# Patient Record
Sex: FEMALE | Race: WHITE | Employment: OTHER | URBAN - METROPOLITAN AREA
[De-identification: names, ages, dates, MRNs, and addresses within clinical notes are randomized per-mention and may not be internally consistent; named-entity substitution may affect disease eponyms.]

---

## 2017-02-10 RX ORDER — ATORVASTATIN CALCIUM 40 MG/1
TABLET, FILM COATED ORAL
Qty: 30 TABLET | Refills: 1 | Status: SHIPPED | OUTPATIENT
Start: 2017-02-10 | End: 2017-08-14 | Stop reason: SDUPTHER

## 2017-02-13 RX ORDER — LEVOTHYROXINE SODIUM 0.12 MG/1
TABLET ORAL
Qty: 30 TABLET | Refills: 3 | Status: SHIPPED | OUTPATIENT
Start: 2017-02-13 | End: 2017-05-18 | Stop reason: SDUPTHER

## 2017-03-20 RX ORDER — CALCIPOTRIENE 0.05 MG/ML
SOLUTION TOPICAL
Qty: 60 ML | Refills: 0 | Status: SHIPPED | OUTPATIENT
Start: 2017-03-20 | End: 2017-08-14 | Stop reason: SDUPTHER

## 2017-05-19 RX ORDER — LEVOTHYROXINE SODIUM 0.12 MG/1
TABLET ORAL
Qty: 30 TABLET | Refills: 0 | Status: SHIPPED | OUTPATIENT
Start: 2017-05-19 | End: 2017-06-16 | Stop reason: SDUPTHER

## 2017-06-16 RX ORDER — LEVOTHYROXINE SODIUM 0.12 MG/1
TABLET ORAL
Qty: 30 TABLET | Refills: 0 | Status: SHIPPED | OUTPATIENT
Start: 2017-06-16 | End: 2017-07-11 | Stop reason: SDUPTHER

## 2017-07-11 RX ORDER — LEVOTHYROXINE SODIUM 0.12 MG/1
TABLET ORAL
Qty: 30 TABLET | Refills: 0 | Status: SHIPPED | OUTPATIENT
Start: 2017-07-11 | End: 2017-08-14 | Stop reason: SDUPTHER

## 2017-08-14 ENCOUNTER — OFFICE VISIT (OUTPATIENT)
Dept: INTERNAL MEDICINE | Age: 62
End: 2017-08-14

## 2017-08-14 VITALS
DIASTOLIC BLOOD PRESSURE: 82 MMHG | WEIGHT: 238 LBS | BODY MASS INDEX: 39.65 KG/M2 | SYSTOLIC BLOOD PRESSURE: 128 MMHG | HEIGHT: 65 IN

## 2017-08-14 DIAGNOSIS — E78.5 HYPERLIPIDEMIA, UNSPECIFIED HYPERLIPIDEMIA TYPE: ICD-10-CM

## 2017-08-14 DIAGNOSIS — M16.11 PRIMARY OSTEOARTHRITIS OF RIGHT HIP: ICD-10-CM

## 2017-08-14 DIAGNOSIS — E03.9 ACQUIRED HYPOTHYROIDISM: ICD-10-CM

## 2017-08-14 DIAGNOSIS — Z12.11 ENCOUNTER FOR SCREENING COLONOSCOPY: ICD-10-CM

## 2017-08-14 DIAGNOSIS — Z00.00 ANNUAL PHYSICAL EXAM: Primary | ICD-10-CM

## 2017-08-14 DIAGNOSIS — H40.1132 PRIMARY OPEN ANGLE GLAUCOMA OF BOTH EYES, MODERATE STAGE: ICD-10-CM

## 2017-08-14 PROCEDURE — 99396 PREV VISIT EST AGE 40-64: CPT | Performed by: INTERNAL MEDICINE

## 2017-08-14 RX ORDER — CALCIPOTRIENE 0.05 MG/ML
SOLUTION TOPICAL
Qty: 60 ML | Refills: 1 | Status: SHIPPED | OUTPATIENT
Start: 2017-08-14 | End: 2018-07-23 | Stop reason: SDUPTHER

## 2017-08-14 RX ORDER — LEVOTHYROXINE SODIUM 0.12 MG/1
TABLET ORAL
Qty: 30 TABLET | Refills: 3 | Status: SHIPPED | OUTPATIENT
Start: 2017-08-14 | End: 2017-11-23 | Stop reason: SDUPTHER

## 2017-08-14 RX ORDER — ATORVASTATIN CALCIUM 40 MG/1
TABLET, FILM COATED ORAL
Qty: 30 TABLET | Refills: 3 | Status: SHIPPED | OUTPATIENT
Start: 2017-08-14 | End: 2017-11-23 | Stop reason: SDUPTHER

## 2017-08-14 RX ORDER — NAPROXEN 500 MG/1
500 TABLET ORAL 2 TIMES DAILY WITH MEALS
Qty: 60 TABLET | Refills: 2 | Status: SHIPPED | OUTPATIENT
Start: 2017-08-14 | End: 2017-10-29 | Stop reason: SDUPTHER

## 2017-08-14 ASSESSMENT — PATIENT HEALTH QUESTIONNAIRE - PHQ9
SUM OF ALL RESPONSES TO PHQ QUESTIONS 1-9: 0
1. LITTLE INTEREST OR PLEASURE IN DOING THINGS: 0
SUM OF ALL RESPONSES TO PHQ9 QUESTIONS 1 & 2: 0
2. FEELING DOWN, DEPRESSED OR HOPELESS: 0

## 2017-08-16 DIAGNOSIS — M16.11 PRIMARY OSTEOARTHRITIS OF RIGHT HIP: ICD-10-CM

## 2017-08-16 DIAGNOSIS — E78.5 HYPERLIPIDEMIA, UNSPECIFIED HYPERLIPIDEMIA TYPE: ICD-10-CM

## 2017-08-16 DIAGNOSIS — Z00.00 ANNUAL PHYSICAL EXAM: ICD-10-CM

## 2017-08-16 LAB
A/G RATIO: 1.5 (ref 1.1–2.2)
ALBUMIN SERPL-MCNC: 4.1 G/DL (ref 3.4–5)
ALP BLD-CCNC: 67 U/L (ref 40–129)
ALT SERPL-CCNC: 13 U/L (ref 10–40)
ANION GAP SERPL CALCULATED.3IONS-SCNC: 11 MMOL/L (ref 3–16)
AST SERPL-CCNC: 15 U/L (ref 15–37)
BASOPHILS ABSOLUTE: 0.1 K/UL (ref 0–0.2)
BASOPHILS RELATIVE PERCENT: 1 %
BILIRUB SERPL-MCNC: 0.3 MG/DL (ref 0–1)
BUN BLDV-MCNC: 13 MG/DL (ref 7–20)
CALCIUM SERPL-MCNC: 9.1 MG/DL (ref 8.3–10.6)
CHLORIDE BLD-SCNC: 101 MMOL/L (ref 99–110)
CHOLESTEROL, TOTAL: 187 MG/DL (ref 0–199)
CO2: 28 MMOL/L (ref 21–32)
CREAT SERPL-MCNC: 0.5 MG/DL (ref 0.6–1.2)
EOSINOPHILS ABSOLUTE: 0.6 K/UL (ref 0–0.6)
EOSINOPHILS RELATIVE PERCENT: 11.5 %
GFR AFRICAN AMERICAN: >60
GFR NON-AFRICAN AMERICAN: >60
GLOBULIN: 2.8 G/DL
GLUCOSE BLD-MCNC: 94 MG/DL (ref 70–99)
HCT VFR BLD CALC: 38.3 % (ref 36–48)
HDLC SERPL-MCNC: 69 MG/DL (ref 40–60)
HEMOGLOBIN: 12.6 G/DL (ref 12–16)
LDL CHOLESTEROL CALCULATED: 96 MG/DL
LYMPHOCYTES ABSOLUTE: 1.2 K/UL (ref 1–5.1)
LYMPHOCYTES RELATIVE PERCENT: 22.6 %
MCH RBC QN AUTO: 31.7 PG (ref 26–34)
MCHC RBC AUTO-ENTMCNC: 32.9 G/DL (ref 31–36)
MCV RBC AUTO: 96.1 FL (ref 80–100)
MONOCYTES ABSOLUTE: 0.5 K/UL (ref 0–1.3)
MONOCYTES RELATIVE PERCENT: 8.9 %
NEUTROPHILS ABSOLUTE: 2.9 K/UL (ref 1.7–7.7)
NEUTROPHILS RELATIVE PERCENT: 56 %
PDW BLD-RTO: 15 % (ref 12.4–15.4)
PLATELET # BLD: 291 K/UL (ref 135–450)
PMV BLD AUTO: 9 FL (ref 5–10.5)
POTASSIUM SERPL-SCNC: 4.5 MMOL/L (ref 3.5–5.1)
RBC # BLD: 3.99 M/UL (ref 4–5.2)
SODIUM BLD-SCNC: 140 MMOL/L (ref 136–145)
T4 FREE: 1.5 NG/DL (ref 0.9–1.8)
TOTAL PROTEIN: 6.9 G/DL (ref 6.4–8.2)
TRIGL SERPL-MCNC: 111 MG/DL (ref 0–150)
TSH REFLEX: 5.02 UIU/ML (ref 0.27–4.2)
VITAMIN D 25-HYDROXY: 31.6 NG/ML
VLDLC SERPL CALC-MCNC: 22 MG/DL
WBC # BLD: 5.1 K/UL (ref 4–11)

## 2017-10-30 RX ORDER — NAPROXEN 500 MG/1
TABLET ORAL
Qty: 60 TABLET | Refills: 0 | Status: SHIPPED | OUTPATIENT
Start: 2017-10-30 | End: 2017-11-23 | Stop reason: SDUPTHER

## 2017-11-27 RX ORDER — ATORVASTATIN CALCIUM 40 MG/1
TABLET, FILM COATED ORAL
Qty: 30 TABLET | Refills: 0 | Status: SHIPPED | OUTPATIENT
Start: 2017-11-27 | End: 2017-12-22 | Stop reason: SDUPTHER

## 2017-11-27 RX ORDER — LEVOTHYROXINE SODIUM 0.12 MG/1
TABLET ORAL
Qty: 30 TABLET | Refills: 0 | Status: SHIPPED | OUTPATIENT
Start: 2017-11-27 | End: 2017-12-22 | Stop reason: SDUPTHER

## 2017-11-27 RX ORDER — NAPROXEN 500 MG/1
TABLET ORAL
Qty: 60 TABLET | Refills: 0 | Status: SHIPPED | OUTPATIENT
Start: 2017-11-27 | End: 2017-12-22 | Stop reason: SDUPTHER

## 2017-12-22 RX ORDER — ATORVASTATIN CALCIUM 40 MG/1
TABLET, FILM COATED ORAL
Qty: 30 TABLET | Refills: 0 | Status: SHIPPED | OUTPATIENT
Start: 2017-12-22 | End: 2018-11-05 | Stop reason: SDUPTHER

## 2017-12-22 RX ORDER — LEVOTHYROXINE SODIUM 0.12 MG/1
TABLET ORAL
Qty: 30 TABLET | Refills: 0 | Status: SHIPPED | OUTPATIENT
Start: 2017-12-22 | End: 2018-01-14 | Stop reason: SDUPTHER

## 2017-12-22 RX ORDER — NAPROXEN 500 MG/1
TABLET ORAL
Qty: 60 TABLET | Refills: 0 | Status: SHIPPED | OUTPATIENT
Start: 2017-12-22 | End: 2018-01-03 | Stop reason: ALTCHOICE

## 2018-01-03 ENCOUNTER — OFFICE VISIT (OUTPATIENT)
Dept: ORTHOPEDIC SURGERY | Age: 63
End: 2018-01-03

## 2018-01-03 VITALS
HEIGHT: 65 IN | WEIGHT: 235 LBS | DIASTOLIC BLOOD PRESSURE: 83 MMHG | HEART RATE: 66 BPM | BODY MASS INDEX: 39.15 KG/M2 | SYSTOLIC BLOOD PRESSURE: 142 MMHG

## 2018-01-03 DIAGNOSIS — M79.642 BILATERAL HAND PAIN: Primary | ICD-10-CM

## 2018-01-03 DIAGNOSIS — G56.03 BILATERAL CARPAL TUNNEL SYNDROME: ICD-10-CM

## 2018-01-03 DIAGNOSIS — M79.641 BILATERAL HAND PAIN: Primary | ICD-10-CM

## 2018-01-03 PROCEDURE — 99203 OFFICE O/P NEW LOW 30 MIN: CPT | Performed by: ORTHOPAEDIC SURGERY

## 2018-01-03 PROCEDURE — 73130 X-RAY EXAM OF HAND: CPT | Performed by: ORTHOPAEDIC SURGERY

## 2018-01-03 PROCEDURE — 20526 THER INJECTION CARP TUNNEL: CPT | Performed by: ORTHOPAEDIC SURGERY

## 2018-01-03 PROCEDURE — L3908 WHO COCK-UP NONMOLDE PRE OTS: HCPCS | Performed by: ORTHOPAEDIC SURGERY

## 2018-01-03 RX ORDER — IBUPROFEN 800 MG/1
800 TABLET ORAL EVERY 8 HOURS PRN
Qty: 60 TABLET | Refills: 0 | Status: SHIPPED | OUTPATIENT
Start: 2018-01-03 | End: 2019-03-20

## 2018-01-03 NOTE — PROGRESS NOTES
Injection administration details:  Date & Time: 1/3/18 10:52 AM  Site & Comments: BILATERAL CARPAL TUNNEL administered by Dr Stiven Hudson 400mg/10mL  CC# : 1  [facility], [place]:[id number]   Lot number: [id number]  EXP: 12/2018    Xylocaine 1% 50mL  CC# : 1  NDC: 91294-348-59  Lot number: [id number]  EXP: 06/2021

## 2018-01-15 RX ORDER — LEVOTHYROXINE SODIUM 0.12 MG/1
TABLET ORAL
Qty: 30 TABLET | Refills: 0 | Status: SHIPPED | OUTPATIENT
Start: 2018-01-15 | End: 2018-02-16 | Stop reason: SDUPTHER

## 2018-01-23 ENCOUNTER — OFFICE VISIT (OUTPATIENT)
Dept: ORTHOPEDIC SURGERY | Age: 63
End: 2018-01-23

## 2018-01-23 DIAGNOSIS — G56.03 BILATERAL CARPAL TUNNEL SYNDROME: Primary | ICD-10-CM

## 2018-01-23 PROCEDURE — 95886 MUSC TEST DONE W/N TEST COMP: CPT | Performed by: PHYSICAL MEDICINE & REHABILITATION

## 2018-01-23 PROCEDURE — 95910 NRV CNDJ TEST 7-8 STUDIES: CPT | Performed by: PHYSICAL MEDICINE & REHABILITATION

## 2018-01-23 NOTE — PROGRESS NOTES
[address] MEDICINE      Patient: Evie Gordon Age: 57 Years 3 Months  Sex: Female Date: 1/23/2018  YOB: 1955 Ref. Phys.: Dr Mari Roberts  Notes:  LUE pain in elbow to wrist; n/t bilateral hands; no DM, no xs Etoh abuse, h/o hypothyroid; improvements with wrist inj. and splints      Sensory NCS      Nerve / Sites Peak PeakAmp Dist Jayro    ms µV cm m/s   L MEDIAN - D2 ULNAR D5   1. Median Wrist 4.20 20.1 14 42.4   2. Ulnar Wrist 3.40 24.3 14 51.9   R MEDIAN - D2 ULNAR D5   1. Median Wrist 4.25 20.9 14 41.2   2. Ulnar Wrist 3.40 30.3 14 54.9   L MEDIAN - RADIAL THUMB   1. Median Wrist       2. Radial Wrist 2.05 46.0 10 87.0   R MEDIAN - RADIAL THUMB   1. Median Wrist       2. Radial Wrist 2.00 24.5 8 50.0       Motor NCS      Nerve / Sites Lat Amp Amp Dist Jayro    ms mV % cm m/s   L MEDIAN - APB   1. Wrist 4.30 10.0 100 8    2. Elbow 8.45 9.7 97 22 53.0   R MEDIAN - APB   1. Wrist 4.25 8.1 100 8    2. Elbow 8.15 7.5 93.4 22 56.4   L ULNAR - ADM   1. Wrist 2.55 7.3 100 8    2. B. Elbow 5.80 7.1 98.3 19 58.5   3. A. Elbow 7.55 7.2 99.5 10 57.1   R ULNAR - ADM   1. Wrist 2.40 8.8 100 8    2. B. Elbow 5.55 8.5 96.1 19 60.3   3. A. Elbow 7.20 8.4 94.8 10 60.6       EMG Summary Table     Spontaneous MUAP Recruit. Ins. Act Fibs. PSW Fasics. H.F. Amp. Dur. Poly's. Pattern   L. ABD POLL BREVIS N None None None None ++ + + N   R. ABD POLL BREVIS N None None None None ++ + N N   R. FIRST D INTEROSS N None None None None + N N N   R. EXT DIG COMM N None None None None + + N N   R. PRON TERES N None None None None N N N N   R. BICEPS N None None None None N N N N   R. TRICEPS N None None None None N N N N   R. CERV PSP (L) N None None None None N N N N   L. CERV PSP (L) N None None None None N N N N   L. EXT CARPI R BREV N None None None None N N N N   L. EXT DIG COMM N None None None None N N N N   L.  PRON TERES N None None None None N N N N   L. FIRST D INTEROSS N None

## 2018-01-25 ENCOUNTER — OFFICE VISIT (OUTPATIENT)
Dept: ORTHOPEDIC SURGERY | Age: 63
End: 2018-01-25

## 2018-01-25 VITALS — BODY MASS INDEX: 39.16 KG/M2 | HEIGHT: 65 IN | WEIGHT: 235.01 LBS

## 2018-01-25 DIAGNOSIS — G56.03 BILATERAL CARPAL TUNNEL SYNDROME: Primary | ICD-10-CM

## 2018-01-25 PROCEDURE — 99213 OFFICE O/P EST LOW 20 MIN: CPT | Performed by: ORTHOPAEDIC SURGERY

## 2018-01-25 NOTE — LETTER
KEEP YOUR HAND ELEVATED - Surgery always results in swelling. Most of the pain and stiffness right after surgery is due to internal pressure from swelling. To relieve the pressure, raise your hand higher than your heart as often as possible to drain fluid out of your hand for at least three (3) days after surgery. Swelling may also make the cast or bandage tight, which will cause more pain and swelling. If you feel that your cast or bandage is too tight, please contact me or Kole Cheek- we would rather change it than for you to have a problem. KEEP YOUR BANDAGE DRY -  Wounds heal with the fewest problems if they are kept clean and dry. When bathing, protect your bandage in a plastic bag. If you bandage gets wet on the inside, it should be changed not simply allowed to dry. I would rather change your cast or bandage then risk a problem with your wound. DO NOT REMOVE OR CHANGE YOUR BANDAGES - unless you have been given specific instructions from Dr. Manuel Benavides to change them before being seen for you post operative appointment. BRUISING OR BLEEDING - This is  Common after surgery. Bandages often become stained with blood on the day of surgery. Bruising often worsens several days after surgery. Bruising or bleeding is usually not a source of concern unless accompanied by steady drainage, worsening pain, or progressive swelling. MEDICATIONS - You will most likely be given at least one prescription following surgery. All medications should be taken only as directed on the prescription. Nausea is common when taking pain medications. Take the pain medicine only as needed and not on an empty stomach. Itching or a rash are signs of possible mild allergic reaction.   Contact our office should this persist.    HAND THERAPY:  (Only as checked off here)       No Therapy will be needed at this time     xxx Every two (2) hours, do this (4) times daily:  With your bandage on,

## 2018-01-26 ENCOUNTER — TELEPHONE (OUTPATIENT)
Dept: ORTHOPEDIC SURGERY | Age: 63
End: 2018-01-26

## 2018-01-29 NOTE — PROGRESS NOTES
Assessment: 15-year-old female presenting with multiple bilateral upper extremity complaints including numbness tingling bilateral hands  1. Bilateral carpal tunnel syndrome, possible mild chronic C8 radiculopathy right upper extremity  2. Bilateral basal thumb joint arthritis  3. Left elbow lateral epicondylitis      Treatment Plan: I discussed with the patient today results of electrodiagnostic study. She does have evidence of mild to moderate bilateral carpal tunnel syndrome consistent with her clinical examination. She has had transient benefit from bilateral corticosteroid injections in the carpal tunnel was begun to have recurrent symptoms. Her symptoms have been over 6 months despite conservative treatment. We did discuss treatment options for her carpal tunnel including both operative and nonoperative intervention. Risks and benefits of each option were discussed and patient would like to proceed with surgical intervention for her left carpal tunnel syndrome  The risks and benefits were discussed thoroughly involving mini-open carpal tunnel release. These included, but were not limited to infection, tendon or nerve injury, scar or thenar sensitivity, need for transfusion, adverse effects of anesthesia including stroke heart attack, blood clot and death, incomplete relief of numbness, pain, and/or weakness. We will continue to treat conservatively her right carpal tunnel syndrome with use of wrist brace at night. Consent obtained for left carpal tunnel release, plan for arrangement for appropriate preoperative medical workup. Regarding her left elbow, continue with conservative treatment consisting of home exercise program for lateral epicondylitis and consider formal referral for occupational therapy for stretching and strengthening. Continue with activity modification including lifting modifications for her left upper extremity.  We will continue to monitor symptoms and if worsening symptoms we'll Left elbow:  Range of motion 0-130°, 70° of pronation and supination with no mechanical symptoms  Focal tenderness lateral epicondyle with mild swelling lateral elbow. No additional skin changes throughout left elbow  Pain with wrist extension and long finger extension     Neurological:  Direct compression, Tinel's, and Phalen's testing reproduces the patient's symptoms into the median nerve distribution bilaterally  Negative cubital tunnel syndrome bilateral upper extremity  Negative Spurling sign bilateral upper extremity  2+ brachioradialis and triceps tendon reflexes, negative Glenn sign bilateral upper extremity    Capillary refill brisk all fingers, symmetric  Gross sensation intact to light touch median/ulnar/radial nerves  Sensation intact to radial/ulnar aspect of fingertip        Radiology:    X-rays obtained and reviewed in office:  No new images obtained today    Additional Diagnostic Test Findings: EMG/nerve conduction study bilateral upper extremity 1/23/2018: There is electrodiagnostic evidence of:     1. Mild to moderate bilateral median mononeuropathies around the wrists(carpal tunnel syndrome)  2. Findings suggestive of mild chronic right C8 radiculopathy  3. No evidence of any other left or right upper extremity mononeuropathy, plexopathy, or radiculopathy   As interpreted by Dr. Ana De Paz , personally reviewed by me please see media tab for full details      Office Procedures:  No orders of the defined types were placed in this encounter. Allan Mcknight MD  Orthopaedic Surgeon, [address]    Contact Information:  [place]: [phone] [id number] Clinical )    This dictation was performed with a verbal recognition program [facility]) and it was checked for errors. It is possible that there are still dictated errors within this office note. If so, please bring any errors to my attention for an addendum.   All efforts were made to ensure that this office note is accurate.

## 2018-01-29 NOTE — PROGRESS NOTES
Chief Complaint   Patient presents with    Hand Pain     B Hands         HISTORY OF PRESENT ILLNESS:  Melchor Galvan is a 57 y.o.  right-hand-dominant patient, , here for a numbness & tingling in the  Bilateral hand and wrist that began approximately 6 months ago. She reports numbness and tingling in nearly all of her fingers that has been intermittent but her aggressive over the last several months. She denies any catching or locking of her fingers. No history of rheumatoid arthritis, gout or other inflammatory arthropathy. She does report a history of bilateral thumb base pain as well over the last several months. She has tried oral anti-inflammatories occasionally for this pain that is exacerbated by pinching and gripping activities. Finally, she report a history of some left lateral sided elbow pain with radiating symptoms down her left forearm and into her hand and wrist.  No reported injury to her left hand or left elbow. Outstretched lifting and gripping exacerbate the symptoms    The discomfort does affect sleep at night. she has not tried wrist splints. EMG testing: no.        Medical History:  Patient's medications, allergies, past medical, surgical, social and family histories were reviewed and updated as appropriate. ROS:  Pertinent items are noted in HPI  Review of systems reviewed from Patient History Form dated on 1/3/18 and available in the patient's chart under the Media tab. PHYSICAL EXAMINATION:    Gen/Psych: Examination reveals a pleasant individual in no acute distress. The patient is oriented to time, place and person. The patient's mood and affect are appropriate. Lymph: The lymphatic examination bilaterally reveals all areas to be without enlargement or induration. Skin intact without lymphadenopathy, discoloration, or abnormal temperature. Vascular: There is intact, symmetric circulation in both upper extremities.  Capillary refill brisk in

## 2018-01-30 ENCOUNTER — OFFICE VISIT (OUTPATIENT)
Dept: INTERNAL MEDICINE | Age: 63
End: 2018-01-30

## 2018-01-30 VITALS
HEIGHT: 65 IN | WEIGHT: 239 LBS | DIASTOLIC BLOOD PRESSURE: 70 MMHG | BODY MASS INDEX: 39.82 KG/M2 | SYSTOLIC BLOOD PRESSURE: 130 MMHG

## 2018-01-30 DIAGNOSIS — Z01.818 PREOP EXAM FOR INTERNAL MEDICINE: Primary | ICD-10-CM

## 2018-01-30 DIAGNOSIS — E03.9 ACQUIRED HYPOTHYROIDISM: ICD-10-CM

## 2018-01-30 DIAGNOSIS — G56.03 BILATERAL CARPAL TUNNEL SYNDROME: ICD-10-CM

## 2018-01-30 DIAGNOSIS — E78.5 HYPERLIPIDEMIA, UNSPECIFIED HYPERLIPIDEMIA TYPE: ICD-10-CM

## 2018-01-30 PROCEDURE — 99214 OFFICE O/P EST MOD 30 MIN: CPT | Performed by: INTERNAL MEDICINE

## 2018-02-02 ENCOUNTER — HOSPITAL ENCOUNTER (OUTPATIENT)
Dept: PREADMISSION TESTING | Age: 63
Discharge: HOME | End: 2018-02-02
Attending: ORTHOPAEDIC SURGERY | Admitting: ORTHOPAEDIC SURGERY

## 2018-02-02 VITALS
OXYGEN SATURATION: 98 % | RESPIRATION RATE: 16 BRPM | BODY MASS INDEX: 39.15 KG/M2 | DIASTOLIC BLOOD PRESSURE: 62 MMHG | HEART RATE: 58 BPM | SYSTOLIC BLOOD PRESSURE: 138 MMHG | WEIGHT: 235 LBS | TEMPERATURE: 97.6 F | HEIGHT: 65 IN

## 2018-02-02 DIAGNOSIS — Z98.890 S/P CARPAL TUNNEL RELEASE: Primary | ICD-10-CM

## 2018-02-02 RX ORDER — MEPERIDINE HYDROCHLORIDE 25 MG/ML
12.5 INJECTION INTRAMUSCULAR; INTRAVENOUS; SUBCUTANEOUS EVERY 5 MIN PRN
Status: DISCONTINUED | OUTPATIENT
Start: 2018-02-02 | End: 2018-02-03 | Stop reason: HOSPADM

## 2018-02-02 RX ORDER — HYDROCODONE BITARTRATE AND ACETAMINOPHEN 5; 325 MG/1; MG/1
1 TABLET ORAL EVERY 6 HOURS PRN
Qty: 10 TABLET | Refills: 0 | Status: SHIPPED | OUTPATIENT
Start: 2018-02-02 | End: 2018-02-05

## 2018-02-02 RX ORDER — SODIUM CHLORIDE, SODIUM LACTATE, POTASSIUM CHLORIDE, CALCIUM CHLORIDE 600; 310; 30; 20 MG/100ML; MG/100ML; MG/100ML; MG/100ML
INJECTION, SOLUTION INTRAVENOUS CONTINUOUS
Status: DISCONTINUED | OUTPATIENT
Start: 2018-02-02 | End: 2018-02-03 | Stop reason: HOSPADM

## 2018-02-02 RX ORDER — ONDANSETRON 2 MG/ML
4 INJECTION INTRAMUSCULAR; INTRAVENOUS
Status: ACTIVE | OUTPATIENT
Start: 2018-02-02 | End: 2018-02-02

## 2018-02-02 RX ORDER — FENTANYL CITRATE 50 UG/ML
25 INJECTION, SOLUTION INTRAMUSCULAR; INTRAVENOUS EVERY 5 MIN PRN
Status: DISCONTINUED | OUTPATIENT
Start: 2018-02-02 | End: 2018-02-03 | Stop reason: HOSPADM

## 2018-02-02 RX ORDER — LABETALOL HYDROCHLORIDE 5 MG/ML
5 INJECTION, SOLUTION INTRAVENOUS EVERY 10 MIN PRN
Status: DISCONTINUED | OUTPATIENT
Start: 2018-02-02 | End: 2018-02-03 | Stop reason: HOSPADM

## 2018-02-02 RX ADMIN — SODIUM CHLORIDE, SODIUM LACTATE, POTASSIUM CHLORIDE, CALCIUM CHLORIDE: 600; 310; 30; 20 INJECTION, SOLUTION INTRAVENOUS at 08:11

## 2018-02-02 ASSESSMENT — PAIN SCALES - GENERAL
PAINLEVEL_OUTOF10: 0

## 2018-02-02 NOTE — PROGRESS NOTES
Device  [] Crutches   []Drain    [] Wanda Dove   []Other  [] Inpatient / significant other understands the plan for transfer to the inpatient unit

## 2018-02-02 NOTE — ANESTHESIA PRE-OP
Department of Anesthesiology  Preprocedure Note       Name:  Narda Schilder   Age:  57 y.o.  :  1955                                          MRN:  [id number]         Date:  2018      Surgeon: Grant Martinez    Procedure: CTS release    Medications prior to admission:   Prior to Admission medications    Medication Sig Start Date End Date Taking? Authorizing Provider   levothyroxine (SYNTHROID) 125 MCG tablet TAKE 1 TABLET BY MOUTH EVERY DAY 1/15/18  Yes Lois Nye MD   ibuprofen (ADVIL;MOTRIN) 800 MG tablet Take 1 tablet by mouth every 8 hours as needed for Pain 1/3/18  Yes Karl Cushing, MD   atorvastatin (LIPITOR) 40 MG tablet TAKE ONE TABLET BY MOUTH EVERY DAY 17  Yes Lois Nye MD   Vitamin D (CHOLECALCIFEROL) 1000 UNITS CAPS capsule Take 1,000 Units by mouth daily. Yes Historical Provider, MD   Risedronate Sodium (ACTONEL) 150 MG TABS Take  by mouth every 30 days. Yes Historical Provider, MD   latanoprost (XALATAN) 0.005 % ophthalmic solution 1 drop nightly. Yes Historical Provider, MD   timolol (TIMOPTIC-XR) 0.5 % ophthalmic gel-forming 1 drop daily. Yes Historical Provider, MD   calcium carbonate (OSCAL) 500 MG TABS tablet Take 500 mg by mouth daily. Yes Historical Provider, MD   fish oil-omega-3 fatty acids 1000 MG capsule Take 2 g by mouth daily. Yes Historical Provider, MD   calcipotriene (DOVONEX) 0.005 % solution APPLY EXTERNALLY TO THE AFFECTED AREA TWICE DAILY 17   Lois Nye MD   Nutritional Supplements (NUTRITIONAL SUPPLEMENT PO) Take  by mouth.     Historical Provider, MD       Current medications:    Current Outpatient Prescriptions   Medication Sig Dispense Refill    levothyroxine (SYNTHROID) 125 MCG tablet TAKE 1 TABLET BY MOUTH EVERY DAY 30 tablet 0    ibuprofen (ADVIL;MOTRIN) 800 MG tablet Take 1 tablet by mouth every 8 hours as needed for Pain 60 tablet 0    atorvastatin (LIPITOR) 40 MG tablet TAKE ONE TABLET BY MOUTH EVERY DAY 30 tablet 0    Vitamin D Vitals:    02/02/18 0637 02/02/18 0730   BP: (!) 158/91 (!) 148/79   Pulse: 73    Resp: 18    Temp: 97.7 °F (36.5 °C)    TempSrc: Oral    SpO2: 95%    Weight: 235 lb (106.6 kg)    Height: 5' 5\" (1.651 m)                                               BP Readings from Last 3 Encounters:   02/02/18 (!) 148/79   01/30/18 130/70   01/03/18 (!) 142/83       NPO Status: Time of last liquid consumption: 2000                        Time of last solid consumption: 2000                        Date of last liquid consumption: 02/01/18                        Date of last solid food consumption: 02/01/18    BMI:   Wt Readings from Last 3 Encounters:   02/02/18 235 lb (106.6 kg)   01/30/18 239 lb (108.4 kg)   01/25/18 235 lb 0.2 oz (106.6 kg)     Body mass index is 39.11 kg/m². Anesthesia Evaluation   history of anesthetic complications (PONV (with GA)): Airway: Mallampati: II  TM distance: >3 FB   Neck ROM: full  Mouth opening: > = 3 FB Dental:      Comment: Good dentition    Pulmonary:                             ROS comment: Deneis Asthma, COPD, Smoking   Cardiovascular:                   ROS comment: Deneis CP, SOA with moderate exercise     Neuro/Psych:   Negative Neuro/Psych ROS               ROS comment: As per surgery GI/Hepatic/Renal:        (-) GERD, liver disease and no renal disease       Endo/Other:    (+) hypothyroidism (controlled)::., .    (-) no Type II DM               Abdominal:           Vascular: negative vascular ROS. Anesthesia Plan      MAC     ASA 2       Induction: intravenous. Anesthetic plan and risks discussed with patient.                       Lizz Espinoza MD   2/2/2018

## 2018-02-02 NOTE — BRIEF OP NOTE
Brief Postoperative Note    Gilda Agustin  YOB: 1955  [id number]    Pre-operative Diagnosis: 1. Left carpal tunnel syndrome    Post-operative Diagnosis: Same    Procedure: 1.  Left mini open carpal tunnel release    Anesthesia: MAC and Local    Surgeons/Assistants: Adeline Gillette MD/Noemy MATHEW     Estimated Blood Loss: less than 5ml    Complications: None immediate apparent    Specimens: Was Not Obtained    Findings: see fully dictated operative report    Electronically signed by Ok Red MD on 2/2/2018 at 9:34 AM

## 2018-02-02 NOTE — OP NOTE
hand, dissection carried down through  skin and subcutaneous tissue as well as palmar fascia.  At this point  retractors were used to identify transverse carpal ligament, which was  identified and incised using a 15 blade sharply at  the distal portion of the  carpal tunnel . A Richardson elevator was inserted to protect the contents of the  carpal tunnel at all times.  The distal release was completed using a  combination of tenotomy scissors, gently releasing the fibers distally as well  as a 15 blade until the yellow fat distally was identified and complete  release of the nerve was directly visualized.  We then focused attention  proximally. Under direct visualization, the transverse carpal ligament was  released using a 15 blade using a Richardson elevator for protection of the median  nerve and carpal tunnel contents.  Tenotomy scissors were used to incise the  more superficial palmar fascia proximally as well with again direct  visualization noted.  Fingertip could be inserted to ensure adequate proximal  and distal decompression.  The contents of the carpal tunnel were examined and  found to be consistent with changes of median nerve compression.   The  tourniquet was then released, and hemostasis was achieved with bipolar  electrocautery.  The wound was irrigated with sterile saline.  The skin was  then closed using interrupted 5-0 nylon suture.  The sterile dressing using  Adaptic, bacitracin with sterile overwrap was then applied.  The patient was  awoken and taken to PACU in stable condition.  No apparent complications were  noted.           Findings:  No aberrant motor branch     Intervention:  1% Xylocaine, 0.25% Marcaine, without epinephrine as local injection        Other Notes:   Follow-up in 10-14 days for wound inspection and likely suture removal.  Patient will be taught a home exercise range of motion program for the wrist and fingers along with scar and thenar massage.  If there are any concerns or if the patient desires, hand therapy will be ordered.         Florencio Miller MD    Hand & Upper Extremity Surgery  [address] [facility] ([facility])

## 2018-02-02 NOTE — H&P
I have reviewed the history and physical and examined the patient and find no relevant changes. I have reviewed with the patient and/or family the risks, benefits, and alternatives to the procedure.     Mary Garcia MD  2/2/2018
PT CALLED AND CANCELED APPT FOR TODAY DUE TO FAMILY EMERGENCY. ASKED IF SHE COULD GET A REFILL ON HER JARDIANCE AND NEURONTIN.  [facility].     THANK  YOU.   
CAPS capsule Take 1,000 Units by mouth daily. Yes Historical Provider, MD   Risedronate Sodium (ACTONEL) 150 MG TABS Take  by mouth every 30 days. Yes Historical Provider, MD   latanoprost (XALATAN) 0.005 % ophthalmic solution 1 drop nightly. Yes Historical Provider, MD   timolol (TIMOPTIC-XR) 0.5 % ophthalmic gel-forming 1 drop daily. Yes Historical Provider, MD   calcium carbonate (OSCAL) 500 MG TABS tablet Take 500 mg by mouth daily. Yes Historical Provider, MD   fish oil-omega-3 fatty acids 1000 MG capsule Take 2 g by mouth daily. Yes Historical Provider, MD   calcipotriene (DOVONEX) 0.005 % solution APPLY EXTERNALLY TO THE AFFECTED AREA TWICE DAILY 8/14/17   Sharon Solomon MD   Nutritional Supplements (NUTRITIONAL SUPPLEMENT PO) Take  by mouth. Historical Provider, MD         Allergies:  Codeine phosphate    PHYSICAL EXAM:      BP (!) 148/79   Pulse 73   Temp 97.7 °F (36.5 °C) (Oral)   Resp 18   Ht 5' 5\" (1.651 m)   Wt 235 lb (106.6 kg)   SpO2 95%   BMI 39.11 kg/m²      Heart:  regular rate and rhythm,no murmur     Lungs:  No increased work of breathing, good air exchange, clear to auscultation bilaterally, no crackles or wheezing    Abdomen:  soft, non-distended, non-tender, no rebound tenderness or guarding, normal active bowel sounds and no masses palpated    ASSESSMENT AND PLAN:    1. Patient seen and focused exam done today- no new changes since last physical exam on 01/30/18    2. Access to ancillary services are available per request of the provider.     Marisela Castillo CNP     2/2/2018

## 2018-02-14 ENCOUNTER — TELEPHONE (OUTPATIENT)
Dept: ORTHOPEDIC SURGERY | Age: 63
End: 2018-02-14

## 2018-02-14 ENCOUNTER — OFFICE VISIT (OUTPATIENT)
Dept: ORTHOPEDIC SURGERY | Age: 63
End: 2018-02-14

## 2018-02-14 VITALS — BODY MASS INDEX: 39.16 KG/M2 | HEIGHT: 65 IN | WEIGHT: 235.01 LBS

## 2018-02-14 DIAGNOSIS — G56.02 LEFT CARPAL TUNNEL SYNDROME: Primary | ICD-10-CM

## 2018-02-14 DIAGNOSIS — G56.01 RIGHT CARPAL TUNNEL SYNDROME: ICD-10-CM

## 2018-02-14 PROBLEM — G56.03 BILATERAL CARPAL TUNNEL SYNDROME: Status: ACTIVE | Noted: 2018-02-14

## 2018-02-14 PROCEDURE — 99212 OFFICE O/P EST SF 10 MIN: CPT | Performed by: ORTHOPAEDIC SURGERY

## 2018-02-14 PROCEDURE — L3908 WHO COCK-UP NONMOLDE PRE OTS: HCPCS | Performed by: ORTHOPAEDIC SURGERY

## 2018-02-14 PROCEDURE — 99024 POSTOP FOLLOW-UP VISIT: CPT | Performed by: ORTHOPAEDIC SURGERY

## 2018-02-14 NOTE — PROGRESS NOTES
Nasreen Desai: [phone]  Clinical )    This dictation was performed with a verbal recognition program [facility]) and it was checked for errors. It is possible that there are still dictated errors within this office note. If so, please bring any errors to my attention for an addendum. All efforts were made to ensure that this office note is accurate.

## 2018-02-16 RX ORDER — LEVOTHYROXINE SODIUM 0.12 MG/1
TABLET ORAL
Qty: 30 TABLET | Refills: 0 | Status: SHIPPED | OUTPATIENT
Start: 2018-02-16 | End: 2018-03-13 | Stop reason: SDUPTHER

## 2018-02-21 NOTE — PROGRESS NOTES
The following educational items and goals will be achieved upon completion of the patient's Pre-admission testing experience:             Identify the learner who is being assessed for education:  patient                    Ability to Learn:  Exhibits ability to grasp concepts and respond to questions: High  Ready to Learn: Yes  calm   Preferred Method of Learning:  written  Barriers to Learning: Verbalizes interest  Special Considerations due to cultural, Mu-ism, spiritual beliefs:  No  Language:  English  :  Ana Paula Stephens  [x] Appropriate evaluation / integration of data as delineated by ASPAN Standards of Perianesthesia Nursing Practice    Pain scale and pain management   [x]Patient verbalizes understanding of pain scale and pain management  [x]Pre-operative determination of patients anticipated Post-Operative pain goal:   less than 4 of 10 on 10 point scale post op goal  [] Other     Medication(s) - Compliance with preop medication instructions  [x] Patient verbalizes understanding of preop medications (see [facility] Presurgical Instructions)    Instructions, Pre op                                                                                            [x] Patient verbalizes understanding of presurgical instructions as reviewed with phone interview nurse or in-person nurse review    Fall Risk Potential, Preoperatively                                                                                   []No preoperative risk identified  []Preop risk identified:                    []Sensory deficit        []Motor deficit        []Balance problem        []Home medication        []Uses assistive device                    []History of a Fall within the last 30 days    Goal(s) for fall prevention:  []Prevent fall or injury by requesting assistance with activities of daily living  []Patient / Significant other verbalizes understanding the need to call

## 2018-02-21 NOTE — PROGRESS NOTES
[address] [facility]                          Date of Procedure 2/23/18 Time of Procedure 7:30 a.m    PRIOR TO PROCEDURE DATE:  1. Please follow any guidelines/instructions prior to your procedure as advised by your surgeon. 2. Arrange for someone to drive you home and be with you for the first 24 hours after discharge for your safety after your procedure for which you received sedation. Ensure it is someone we can share information with regarding your discharge. 3. You must contact your surgeon for instructions IF:   You are taking any blood thinners, aspirin, anti-inflammatory or vitamin E.   There is a change in your physical condition such as a cold, fever, rash, cuts, sores or any other infection, especially near your surgical site. 4. Do not drink alcohol the day before or day of your procedure. 5. A Pre-op History and Physical for surgery MUST be completed by your Physician or Urgent Care within 30 days of your procedure date. Please bring a copy with you on the day of your procedure and along with any other testing performed. THE DAY OF YOUR PROCEDURE:  1. Follow instructions for ARRIVAL TIME as DIRECTED BY YOUR SURGEON. If your surgeon does not give you a specific arrival time, please arrive at 5:30 a.m.    2. Enter the MAIN entrance from [facility] and follow the signs to the free [employer] or [employer] parking (offered free of charge 6am-5pm). 3. Enter the Main Entrance of the hospital (do not enter from the lower level of the parking garage). Upon entrance, check in with the  at the main desk on your left. If no one is available at the desk, proceed into the [facility] Waiting Room and go through the door directly into the [facility]. There is a Check-in desk ACROSS from Room 5 (marked with a sign hanging from the ceiling). The phone number for the surgery center is [phone].     4. Please call [phone] option #2

## 2018-02-23 ENCOUNTER — HOSPITAL ENCOUNTER (OUTPATIENT)
Dept: PREADMISSION TESTING | Age: 63
Discharge: HOME | End: 2018-02-23
Attending: ORTHOPAEDIC SURGERY | Admitting: ORTHOPAEDIC SURGERY

## 2018-02-23 VITALS
SYSTOLIC BLOOD PRESSURE: 128 MMHG | HEIGHT: 65 IN | OXYGEN SATURATION: 96 % | DIASTOLIC BLOOD PRESSURE: 69 MMHG | BODY MASS INDEX: 39.15 KG/M2 | TEMPERATURE: 97.7 F | RESPIRATION RATE: 16 BRPM | WEIGHT: 235 LBS | HEART RATE: 66 BPM

## 2018-02-23 RX ORDER — ONDANSETRON 2 MG/ML
4 INJECTION INTRAMUSCULAR; INTRAVENOUS
Status: ACTIVE | OUTPATIENT
Start: 2018-02-23 | End: 2018-02-23

## 2018-02-23 RX ORDER — MEPERIDINE HYDROCHLORIDE 25 MG/ML
12.5 INJECTION INTRAMUSCULAR; INTRAVENOUS; SUBCUTANEOUS EVERY 5 MIN PRN
Status: DISCONTINUED | OUTPATIENT
Start: 2018-02-23 | End: 2018-02-24 | Stop reason: HOSPADM

## 2018-02-23 RX ORDER — SODIUM CHLORIDE 0.9 % (FLUSH) 0.9 %
10 SYRINGE (ML) INJECTION EVERY 12 HOURS SCHEDULED
Status: DISCONTINUED | OUTPATIENT
Start: 2018-02-23 | End: 2018-02-24 | Stop reason: HOSPADM

## 2018-02-23 RX ORDER — SODIUM CHLORIDE 0.9 % (FLUSH) 0.9 %
10 SYRINGE (ML) INJECTION PRN
Status: DISCONTINUED | OUTPATIENT
Start: 2018-02-23 | End: 2018-02-24 | Stop reason: HOSPADM

## 2018-02-23 RX ORDER — FENTANYL CITRATE 50 UG/ML
50 INJECTION, SOLUTION INTRAMUSCULAR; INTRAVENOUS EVERY 5 MIN PRN
Status: DISCONTINUED | OUTPATIENT
Start: 2018-02-23 | End: 2018-02-24 | Stop reason: HOSPADM

## 2018-02-23 RX ORDER — FENTANYL CITRATE 50 UG/ML
25 INJECTION, SOLUTION INTRAMUSCULAR; INTRAVENOUS EVERY 5 MIN PRN
Status: DISCONTINUED | OUTPATIENT
Start: 2018-02-23 | End: 2018-02-24 | Stop reason: HOSPADM

## 2018-02-23 RX ORDER — LABETALOL HYDROCHLORIDE 5 MG/ML
5 INJECTION, SOLUTION INTRAVENOUS EVERY 10 MIN PRN
Status: DISCONTINUED | OUTPATIENT
Start: 2018-02-23 | End: 2018-02-24 | Stop reason: HOSPADM

## 2018-02-23 RX ORDER — OXYCODONE HYDROCHLORIDE AND ACETAMINOPHEN 5; 325 MG/1; MG/1
1 TABLET ORAL
Status: ACTIVE | OUTPATIENT
Start: 2018-02-23 | End: 2018-02-23

## 2018-02-23 RX ORDER — HYDRALAZINE HYDROCHLORIDE 20 MG/ML
5 INJECTION INTRAMUSCULAR; INTRAVENOUS EVERY 10 MIN PRN
Status: DISCONTINUED | OUTPATIENT
Start: 2018-02-23 | End: 2018-02-24 | Stop reason: HOSPADM

## 2018-02-23 RX ORDER — PROMETHAZINE HYDROCHLORIDE 25 MG/ML
6.25 INJECTION, SOLUTION INTRAMUSCULAR; INTRAVENOUS
Status: ACTIVE | OUTPATIENT
Start: 2018-02-23 | End: 2018-02-23

## 2018-02-23 RX ORDER — SODIUM CHLORIDE, SODIUM LACTATE, POTASSIUM CHLORIDE, CALCIUM CHLORIDE 600; 310; 30; 20 MG/100ML; MG/100ML; MG/100ML; MG/100ML
INJECTION, SOLUTION INTRAVENOUS CONTINUOUS
Status: DISCONTINUED | OUTPATIENT
Start: 2018-02-23 | End: 2018-02-24 | Stop reason: HOSPADM

## 2018-02-23 RX ADMIN — SODIUM CHLORIDE, SODIUM LACTATE, POTASSIUM CHLORIDE, CALCIUM CHLORIDE: 600; 310; 30; 20 INJECTION, SOLUTION INTRAVENOUS at 06:31

## 2018-02-23 ASSESSMENT — PAIN SCALES - GENERAL
PAINLEVEL_OUTOF10: 0

## 2018-02-23 ASSESSMENT — PAIN - FUNCTIONAL ASSESSMENT: PAIN_FUNCTIONAL_ASSESSMENT: 0-10

## 2018-02-23 ASSESSMENT — PAIN DESCRIPTION - DESCRIPTORS: DESCRIPTORS: ACHING;TINGLING

## 2018-02-23 NOTE — H&P
I have reviewed the history and physical and examined the patient and find no relevant changes. I have reviewed with the patient and/or family the risks, benefits, and alternatives to the procedure.     Carmine Suarez MD  2/23/2018

## 2018-02-23 NOTE — H&P
Shayylissy Agustin    [id number]    [facility] Same Day Surgery Update H & P  Department of General Surgery   Surgical Service   CNP Pre-operative History and Physical  Last H & P within the last 30 days. DIAGNOSIS:   RIGHT CARPAL TUNNEL SYNDROME    PROCEDURE:  Right Open Carpal Tunnel Release; Any Other Indicated Procedures      HISTORY OF PRESENT ILLNESS: Pt. Is a 57 y.o. Morbidly Obese Female who c/o of pain and N/T in right hand and fingers. Sx. Not relieved with conservative treatment. Pt. Is now here for surgical intervention. Please see initial H & P     Past Medical History:        Diagnosis Date    Arthritis     Bone spur     CTS (carpal tunnel syndrome)     Cyst     left foot and spur    Glaucoma     Hyperlipidemia     Hypothyroidism     Osteopenia     Overweight(278.02)     PONV (postoperative nausea and vomiting)     S/P foot surgery 07/07/2011    excision cyst and resection spur left foot    Scalp psoriasis      Past Surgical History:        Procedure Laterality Date    CARPAL TUNNEL RELEASE Left 02/02/2018    COLONOSCOPY      2011    FOOT SURGERY  07/07/2011    w/local anesthesia    JOINT REPLACEMENT  06/2015    Rt. hip      Past Social History:  Social History     Social History    Marital status:      Spouse name: N/A    Number of children: N/A    Years of education: N/A     Social History Main Topics    Smoking status: Former Smoker     Quit date: 7/6/1991    Smokeless tobacco: Never Used    Alcohol use Yes      Comment: occasionally    Drug use: No    Sexual activity: Not Asked     Other Topics Concern    None     Social History Narrative    None         Medications Prior to Admission:      Prior to Admission medications    Medication Sig Start Date End Date Taking?  Authorizing Provider   Multiple Vitamins-Minerals (MULTIVITAMIN PO) Take by mouth daily   Yes Historical Provider, MD   levothyroxine (SYNTHROID) 125 MCG tablet TAKE 1 TABLET BY MOUTH EVERY DAY

## 2018-03-07 ENCOUNTER — OFFICE VISIT (OUTPATIENT)
Dept: ORTHOPEDIC SURGERY | Age: 63
End: 2018-03-07

## 2018-03-07 DIAGNOSIS — G56.01 RIGHT CARPAL TUNNEL SYNDROME: Primary | ICD-10-CM

## 2018-03-07 PROCEDURE — 99024 POSTOP FOLLOW-UP VISIT: CPT | Performed by: ORTHOPAEDIC SURGERY

## 2018-03-07 PROCEDURE — L3908 WHO COCK-UP NONMOLDE PRE OTS: HCPCS | Performed by: ORTHOPAEDIC SURGERY

## 2018-03-13 RX ORDER — LEVOTHYROXINE SODIUM 0.12 MG/1
TABLET ORAL
Qty: 30 TABLET | Refills: 0 | Status: SHIPPED | OUTPATIENT
Start: 2018-03-13 | End: 2018-04-06 | Stop reason: SDUPTHER

## 2018-04-06 RX ORDER — LEVOTHYROXINE SODIUM 0.12 MG/1
TABLET ORAL
Qty: 30 TABLET | Refills: 0 | Status: SHIPPED | OUTPATIENT
Start: 2018-04-06 | End: 2018-05-02 | Stop reason: SDUPTHER

## 2018-04-23 ENCOUNTER — OFFICE VISIT (OUTPATIENT)
Dept: ORTHOPEDIC SURGERY | Age: 63
End: 2018-04-23

## 2018-04-23 VITALS — BODY MASS INDEX: 39.16 KG/M2 | WEIGHT: 235.01 LBS | HEIGHT: 65 IN

## 2018-04-23 DIAGNOSIS — M25.642 STIFFNESS OF HAND JOINT, LEFT: ICD-10-CM

## 2018-04-23 DIAGNOSIS — G56.03 BILATERAL CARPAL TUNNEL SYNDROME: ICD-10-CM

## 2018-04-23 DIAGNOSIS — M25.641 STIFFNESS OF HAND JOINT, RIGHT: Primary | ICD-10-CM

## 2018-04-23 PROCEDURE — 99024 POSTOP FOLLOW-UP VISIT: CPT | Performed by: ORTHOPAEDIC SURGERY

## 2018-05-02 RX ORDER — LEVOTHYROXINE SODIUM 0.12 MG/1
TABLET ORAL
Qty: 30 TABLET | Refills: 0 | Status: SHIPPED | OUTPATIENT
Start: 2018-05-02 | End: 2018-05-27 | Stop reason: SDUPTHER

## 2018-05-29 RX ORDER — LEVOTHYROXINE SODIUM 0.12 MG/1
TABLET ORAL
Qty: 30 TABLET | Refills: 0 | Status: SHIPPED | OUTPATIENT
Start: 2018-05-29 | End: 2018-06-23 | Stop reason: SDUPTHER

## 2018-06-25 RX ORDER — LEVOTHYROXINE SODIUM 0.12 MG/1
TABLET ORAL
Qty: 30 TABLET | Refills: 2 | Status: SHIPPED | OUTPATIENT
Start: 2018-06-25 | End: 2018-07-23 | Stop reason: SDUPTHER

## 2018-07-02 ENCOUNTER — TELEPHONE (OUTPATIENT)
Dept: ORTHOPEDIC SURGERY | Age: 63
End: 2018-07-02

## 2018-07-02 DIAGNOSIS — G56.03 BILATERAL CARPAL TUNNEL SYNDROME: Primary | ICD-10-CM

## 2018-07-16 ENCOUNTER — HOSPITAL ENCOUNTER (OUTPATIENT)
Dept: OCCUPATIONAL THERAPY | Age: 63
Setting detail: THERAPIES SERIES
Discharge: HOME | End: 2018-07-16
Payer: COMMERCIAL

## 2018-07-16 PROCEDURE — G8985 CARRY GOAL STATUS: HCPCS | Performed by: OCCUPATIONAL THERAPIST

## 2018-07-16 PROCEDURE — 97530 THERAPEUTIC ACTIVITIES: CPT | Performed by: OCCUPATIONAL THERAPIST

## 2018-07-16 PROCEDURE — 97165 OT EVAL LOW COMPLEX 30 MIN: CPT | Performed by: OCCUPATIONAL THERAPIST

## 2018-07-16 PROCEDURE — 97022 WHIRLPOOL THERAPY: CPT | Performed by: OCCUPATIONAL THERAPIST

## 2018-07-16 PROCEDURE — 97110 THERAPEUTIC EXERCISES: CPT | Performed by: OCCUPATIONAL THERAPIST

## 2018-07-16 PROCEDURE — G8984 CARRY CURRENT STATUS: HCPCS | Performed by: OCCUPATIONAL THERAPIST

## 2018-07-16 NOTE — FLOWSHEET NOTE
[facility]  [address],  [address], [address] [place] [address]  Phone: [phone] Fax: [phone]      Hand Therapy Daily Treatment Note  Date:  2018    Patient: Palak Peters   : 1955   MRN: [id number]  Referring Physician: Referring Practitioner: Shital Peralta MD       Medical Diagnosis Information:  Diagnosis: G56.03 (ICD-10-CM) - Bilateral carpal tunnel syndrome    Treatment Diagnosis: B hand pain M79.641, M79.642                                         Insurance information: OT Insurance Information: [place]  Date of Injury:NA  Date of Surgery: 2/3/18 L OCTR, 18 R OCTR         Visit # Insurance Allowable        Date of Patient follow up with Physician: prn    G-Codes:  OT G-codes  Functional Assessment Tool Used: Quick DASH  Score: 57%  Functional Limitation: Carrying, moving and handling objects  Carrying, Moving and Handling Objects Current Status (): At least 40 percent but less than 60 percent impaired, limited or restricted  Carrying, Moving and Handling Objects Goal Status ():  At least 20 percent but less than 40 percent impaired, limited or restricted    Progress Note: []  Yes  [x]  No  Next due by: Visit #10      Latex Allergy:  [x]NO      []YES            Pacemaker:  [x] No       [] Yes      Preferred Language for Healthcare:   [x]English       []other:    Pain level:  See eval    SUBJECTIVE:  See eval    RESTRICTIONS/PRECAUTIONS: -    OBJECTIVE:       Date:  2018       Objective Measures:         See eval                       Modalities:        Fluidotherapy 20' (10' each R/L)                       Therapeutic Exercise, Activities, NMR:        A/PROM 10x B DTG, A/PROM exercises - see sheet       Activities Thumb to base of SF to  med foam blocks R x 15, mini L x 15    Tea strainer (light) to  foam blocks x 15 R, x 15 L    Pen rolling B thumb x20                                 Therapeutic

## 2018-07-16 NOTE — PLAN OF CARE
reviewed - see intake form.      SUBJECTIVE:  Background/Relevant Medical & Therapy History: progressive numbness/tingling, pain into B arms/wrists/hands; B surgeries weeks apart in Feb 2018; persistent stiffness in B hands before surgery and has continued since; reports pain constantly, sometimes worse than others (particularly shaking hands)      Pain Scale: 5/10   [x]Constant      []Intermittent    []other:  Pain Location:  B wrists/hands  Easing factors: rest  Provocative factors: movement      Occupational Profile:  Home Enviroment: lives with  [] spouse,  [] family,  [x] alone,  [] significant other,   [] other:    Occupation/School:     Recreational Activities/Meaningful Interests: walking, gardening    Prior Level of Function: [x] Independent with ADLs/IADLs     [] Assistance needed (describe):    Patient-Identified Primary Performance Deficits (to be addressed in POC):   [] bathing    [x] household tasks    [] dressing    [] self feeding   [] grooming    [] work/education   [] functional mobility   [] sleeping/rest   [] toileting/hygiene   [x] recreational activities   [] driving    [] community/social participation   [] other:     Comorbidities Affecting Functional Performance:     []Anxiety (F41.9)/Depression (F32.9)   []Diabetes Type 1(E10.65) or 2 (E11.65)   []Rheumatoid Arthritis (M05.9)  []Fibromyalgia (M79.7)  []Neuropathy(G60.9)  [x]Osteoarthritis(M19.91)  []None   [x]Other: glaucoma    Hand Dominance:   [x]  Right    [] Left      OBJECTIVE:    Involved Involved   AROM: Right Left   IF MP  PIP  DIP   0/44 25/103  0/45 0/50  10/94  10/54   LF MP  PIP  DIP   10/55  22/110  10/55 10/62  15/92  0/55   RF MP  PIP   DIP   0/65  50/107  0/40 0/45  35/100  0/18   SF MP  PIP  DIP   20/80  15/90  5/45 10/75  10/80  15/20   Digits: tips to DPFC   2cm IF  1.5cm LF  2cm RF  1.5cm SF   2.5cm IF  3cm LF  3.5cm RF  3cm SF   Thumb MP  IP >0/30  0/50 >0/40  0/50   Thumb tip to DPFC 3cm 1.5cm   Wrist minutes face to face)             Electronically signed by:   [address] Stephens OTR/L, PT, MPT, [address] [place] [address], [id number], [id number]

## 2018-07-19 ENCOUNTER — HOSPITAL ENCOUNTER (OUTPATIENT)
Dept: OCCUPATIONAL THERAPY | Age: 63
Setting detail: THERAPIES SERIES
Discharge: HOME | End: 2018-07-19
Payer: COMMERCIAL

## 2018-07-19 PROCEDURE — 97140 MANUAL THERAPY 1/> REGIONS: CPT | Performed by: OCCUPATIONAL THERAPIST

## 2018-07-19 PROCEDURE — 97110 THERAPEUTIC EXERCISES: CPT | Performed by: OCCUPATIONAL THERAPIST

## 2018-07-19 PROCEDURE — 97530 THERAPEUTIC ACTIVITIES: CPT | Performed by: OCCUPATIONAL THERAPIST

## 2018-07-19 PROCEDURE — 97022 WHIRLPOOL THERAPY: CPT | Performed by: OCCUPATIONAL THERAPIST

## 2018-07-19 NOTE — FLOWSHEET NOTE
[facility]   [address],  [address], [address] [place] [address]  Phone: [phone] Fax: [phone]      Hand Therapy Daily Treatment Note  Date:  2018    Patient: Morgan Ganser   : 1955   MRN: [id number]  Referring Physician: Referring Practitioner: Reji Mejia MD       Medical Diagnosis Information:  Diagnosis: G56.03 (ICD-10-CM) - Bilateral carpal tunnel syndrome    Treatment Diagnosis: B hand pain M79.641, M79.642                                         Insurance information: OT Insurance Information: [place]  Date of Injury:NA  Date of Surgery: 2/3/18 L OCTR, 18 R OCTR         Visit # Insurance Allowable   2      Date of Patient follow up with Physician: prn    G-Codes:  OT G-codes  Functional Assessment Tool Used: Quick DASH  Score: 57%  Functional Limitation: Carrying, moving and handling objects  Carrying, Moving and Handling Objects Current Status (): At least 40 percent but less than 60 percent impaired, limited or restricted  Carrying, Moving and Handling Objects Goal Status ():  At least 20 percent but less than 40 percent impaired, limited or restricted    Progress Note: []  Yes  [x]  No  Next due by: Visit #10      Latex Allergy:  [x]NO      []YES            Pacemaker:  [x] No       [] Yes      Preferred Language for Healthcare:   [x]English       []other:    Pain level:  3-5/10    SUBJECTIVE:  Compliant with HEP; seems to be moving some better    RESTRICTIONS/PRECAUTIONS: -    OBJECTIVE:       Date:  2018      Objective Measures:        Digits: tips to DPFC   R: 2cm IF  1.5cm LF  2cm RF  1.5cm SF    L: 2.5cm IF  3cm LF  3.5cm RF  3cm SF R: 1cm IF  0.5cm LF/RF  0cm SF    L: 2cm IF-RF  1.5cm SF                      Modalities:        Fluidotherapy 20' (10' each R/L) 20' (10' each as prior)                      Therapeutic Exercise, Activities, NMR:        A/PROM 10x B DTG, A/PROM exercises - see sheet

## 2018-07-23 RX ORDER — CALCIPOTRIENE 0.05 MG/ML
SOLUTION TOPICAL
Qty: 60 ML | Refills: 1 | Status: SHIPPED | OUTPATIENT
Start: 2018-07-23 | End: 2019-09-05

## 2018-07-23 RX ORDER — LEVOTHYROXINE SODIUM 0.12 MG/1
TABLET ORAL
Qty: 90 TABLET | Refills: 1 | Status: SHIPPED | OUTPATIENT
Start: 2018-07-23 | End: 2018-11-05 | Stop reason: SDUPTHER

## 2018-07-31 ENCOUNTER — HOSPITAL ENCOUNTER (OUTPATIENT)
Dept: OCCUPATIONAL THERAPY | Age: 63
Setting detail: THERAPIES SERIES
Discharge: HOME | End: 2018-07-31
Payer: COMMERCIAL

## 2018-07-31 PROCEDURE — 97110 THERAPEUTIC EXERCISES: CPT | Performed by: OCCUPATIONAL THERAPIST

## 2018-07-31 PROCEDURE — 97022 WHIRLPOOL THERAPY: CPT | Performed by: OCCUPATIONAL THERAPIST

## 2018-07-31 PROCEDURE — 97140 MANUAL THERAPY 1/> REGIONS: CPT | Performed by: OCCUPATIONAL THERAPIST

## 2018-07-31 PROCEDURE — 97530 THERAPEUTIC ACTIVITIES: CPT | Performed by: OCCUPATIONAL THERAPIST

## 2018-07-31 NOTE — FLOWSHEET NOTE
[facility]  [address],  [address], [address] [place] [address]  Phone: [phone] Fax: [phone]      Hand Therapy Daily Treatment Note  Date:  2018    Patient: Dmitriy Pastrana   : 1955   MRN: [id number]  Referring Physician: Referring Practitioner: Betsey Estrada MD       Medical Diagnosis Information:  Diagnosis: G56.03 (ICD-10-CM) - Bilateral carpal tunnel syndrome    Treatment Diagnosis: B hand pain M79.641, M79.642                                         Insurance information: OT Insurance Information: [place]  Date of Injury:NA  Date of Surgery: 2/3/18 L OCTR, 18 R OCTR         Visit # Insurance Allowable   3      Date of Patient follow up with Physician: prn    G-Codes:  OT G-codes  Functional Assessment Tool Used: Quick DASH  Score: 57%  Functional Limitation: Carrying, moving and handling objects  Carrying, Moving and Handling Objects Current Status (): At least 40 percent but less than 60 percent impaired, limited or restricted  Carrying, Moving and Handling Objects Goal Status ():  At least 20 percent but less than 40 percent impaired, limited or restricted    Progress Note: []  Yes  [x]  No  Next due by: Visit #10      Latex Allergy:  [x]NO      []YES            Pacemaker:  [x] No       [] Yes      Preferred Language for Healthcare:   [x]English       []other:    Pain level:  3-5/10    SUBJECTIVE:  Compliant with HEP; continues to feel like fingers are moving better; able to use hands more easily for bathing tasks    RESTRICTIONS/PRECAUTIONS: -    OBJECTIVE:       Date:  2018     Objective Measures:        Digits: tips to DPFC   R: 2cm IF  1.5cm LF  2cm RF  1.5cm SF    L: 2.5cm IF  3cm LF  3.5cm RF  3cm SF R: 1cm IF  0.5cm LF/RF  0cm SF    L: 2cm IF-RF  1.5cm SF R: 1cm IF  0.5cm LF/RF  0cm SF    L: 2cm LF/RF  1.5cm IF/SF      II   B 16#               Modalities:        Fluidotherapy 20' (10' strengthening, flexibility, endurance, ROM of scapular, scapulothoracic and UE control with self care, reaching, carrying, lifting, house/yardwork, driving/computer work  [x] (32720) Reviewed/Progressed HEP activities related to improving balance, coordination, kinesthetic sense, posture, motor skill, proprioception of scapular, scapulothoracic and UE control with self care, reaching, carrying, lifting, house/yardwork, driving/computer work    [] Comments:     Manual Treatments:  PROM / STM / Oscillations-Mobs:  G-I, II, III, IV (PA's, Inf., Post.)  [x] (80971) Provided manual therapy to mobilize soft tissue/joints of cervical/CT, scapular GHJ and UE for the purpose of modulating pain, promoting relaxation,  increasing ROM, reducing/eliminating soft tissue swelling/inflammation/restriction, improving soft tissue extensibility and allowing for proper ROM for normal function with self care, reaching, carrying, lifting, house/yardwork, driving/computer work  [x] Comments: 8' STM, retrograde massage, AA/PROM, joint mobilizations PIP ext B hands    ADL Training:  [x]  (29300) Provided self-care/home management training related to activities of daily living and compensatory training, and/or use of adaptive equipment   [x] Comments: reviewed diagnosis specific anatomy, joint protection, and ADL modifications       Splinting:  [] Fabrication of:   [] (38750) Checkout for orthotic/prosthetic use, established patient   [] (45261) Orthotic management and training (fitting and assessment)  [] Comments:    Charges:  Timed Code Treatment Minutes: 38   Total Treatment Minutes: 60     [] EVAL (LOW) 94217   [] OT Re-eval (49950)  [] EVAL (MOD) 90819   [] EVAL (HIGH) 20060       [x] Vanessa (25332) x  1   [] CBQOP(50458)  [] NMR (80833) x      [] Estim (attended) (55128)   [x] Manual ([id number]) x  1    [] US (32302)  [x] TA (17285) x  1   [] Paraffin (17949)  [] ADL  ([phone]) x     [] Splint/L code:    [] Estim (unattended) (22 [id number])  [x] Fluidotherapy (26983)  [] Other:    GOALS:  Short Term Goals: To be achieved in: 2 weeks  1. Independent in HEP and progression per patient tolerance, in order to prevent re-injury. 2. Patient will have a decrease in pain to facilitate improvement in movement, function, and ADLs as indicated by Functional Deficits. Long Term Goals to be achieved in 4-6  weeks, including patient directed goals to address identified performance deficits:  1) Pt to be independent in graded HEP progression with a good level of effort and compliance. 2) Pt to report a score of 30 % or less on the Quick DASH disability questionnaire for increased performance with carrying, moving, and handling objects. 3) Pt will demonstrate increased ROM to B digits </= 1-1.5cm to [facility] for improved independence with grasping garden tools. 4) Pt will demonstrate increased strength to B  >/= 15# for improved independence with opening jar lids. Met 7/31/2018  5) Pt will have a decrease in pain to 1-2/10 to facilitate improvement in performance of household cooking/cleaning tasks. 6)    7)      Progression Towards Functional goals:  [x] Patient is progressing as expected towards functional goals listed. [] Progression is slowed due to complexities listed. [] Progression has been slowed due to co-morbidities. [] Plan just implemented, too soon to assess goals progression  [] All goals are met  [x] Other: goal 4 met    ASSESSMENT:    Treatment/Activity Tolerance:  [x] Patient tolerated treatment well [] Patient limited by fatique  [] Patient limited by pain  [] Patient limited by other medical complications  [x] Other: increased strength/function     Prognosis: [x] Good [] Fair  [] Poor    Patient Requires Follow-up: [x] Yes  [] No    PLAN: [x] Continue per plan of care [] Alter current plan (see comments)  [] Plan of care initiated [] Hold pending MD visit [] Discharge    Electronically signed by:  Jessica Leonard OTR/L, PT, MPT, CHT, [id number], [id number]

## 2018-08-06 ENCOUNTER — APPOINTMENT (OUTPATIENT)
Dept: OCCUPATIONAL THERAPY | Age: 63
End: 2018-08-06
Payer: COMMERCIAL

## 2018-08-07 ENCOUNTER — APPOINTMENT (OUTPATIENT)
Dept: OCCUPATIONAL THERAPY | Age: 63
End: 2018-08-07
Payer: COMMERCIAL

## 2018-08-07 ENCOUNTER — HOSPITAL ENCOUNTER (OUTPATIENT)
Dept: OCCUPATIONAL THERAPY | Age: 63
Setting detail: THERAPIES SERIES
Discharge: HOME | End: 2018-08-07
Payer: COMMERCIAL

## 2018-08-07 PROCEDURE — 97530 THERAPEUTIC ACTIVITIES: CPT | Performed by: OCCUPATIONAL THERAPIST

## 2018-08-07 PROCEDURE — 97022 WHIRLPOOL THERAPY: CPT | Performed by: OCCUPATIONAL THERAPIST

## 2018-08-07 PROCEDURE — 97140 MANUAL THERAPY 1/> REGIONS: CPT | Performed by: OCCUPATIONAL THERAPIST

## 2018-08-07 PROCEDURE — 97110 THERAPEUTIC EXERCISES: CPT | Performed by: OCCUPATIONAL THERAPIST

## 2018-08-07 NOTE — FLOWSHEET NOTE
[facility]  [address], [address]  Phone: [phone] Fax: [phone]      Hand Therapy Daily Treatment Note  Date:  2018    Patient: Corie Search   : 1955   MRN: [id number]  Referring Physician: Referring Practitioner: Scott Parra MD       Medical Diagnosis Information:  Diagnosis: G56.03 (ICD-10-CM) - Bilateral carpal tunnel syndrome    Treatment Diagnosis: B hand pain M79.641, M79.642                                         Insurance information: OT Insurance Information: [place]  Date of Injury:NA  Date of Surgery: 2/3/18 L OCTR, 18 R OCTR         Visit # Insurance Allowable   4 20     Date of Patient follow up with Physician: prn    G-Codes:  OT G-codes  Functional Assessment Tool Used: Quick DASH  Score: 57%  Functional Limitation: Carrying, moving and handling objects  Carrying, Moving and Handling Objects Current Status (): At least 40 percent but less than 60 percent impaired, limited or restricted  Carrying, Moving and Handling Objects Goal Status ():  At least 20 percent but less than 40 percent impaired, limited or restricted    Progress Note: []  Yes  [x]  No  Next due by: Visit #10      Latex Allergy:  [x]NO      []YES            Pacemaker:  [x] No       [] Yes      Preferred Language for Healthcare:   [x]English       []other:    Pain level:  3-5/10    SUBJECTIVE:  Compliant with HEP; continues to feel like hands are moving better overall    RESTRICTIONS/PRECAUTIONS: -    OBJECTIVE:       Date:  2018    Objective Measures:        Digits: tips to DPFC   R: 2cm IF  1.5cm LF  2cm RF  1.5cm SF    L: 2.5cm IF  3cm LF  3.5cm RF  3cm SF R: 1cm IF  0.5cm LF/RF  0cm SF    L: 2cm IF-RF  1.5cm SF R: 1cm IF  0.5cm LF/RF  0cm SF    L: 2cm LF/RF  1.5cm IF/SF R RF PIP ext 30  L RF PIP ext 20    R: 1cm IF  0.5cm LF/RF  0cm SF    L: 2cm LF/RF  1.5cm IF/SF        R      L Wrist ext/flex    60/45    54/40     II  LP   3 pt    B 16#   18     16  10     10  6      6              Modalities:        Fluidotherapy 20' (10' each R/L) 20' (10' each as prior) 15' R    Paraffin 15' L 10'R  10'L                    Therapeutic Exercise, Activities, NMR:        A/PROM 10x B DTG, A/PROM exercises - see sheet B DTG, A/PROM hands (th-SF) as prior 15'    Tenodesis x 10 each for retraining of wrist/hand mechanics   10' - A/PROM B hands    Flexion taping B hands x 3'    Power Web flexion stretch B hands x 10 each A/PROM as prior    Power Web extension stretching B hands x 10 each    Activities Thumb to base of SF to  med foam blocks R x 15, mini L x 15    Tea strainer (light) to  foam blocks x 15 R, x 15 L    Pen rolling B thumb x20 Sponges - yellow L, pink R - 20x squeeze/roll - cueing for wrist/hand mechanics    Warm water transfer - R pink sponge, L yellow sponge x 25 squeezes    Hand helper R x 20 (2 red bands)     Paraffin squeezes L hand x 15    B tea strainers to /release foam blocks (30 x each hand - light TS L, mod resistance R) Yellow sponge gripping R (while L was in fludio)     Red hand grippers to /release foam blocks x 30x2 B                                  Therapeutic Exercise and NMR:  [x] (08041) Provided verbal/tactile cueing for activities related to strengthening, flexibility, endurance, ROM  for improvements in scapular, scapulothoracic and UE control with self care, reaching, carrying, lifting, house/yardwork, driving/computer work. [x] (99361) Provided verbal/tactile cueing for activities related to improving balance, coordination, kinesthetic sense, posture, motor skill, proprioception  to assist with  scapular, scapulothoracic and UE control with self care, reaching, carrying, lifting, house/yardwork, driving/computer work.   [] Comments:    Therapeutic Activities:    [x] (74410 or 02169) Provided verbal/tactile cueing for activities related to improving balance, coordination, kinesthetic sense, posture, motor skill, proprioception and motor activation to allow for proper function of scapular, scapulothoracic and UE control with self care, carrying, lifting, driving/computer work  [] Comments:    Home Exercise Program:    [x] (98539) Reviewed/Progressed HEP activities related to strengthening, flexibility, endurance, ROM of scapular, scapulothoracic and UE control with self care, reaching, carrying, lifting, house/yardwork, driving/computer work  [x] (74907) Reviewed/Progressed HEP activities related to improving balance, coordination, kinesthetic sense, posture, motor skill, proprioception of scapular, scapulothoracic and UE control with self care, reaching, carrying, lifting, house/yardwork, driving/computer work    [] Comments:     Manual Treatments:  PROM / STM / Oscillations-Mobs:  G-I, II, III, IV (PA's, Inf., Post.)  [x] (05320) Provided manual therapy to mobilize soft tissue/joints of cervical/CT, scapular GHJ and UE for the purpose of modulating pain, promoting relaxation,  increasing ROM, reducing/eliminating soft tissue swelling/inflammation/restriction, improving soft tissue extensibility and allowing for proper ROM for normal function with self care, reaching, carrying, lifting, house/yardwork, driving/computer work  [x] Comments: 8' STM, retrograde massage, AA/PROM, joint mobilizations PIP ext B hands    ADL Training:  [x]  (10196) Provided self-care/home management training related to activities of daily living and compensatory training, and/or use of adaptive equipment   [x] Comments: reviewed diagnosis specific anatomy, joint protection, and ADL modifications       Splinting:  [x] Fabrication of: night R RF extension splint    [] (45408) Checkout for orthotic/prosthetic use, established patient   [] (47660) Orthotic management and training (fitting and assessment)  [] Comments:    Charges:  Timed Code Treatment Minutes: 38   Total

## 2018-08-13 ENCOUNTER — HOSPITAL ENCOUNTER (OUTPATIENT)
Dept: OCCUPATIONAL THERAPY | Age: 63
Setting detail: THERAPIES SERIES
Discharge: HOME | End: 2018-08-13
Payer: COMMERCIAL

## 2018-08-13 PROCEDURE — 97110 THERAPEUTIC EXERCISES: CPT | Performed by: OCCUPATIONAL THERAPIST

## 2018-08-13 PROCEDURE — 97530 THERAPEUTIC ACTIVITIES: CPT | Performed by: OCCUPATIONAL THERAPIST

## 2018-08-13 NOTE — FLOWSHEET NOTE
LF/RF  0cm SF    L: 2cm LF/RF  1.5cm IF/SF        R      L  25     Wrist ext/flex    60/45    54/40      II  LP   3 pt    B 16#   18     16  10     10  6      6     R - 15  L - 15            Modalities:         Fluidotherapy 20' (10' each R/L) 20' (10' each as prior) 15' R    Paraffin 15' L 10'R  10'L 10' R  10' L         Hot pack with passive digital extension 10' Hot pack with alternating passive digital extension and flexion            Therapeutic Exercise, Activities, NMR:         A/PROM 10x B DTG, A/PROM exercises - see sheet B DTG, A/PROM hands (th-SF) as prior 15'    Tenodesis x 10 each for retraining of wrist/hand mechanics   10' - A/PROM B hands    Flexion taping B hands x 3'    Power Web flexion stretch B hands x 10 each A/PROM as prior    Power Web extension stretching B hands x 10 each APROM to bilateral hands flex and ext. Putty rolling and squeezing with bilateral hands. APROM to bilateral hands. Putty rolling and squeezing bilaterally   Activities Thumb to base of SF to  med foam blocks R x 15, mini L x 15    Tea strainer (light) to  foam blocks x 15 R, x 15 L    Pen rolling B thumb x20 Sponges - yellow L, pink R - 20x squeeze/roll - cueing for wrist/hand mechanics    Warm water transfer - R pink sponge, L yellow sponge x 25 squeezes    Hand helper R x 20 (2 red bands)     Paraffin squeezes L hand x 15    B tea strainers to /release foam blocks (30 x each hand - light TS L, mod resistance R) Yellow sponge gripping R (while L was in fludio)     Red hand grippers to /release foam blocks x 30x2 B        Tea strainer picking up 1\" cube sponges x 40 both hands.                             Issued digisleeve for right ring finger to see if it helps with swelling                                Therapeutic Exercise and NMR:  [x] (94320) Provided verbal/tactile cueing for activities related to strengthening, flexibility, endurance, ROM  for improvements in scapular, Training:  [x]  (51037) Provided self-care/home management training related to activities of daily living and compensatory training, and/or use of adaptive equipment   [x] Comments: reviewed diagnosis specific anatomy, joint protection, and ADL modifications       Splinting:  [x] Fabrication of: night R RF extension splint    [] (06045) Checkout for orthotic/prosthetic use, established patient   [] (51290) Orthotic management and training (fitting and assessment)  [] Comments:    Charges:  Timed Code Treatment Minutes: 38   Total Treatment Minutes: 48     [] EVAL (LOW) 32202   [] OT Re-eval (65252)  [] EVAL (MOD) 46431   [] EVAL (HIGH) 82056       [x] Vnaessa (38197) x  2   [] OIECU(92825)  [] NMR (75895) x      [] Estim (attended) (29191)   [] Manual ([id number]) x       [] US (42532)  [x] TA (38437) x  1   [] Paraffin (77908)  [] ADL  (88 [phone]) x     [] Splint/L code:    [] Estim (unattended) [phone])  [x] Fluidotherapy (82028)  [] Other:    GOALS:  Short Term Goals: To be achieved in: 2 weeks  1. Independent in HEP and progression per patient tolerance, in order to prevent re-injury. 2. Patient will have a decrease in pain to facilitate improvement in movement, function, and ADLs as indicated by Functional Deficits. Long Term Goals to be achieved in 4-6  weeks, including patient directed goals to address identified performance deficits:  1) Pt to be independent in graded HEP progression with a good level of effort and compliance. 2) Pt to report a score of 30 % or less on the Quick DASH disability questionnaire for increased performance with carrying, moving, and handling objects. 3) Pt will demonstrate increased ROM to B digits </= 1-1.5cm to [facility] for improved independence with grasping garden tools. 4) Pt will demonstrate increased strength to B  >/= 15# for improved independence with opening jar lids.  Met 7/31/2018  5) Pt will have a decrease in pain to 1-2/10 to facilitate improvement in performance of household cooking/cleaning tasks. 6)    7)      Progression Towards Functional goals:  [x] Patient is progressing as expected towards functional goals listed. [] Progression is slowed due to complexities listed. [] Progression has been slowed due to co-morbidities. [] Plan just implemented, too soon to assess goals progression  [] All goals are met  [] Other:    ASSESSMENT:  Slight gains in extension to PIP joints ring fingers.   Treatment/Activity Tolerance:  [x] Patient tolerated treatment well [] Patient limited by fatique  [] Patient limited by pain  [] Patient limited by other medical complications  [x] Other: functional improvements continue to be reported     Prognosis: [x] Good [] Fair  [] Poor    Patient Requires Follow-up: [x] Yes  [] No    PLAN: [x] Continue per plan of care [] Alter current plan (see comments)  [] Plan of care initiated [] Hold pending MD visit [] Discharge    Electronically signed by: Lourdes Never , Roberta Goltz, [address] [place] [id number]

## 2018-08-17 ENCOUNTER — HOSPITAL ENCOUNTER (OUTPATIENT)
Dept: OCCUPATIONAL THERAPY | Age: 63
Setting detail: THERAPIES SERIES
Discharge: HOME | End: 2018-08-17
Payer: COMMERCIAL

## 2018-08-17 PROCEDURE — 97110 THERAPEUTIC EXERCISES: CPT | Performed by: OCCUPATIONAL THERAPIST

## 2018-08-17 PROCEDURE — 97018 PARAFFIN BATH THERAPY: CPT | Performed by: OCCUPATIONAL THERAPIST

## 2018-08-17 PROCEDURE — 97140 MANUAL THERAPY 1/> REGIONS: CPT | Performed by: OCCUPATIONAL THERAPIST

## 2018-08-17 NOTE — FLOWSHEET NOTE
[facility] [address], [address] [facility]  Phone: [phone] Fax: [phone]      Hand Therapy Daily Treatment Note  Date:  2018    Patient: Gabrielle Frances   : 1955   MRN: [id number]  Referring Physician: Referring Practitioner: Lety Pittman MD       Medical Diagnosis Information:  Diagnosis: G56.03 (ICD-10-CM) - Bilateral carpal tunnel syndrome    Treatment Diagnosis: B hand pain M79.641, M79.642                                         Insurance information: OT Insurance Information: [place]  Date of Injury:NA  Date of Surgery: 2/3/18 L OCTR, 18 R OCTR         Visit # Insurance Allowable        Date of Patient follow up with Physician: prn    G-Codes:  OT G-codes  Functional Assessment Tool Used: Quick DASH  Score: 57%  Functional Limitation: Carrying, moving and handling objects  Carrying, Moving and Handling Objects Current Status (): At least 40 percent but less than 60 percent impaired, limited or restricted  Carrying, Moving and Handling Objects Goal Status (): At least 20 percent but less than 40 percent impaired, limited or restricted    Progress Note: []  Yes  [x]  No  Next due by: Visit #10      Latex Allergy:  [x]NO      []YES            Pacemaker:  [x] No       [] Yes      Preferred Language for Healthcare:   [x]English       []other:    Pain level:  3-5/10    SUBJECTIVE:  Have been traveling a lot so have not worked hands much.  Digisleeve seems to help keeping finger from drawing in as much    RESTRICTIONS/PRECAUTIONS: -    OBJECTIVE:       Date:  2018 7/19/18 7/31/18 8/7/18 8/10/18 8/13/18 8/17/18   Objective Measures:          Digits: tips to DPFC   R: 2cm IF  1.5cm LF  2cm RF  1.5cm SF    L: 2.5cm IF  3cm LF  3.5cm RF  3cm SF R: 1cm IF  0.5cm LF/RF  0cm SF    L: 2cm IF-RF  1.5cm SF R: 1cm IF  0.5cm LF/RF  0cm SF    L: 2cm LF/RF  1.5cm IF/SF R RF PIP ext 30  L RF PIP ext 20    R: 1cm

## 2018-08-21 ENCOUNTER — HOSPITAL ENCOUNTER (OUTPATIENT)
Dept: OCCUPATIONAL THERAPY | Age: 63
Setting detail: THERAPIES SERIES
Discharge: HOME | End: 2018-08-21
Payer: COMMERCIAL

## 2018-08-21 PROCEDURE — 97140 MANUAL THERAPY 1/> REGIONS: CPT | Performed by: OCCUPATIONAL THERAPIST

## 2018-08-21 PROCEDURE — 97110 THERAPEUTIC EXERCISES: CPT | Performed by: OCCUPATIONAL THERAPIST

## 2018-08-21 NOTE — FLOWSHEET NOTE
fludio)     Red hand grippers to /release foam blocks x 30x2 B        Tea strainer picking up 1\" cube sponges x 40 both hands. Issued digisleeve for right ring finger to see if it helps with swelling                                     Patient requested digisleeve for ring finger on left hand also. Also issued LMB dynamic extension splint patient could put on an doff for 10-15 minutes at a time during the day to ring finger PIP joints                                       Continue digisleeves and LMB as desired. Recpommended wearing static PIP extension splint to right ring finger more frequently throughout the day to see if greater periods of war help to decrease the rebound into PIP flexion when removed. Therapeutic Exercise and NMR:  [x] (86231) Provided verbal/tactile cueing for activities related to strengthening, flexibility, endurance, ROM  for improvements in scapular, scapulothoracic and UE control with self care, reaching, carrying, lifting, house/yardwork, driving/computer work.    [] (83689) Provided verbal/tactile cueing for activities related to improving balance, coordination, kinesthetic sense, posture, motor skill, proprioception  to assist with  scapular, scapulothoracic and UE control with self care, reaching, carrying, lifting, house/yardwork, driving/computer work.   [] Comments:    Therapeutic Activities:    [] (13599 or 06359) Provided verbal/tactile cueing for activities related to improving balance, coordination, kinesthetic sense, posture, motor skill, proprioception and motor activation to allow for proper function of scapular, scapulothoracic and UE control with self care, carrying, lifting, driving/computer work  [] Comments:    Home Exercise Program:    [x] (82924) Reviewed/Progressed HEP activities related to strengthening, flexibility, endurance, ROM of scapular, scapulothoracic and UE control with self care, reaching, carrying, lifting, house/yardwork, driving/computer work  [x] (31487) Reviewed/Progressed HEP activities related to improving balance, coordination, kinesthetic sense, posture, motor skill, proprioception of scapular, scapulothoracic and UE control with self care, reaching, carrying, lifting, house/yardwork, driving/computer work    [] Comments:     Manual Treatments:  PROM / STM / Oscillations-Mobs:  G-I, II, III, IV (PA's, Inf., Post.)  [x] (15112) Provided manual therapy to mobilize soft tissue/joints of cervical/CT, scapular GHJ and UE for the purpose of modulating pain, promoting relaxation,  increasing ROM, reducing/eliminating soft tissue swelling/inflammation/restriction, improving soft tissue extensibility and allowing for proper ROM for normal function with self care, reaching, carrying, lifting, house/yardwork, driving/computer work  [x] Comments: 8' STM, retrograde massage, AA/PROM, joint mobilizations PIP ext B hands    ADL Training:  []  (79922) Provided self-care/home management training related to activities of daily living and compensatory training, and/or use of adaptive equipment   [] Comments: reviewed diagnosis specific anatomy, joint protection, and ADL modifications       Splinting:  [] Fabrication of: night R RF extension splint    [] (83280) Checkout for orthotic/prosthetic use, established patient   [] (06198) Orthotic management and training (fitting and assessment)  [] Comments:    Charges:  Timed Code Treatment Minutes: 30   Total Treatment Minutes: 45     [] EVAL (LOW) 15069   [] OT Re-eval (26803)  [] EVAL (MOD) 16104   [] EVAL (HIGH) 44742       [x] Vanessa [phone]) x  1   [] IHRAB(48970)  [] NMR (19883) x      [] Estim (attended) (77960)   [x] Manual ([id number]) x  1    [] US (83968)  [] TA (34922) x      [x] Paraffin (25597)  [] ADL  (28 289 24 60) x     [] Splint/L code:    [] Estim (unattended) ([id number])  [] Fluidotherapy (05289)  [] Other:    GOALS:  Short Term Goals:  To be achieved in: 2 weeks  1. Independent in HEP and progression per patient tolerance, in order to prevent re-injury. 2. Patient will have a decrease in pain to facilitate improvement in movement, function, and ADLs as indicated by Functional Deficits. Long Term Goals to be achieved in 4-6  weeks, including patient directed goals to address identified performance deficits:  1) Pt to be independent in graded HEP progression with a good level of effort and compliance. 2) Pt to report a score of 30 % or less on the Quick DASH disability questionnaire for increased performance with carrying, moving, and handling objects. 3) Pt will demonstrate increased ROM to B digits </= 1-1.5cm to [facility] for improved independence with grasping garden tools. 4) Pt will demonstrate increased strength to B  >/= 15# for improved independence with opening jar lids. Met 7/31/2018  5) Pt will have a decrease in pain to 1-2/10 to facilitate improvement in performance of household cooking/cleaning tasks. 6)    7)      Progression Towards Functional goals:  [x] Patient is progressing as expected towards functional goals listed. [] Progression is slowed due to complexities listed. [] Progression has been slowed due to co-morbidities.   [] Plan just implemented, too soon to assess goals progression  [] All goals are met  [] Other:    ASSESSMENT:  Status quo  Treatment/Activity Tolerance:  [x] Patient tolerated treatment well [] Patient limited by fatique  [] Patient limited by pain  [] Patient limited by other medical complications  [] Other:     Prognosis: [x] Good [] Fair  [] Poor    Patient Requires Follow-up: [x] Yes  [] No    PLAN: [x] Continue per plan of care [] Alter current plan (see comments)  [] Plan of care initiated [] Hold pending MD visit [] Discharge    Electronically signed by: Sunita Becerra , [id number], [address] [facility] [address]

## 2018-08-28 ENCOUNTER — HOSPITAL ENCOUNTER (OUTPATIENT)
Dept: OCCUPATIONAL THERAPY | Age: 63
Setting detail: THERAPIES SERIES
Discharge: HOME | End: 2018-08-28
Payer: COMMERCIAL

## 2018-08-28 PROCEDURE — 97140 MANUAL THERAPY 1/> REGIONS: CPT | Performed by: OCCUPATIONAL THERAPIST

## 2018-08-28 PROCEDURE — 97022 WHIRLPOOL THERAPY: CPT | Performed by: OCCUPATIONAL THERAPIST

## 2018-08-28 PROCEDURE — 97110 THERAPEUTIC EXERCISES: CPT | Performed by: OCCUPATIONAL THERAPIST

## 2018-08-28 PROCEDURE — G8984 CARRY CURRENT STATUS: HCPCS | Performed by: OCCUPATIONAL THERAPIST

## 2018-08-28 PROCEDURE — G8985 CARRY GOAL STATUS: HCPCS | Performed by: OCCUPATIONAL THERAPIST

## 2018-08-28 NOTE — FLOWSHEET NOTE
Thumb MP  IP    40  50 45  55   Thumb tip to DPFC    2.5cm 1.25cm   Wrist ext/flex            rd/ud    62/47 56/45   FA sup/pron    60/90 60/90   Strength        II  LP  3 pt pinch  Tip Pinch 16  11  8  6 15  12  8.5  8   MMT: wrist ext                   flex 5/5  4+/5 4+/5  4/5             Date: 8/17/18 8/20/18 8/27/18 8/28/18   Objective Measures:   prior to tx       See 8/27 note See above for 10th visit reassessment findings   Modalities:       Fluidotherapy    12' R  8' L    Paraffin and hot pack with alternating passive digital ext/flex Hot pack with alternating passive digital extension and fleixon Hot pack with alternating passive digital extension and fleixon           Therapeutic Exercise, Activities, NMR:       A/PROM APROM to bilateral hands and wrists        Squeezing soft paraffin 3' bilateral hands. APROM to bilateral hands     Yellow putty rolling and squeezing APROM to bilateral hands    Yellow putty rolling and squeezing A/PROM B hands, wrists, FA     Reinforced frequency and techniques for  intrinsic stretching, PIP isolated stretching, extrinsic stretching, DTG AROM, intrinsic +, wrist A/PROM    Activities   Patient requested digisleeve for ring finger on left hand also. Also issued LMB dynamic extension splint patient could put on an doff for 10-15 minutes at a time during the day to ring finger PIP joints   Continue digisleeves and LMB as desired. Recpommended wearing static PIP extension splint to right ring finger more frequently throughout the day to see if greater periods of war help to decrease the rebound into PIP flexion when removed. Recommend continued use of gutter extension to right ring more frequently throughout the day.    Gripping of stress ball R during fluido for L                          Therapeutic Exercise and NMR:  [x] (23670) Provided verbal/tactile cueing for activities related to strengthening, flexibility, endurance, ROM  for improvements in scapular, has been slowed due to co-morbidities. [] Plan just implemented, too soon to assess goals progression  [] All goals are met  [x] Other: goal 2 met    ASSESSMENT:  progression of ROM noted, although slow and incremental, enabling notable gains in functional mobility of B hands as evidenced by pt's Quick DASH score (improved from 57% to 30%) since initial evalTreatment/Activity Tolerance:  [x] Patient tolerated treatment well [] Patient limited by fatique  [] Patient limited by pain  [] Patient limited by other medical complications  [] Other:     Prognosis: [x] Good [] Fair  [] Poor    Patient Requires Follow-up: [x] Yes  [] No    PLAN: [] Continue per plan of care   [x] Alter current plan - 1-2 days per week for 4-6 additional Weeks, with decrease in frequency to 1x every 1-2 weeks as pt becomes more competent with HEP for progression to D/C to HEP independence. [] Plan of care initiated [] Hold pending MD visit [] Discharge    Electronically signed by:  Gurdeep Stephens OTR/L, PT, MPT, [address] [place] [address], [phone], [phone]

## 2018-08-28 NOTE — PLAN OF CARE
listed. [] Progression is slowed due to complexities listed. [] Progression has been slowed due to co-morbidities. [x] Other: progression of ROM noted, although slow and incremental, enabling notable gains in functional mobility of B hands as evidenced by pt's Quick DASH score (improved from 57% to 30%)     Impairments (physical, cognitive and/or psychosocial):  [x] Decreased mobility   [x] Weakness    [] Hypersensitivity   [x] Pain/tenderness   [x] Edema/swelling   [] Decreased coordination (fine/gross motor)   [] Impaired body mechanics  [] Sensory loss  [] Loss of balance   [] Other:      Patient-Identified Primary Performance Deficits (to be addressed in POC):   [] bathing                                   [x] household tasks    [] dressing                                 [] self feeding   [] grooming                                [] work/education   [] functional mobility                  [] sleeping/rest   [] toileting/hygiene                     [x] recreational activities   [] driving                                    [] community/social participation   [] other:     Rehab Potential:   [] Excellent [x] Good [] Fair  [] Poor      Toleration of reevaluation:    [] Excellent [x] Good [] Fair  [] Poor    New or Updated Goals (if applicable):  [] No change to goals established upon initial eval/last progress note (See below)  New Goals:    Long Term Goals to be achieved in 4-6  weeks, including patient directed goals to address identified performance deficits:  1) Pt to be independent in graded HEP progression with a good level of effort and compliance. 2) Pt to report a score of 30 % or less on the Quick DASH disability questionnaire for increased performance with carrying, moving, and handling objects. Met 8/28/2018  3) Modified 8/28/18: Pt will demonstrate increased ROM to B digits </= 2 cm  to [facility] for improved independence with grasping garden tools.   4) Pt will demonstrate increased strength to B  >/= 15# for improved independence with opening jar lids. Met 7/31/2018  5) Pt will have a decrease in pain to 1-2/10 to facilitate improvement in performance of household cooking/cleaning tasks. 6)    7)        PLAN OF CARE:  Frequency/Duration:  1-2 days per week for 4-6 additional Weeks, with decrease in frequency to 1x every 1-2 weeks as pt becomes more competent with HEP for progression to D/C to HEP independence. Interventions:   [x] Therapeutic Exercise [x] Therapeutic Activity    [x] Activities of Daily Living [x] Neuromuscular Re-education      [x] Patient Education  [x] Manual Therapy      [x] Modalities as needed, and not otherwise contraindicated, including: ultrasound,paraffin,moist heat/cold pack, electrical stimulation, contrast bath, iontophoresis, fluidotherapy  [x] Splinting          Electronically signed by:   Gurdeep Stephens OTR/L, PT, MPT, [address] [place] [address], [phone], [id number]

## 2018-08-30 ENCOUNTER — HOSPITAL ENCOUNTER (OUTPATIENT)
Dept: OCCUPATIONAL THERAPY | Age: 63
Setting detail: THERAPIES SERIES
Discharge: HOME | End: 2018-08-30
Payer: COMMERCIAL

## 2018-08-30 PROCEDURE — 97140 MANUAL THERAPY 1/> REGIONS: CPT | Performed by: OCCUPATIONAL THERAPIST

## 2018-08-30 PROCEDURE — 97110 THERAPEUTIC EXERCISES: CPT | Performed by: OCCUPATIONAL THERAPIST

## 2018-08-30 PROCEDURE — 97530 THERAPEUTIC ACTIVITIES: CPT | Performed by: OCCUPATIONAL THERAPIST

## 2018-08-30 NOTE — FLOWSHEET NOTE
tip to [facility]    2.5cm 1.25cm   Wrist ext/flex            rd/ud    62/47 56/45   FA sup/pron    60/90 60/90   Strength        II  LP  3 pt pinch  Tip Pinch 16  11  8  6 15  12  8.5  8   MMT: wrist ext                   flex 5/5  4+/5 4+/5  4/5             Date: 8/17/18 8/20/18 8/27/18 8/28/18 8/30/18   Objective Measures:   prior to tx        See 8/27 note See above for 10th visit reassessment findings    Modalities:        Fluidotherapy    12' R  8' L     Paraffin and hot pack with alternating passive digital ext/flex Hot pack with alternating passive digital extension and fleixon Hot pack with alternating passive digital extension and fleixon  Hot pack with alternating passive digital flex and ext x 15 total both hands           Therapeutic Exercise, Activities, NMR:        A/PROM APROM to bilateral hands and wrists        Squeezing soft paraffin 3' bilateral hands. APROM to bilateral hands     Yellow putty rolling and squeezing APROM to bilateral hands    Yellow putty rolling and squeezing A/PROM B hands, wrists, FA     Reinforced frequency and techniques for  intrinsic stretching, PIP isolated stretching, extrinsic stretching, DTG AROM, intrinsic +, wrist A/PROM  A/PROM B hands, wrists, FA     Motion redirection for PIP extension to right ring finger utilizing pen to flex right ring MCP more than other fingers, performed 10 x2 instructed to add to exercise routine at home     Activities   Patient requested digisleeve for ring finger on left hand also. Also issued LMB dynamic extension splint patient could put on an doff for 10-15 minutes at a time during the day to ring finger PIP joints   Continue digisleeves and LMB as desired. Recpommended wearing static PIP extension splint to right ring finger more frequently throughout the day to see if greater periods of war help to decrease the rebound into PIP flexion when removed.    Recommend continued use of gutter extension to right ring more frequently reducing/eliminating soft tissue swelling/inflammation/restriction, improving soft tissue extensibility and allowing for proper ROM for normal function with self care, reaching, carrying, lifting, house/yardwork, driving/computer work  [x] Comments: 10' STM, retrograde massage, AA/PROM, joint mobilizations PIP ext B hands    ADL Training:  []  (37674) Provided self-care/home management training related to activities of daily living and compensatory training, and/or use of adaptive equipment   [] Comments: reviewed diagnosis specific anatomy, joint protection, and ADL modifications       Splinting:  [] Fabrication of: night L RF extension splint    [] (71967) Checkout for orthotic/prosthetic use, established patient   [] (29449) Orthotic management and training (fitting and assessment)  [x] Comments:adjusted base of splint to left ring finger    Charges:  Timed Code Treatment Minutes: 45   Total Treatment Minutes: 55     [] EVAL (LOW) 09275   [] OT Re-eval (94480)  [] EVAL (MOD) 53750   [] EVAL (HIGH) 82421       [x] Vanessa (53148) x  1   [] GUQXN(00997)  [] NMR (21838) x      [] Estim (attended) (09255)   [x] Manual ([id number]) x  1    [] US (10097)  [x] TA (51750) x  1   [] Paraffin (20007)  [] ADL  (39290) x     [] Splint/L code:    [] Estim (unattended) (52966)  [x] Fluidotherapy (53098)   [] Other:    GOALS:  Long Term Goals to be achieved in 4-6 additional weeks through 10/9/18, including patient directed goals to address identified performance deficits:  1) Pt to be independent in graded HEP progression with a good level of effort and compliance. 2) Pt to report a score of 30 % or less on the Quick DASH disability questionnaire for increased performance with carrying, moving, and handling objects. Met 8/28/2018  3) Modified 8/28/18: Pt will demonstrate increased ROM to B digits </= 2 cm  to DPFC for improved independence with grasping garden tools.   4) Pt will demonstrate increased strength to B  >/= 15# for

## 2018-09-06 ENCOUNTER — HOSPITAL ENCOUNTER (OUTPATIENT)
Dept: OCCUPATIONAL THERAPY | Age: 63
Setting detail: THERAPIES SERIES
Discharge: HOME | End: 2018-09-06
Payer: COMMERCIAL

## 2018-09-06 PROCEDURE — 97022 WHIRLPOOL THERAPY: CPT | Performed by: OCCUPATIONAL THERAPIST

## 2018-09-06 PROCEDURE — 97110 THERAPEUTIC EXERCISES: CPT | Performed by: OCCUPATIONAL THERAPIST

## 2018-09-06 PROCEDURE — 97140 MANUAL THERAPY 1/> REGIONS: CPT | Performed by: OCCUPATIONAL THERAPIST

## 2018-09-06 PROCEDURE — 97530 THERAPEUTIC ACTIVITIES: CPT | Performed by: OCCUPATIONAL THERAPIST

## 2018-09-06 NOTE — FLOWSHEET NOTE
smaller dowel (thick Sharpie) B hands for pressing into putty x 20                                Therapeutic Exercise and NMR:  [x] (95722) Provided verbal/tactile cueing for activities related to strengthening, flexibility, endurance, ROM  for improvements in scapular, scapulothoracic and UE control with self care, reaching, carrying, lifting, house/yardwork, driving/computer work.    [] (27294) Provided verbal/tactile cueing for activities related to improving balance, coordination, kinesthetic sense, posture, motor skill, proprioception  to assist with  scapular, scapulothoracic and UE control with self care, reaching, carrying, lifting, house/yardwork, driving/computer work.   [] Comments:    Therapeutic Activities:    [x] (42433 or 36314) Provided verbal/tactile cueing for activities related to improving balance, coordination, kinesthetic sense, posture, motor skill, proprioception and motor activation to allow for proper function of scapular, scapulothoracic and UE control with self care, carrying, lifting, driving/computer work  [] Comments:    Home Exercise Program:    [x] (59171) Reviewed/Progressed HEP activities related to strengthening, flexibility, endurance, ROM of scapular, scapulothoracic and UE control with self care, reaching, carrying, lifting, house/yardwork, driving/computer work  [x] (93012) Reviewed/Progressed HEP activities related to improving balance, coordination, kinesthetic sense, posture, motor skill, proprioception of scapular, scapulothoracic and UE control with self care, reaching, carrying, lifting, house/yardwork, driving/computer work    [] Comments:     Manual Treatments:  PROM / STM / Oscillations-Mobs:  G-I, II, III, IV (PA's, Inf., Post.)  [x] (84929) Provided manual therapy to mobilize soft tissue/joints of cervical/CT, scapular GHJ and UE for the purpose of modulating pain, promoting relaxation,  increasing ROM, reducing/eliminating soft tissue

## 2018-09-07 ENCOUNTER — TELEPHONE (OUTPATIENT)
Dept: ORTHOPEDIC SURGERY | Age: 63
End: 2018-09-07

## 2018-09-10 ENCOUNTER — OFFICE VISIT (OUTPATIENT)
Dept: ORTHOPEDIC SURGERY | Age: 63
End: 2018-09-10

## 2018-09-10 DIAGNOSIS — M79.641 BILATERAL HAND PAIN: ICD-10-CM

## 2018-09-10 DIAGNOSIS — M79.642 BILATERAL HAND PAIN: ICD-10-CM

## 2018-09-10 DIAGNOSIS — G56.03 BILATERAL CARPAL TUNNEL SYNDROME: Primary | ICD-10-CM

## 2018-09-10 LAB
A/G RATIO: 1.3 (ref 1.1–2.2)
ALBUMIN SERPL-MCNC: 4 G/DL (ref 3.4–5)
ALP BLD-CCNC: 68 U/L (ref 40–129)
ALT SERPL-CCNC: 13 U/L (ref 10–40)
ANION GAP SERPL CALCULATED.3IONS-SCNC: 11 MMOL/L (ref 3–16)
AST SERPL-CCNC: 17 U/L (ref 15–37)
BASOPHILS ABSOLUTE: 0 K/UL (ref 0–0.2)
BASOPHILS RELATIVE PERCENT: 1 %
BILIRUB SERPL-MCNC: 0.3 MG/DL (ref 0–1)
BUN BLDV-MCNC: 17 MG/DL (ref 7–20)
C-REACTIVE PROTEIN: 6.2 MG/L (ref 0–5.1)
CALCIUM SERPL-MCNC: 9.1 MG/DL (ref 8.3–10.6)
CHLORIDE BLD-SCNC: 100 MMOL/L (ref 99–110)
CO2: 24 MMOL/L (ref 21–32)
CREAT SERPL-MCNC: 0.6 MG/DL (ref 0.6–1.2)
EOSINOPHILS ABSOLUTE: 0.3 K/UL (ref 0–0.6)
EOSINOPHILS RELATIVE PERCENT: 6.5 %
GFR AFRICAN AMERICAN: >60
GFR NON-AFRICAN AMERICAN: >60
GLOBULIN: 3 G/DL
GLUCOSE BLD-MCNC: 100 MG/DL (ref 70–99)
HCT VFR BLD CALC: 38.7 % (ref 36–48)
HEMOGLOBIN: 12.5 G/DL (ref 12–16)
LYMPHOCYTES ABSOLUTE: 1 K/UL (ref 1–5.1)
LYMPHOCYTES RELATIVE PERCENT: 22.8 %
MCH RBC QN AUTO: 31 PG (ref 26–34)
MCHC RBC AUTO-ENTMCNC: 32.4 G/DL (ref 31–36)
MCV RBC AUTO: 95.8 FL (ref 80–100)
MONOCYTES ABSOLUTE: 0.4 K/UL (ref 0–1.3)
MONOCYTES RELATIVE PERCENT: 9.6 %
NEUTROPHILS ABSOLUTE: 2.7 K/UL (ref 1.7–7.7)
NEUTROPHILS RELATIVE PERCENT: 60.1 %
PDW BLD-RTO: 14.8 % (ref 12.4–15.4)
PLATELET # BLD: 315 K/UL (ref 135–450)
PMV BLD AUTO: 8.3 FL (ref 5–10.5)
POTASSIUM SERPL-SCNC: 4.7 MMOL/L (ref 3.5–5.1)
RBC # BLD: 4.04 M/UL (ref 4–5.2)
RHEUMATOID FACTOR: <10 IU/ML
SEDIMENTATION RATE, ERYTHROCYTE: 21 MM/HR (ref 0–30)
SODIUM BLD-SCNC: 135 MMOL/L (ref 136–145)
TOTAL PROTEIN: 7 G/DL (ref 6.4–8.2)
URIC ACID, SERUM: 5.5 MG/DL (ref 2.6–6)
WBC # BLD: 4.5 K/UL (ref 4–11)

## 2018-09-10 PROCEDURE — 99213 OFFICE O/P EST LOW 20 MIN: CPT | Performed by: ORTHOPAEDIC SURGERY

## 2018-09-10 NOTE — PROGRESS NOTES
Assessment: 33-year-old female status post bilateral carpal tunnel release presenting with bilateral finger and hand stiffness    Treatment Plan: She is now over 6 months status post surgery. She has had excellent benefit from numbness and tingling in symptoms related to carpal tunnel release. However, she does have stiffness that has been limiting her activities in both hands. I did review images today from January 2018 demonstrating multiple diffuse bilateral IP joint degenerative changes and thumb CMC joint degenerative changes  We did discuss symptomatic treatment including oral anti-inflammatory and the possibility of selective injections into hand and finger joints. I would like to obtain inflammatory panel to assess and rule out inflammatory arthritis at this time. The patient has plateaued essentially over the last several weeks while working with therapy and therefore we will continue to work on identifying possible causes of her stiffness and strategies to improve her overall function. Plan for follow-up after laboratory studies have been completed to discuss results and further treatment option    No Follow-up on file. History of Present Illness  Oscar Agustin is a 57 y.o. female here today for a follow-up for Her bilateral hands. She underwent bilateral carpal tunnel release in February 2018. She also has a known history of bilateral thumb arthritis and diffuse arthritis in her fingers. Since her surgery, she had been doing well with no symptoms of numbness or tingling. However, she has had persistent stiffness and despite working with hand therapy on range of motion and modalities she continues to have stiffness in her hand and all fingers that has inhibited some of her function. She denies any new injury, she does have some persistent swelling.  She denies any other joint pain and does not have a known history of rheumatoid arthritis or gout    Review of Systems  Pertinent items are noted

## 2018-09-11 LAB
ANA INTERPRETATION: NORMAL
ANTI-NUCLEAR ANTIBODY (ANA): NEGATIVE

## 2018-09-12 LAB — CCP IGG ANTIBODIES: 3 UNITS (ref 0–19)

## 2018-09-13 ENCOUNTER — APPOINTMENT (OUTPATIENT)
Dept: OCCUPATIONAL THERAPY | Age: 63
End: 2018-09-13
Payer: COMMERCIAL

## 2018-10-01 ENCOUNTER — OFFICE VISIT (OUTPATIENT)
Dept: RHEUMATOLOGY | Age: 63
End: 2018-10-01
Payer: COMMERCIAL

## 2018-10-01 ENCOUNTER — OFFICE VISIT (OUTPATIENT)
Dept: ORTHOPEDIC SURGERY | Age: 63
End: 2018-10-01
Payer: COMMERCIAL

## 2018-10-01 VITALS
HEART RATE: 82 BPM | HEIGHT: 65 IN | SYSTOLIC BLOOD PRESSURE: 122 MMHG | DIASTOLIC BLOOD PRESSURE: 84 MMHG | BODY MASS INDEX: 40.82 KG/M2 | TEMPERATURE: 98 F | WEIGHT: 245 LBS

## 2018-10-01 DIAGNOSIS — M79.641 BILATERAL HAND PAIN: Primary | ICD-10-CM

## 2018-10-01 DIAGNOSIS — M19.042 PRIMARY OSTEOARTHRITIS OF BOTH HANDS: ICD-10-CM

## 2018-10-01 DIAGNOSIS — M18.11 ARTHRITIS OF CARPOMETACARPAL (CMC) JOINT OF RIGHT THUMB: ICD-10-CM

## 2018-10-01 DIAGNOSIS — L40.50 PSORIATIC ARTHRITIS (HCC): Primary | ICD-10-CM

## 2018-10-01 DIAGNOSIS — M79.642 BILATERAL HAND PAIN: Primary | ICD-10-CM

## 2018-10-01 DIAGNOSIS — L40.9 PSORIASIS: ICD-10-CM

## 2018-10-01 DIAGNOSIS — M19.041 PRIMARY OSTEOARTHRITIS OF BOTH HANDS: ICD-10-CM

## 2018-10-01 DIAGNOSIS — M18.12 ARTHRITIS OF CARPOMETACARPAL (CMC) JOINT OF LEFT THUMB: ICD-10-CM

## 2018-10-01 PROCEDURE — 99213 OFFICE O/P EST LOW 20 MIN: CPT | Performed by: ORTHOPAEDIC SURGERY

## 2018-10-01 PROCEDURE — 99245 OFF/OP CONSLTJ NEW/EST HI 55: CPT | Performed by: INTERNAL MEDICINE

## 2018-10-01 PROCEDURE — 20600 DRAIN/INJ JOINT/BURSA W/O US: CPT | Performed by: ORTHOPAEDIC SURGERY

## 2018-10-01 RX ORDER — PREDNISONE 10 MG/1
TABLET ORAL
Qty: 35 TABLET | Refills: 0 | Status: SHIPPED | OUTPATIENT
Start: 2018-10-01 | End: 2018-10-29 | Stop reason: SDUPTHER

## 2018-10-01 NOTE — PROGRESS NOTES
ophthalmic gel-forming Place 1 drop into both eyes daily       calcium carbonate (OSCAL) 500 MG TABS tablet Take 500 mg by mouth daily.  fish oil-omega-3 fatty acids 1000 MG capsule Take 2 g by mouth daily. No current facility-administered medications for this visit. Allergies   Allergen Reactions    Codeine Phosphate        PHYSICAL EXAM:    Vitals:    /84 (Site: Right Upper Arm, Position: Sitting, Cuff Size: Large Adult)   Pulse 82   Temp 98 °F (36.7 °C) (Oral)   Ht 5' 5\" (1.651 m)   Wt 245 lb (111.1 kg)   BMI 40.77 kg/m²   General appearance/ Psychiatric: well nourished, and well groomed, normal judgement, alert, appears stated age and cooperative. MKS:   28 joint JOINT COUNT:                               Right                                                  Left   Swell Tender Swell Tender   PIP1           PIP2  x  x  x  x   PIP3  x  x       PIP4  x x       PIP5          MCP1           MCP2 xx  xx  x  x   MCP3  xx  xx  x  x   MCP4  x  x       MCP5 x  x       Wrist    x  x  x   Elbow           Shoulder    x   x   Knee             Bony swelling across scattered DIP joints, and PIP especially right second and third. Fist-50% right, 80% left. Bilateral wrist-painful range of motion. Both shoulders have full range of motion, associated with discomfort. All other joints have full range of motion. Ankle and feet joints-no tenderness, swelling or synovitis, full range of motion. Focal spine tenderness. Skin: 2 small plaque psoriasis - hairline behind right ear, one in the vertex. No other rashes, no induration or skin thickening or nodules. No evidence ischemia or deformities noted in digits or nails. HEENT: Normal lids, lacrimal glands and pupils. No oral or nasal ulcers. Salivary glands reveal no evidence of abnormality. External inspection of the ears and nose within normal limits. Neck: No masses or asymmetry. No thyroid enlargement.   Chest: Normal effort, clear to

## 2018-10-12 ENCOUNTER — TELEPHONE (OUTPATIENT)
Dept: RHEUMATOLOGY | Age: 63
End: 2018-10-12

## 2018-10-12 ENCOUNTER — OFFICE VISIT (OUTPATIENT)
Dept: RHEUMATOLOGY | Age: 63
End: 2018-10-12
Payer: COMMERCIAL

## 2018-10-12 VITALS
TEMPERATURE: 97.9 F | HEART RATE: 74 BPM | SYSTOLIC BLOOD PRESSURE: 124 MMHG | HEIGHT: 65 IN | DIASTOLIC BLOOD PRESSURE: 82 MMHG | BODY MASS INDEX: 40.98 KG/M2 | WEIGHT: 246 LBS

## 2018-10-12 DIAGNOSIS — L40.9 PSORIASIS: ICD-10-CM

## 2018-10-12 DIAGNOSIS — L40.50 PSORIATIC ARTHRITIS (HCC): Primary | ICD-10-CM

## 2018-10-12 DIAGNOSIS — Z79.899 HIGH RISK MEDICATION USE: ICD-10-CM

## 2018-10-12 PROCEDURE — 99214 OFFICE O/P EST MOD 30 MIN: CPT | Performed by: INTERNAL MEDICINE

## 2018-10-12 RX ORDER — FOLIC ACID 1 MG/1
1 TABLET ORAL DAILY
Qty: 90 TABLET | Refills: 1 | Status: SHIPPED | OUTPATIENT
Start: 2018-10-12 | End: 2019-03-20

## 2018-10-12 NOTE — PROGRESS NOTES
benefits and side effects fully explained including but not limited the immune suppression, low blood count, liver toxicity, rare lung problems, probable risk of cancers . It requires periodic blood monitoring for the side effects and alcohol abstinence is recommended. Patient was made aware that this is a high risk medications, and explained the importance of close follow up and testing in order to identify any adverse events that can potentially cause organ damage. Patient verbalizes the understanding. I also gave hand out on drug information and should discuss with me with any concerns. Methotrexate and folic acid was sent to pharmacy. Continue prednisone taper. Labs in 4 weeks, office visit in 2 months. Patient indicates understanding and agrees with the management plan. I reviewed patient's history, referral documents and electronic medical records. #######################################################################    Subjective-  Follow-up for psoriasis and psoriatic arthritis. Skin psoriasis is limited-2 patches in her scalp, resolved now. Psoriatic arthritis is much better on prednisone taper. Labs from last visit are stable. She continues to have stiffness, pain, swelling across her finger joints, has noticed approximately 50-60% improvement in her symptoms. She is tolerating prednisone well. Denies any intercurrent infections, palpitations, tremor, insomnia. All other review of systems are negative.     Past Medical History:   Diagnosis Date    Arthritis     Bone spur     CTS (carpal tunnel syndrome)     Cyst     left foot and spur    Glaucoma     Hyperlipidemia     Hypothyroidism     Osteopenia     Overweight(278.02)     PONV (postoperative nausea and vomiting)     S/P foot surgery 07/07/2011    excision cyst and resection spur left foot    Scalp psoriasis      Past Surgical History:   Procedure Laterality Date    CARPAL TUNNEL RELEASE Left 02/02/2018    CARPAL

## 2018-10-16 DIAGNOSIS — L40.50 PSORIATIC ARTHRITIS (HCC): Primary | ICD-10-CM

## 2018-10-18 DIAGNOSIS — L40.50 PSORIATIC ARTHRITIS (HCC): Primary | ICD-10-CM

## 2018-10-29 RX ORDER — PREDNISONE 10 MG/1
TABLET ORAL
Qty: 35 TABLET | Refills: 0 | Status: SHIPPED | OUTPATIENT
Start: 2018-10-29 | End: 2018-11-29 | Stop reason: ALTCHOICE

## 2018-11-05 ENCOUNTER — OFFICE VISIT (OUTPATIENT)
Dept: INTERNAL MEDICINE CLINIC | Age: 63
End: 2018-11-05
Payer: COMMERCIAL

## 2018-11-05 VITALS
SYSTOLIC BLOOD PRESSURE: 122 MMHG | DIASTOLIC BLOOD PRESSURE: 60 MMHG | WEIGHT: 241.6 LBS | HEIGHT: 65 IN | BODY MASS INDEX: 40.25 KG/M2

## 2018-11-05 DIAGNOSIS — Z00.00 ANNUAL PHYSICAL EXAM: Primary | ICD-10-CM

## 2018-11-05 DIAGNOSIS — E03.9 ACQUIRED HYPOTHYROIDISM: ICD-10-CM

## 2018-11-05 DIAGNOSIS — Z00.00 ANNUAL PHYSICAL EXAM: ICD-10-CM

## 2018-11-05 DIAGNOSIS — M16.11 PRIMARY OSTEOARTHRITIS OF RIGHT HIP: ICD-10-CM

## 2018-11-05 DIAGNOSIS — H40.1132 PRIMARY OPEN ANGLE GLAUCOMA (POAG) OF BOTH EYES, MODERATE STAGE: ICD-10-CM

## 2018-11-05 DIAGNOSIS — E78.5 HYPERLIPIDEMIA, UNSPECIFIED HYPERLIPIDEMIA TYPE: ICD-10-CM

## 2018-11-05 LAB
CHOLESTEROL, TOTAL: 201 MG/DL (ref 0–199)
HDLC SERPL-MCNC: 65 MG/DL (ref 40–60)
LDL CHOLESTEROL CALCULATED: 111 MG/DL
TRIGL SERPL-MCNC: 126 MG/DL (ref 0–150)
VLDLC SERPL CALC-MCNC: 25 MG/DL

## 2018-11-05 PROCEDURE — 99397 PER PM REEVAL EST PAT 65+ YR: CPT | Performed by: INTERNAL MEDICINE

## 2018-11-05 RX ORDER — ATORVASTATIN CALCIUM 40 MG/1
TABLET, FILM COATED ORAL
Qty: 90 TABLET | Refills: 3 | Status: SHIPPED | OUTPATIENT
Start: 2018-11-05 | End: 2019-04-04 | Stop reason: SDUPTHER

## 2018-11-05 RX ORDER — LEVOTHYROXINE SODIUM 0.12 MG/1
TABLET ORAL
Qty: 90 TABLET | Refills: 3 | Status: SHIPPED | OUTPATIENT
Start: 2018-11-05 | End: 2019-05-06 | Stop reason: SDUPTHER

## 2018-11-05 ASSESSMENT — PATIENT HEALTH QUESTIONNAIRE - PHQ9
SUM OF ALL RESPONSES TO PHQ9 QUESTIONS 1 & 2: 0
1. LITTLE INTEREST OR PLEASURE IN DOING THINGS: 0
SUM OF ALL RESPONSES TO PHQ QUESTIONS 1-9: 0
2. FEELING DOWN, DEPRESSED OR HOPELESS: 0
SUM OF ALL RESPONSES TO PHQ QUESTIONS 1-9: 0

## 2018-11-05 NOTE — PROGRESS NOTES
Normal bowel sounds, non-distended and non-tender to palpation. No liver or spleen enlargement. EXT: No edema, no calf tenderness. Pulses are present bilaterally. DATA:    Lab Results   Component Value Date    ALT 13 09/10/2018    AST 17 09/10/2018    ALKPHOS 68 09/10/2018    BILITOT 0.3 09/10/2018     Lab Results   Component Value Date    CREATININE 0.6 09/10/2018    BUN 17 09/10/2018     (L) 09/10/2018    K 4.7 09/10/2018     09/10/2018    CO2 24 09/10/2018     Lab Results   Component Value Date    TSH 2.07 07/09/2012     Lab Results   Component Value Date    WBC 4.5 09/10/2018    HGB 12.5 09/10/2018    HCT 38.7 09/10/2018    MCV 95.8 09/10/2018     09/10/2018     Assessment Plan :     1. Annual wellness exam      2. Hypothyroidism          No current complaints of fatigue or weight gain. Remains on thyroid replacement. Medications reviewed and refilled. 3.       Hyperlipidemia         Remains on statin       No c/o muscle aches       No chest pain       Last lipid panel was in good range        Discussed healthy diet and lifestyle    4. Glaucoma            Monitored closely by eye doctor     5. Obesity              BMI  40.20           Encourage weight reduction,      6.  Psoriatic arthritis           On Embrel weekly                Helene Edward MD  I attest that this note was completed entirely by me

## 2018-11-12 ENCOUNTER — OFFICE VISIT (OUTPATIENT)
Dept: INTERNAL MEDICINE CLINIC | Age: 63
End: 2018-11-12
Payer: COMMERCIAL

## 2018-11-12 VITALS
SYSTOLIC BLOOD PRESSURE: 138 MMHG | BODY MASS INDEX: 40.65 KG/M2 | DIASTOLIC BLOOD PRESSURE: 80 MMHG | HEIGHT: 65 IN | WEIGHT: 244 LBS

## 2018-11-12 DIAGNOSIS — Z01.818 PRE-OP EXAMINATION: Primary | ICD-10-CM

## 2018-11-12 DIAGNOSIS — E78.5 HYPERLIPIDEMIA, UNSPECIFIED HYPERLIPIDEMIA TYPE: ICD-10-CM

## 2018-11-12 DIAGNOSIS — E03.9 ACQUIRED HYPOTHYROIDISM: ICD-10-CM

## 2018-11-12 DIAGNOSIS — M16.12 PRIMARY OSTEOARTHRITIS OF LEFT HIP: ICD-10-CM

## 2018-11-12 PROCEDURE — 93000 ELECTROCARDIOGRAM COMPLETE: CPT | Performed by: INTERNAL MEDICINE

## 2018-11-12 PROCEDURE — 99214 OFFICE O/P EST MOD 30 MIN: CPT | Performed by: INTERNAL MEDICINE

## 2018-12-19 ENCOUNTER — OFFICE VISIT (OUTPATIENT)
Dept: RHEUMATOLOGY | Age: 63
End: 2018-12-19
Payer: COMMERCIAL

## 2018-12-19 VITALS
BODY MASS INDEX: 37.49 KG/M2 | WEIGHT: 225 LBS | HEART RATE: 72 BPM | HEIGHT: 65 IN | DIASTOLIC BLOOD PRESSURE: 78 MMHG | TEMPERATURE: 98.1 F | SYSTOLIC BLOOD PRESSURE: 124 MMHG

## 2018-12-19 DIAGNOSIS — L40.9 PSORIASIS: ICD-10-CM

## 2018-12-19 DIAGNOSIS — L40.50 PSORIATIC ARTHRITIS (HCC): Primary | ICD-10-CM

## 2018-12-19 DIAGNOSIS — Z79.899 HIGH RISK MEDICATION USE: ICD-10-CM

## 2018-12-19 PROCEDURE — 99214 OFFICE O/P EST MOD 30 MIN: CPT | Performed by: INTERNAL MEDICINE

## 2018-12-19 NOTE — PROGRESS NOTES
ALT 13 09/10/2018 1027    BILITOT 0.3 09/10/2018 1027          Lab Results   Component Value Date    LABURIC 5.5 09/10/2018     Lab Results   Component Value Date    SEDRATE 22 10/01/2018     Lab Results   Component Value Date    CRP 6.2 (H) 10/01/2018     Lab Results   Component Value Date    AQUILES Negative 09/10/2018    SEDRATE 22 10/01/2018     No results found for: CKTOTAL  Lab Results   Component Value Date    TSH 2.07 07/09/2012     Lab Results   Component Value Date    VITD25 31.6 08/16/2017         Radiology Review:   10/1/2018-  Both hand x-ray images were retrieved, images were shown to patient, has significant osteoarthritic changes in the DIP, PIP's as well as joint space narrowing of MCP joints mainly his third fourth. R 2 nd PIP has central erosion with new bone formation,which has progressed when comparing studies from Jan 2018. A/P- See above.

## 2019-02-13 DIAGNOSIS — L40.50 PSORIATIC ARTHRITIS (HCC): ICD-10-CM

## 2019-02-13 RX ORDER — ETANERCEPT 50 MG/ML
SOLUTION SUBCUTANEOUS
Qty: 3.92 ML | Refills: 3 | Status: SHIPPED | OUTPATIENT
Start: 2019-02-13 | End: 2019-05-24 | Stop reason: SDUPTHER

## 2019-03-19 ENCOUNTER — OFFICE VISIT (OUTPATIENT)
Dept: RHEUMATOLOGY | Age: 64
End: 2019-03-19
Payer: COMMERCIAL

## 2019-03-19 VITALS
DIASTOLIC BLOOD PRESSURE: 70 MMHG | SYSTOLIC BLOOD PRESSURE: 118 MMHG | TEMPERATURE: 98.1 F | HEART RATE: 68 BPM | BODY MASS INDEX: 37.49 KG/M2 | WEIGHT: 225 LBS | HEIGHT: 65 IN

## 2019-03-19 DIAGNOSIS — M19.042 PRIMARY OSTEOARTHRITIS OF BOTH HANDS: ICD-10-CM

## 2019-03-19 DIAGNOSIS — L40.9 PSORIASIS: ICD-10-CM

## 2019-03-19 DIAGNOSIS — L40.50 PSORIATIC ARTHRITIS (HCC): Primary | ICD-10-CM

## 2019-03-19 DIAGNOSIS — Z79.899 HIGH RISK MEDICATION USE: ICD-10-CM

## 2019-03-19 DIAGNOSIS — M19.041 PRIMARY OSTEOARTHRITIS OF BOTH HANDS: ICD-10-CM

## 2019-03-19 PROCEDURE — 99214 OFFICE O/P EST MOD 30 MIN: CPT | Performed by: INTERNAL MEDICINE

## 2019-03-19 RX ORDER — GABAPENTIN 300 MG/1
3 CAPSULE ORAL NIGHTLY
Refills: 0 | COMMUNITY
Start: 2019-02-19 | End: 2020-12-17 | Stop reason: SDUPTHER

## 2019-03-19 RX ORDER — MELOXICAM 15 MG/1
1 TABLET ORAL DAILY
COMMUNITY
Start: 2019-01-23 | End: 2019-09-05

## 2019-03-20 ENCOUNTER — OFFICE VISIT (OUTPATIENT)
Dept: INTERNAL MEDICINE CLINIC | Age: 64
End: 2019-03-20
Payer: COMMERCIAL

## 2019-03-20 VITALS
WEIGHT: 233 LBS | HEART RATE: 71 BPM | SYSTOLIC BLOOD PRESSURE: 130 MMHG | OXYGEN SATURATION: 98 % | HEIGHT: 65 IN | RESPIRATION RATE: 14 BRPM | DIASTOLIC BLOOD PRESSURE: 82 MMHG | BODY MASS INDEX: 38.82 KG/M2

## 2019-03-20 DIAGNOSIS — Z01.818 PRE-OP EXAM: Primary | ICD-10-CM

## 2019-03-20 DIAGNOSIS — L40.50 PSORIATIC ARTHRITIS (HCC): ICD-10-CM

## 2019-03-20 DIAGNOSIS — S72.22XS CLOSED DISPLACED SUBTROCHANTERIC FRACTURE OF LEFT FEMUR, SEQUELA: ICD-10-CM

## 2019-03-20 PROCEDURE — 99242 OFF/OP CONSLTJ NEW/EST SF 20: CPT | Performed by: INTERNAL MEDICINE

## 2019-03-20 ASSESSMENT — PATIENT HEALTH QUESTIONNAIRE - PHQ9
SUM OF ALL RESPONSES TO PHQ QUESTIONS 1-9: 0
SUM OF ALL RESPONSES TO PHQ QUESTIONS 1-9: 0
2. FEELING DOWN, DEPRESSED OR HOPELESS: 0
SUM OF ALL RESPONSES TO PHQ9 QUESTIONS 1 & 2: 0
1. LITTLE INTEREST OR PLEASURE IN DOING THINGS: 0

## 2019-04-04 ENCOUNTER — TELEPHONE (OUTPATIENT)
Dept: INTERNAL MEDICINE CLINIC | Age: 64
End: 2019-04-04

## 2019-04-04 RX ORDER — ATORVASTATIN CALCIUM 40 MG/1
TABLET, FILM COATED ORAL
Qty: 90 TABLET | Refills: 0 | Status: SHIPPED | OUTPATIENT
Start: 2019-04-04 | End: 2019-06-15 | Stop reason: SDUPTHER

## 2019-04-04 NOTE — TELEPHONE ENCOUNTER
[facility] called wanting a refill on :  atorvastatin (LIPITOR) 40 MG tablet [[id number]    Patient is a former patient of Dr. Brigid Carrillo and does have an appointment schedule in June. Requesting enough until her appointment.

## 2019-05-06 RX ORDER — LEVOTHYROXINE SODIUM 0.12 MG/1
TABLET ORAL
Qty: 90 TABLET | Refills: 0 | Status: SHIPPED | OUTPATIENT
Start: 2019-05-06 | End: 2019-09-05 | Stop reason: SDUPTHER

## 2019-05-06 NOTE — TELEPHONE ENCOUNTER
Refill Request:     levothyroxine (SYNTHROID) 125 MCG tablet [[id number]    [facility] - [id number]  [phone] - f [phone]

## 2019-05-24 DIAGNOSIS — L40.50 PSORIATIC ARTHRITIS (HCC): ICD-10-CM

## 2019-05-24 RX ORDER — ETANERCEPT 50 MG/ML
SOLUTION SUBCUTANEOUS
Qty: 3.92 ML | Refills: 3 | Status: SHIPPED | OUTPATIENT
Start: 2019-05-24 | End: 2019-12-19 | Stop reason: CLARIF

## 2019-06-19 ENCOUNTER — OFFICE VISIT (OUTPATIENT)
Dept: RHEUMATOLOGY | Age: 64
End: 2019-06-19
Payer: COMMERCIAL

## 2019-06-19 VITALS
DIASTOLIC BLOOD PRESSURE: 68 MMHG | BODY MASS INDEX: 39.32 KG/M2 | HEIGHT: 65 IN | SYSTOLIC BLOOD PRESSURE: 116 MMHG | WEIGHT: 236 LBS

## 2019-06-19 DIAGNOSIS — Z79.899 HIGH RISK MEDICATION USE: ICD-10-CM

## 2019-06-19 DIAGNOSIS — L40.9 PSORIASIS: ICD-10-CM

## 2019-06-19 DIAGNOSIS — M19.041 PRIMARY OSTEOARTHRITIS OF BOTH HANDS: ICD-10-CM

## 2019-06-19 DIAGNOSIS — L40.50 PSORIATIC ARTHRITIS (HCC): Primary | ICD-10-CM

## 2019-06-19 DIAGNOSIS — M19.042 PRIMARY OSTEOARTHRITIS OF BOTH HANDS: ICD-10-CM

## 2019-06-19 PROCEDURE — 99214 OFFICE O/P EST MOD 30 MIN: CPT | Performed by: INTERNAL MEDICINE

## 2019-06-19 RX ORDER — SULFASALAZINE 500 MG/1
TABLET ORAL
Qty: 120 TABLET | Refills: 1 | Status: SHIPPED | OUTPATIENT
Start: 2019-06-19 | End: 2019-08-13 | Stop reason: SDUPTHER

## 2019-06-19 NOTE — PROGRESS NOTES
[facility], MD                                                           [address]                                                              (O) [phone] (F)      Dear Dr. Obdulia Harman MD:  Please find Rheumatology assessment. Thank you for giving me the opportunity to be involved in Shania Agustin's care and I look forward following Shania Bryson along with you. If you have any questions or concerns please feel free to reach me. Note is transcribed using voice recognition software. Inadvertent computerized transcription errors may be present. Patient identification: Rai Agustin,: ,43 y.o. Sex: female     Assessment / Plan: Shania Bryson was seen today for follow-up. Diagnoses and all orders for this visit:    Psoriatic arthritis ([facility] [address]    Psoriasis    Primary osteoarthritis of both hands    High risk medication use  -     AST(SGOT) & ALT(SGPT); Standing  -     CBC Auto Differential; Standing  -     C-Reactive Protein; Standing  -     Creatinine, Serum; Standing  -     Sedimentation Rate; Standing    Other orders  -     sulfaSALAzine (AZULFIDINE) 500 MG tablet; Take 2 tab po BID        Psoriatic arthritis-symptomatic-active disease-did have significant improvement on Enbrel about 60 to 70%. Plan osteoarthritis-stable. Skin psoriasis-is in remission. Plan-  2 options were discussed with patient-either adding methotrexate or sulfasalazine to Enbrel or changing biologic therapy. She does not feel comfortable taking methotrexate, would like to try sulfasalazine before sending biologic therapy.   Prescription was sent to the pharmacy, side effects, directions were discussed with patient, should start with 1 tablet a day, quickly escalate to a desired dose. Labs in 6 weeks, follow-up in 3 months. Patient indicates understanding and agrees with the management plan. I reviewed patient's history, referral documents and electronic medical records. #######################################################################    Subjective-  Follow-up for psoriasis, psoriatic arthritis and generalized osteoarthritis. Interval changes-states that she is doing much better than before starting Enbrel, does continue to have pain, swelling, stiffness across DIPs, PIPs, MCPs-a.m. stiffness-about an hour, swelling comes and goes. She is able to carry out daily chores and recreational activities. She does have problem and making full fist especially in the morning. Skin psoriasis is in remission. Tolerating medications well. No intercurrent infections, injection site reactions. All other review of systems are negative. Past Medical History:   Diagnosis Date    Arthritis     Bone spur     CTS (carpal tunnel syndrome)     Cyst     left foot and spur    Glaucoma     Hyperlipidemia     Hypothyroidism     Osteopenia     Overweight(278.02)     PONV (postoperative nausea and vomiting)     Psoriatic arthritis (HCC)     S/P foot surgery 07/07/2011    excision cyst and resection spur left foot    Scalp psoriasis      Past Surgical History:   Procedure Laterality Date    CARPAL TUNNEL RELEASE Left 02/02/2018    CARPAL TUNNEL RELEASE Right 02/23/2018    right carpal tunnel release    COLONOSCOPY      2011    FOOT SURGERY  07/07/2011    w/local anesthesia    JOINT REPLACEMENT  06/2015    Rt.  hip     JOINT REPLACEMENT Left 11/20/2018    Dr Leonel Ascencio  2003       No family history of autoimmune diseases    Current Outpatient Medications   Medication Sig Dispense Refill    ENBREL MINI 50 MG/ML SOCT INJECT 50 MG UNDER THE SKIN ONCE A WEEK 3.92 mL 3    levothyroxine (SYNTHROID) 125 MCG tablet TAKE 1 TABLET BY MOUTH EVERY DAY 90 tablet 0  atorvastatin (LIPITOR) 40 MG tablet TAKE ONE TABLET BY MOUTH EVERY DAY 90 tablet 0    gabapentin (NEURONTIN) 300 MG capsule Take 3 capsules by mouth nightly. 0    meloxicam (MOBIC) 15 MG tablet Take 1 tablet by mouth daily      calcipotriene (DOVONEX) 0.005 % solution APPLY EXTERNALLY TO THE AFFECTED AREA TWICE DAILY 60 mL 1    Multiple Vitamins-Minerals (MULTIVITAMIN PO) Take by mouth daily      Vitamin D (CHOLECALCIFEROL) 1000 UNITS CAPS capsule Take 1,000 Units by mouth daily.  latanoprost (XALATAN) 0.005 % ophthalmic solution Place 1 drop into both eyes nightly       timolol (TIMOPTIC-XR) 0.5 % ophthalmic gel-forming Place 1 drop into both eyes daily       sulfaSALAzine (AZULFIDINE) 500 MG tablet Take 2 tab po  tablet 1     No current facility-administered medications for this visit. Allergies   Allergen Reactions    Codeine Phosphate      Nausea and vomiting       PHYSICAL EXAM:    Vitals:    /68   Ht 5' 5\" (1.651 m)   Wt 236 lb (107 kg)   BMI 39.27 kg/m²   General appearance/ Psychiatric: well nourished, and well groomed, normal judgement, alert, appears stated age and cooperative. MKS:   28 joint JOINT COUNT:                               Right                                                  Left   Swell Tender Swell Tender   PIP1           PIP2  x  x x  x   PIP3 x  x       PIP4          PIP5          MCP1           MCP2     x     MCP3  x x  x     MCP4  x  x       MCP5          Wrist           Elbow           Shoulder          Knee             Right hand second and third DIP joint is tender. She also has advanced osteoarthritic changes across DIPs, PIP, MCP-clinically and radiologically. Right hand-80% effaced, left hand loose fist.  Otherwise, do not appreciate any tender, swollen or inflamed joints in upper or lower extremities. Skin: Does not have a psoriatic rash is now. . No evidence ischemia or deformities noted in digits or nails.         DATA:   Lab Results

## 2019-07-25 DIAGNOSIS — Z79.899 HIGH RISK MEDICATION USE: ICD-10-CM

## 2019-07-25 LAB
ALT SERPL-CCNC: 10 U/L (ref 10–40)
AST SERPL-CCNC: 14 U/L (ref 15–37)
BASOPHILS ABSOLUTE: 0 K/UL (ref 0–0.2)
BASOPHILS RELATIVE PERCENT: 1.3 %
C-REACTIVE PROTEIN: 2.7 MG/L (ref 0–5.1)
CREAT SERPL-MCNC: 0.9 MG/DL (ref 0.6–1.2)
EOSINOPHILS ABSOLUTE: 0.2 K/UL (ref 0–0.6)
EOSINOPHILS RELATIVE PERCENT: 4.8 %
GFR AFRICAN AMERICAN: >60
GFR NON-AFRICAN AMERICAN: >60
HCT VFR BLD CALC: 40.2 % (ref 36–48)
HEMOGLOBIN: 13 G/DL (ref 12–16)
LYMPHOCYTES ABSOLUTE: 1.3 K/UL (ref 1–5.1)
LYMPHOCYTES RELATIVE PERCENT: 35.2 %
MCH RBC QN AUTO: 30.4 PG (ref 26–34)
MCHC RBC AUTO-ENTMCNC: 32.3 G/DL (ref 31–36)
MCV RBC AUTO: 94.2 FL (ref 80–100)
MONOCYTES ABSOLUTE: 0.6 K/UL (ref 0–1.3)
MONOCYTES RELATIVE PERCENT: 14.8 %
NEUTROPHILS ABSOLUTE: 1.6 K/UL (ref 1.7–7.7)
NEUTROPHILS RELATIVE PERCENT: 43.9 %
PDW BLD-RTO: 17 % (ref 12.4–15.4)
PLATELET # BLD: 291 K/UL (ref 135–450)
PMV BLD AUTO: 8.3 FL (ref 5–10.5)
RBC # BLD: 4.26 M/UL (ref 4–5.2)
WBC # BLD: 3.8 K/UL (ref 4–11)

## 2019-07-26 LAB — SEDIMENTATION RATE, ERYTHROCYTE: 13 MM/HR (ref 0–30)

## 2019-08-13 RX ORDER — SULFASALAZINE 500 MG/1
TABLET ORAL
Qty: 120 TABLET | Refills: 0 | Status: SHIPPED | OUTPATIENT
Start: 2019-08-13 | End: 2020-09-01 | Stop reason: ALTCHOICE

## 2019-09-05 ENCOUNTER — OFFICE VISIT (OUTPATIENT)
Dept: INTERNAL MEDICINE CLINIC | Age: 64
End: 2019-09-05
Payer: COMMERCIAL

## 2019-09-05 VITALS
SYSTOLIC BLOOD PRESSURE: 142 MMHG | DIASTOLIC BLOOD PRESSURE: 60 MMHG | WEIGHT: 238 LBS | HEIGHT: 65 IN | BODY MASS INDEX: 39.65 KG/M2

## 2019-09-05 DIAGNOSIS — Z12.39 BREAST CANCER SCREENING: ICD-10-CM

## 2019-09-05 DIAGNOSIS — Z00.00 ROUTINE GENERAL MEDICAL EXAMINATION AT A HEALTH CARE FACILITY: Primary | ICD-10-CM

## 2019-09-05 DIAGNOSIS — E78.5 HYPERLIPIDEMIA, UNSPECIFIED HYPERLIPIDEMIA TYPE: ICD-10-CM

## 2019-09-05 DIAGNOSIS — L40.50 PSORIATIC ARTHRITIS (HCC): ICD-10-CM

## 2019-09-05 DIAGNOSIS — L40.9 PSORIASIS: ICD-10-CM

## 2019-09-05 DIAGNOSIS — E03.9 ACQUIRED HYPOTHYROIDISM: ICD-10-CM

## 2019-09-05 PROCEDURE — 99397 PER PM REEVAL EST PAT 65+ YR: CPT | Performed by: INTERNAL MEDICINE

## 2019-09-05 RX ORDER — ATORVASTATIN CALCIUM 20 MG/1
20 TABLET, FILM COATED ORAL DAILY
Qty: 90 TABLET | Refills: 3 | Status: SHIPPED | OUTPATIENT
Start: 2019-09-05 | End: 2020-11-25 | Stop reason: SDUPTHER

## 2019-09-05 RX ORDER — LEVOTHYROXINE SODIUM 0.12 MG/1
TABLET ORAL
Qty: 90 TABLET | Refills: 3 | Status: SHIPPED | OUTPATIENT
Start: 2019-09-05 | End: 2019-10-02

## 2019-09-05 RX ORDER — NAPROXEN 500 MG/1
500 TABLET ORAL 2 TIMES DAILY WITH MEALS
COMMUNITY
End: 2020-12-17

## 2019-09-05 ASSESSMENT — PATIENT HEALTH QUESTIONNAIRE - PHQ9
SUM OF ALL RESPONSES TO PHQ QUESTIONS 1-9: 0
2. FEELING DOWN, DEPRESSED OR HOPELESS: 0
SUM OF ALL RESPONSES TO PHQ QUESTIONS 1-9: 0
SUM OF ALL RESPONSES TO PHQ9 QUESTIONS 1 & 2: 0
1. LITTLE INTEREST OR PLEASURE IN DOING THINGS: 0

## 2019-09-05 NOTE — LETTER
[facility] [address]  [address], [address] [place] [address]  Phone: [phone]  Fax: [phone]    Irvin Kennedy MD        September 5, 2019     Patient: Eunice Ott   YOB: 1955   Date of Visit: 9/5/2019       To Whom It May Concern: It is my medical opinion that Michelle Adam requires a disability parking placard for the following reasons:  She has limited walking ability due to an orthopedic condition. Duration of need: 5 years    If you have any questions or concerns, please don't hesitate to call.     Sincerely,          Irvin Kennedy MD

## 2019-09-05 NOTE — PROGRESS NOTES
Rt. hip     JOINT REPLACEMENT Left 2018    Dr Leighton Bejarano  2003       Social History     Socioeconomic History    Marital status:      Spouse name: Not on file    Number of children: Not on file    Years of education: Not on file    Highest education level: Not on file   Occupational History    Occupation: sales     Comment: chemical protective suits   Social Needs    Financial resource strain: Not on file    Food insecurity:     Worry: Not on file     Inability: Not on file    Transportation needs:     Medical: Not on file     Non-medical: Not on file   Tobacco Use    Smoking status: Former Smoker     Packs/day: 1.00     Years: 4.00     Pack years: 4.00     Last attempt to quit: 1991     Years since quittin.1    Smokeless tobacco: Never Used   Substance and Sexual Activity    Alcohol use: Yes     Comment: occasionally    Drug use: No    Sexual activity: Not on file   Lifestyle    Physical activity:     Days per week: Not on file     Minutes per session: Not on file    Stress: Not on file   Relationships    Social connections:     Talks on phone: Not on file     Gets together: Not on file     Attends Evangelical service: Not on file     Active member of club or organization: Not on file     Attends meetings of clubs or organizations: Not on file     Relationship status: Not on file    Intimate partner violence:     Fear of current or ex partner: Not on file     Emotionally abused: Not on file     Physically abused: Not on file     Forced sexual activity: Not on file   Other Topics Concern    Not on file   Social History Narrative    Not on file        Family History   Problem Relation Age of Onset    Coronary Art Dis Mother        ADVANCEDIRECTIVE: N, Not Received    Vitals:    19 1048 19 1056   BP: (!) 142/60 (!) 142/60   Weight: 238 lb (108 kg)    Height: 5' 5\" (1.651 m)      Estimated body mass index is 39.61 kg/m² as calculated from the

## 2019-09-06 PROBLEM — L40.50 PSORIATIC ARTHRITIS (HCC): Status: ACTIVE | Noted: 2019-09-06

## 2019-09-06 ASSESSMENT — ENCOUNTER SYMPTOMS
SHORTNESS OF BREATH: 0
BACK PAIN: 0
CONSTIPATION: 0
DIARRHEA: 0
ABDOMINAL PAIN: 0

## 2019-09-24 ENCOUNTER — OFFICE VISIT (OUTPATIENT)
Dept: RHEUMATOLOGY | Age: 64
End: 2019-09-24
Payer: COMMERCIAL

## 2019-09-24 VITALS
HEIGHT: 65 IN | BODY MASS INDEX: 39.82 KG/M2 | WEIGHT: 239 LBS | SYSTOLIC BLOOD PRESSURE: 132 MMHG | DIASTOLIC BLOOD PRESSURE: 84 MMHG

## 2019-09-24 DIAGNOSIS — M19.042 PRIMARY OSTEOARTHRITIS OF BOTH HANDS: ICD-10-CM

## 2019-09-24 DIAGNOSIS — M19.041 PRIMARY OSTEOARTHRITIS OF BOTH HANDS: ICD-10-CM

## 2019-09-24 DIAGNOSIS — L40.9 PSORIASIS: ICD-10-CM

## 2019-09-24 DIAGNOSIS — Z79.899 HIGH RISK MEDICATION USE: ICD-10-CM

## 2019-09-24 DIAGNOSIS — L40.50 PSORIATIC ARTHRITIS (HCC): Primary | ICD-10-CM

## 2019-09-24 PROCEDURE — 99214 OFFICE O/P EST MOD 30 MIN: CPT | Performed by: INTERNAL MEDICINE

## 2019-09-24 RX ORDER — SULFASALAZINE 500 MG/1
TABLET ORAL
Qty: 120 TABLET | Refills: 2 | Status: SHIPPED | OUTPATIENT
Start: 2019-09-24 | End: 2019-10-22 | Stop reason: SDUPTHER

## 2019-09-24 RX ORDER — PREDNISONE 10 MG/1
TABLET ORAL
Qty: 10 TABLET | Refills: 0 | Status: SHIPPED | OUTPATIENT
Start: 2019-09-24 | End: 2019-12-19 | Stop reason: CLARIF

## 2019-09-24 NOTE — PROGRESS NOTES
 JOINT REPLACEMENT  06/2015    Rt. hip     JOINT REPLACEMENT Left 11/20/2018    Dr Bella Hinsno  2003       No family history of autoimmune diseases    Current Outpatient Medications   Medication Sig Dispense Refill    predniSONE (DELTASONE) 10 MG tablet Take 1 tab po daily. 10 tablet 0    sulfaSALAzine (AZULFIDINE) 500 MG tablet Take 2 tab po  tablet 2    levothyroxine (SYNTHROID) 125 MCG tablet TAKE 1 TABLET BY MOUTH EVERY DAY 90 tablet 3    atorvastatin (LIPITOR) 20 MG tablet Take 1 tablet by mouth daily 90 tablet 3    naproxen (NAPROSYN) 500 MG tablet Take 500 mg by mouth 2 times daily (with meals)      sulfaSALAzine (AZULFIDINE) 500 MG tablet TAKE 2 TABLETS BY MOUTH TWICE DAILY 120 tablet 0    ENBREL MINI 50 MG/ML SOCT INJECT 50 MG UNDER THE SKIN ONCE A WEEK 3.92 mL 3    gabapentin (NEURONTIN) 300 MG capsule Take 3 capsules by mouth nightly. 0    Vitamin D (CHOLECALCIFEROL) 1000 UNITS CAPS capsule Take 1,000 Units by mouth daily.  latanoprost (XALATAN) 0.005 % ophthalmic solution Place 1 drop into both eyes nightly       timolol (TIMOPTIC-XR) 0.5 % ophthalmic gel-forming Place 1 drop into both eyes daily        No current facility-administered medications for this visit. Allergies   Allergen Reactions    Codeine Phosphate      Nausea and vomiting       PHYSICAL EXAM:    Vitals:    /84   Ht 5' 5\" (1.651 m)   Wt 239 lb (108.4 kg)   BMI 39.77 kg/m²   General appearance/ Psychiatric: well nourished, and well groomed, normal judgement, alert, appears stated age and cooperative.    MKS:   28 joint JOINT COUNT:                               Right                                                  Left   Swell Tender Swell Tender   PIP1           PIP2    x x  x   PIP3   x     x   PIP4          PIP5          MCP1           MCP2          MCP3  x x    x   MCP4  x  x    x   MCP5          Wrist           Elbow           Shoulder          Knee             Osteoarthritic

## 2019-10-01 DIAGNOSIS — E78.5 HYPERLIPIDEMIA, UNSPECIFIED HYPERLIPIDEMIA TYPE: ICD-10-CM

## 2019-10-01 DIAGNOSIS — E03.9 ACQUIRED HYPOTHYROIDISM: ICD-10-CM

## 2019-10-01 LAB
A/G RATIO: 2 (ref 1.1–2.2)
ALBUMIN SERPL-MCNC: 4.7 G/DL (ref 3.4–5)
ALP BLD-CCNC: 67 U/L (ref 40–129)
ALT SERPL-CCNC: 20 U/L (ref 10–40)
ANION GAP SERPL CALCULATED.3IONS-SCNC: 17 MMOL/L (ref 3–16)
AST SERPL-CCNC: 18 U/L (ref 15–37)
BASOPHILS ABSOLUTE: 0 K/UL (ref 0–0.2)
BASOPHILS RELATIVE PERCENT: 1.3 %
BILIRUB SERPL-MCNC: 0.5 MG/DL (ref 0–1)
BUN BLDV-MCNC: 12 MG/DL (ref 7–20)
CALCIUM SERPL-MCNC: 9.1 MG/DL (ref 8.3–10.6)
CHLORIDE BLD-SCNC: 98 MMOL/L (ref 99–110)
CHOLESTEROL, TOTAL: 200 MG/DL (ref 0–199)
CO2: 24 MMOL/L (ref 21–32)
CREAT SERPL-MCNC: 0.6 MG/DL (ref 0.6–1.2)
EOSINOPHILS ABSOLUTE: 0.1 K/UL (ref 0–0.6)
EOSINOPHILS RELATIVE PERCENT: 3.2 %
GFR AFRICAN AMERICAN: >60
GFR NON-AFRICAN AMERICAN: >60
GLOBULIN: 2.4 G/DL
GLUCOSE BLD-MCNC: 96 MG/DL (ref 70–99)
HCT VFR BLD CALC: 38.9 % (ref 36–48)
HDLC SERPL-MCNC: 88 MG/DL (ref 40–60)
HEMOGLOBIN: 12.7 G/DL (ref 12–16)
LDL CHOLESTEROL CALCULATED: 91 MG/DL
LYMPHOCYTES ABSOLUTE: 1.2 K/UL (ref 1–5.1)
LYMPHOCYTES RELATIVE PERCENT: 40 %
MCH RBC QN AUTO: 31.2 PG (ref 26–34)
MCHC RBC AUTO-ENTMCNC: 32.6 G/DL (ref 31–36)
MCV RBC AUTO: 95.8 FL (ref 80–100)
MONOCYTES ABSOLUTE: 0.3 K/UL (ref 0–1.3)
MONOCYTES RELATIVE PERCENT: 10.9 %
NEUTROPHILS ABSOLUTE: 1.4 K/UL (ref 1.7–7.7)
NEUTROPHILS RELATIVE PERCENT: 44.6 %
PDW BLD-RTO: 16.8 % (ref 12.4–15.4)
PLATELET # BLD: 252 K/UL (ref 135–450)
PMV BLD AUTO: 8 FL (ref 5–10.5)
POTASSIUM SERPL-SCNC: 4.2 MMOL/L (ref 3.5–5.1)
RBC # BLD: 4.06 M/UL (ref 4–5.2)
SODIUM BLD-SCNC: 139 MMOL/L (ref 136–145)
T4 FREE: 1.3 NG/DL (ref 0.9–1.8)
TOTAL PROTEIN: 7.1 G/DL (ref 6.4–8.2)
TRIGL SERPL-MCNC: 103 MG/DL (ref 0–150)
TSH REFLEX: 20.87 UIU/ML (ref 0.27–4.2)
VLDLC SERPL CALC-MCNC: 21 MG/DL
WBC # BLD: 3.1 K/UL (ref 4–11)

## 2019-10-02 RX ORDER — LEVOTHYROXINE SODIUM 0.15 MG/1
150 TABLET ORAL DAILY
Qty: 90 TABLET | Refills: 1 | Status: SHIPPED | OUTPATIENT
Start: 2019-10-02 | End: 2020-02-04

## 2019-10-16 ENCOUNTER — TELEPHONE (OUTPATIENT)
Dept: RHEUMATOLOGY | Age: 64
End: 2019-10-16

## 2019-10-22 RX ORDER — SULFASALAZINE 500 MG/1
TABLET ORAL
Qty: 360 TABLET | Refills: 1 | Status: SHIPPED | OUTPATIENT
Start: 2019-10-22 | End: 2020-01-02 | Stop reason: SDUPTHER

## 2019-12-19 ENCOUNTER — OFFICE VISIT (OUTPATIENT)
Dept: RHEUMATOLOGY | Age: 64
End: 2019-12-19
Payer: COMMERCIAL

## 2019-12-19 VITALS
SYSTOLIC BLOOD PRESSURE: 122 MMHG | HEIGHT: 65 IN | BODY MASS INDEX: 41.48 KG/M2 | DIASTOLIC BLOOD PRESSURE: 84 MMHG | WEIGHT: 249 LBS

## 2019-12-19 DIAGNOSIS — M19.042 PRIMARY OSTEOARTHRITIS OF BOTH HANDS: ICD-10-CM

## 2019-12-19 DIAGNOSIS — M19.041 PRIMARY OSTEOARTHRITIS OF BOTH HANDS: ICD-10-CM

## 2019-12-19 DIAGNOSIS — L40.50 PSORIATIC ARTHRITIS (HCC): Primary | ICD-10-CM

## 2019-12-19 DIAGNOSIS — Z79.899 HIGH RISK MEDICATION USE: ICD-10-CM

## 2019-12-19 DIAGNOSIS — L40.9 PSORIASIS: ICD-10-CM

## 2019-12-19 LAB
ALBUMIN SERPL-MCNC: 4 G/DL (ref 3.4–5)
ALP BLD-CCNC: 71 U/L (ref 40–129)
ALT SERPL-CCNC: 21 U/L (ref 10–40)
AST SERPL-CCNC: 21 U/L (ref 15–37)
BASOPHILS ABSOLUTE: 0 K/UL (ref 0–0.2)
BASOPHILS RELATIVE PERCENT: 1 %
BILIRUB SERPL-MCNC: 0.3 MG/DL (ref 0–1)
BILIRUBIN DIRECT: <0.2 MG/DL (ref 0–0.3)
BILIRUBIN, INDIRECT: ABNORMAL MG/DL (ref 0–1)
CREAT SERPL-MCNC: <0.5 MG/DL (ref 0.6–1.2)
EOSINOPHILS ABSOLUTE: 1.1 K/UL (ref 0–0.6)
EOSINOPHILS RELATIVE PERCENT: 25.6 %
GFR AFRICAN AMERICAN: >60
GFR NON-AFRICAN AMERICAN: >60
HCT VFR BLD CALC: 36.6 % (ref 36–48)
HEMOGLOBIN: 12.3 G/DL (ref 12–16)
LYMPHOCYTES ABSOLUTE: 1.2 K/UL (ref 1–5.1)
LYMPHOCYTES RELATIVE PERCENT: 27 %
MCH RBC QN AUTO: 33.5 PG (ref 26–34)
MCHC RBC AUTO-ENTMCNC: 33.6 G/DL (ref 31–36)
MCV RBC AUTO: 99.5 FL (ref 80–100)
MONOCYTES ABSOLUTE: 0.5 K/UL (ref 0–1.3)
MONOCYTES RELATIVE PERCENT: 11.6 %
NEUTROPHILS ABSOLUTE: 1.5 K/UL (ref 1.7–7.7)
NEUTROPHILS RELATIVE PERCENT: 34.8 %
PDW BLD-RTO: 15.3 % (ref 12.4–15.4)
PLATELET # BLD: 232 K/UL (ref 135–450)
PMV BLD AUTO: 8.2 FL (ref 5–10.5)
RBC # BLD: 3.68 M/UL (ref 4–5.2)
TOTAL PROTEIN: 6.3 G/DL (ref 6.4–8.2)
WBC # BLD: 4.3 K/UL (ref 4–11)

## 2019-12-19 PROCEDURE — 99214 OFFICE O/P EST MOD 30 MIN: CPT | Performed by: INTERNAL MEDICINE

## 2020-01-02 ENCOUNTER — PATIENT MESSAGE (OUTPATIENT)
Dept: RHEUMATOLOGY | Age: 65
End: 2020-01-02

## 2020-01-02 RX ORDER — SULFASALAZINE 500 MG/1
TABLET ORAL
Qty: 360 TABLET | Refills: 1 | Status: SHIPPED | OUTPATIENT
Start: 2020-01-02 | End: 2020-09-01 | Stop reason: ALTCHOICE

## 2020-02-04 RX ORDER — LEVOTHYROXINE SODIUM 150 MCG
TABLET ORAL
Qty: 90 TABLET | Refills: 1 | Status: SHIPPED | OUTPATIENT
Start: 2020-02-04 | End: 2020-09-03 | Stop reason: DRUGHIGH

## 2020-03-19 ENCOUNTER — OFFICE VISIT (OUTPATIENT)
Dept: RHEUMATOLOGY | Age: 65
End: 2020-03-19
Payer: COMMERCIAL

## 2020-03-19 VITALS
SYSTOLIC BLOOD PRESSURE: 124 MMHG | WEIGHT: 243 LBS | BODY MASS INDEX: 40.48 KG/M2 | HEIGHT: 65 IN | DIASTOLIC BLOOD PRESSURE: 76 MMHG

## 2020-03-19 LAB
A/G RATIO: 1.7 (ref 1.1–2.2)
ALBUMIN SERPL-MCNC: 4 G/DL (ref 3.4–5)
ALP BLD-CCNC: 70 U/L (ref 40–129)
ALT SERPL-CCNC: 21 U/L (ref 10–40)
ANION GAP SERPL CALCULATED.3IONS-SCNC: 13 MMOL/L (ref 3–16)
AST SERPL-CCNC: 20 U/L (ref 15–37)
BASOPHILS ABSOLUTE: 0 K/UL (ref 0–0.2)
BASOPHILS RELATIVE PERCENT: 1 %
BILIRUB SERPL-MCNC: 0.3 MG/DL (ref 0–1)
BUN BLDV-MCNC: 13 MG/DL (ref 7–20)
C-REACTIVE PROTEIN: 1.8 MG/L (ref 0–5.1)
CALCIUM SERPL-MCNC: 9.1 MG/DL (ref 8.3–10.6)
CHLORIDE BLD-SCNC: 102 MMOL/L (ref 99–110)
CO2: 24 MMOL/L (ref 21–32)
CREAT SERPL-MCNC: <0.5 MG/DL (ref 0.6–1.2)
EOSINOPHILS ABSOLUTE: 0.6 K/UL (ref 0–0.6)
EOSINOPHILS RELATIVE PERCENT: 14.2 %
GFR AFRICAN AMERICAN: >60
GFR NON-AFRICAN AMERICAN: >60
GLOBULIN: 2.4 G/DL
GLUCOSE BLD-MCNC: 86 MG/DL (ref 70–99)
HCT VFR BLD CALC: 38.8 % (ref 36–48)
HEMOGLOBIN: 12.6 G/DL (ref 12–16)
LYMPHOCYTES ABSOLUTE: 1.2 K/UL (ref 1–5.1)
LYMPHOCYTES RELATIVE PERCENT: 29.9 %
MCH RBC QN AUTO: 32.8 PG (ref 26–34)
MCHC RBC AUTO-ENTMCNC: 32.5 G/DL (ref 31–36)
MCV RBC AUTO: 101.1 FL (ref 80–100)
MONOCYTES ABSOLUTE: 0.5 K/UL (ref 0–1.3)
MONOCYTES RELATIVE PERCENT: 13.3 %
NEUTROPHILS ABSOLUTE: 1.6 K/UL (ref 1.7–7.7)
NEUTROPHILS RELATIVE PERCENT: 41.6 %
PDW BLD-RTO: 14.5 % (ref 12.4–15.4)
PLATELET # BLD: 267 K/UL (ref 135–450)
PMV BLD AUTO: 8.2 FL (ref 5–10.5)
POTASSIUM SERPL-SCNC: 4.3 MMOL/L (ref 3.5–5.1)
RBC # BLD: 3.83 M/UL (ref 4–5.2)
SODIUM BLD-SCNC: 139 MMOL/L (ref 136–145)
TOTAL PROTEIN: 6.4 G/DL (ref 6.4–8.2)
WBC # BLD: 3.9 K/UL (ref 4–11)

## 2020-03-19 PROCEDURE — 99214 OFFICE O/P EST MOD 30 MIN: CPT | Performed by: INTERNAL MEDICINE

## 2020-03-19 RX ORDER — SECUKINUMAB 150 MG/ML
INJECTION SUBCUTANEOUS
Qty: 1 ML | Refills: 0 | Status: SHIPPED | OUTPATIENT
Start: 2020-03-19 | End: 2020-03-26 | Stop reason: SDUPTHER

## 2020-03-19 NOTE — PATIENT INSTRUCTIONS
carefully follow any Instructions for Use provided with your medicine. You may need to use 2 injections to get your total dose. Ask your doctor or pharmacist if you don't understand all instructions. Your care provider will show you where on your body to inject secukinumab. Do not inject into skin with active psoriasis, or skin that is red, bruised, or tender. Do not  inject within 2 inches of your navel (belly button). Secukinumab should be clear or light-yellow. Do not use if the medicine looks cloudy, has changed colors, or has particles in it. Call your pharmacist for new medicine. Store this medicine in the original container in a refrigerator. Protect from light and do not shake or freeze. Take the medicine out of the refrigerator and allow it to reach room temperature for 15 to 30 minutes before injecting your dose. Give the injection within 1 hour after removing the medicine from a refrigerator. Use a needle and syringe only once and then place them in a puncture-proof \"sharps\" container. Follow state or local laws about how to dispose of this container. Keep it out of the reach of children and pets. Each prefilled syringe or injection pen is for one use only. Throw it away after one use, even if there is still medicine left inside. What happens if I miss a dose? Call your doctor for instructions if you miss a dose of secukinumab. What happens if I overdose? Seek emergency medical attention or call the Poison Help line at 1-138.473.1037. What should I avoid while using secukinumab? Do not receive a \"live\" vaccine while using secukinumab. Live vaccines include measles, mumps, rubella (MMR), polio, rotavirus, typhoid, yellow fever, varicella (chickenpox), zoster (shingles), and nasal flu (influenza) vaccine. What are the possible side effects of secukinumab?   Get emergency medical help if you have signs of an allergic reaction: hives; chest tightness, difficult breathing; feeling like you might pass out; swelling of your face, lips, tongue, or throat. Call your doctor at once if you have:  · redness, warmth, or painful sores on your skin;  · cough, shortness of breath, cough with red or pink mucus;  · increased urination, burning when you urinate;  · sores or white patches in your mouth or throat (yeast infection or \"thrush\");  · diarrhea, stomach pain; or  · fever, chills, sweating, muscle pain, weight loss. Common side effects may include:  · diarrhea; or  · cold symptoms such as stuffy nose, sneezing, sore throat. This is not a complete list of side effects and others may occur. Call your doctor for medical advice about side effects. You may report side effects to FDA at 9-254-FDA-0651. What other drugs will affect secukinumab? Other drugs may affect secukinumab, including prescription and over-the-counter medicines, vitamins, and herbal products. Tell your doctor about all your current medicines and any medicine you start or stop using. Where can I get more information? Your doctor or pharmacist can provide more information about secukinumab. Remember, keep this and all other medicines out of the reach of children, never share your medicines with others, and use this medication only for the indication prescribed. Every effort has been made to ensure that the information provided by [address] is accurate, up-to-date, and complete, but no guarantee is made to that effect. Drug information contained herein may be time sensitive. [facility] information has been compiled for use by healthcare practitioners and consumers in the [place] and therefore [facility] does not warrant that uses outside of the [place] are appropriate, unless specifically indicated otherwise. [facility]'s drug information does not endorse drugs, diagnose patients or recommend therapy.  [facility]'s drug information is an informational resource designed to assist licensed healthcare practitioners in caring for their patients and/or to serve consumers viewing this service as a supplement to, and not a substitute for, the expertise, skill, knowledge and judgment of healthcare practitioners. The absence of a warning for a given drug or drug combination in no way should be construed to indicate that the drug or drug combination is safe, effective or appropriate for any given patient. [facility] does not assume any responsibility for any aspect of healthcare administered with the aid of information [facility] provides. The information contained herein is not intended to cover all possible uses, directions, precautions, warnings, drug interactions, allergic reactions, or adverse effects. If you have questions about the drugs you are taking, check with your doctor, nurse or pharmacist.  Copyright 5577-4091 [address]: 2.03. Revision date: 7/15/2019. Care instructions adapted under license by [facility] ([facility]). If you have questions about a medical condition or this instruction, always ask your healthcare professional. [place] [address] any warranty or liability for your use of this information.

## 2020-03-19 NOTE — PROGRESS NOTES
Outpatient Medications   Medication Sig Dispense Refill    secukinumab (COSENTYX) 150 MG/ML SOSY Inject 150 mg sc 0,1,2,3,4, thereafter inj sc every 4 weekly. 1 mL 0    SYNTHROID 150 MCG tablet TAKE 1 TABLET DAILY 90 tablet 1    sulfaSALAzine (AZULFIDINE) 500 MG tablet Take 2 tab po  tablet 1    Adalimumab (HUMIRA) 40 MG/0.4ML PSKT Inject 40 mg sc Q 14 days. 2 each 5    atorvastatin (LIPITOR) 20 MG tablet Take 1 tablet by mouth daily 90 tablet 3    naproxen (NAPROSYN) 500 MG tablet Take 500 mg by mouth 2 times daily (with meals)      sulfaSALAzine (AZULFIDINE) 500 MG tablet TAKE 2 TABLETS BY MOUTH TWICE DAILY 120 tablet 0    gabapentin (NEURONTIN) 300 MG capsule Take 3 capsules by mouth nightly. 0    Vitamin D (CHOLECALCIFEROL) 1000 UNITS CAPS capsule Take 1,000 Units by mouth daily.  latanoprost (XALATAN) 0.005 % ophthalmic solution Place 1 drop into both eyes nightly       timolol (TIMOPTIC-XR) 0.5 % ophthalmic gel-forming Place 1 drop into both eyes daily        No current facility-administered medications for this visit. Allergies   Allergen Reactions    Codeine Phosphate      Nausea and vomiting       PHYSICAL EXAM:    Vitals:    /76   Ht 5' 5\" (1.651 m)   Wt 243 lb (110.2 kg)   BMI 40.44 kg/m²   General appearance/ Psychiatric: well nourished, and well groomed, normal judgement, alert, appears stated age and cooperative. MKS:   28 joint JOINT COUNT:                               Right                                                  Left   Swell Tender Swell Tender   PIP1           PIP2          PIP3           PIP4          PIP5          MCP1           MCP2          MCP3         MCP4           MCP5          Wrist           Elbow           Shoulder          Knee             Advanced osteoarthritic changes are unchanged across her DIPs and PIPs however I do not appreciate any focally tender, swollen or inflamed joints in upper or lower extremities.   Loose fist from osteoarthritic changes. No dactylitis or enthesitis. Skin: Multiple subcentimeter superficial abrading skin eruptions with fine scales in forehead, periorbital area, upper and lower extremities and abdomen. I do not appreciate any plaque psoriasis. No nail or digital changes. DATA:   Lab Results   Component Value Date    WBC 4.3 12/19/2019    HGB 12.3 12/19/2019    HCT 36.6 12/19/2019    MCV 99.5 12/19/2019     12/19/2019         Chemistry        Component Value Date/Time     10/01/2019 1141    K 4.2 10/01/2019 1141    CL 98 (L) 10/01/2019 1141    CO2 24 10/01/2019 1141    BUN 12 10/01/2019 1141    CREATININE <0.5 (L) 12/19/2019 1117        Component Value Date/Time    CALCIUM 9.1 10/01/2019 1141    ALKPHOS 71 12/19/2019 1117    AST 21 12/19/2019 1117    ALT 21 12/19/2019 1117    BILITOT 0.3 12/19/2019 1117          Lab Results   Component Value Date    LABURIC 5.5 09/10/2018     Lab Results   Component Value Date    SEDRATE 13 07/25/2019     Lab Results   Component Value Date    CRP 2.7 07/25/2019     Lab Results   Component Value Date    AQUILES Negative 09/10/2018    SEDRATE 13 07/25/2019     No results found for: CKTOTAL  Lab Results   Component Value Date    TSH 2.07 07/09/2012     Lab Results   Component Value Date    VITD25 31.6 08/16/2017         Radiology Review:   10/1/2018-  Both hand x-ray images were retrieved, images were shown to patient, has significant osteoarthritic changes in the DIP, PIP's as well as joint space narrowing of MCP joints mainly his third fourth. R 2 nd PIP has central erosion with new bone formation,which has progressed when comparing studies from Jan 2018. A/P- See above.

## 2020-03-26 ENCOUNTER — PATIENT MESSAGE (OUTPATIENT)
Dept: RHEUMATOLOGY | Age: 65
End: 2020-03-26

## 2020-03-26 RX ORDER — SECUKINUMAB 150 MG/ML
INJECTION SUBCUTANEOUS
Qty: 3 ML | Refills: 2 | Status: SHIPPED | OUTPATIENT
Start: 2020-03-26 | End: 2020-04-27

## 2020-03-26 NOTE — TELEPHONE ENCOUNTER
From: Annita Agustin  To: Binu Aaron MD  Sent: 3/26/2020 11:43 AM EDT  Subject: Prescription Question    My specialty pharmacy does not have the order for Cosentyx. I called and spoke to them today. Could you resend to [place] please?

## 2020-04-03 RX ORDER — DESONIDE 0.5 MG/G
CREAM TOPICAL
Qty: 60 G | Refills: 2 | Status: SHIPPED | OUTPATIENT
Start: 2020-04-03 | End: 2021-09-03

## 2020-04-27 RX ORDER — SECUKINUMAB 150 MG/ML
INJECTION SUBCUTANEOUS
Qty: 5 SYRINGE | Refills: 1 | Status: SHIPPED | OUTPATIENT
Start: 2020-04-27 | End: 2020-05-04

## 2020-05-04 RX ORDER — SECUKINUMAB 150 MG/ML
INJECTION SUBCUTANEOUS
Qty: 6 ML | Refills: 0 | Status: SHIPPED | OUTPATIENT
Start: 2020-05-04 | End: 2020-07-14

## 2020-06-19 ENCOUNTER — VIRTUAL VISIT (OUTPATIENT)
Dept: RHEUMATOLOGY | Age: 65
End: 2020-06-19
Payer: COMMERCIAL

## 2020-06-19 PROCEDURE — 99214 OFFICE O/P EST MOD 30 MIN: CPT | Performed by: INTERNAL MEDICINE

## 2020-06-19 NOTE — PROGRESS NOTES
Mckenna Rosario is a 75 y.o. female being evaluated by a Virtual Visit (video visit) encounter to address concerns as mentioned above. A caregiver was present when appropriate. Due to this being a TeleHealth encounter (During XDWMQ-21 public health emergency), evaluation of the following organ systems was limited: Vitals/Constitutional/EENT/Resp/CV/GI//MS/Neuro/Skin/Heme-Lymph-Imm. Pursuant to the emergency declaration under the [address] [place] [address] and the [employer] Act, this Virtual Visit was conducted with patient's (and/or legal guardian's) consent, to reduce the patient's risk of exposure to COVID-19 and provide necessary medical care. The patient (and/or legal guardian) has also been advised to contact this office for worsening conditions or problems, and seek emergency medical treatment and/or call 911 if deemed necessary. Patient identification was verified at the start of the visit: Yes    Total time spent for this encounter: Not billed by time    Services were provided through a video synchronous discussion virtually to substitute for in-person clinic visit. Patient and provider were located at their individual homes. --Ventura Peterson MD on 6/19/2020 at 12:27 PM    An electronic signature was used to authenticate this note. Mckenna Rosario is a 75 y.o. female being evaluated by a Virtual Visit (video visit) encounter to address concerns as mentioned above. A caregiver was present when appropriate. Due to this being a TeleHealth encounter (During QWKAV-71 public health emergency), evaluation of the following organ systems was limited: Vitals/Constitutional/EENT/Resp/CV/GI//MS/Neuro/Skin/Heme-Lymph-Imm.   Pursuant to the emergency declaration under the [address] [place] [address] and the [facility] Future    Psoriatic arthritis (HCC)    Psoriasis        Psoriasis and psoriatic arthritis- Improved. Started Cosentyx 7 months ago, is on 150 mg q. monthly doses. Seems to be pleased with therapy so far. Facial rash- Saw Derm, Dr. Gregorio Schilling, and diagnosed with eczema, got steroid cream as needed moisturizer. Hand OA- stable. Previous Rx- Enbrel- lost efficacy Humira-nonspecific facial eruption, and lost efficacy over time    Plan-  Labs to be checked in about 6 to 8 weeks, continue current dose of Cosentyx. Call me with any flares or if symptoms are persistent, doses can be increased. Reassessment in 3 months. Patient indicates understanding and agrees with the management plan. I reviewed patient's history, referral documents and electronic medical records. #######################################################################    Subjective-  Follow-up for psoriasis, psoriatic arthritis and generalized osteoarthritis. Interval changes-  She started Cosentyx-finished the loading dose, is due for maintenance dose next week. Skin psoriasis is in remission, and is pleased with the response to the joint as well. Believes that it is helping about 60 to 70%, still has some discomfort especially in the right hand MCPs and PIPs. Tolerating medication well. Denies any GI side effects. she no longer on sulfasalazine. Denies any rashes, GI side effects, intercurrent infections. All other review of systems are negative.       Past Medical History:   Diagnosis Date    Arthritis     Bone spur     CTS (carpal tunnel syndrome)     Cyst     left foot and spur    Glaucoma     Hyperlipidemia     Hypothyroidism     Osteopenia     Overweight(278.02)     PONV (postoperative nausea and vomiting)     Psoriatic arthritis (HCC)     S/P foot surgery 07/07/2011    excision cyst and resection spur left foot    Scalp psoriasis      Past Surgical History:   Procedure Laterality Date    CARPAL TUNNEL RELEASE Left Left   Swell Tender Swell Tender   PIP1           PIP2          PIP3           PIP4          PIP5          MCP1           MCP2          MCP3         MCP4           MCP5          Wrist           Elbow           Shoulder          Knee             R hand loose fist, Advanced osteoarthritic changes are unchanged across her DIPs and PIPs unchanged. No over inflammatory findings noted in exam.  Skin: no rashes. No nail or digital changes. DATA:   Lab Results   Component Value Date    WBC 3.9 (L) 03/19/2020    HGB 12.6 03/19/2020    HCT 38.8 03/19/2020    .1 (H) 03/19/2020     03/19/2020         Chemistry        Component Value Date/Time     03/19/2020 1105    K 4.3 03/19/2020 1105     03/19/2020 1105    CO2 24 03/19/2020 1105    BUN 13 03/19/2020 1105    CREATININE <0.5 (L) 03/19/2020 1105        Component Value Date/Time    CALCIUM 9.1 03/19/2020 1105    ALKPHOS 70 03/19/2020 1105    AST 20 03/19/2020 1105    ALT 21 03/19/2020 1105    BILITOT 0.3 03/19/2020 1105          Lab Results   Component Value Date    LABURIC 5.5 09/10/2018     Lab Results   Component Value Date    SEDRATE 13 07/25/2019     Lab Results   Component Value Date    CRP 1.8 03/19/2020     Lab Results   Component Value Date    AQUILES Negative 09/10/2018    SEDRATE 13 07/25/2019     No results found for: CKTOTAL  Lab Results   Component Value Date    TSH 2.07 07/09/2012     Lab Results   Component Value Date    VITD25 31.6 08/16/2017         Radiology Review:   10/1/2018-  Both hand x-ray images were retrieved, images were shown to patient, has significant osteoarthritic changes in the DIP, PIP's as well as joint space narrowing of MCP joints mainly his third fourth. R 2 nd PIP has central erosion with new bone formation,which has progressed when comparing studies from Jan 2018. A/P- See above.

## 2020-07-14 RX ORDER — SECUKINUMAB 150 MG/ML
INJECTION SUBCUTANEOUS
Qty: 1 SYRINGE | Refills: 5 | Status: SHIPPED | OUTPATIENT
Start: 2020-07-14 | End: 2020-12-17

## 2020-08-26 NOTE — PROGRESS NOTES
[facility] / [facility] ([facility]) SHIRA Lamas [address] [facility], [address]    Acknowledgment of Informed Consent for Surgical or Medical Procedure and Sedation  I agree to allow doctor(s) VENUS LING and his/her associates or assistants, including residents and/or other qualified medical practitioner to perform the following medical treatment or procedure and to administer or direct the administration of sedation as necessary:  Procedure(s): [address] [facility], AKIN OSTEOTOMY RIGHT HALLUX, [address] SEQUENTIAL REDUCTION AND DORSAL CAPSULOTOMY, METATARSAL OSTEOTOMY SECOND RIGHT, OPEN REPAIR OF SUBLUXED SECOND METATARSOPHALANGEAL JOINT RIGHT;  My doctor has explained the following regarding the proposed procedure:   the explanation of the procedure   the benefits of the procedure   the potential problems that might occur during recuperation   the risks and side effects of the procedure which could include but are not limited to severe blood loss, infection, stroke or death   the benefits, risks and side effect of alternative procedures including the consequences of declining this procedure or any alternative procedures   the likelihood of achieving satisfactory results. I acknowledge no guarantee or assurance has been made to me regarding the results. I understand that during the course of this treatment/procedure, unforeseen conditions can occur which require an additional or different procedure. I agree to allow my physician or assistants to perform such extension of the original procedure as they may find necessary. I understand that sedation will often result in temporary impairment of memory and fine motor skills and that sedation can occasionally progress to a state of deep sedation or general anesthesia.     I understand the risks of anesthesia for surgery include, but are not limited to, sore throat, hoarseness, injury to face, mouth, or teeth; nausea; headache; injury to blood vessels or nerves; death, brain damage, or paralysis. I understand that if I have a Limitation of Treatment order in effect during my hospitalization, the order may or may not be in effect during this procedure. I give my doctor permission to give me blood or blood products. I understand that there are risks with receiving blood such as hepatitis, AIDS, fever, or allergic reaction. I acknowledge that the risks, benefits, and alternatives of this treatment have been explained to me and that no express or implied warranty has been given by the hospital, any blood bank, or any person or entity as to the blood or blood components transfused. At the discretion of my doctor, I agree to allow observers, equipment/product representatives and allow photographing, and/or televising of the procedure, provided my name or identity is maintained confidentially. I agree the hospital may dispose of or use for scientific or educational purposes any tissue, fluid, or body parts which may be removed.     ________________________________Date________Time______ am/pm  ([employer] [place] [employer])  Patient or Signature of Closest Relative or Legal Guardian    ________________________________Date________Time______am/pm      Page 1 of  1  Witness

## 2020-08-28 ENCOUNTER — NURSE ONLY (OUTPATIENT)
Dept: PRIMARY CARE CLINIC | Age: 65
End: 2020-08-28
Payer: COMMERCIAL

## 2020-08-28 LAB
A/G RATIO: 1.5 (ref 1.1–2.2)
ALBUMIN SERPL-MCNC: 4.1 G/DL (ref 3.4–5)
ALP BLD-CCNC: 69 U/L (ref 40–129)
ALT SERPL-CCNC: 15 U/L (ref 10–40)
ANION GAP SERPL CALCULATED.3IONS-SCNC: 12 MMOL/L (ref 3–16)
AST SERPL-CCNC: 16 U/L (ref 15–37)
BASOPHILS ABSOLUTE: 0 K/UL (ref 0–0.2)
BASOPHILS RELATIVE PERCENT: 0.7 %
BILIRUB SERPL-MCNC: 0.3 MG/DL (ref 0–1)
BUN BLDV-MCNC: 18 MG/DL (ref 7–20)
C-REACTIVE PROTEIN: 11.2 MG/L (ref 0–5.1)
CALCIUM SERPL-MCNC: 9.4 MG/DL (ref 8.3–10.6)
CHLORIDE BLD-SCNC: 104 MMOL/L (ref 99–110)
CHOLESTEROL, TOTAL: 177 MG/DL (ref 0–199)
CO2: 24 MMOL/L (ref 21–32)
CREAT SERPL-MCNC: 0.6 MG/DL (ref 0.6–1.2)
EOSINOPHILS ABSOLUTE: 0.3 K/UL (ref 0–0.6)
EOSINOPHILS RELATIVE PERCENT: 8 %
GFR AFRICAN AMERICAN: >60
GFR NON-AFRICAN AMERICAN: >60
GLOBULIN: 2.7 G/DL
GLUCOSE BLD-MCNC: 102 MG/DL (ref 70–99)
HCT VFR BLD CALC: 37.6 % (ref 36–48)
HDLC SERPL-MCNC: 63 MG/DL (ref 40–60)
HEMOGLOBIN: 12.5 G/DL (ref 12–16)
LDL CHOLESTEROL CALCULATED: 95 MG/DL
LYMPHOCYTES ABSOLUTE: 1.4 K/UL (ref 1–5.1)
LYMPHOCYTES RELATIVE PERCENT: 31.9 %
MCH RBC QN AUTO: 32.3 PG (ref 26–34)
MCHC RBC AUTO-ENTMCNC: 33.3 G/DL (ref 31–36)
MCV RBC AUTO: 96.9 FL (ref 80–100)
MONOCYTES ABSOLUTE: 0.5 K/UL (ref 0–1.3)
MONOCYTES RELATIVE PERCENT: 10.5 %
NEUTROPHILS ABSOLUTE: 2.1 K/UL (ref 1.7–7.7)
NEUTROPHILS RELATIVE PERCENT: 48.9 %
PDW BLD-RTO: 14.6 % (ref 12.4–15.4)
PLATELET # BLD: 239 K/UL (ref 135–450)
PMV BLD AUTO: 8.3 FL (ref 5–10.5)
POTASSIUM SERPL-SCNC: 4.4 MMOL/L (ref 3.5–5.1)
RBC # BLD: 3.88 M/UL (ref 4–5.2)
SEDIMENTATION RATE, ERYTHROCYTE: 30 MM/HR (ref 0–30)
SODIUM BLD-SCNC: 140 MMOL/L (ref 136–145)
TOTAL PROTEIN: 6.8 G/DL (ref 6.4–8.2)
TRIGL SERPL-MCNC: 97 MG/DL (ref 0–150)
VLDLC SERPL CALC-MCNC: 19 MG/DL
WBC # BLD: 4.3 K/UL (ref 4–11)

## 2020-08-28 PROCEDURE — 99211 OFF/OP EST MAY X REQ PHY/QHP: CPT | Performed by: NURSE PRACTITIONER

## 2020-08-28 PROCEDURE — 36415 COLL VENOUS BLD VENIPUNCTURE: CPT | Performed by: INTERNAL MEDICINE

## 2020-08-29 LAB — SARS-COV-2, NAA: NOT DETECTED

## 2020-08-31 ENCOUNTER — ANESTHESIA EVENT (OUTPATIENT)
Dept: OPERATING ROOM | Age: 65
End: 2020-08-31
Payer: COMMERCIAL

## 2020-09-01 NOTE — PROGRESS NOTES
No surgery scheduling sheet/ orders  in epic or received via fax. Called surgeon- [facility] for Dayo Screen- need orders ASAP- please fax to PAT.

## 2020-09-01 NOTE — PROGRESS NOTES
[facility] PRE-SURGICAL TESTING INSTRUCTIONS                              PRIOR TO PROCEDURE DATE:  1. Please follow any guidelines/instructions prior to your procedure as advised by your surgeon. 2. Arrange for someone to drive you home and be with you for the first 24 hours after discharge for your safety after your procedure for which you received sedation. Ensure it is someone we can share information with regarding your discharge. 3. You must contact your surgeon for instructions IF:   You are taking any blood thinners, aspirin, anti-inflammatory or vitamin E.   There is a change in your physical condition such as a cold, fever, rash, cuts, sores or any other infection, especially near your surgical site. 4. Do not drink alcohol the day before or day of your procedure. 5. A Pre-op History and Physical for surgery MUST be completed by your Physician or Urgent Care within 30 days of your procedure date. Please bring a copy with you on the day of your procedure and along with any other testing performed. THE DAY OF YOUR PROCEDURE:  1. Follow instructions for ARRIVAL TIME as DIRECTED BY YOUR SURGEON. I    2. Enter the MAIN entrance from [employer] and follow the signs to the free [employer] or [employer] parking (offered free of charge 6am-5pm). 3. Enter the Main Entrance of the hospital (do not enter from the lower level of the parking garage). Upon entrance, check in with the  at the main desk on your left. If no one is available at the desk, proceed into the [facility] Waiting Room and go through the door directly into the [facility]. There is a Check-in desk ACROSS from Room 5 (marked with a sign hanging from the ceiling). The phone number for the surgery center is [phone]. 4. Please call [phone] option #2 option #2 if you have not been preregistered yet. On the day of your procedure bring your insurance card and photo ID.  You will be registered at your bedside once brought back to your room. 5. DO NOT EAT ANYTHING eight hours prior to surgery. May have 8 ounces of water 4 hours prior to surgery. 6. MEDICATIONS    Take the following medications with a SMALL sip of water:    Use your usual dose of inhalers the morning of surgery. BRING your rescue inhaler with you to hospital.    Anesthesia does NOT want you to take insulin the morning of surgery. They will control your blood sugar while you are at the hospital. Please contact your ordering physician for instructions regarding your insulin the night before your procedure. If you have an insulin pump, please keep it set on basal rate. 7. Do not swallow water when brushing teeth. No gum, candy, mints or ice chips. Refrain from smoking or at least decrease the amount. 8. Dress in loose, comfortable clothing appropriate for redressing after your procedure. Do not wear jewelry (including body piercings), make-up (especially NO eye make-up), fingernail polish (NO toenail polish if foot/leg surgery), lotion, powders or metal hairclips. 9. Dentures, glasses, or contacts will need to be removed before your procedure. Bring cases for your glasses, contacts, dentures, or hearing aids to protect them while you are in surgery. 10. If you use a CPAP, please bring it with you on the day of your procedure. 11. We recommend that valuable personal  belongings such as cash, cell phones, e-tablets or jewelry, be left at home during your stay. The hospital will not be responsible for valuables that are not secured in the hospital safe. However, if your insurance requires a co-pay, you may want to bring a method of payment, i.e. Check or credit card, if you wish to pay your co-pay the day of surgery. 12. If you are to stay overnight, you may bring a bag with personal items.  Please have any large items you may need brought in by your family after your arrival to your hospital potential side effects. 2. For comfort and safety, arrange to have someone at home with you for the first 24 hours after discharge. 3. You and your family will be given written instructions about your diet, activity, dressing care, medications, and return visits. 4. Once at home, should issues with nausea, pain, or bleeding occur, or should you notice any signs of infection, you should call your surgeon. 5. Always clean your hands before and after caring for your wound. Do not let your family touch your surgery site without cleaning their hands. 6. Narcotic pain medications can cause significant constipation. You may want to add a stool softener to your postoperative medication schedule or speak to your surgeon on how best to manage this SIDE EFFECT. SPECIAL INSTRUCTIONS     Thank you for allowing us to care for you. We strive to exceed your expectations in the delivery of care and service provided to you and your family. If you need to contact us for any reason, please call us at [phone]    Instructions reviewed with patient during preadmission testing phone interview. Girma Zambrano. 9/1/2020 .2:33 PM      ADDITIONAL EDUCATIONAL INFORMATION REVIEWED PER PHONE WITH YOU AND/OR YOUR FAMILY:  ANTIBACTERIAL SOAP

## 2020-09-02 ENCOUNTER — OFFICE VISIT (OUTPATIENT)
Dept: INTERNAL MEDICINE CLINIC | Age: 65
End: 2020-09-02
Payer: COMMERCIAL

## 2020-09-02 VITALS
WEIGHT: 248 LBS | HEIGHT: 65 IN | TEMPERATURE: 98.3 F | OXYGEN SATURATION: 97 % | BODY MASS INDEX: 41.32 KG/M2 | HEART RATE: 76 BPM | DIASTOLIC BLOOD PRESSURE: 70 MMHG | SYSTOLIC BLOOD PRESSURE: 138 MMHG

## 2020-09-02 DIAGNOSIS — E03.9 ACQUIRED HYPOTHYROIDISM: ICD-10-CM

## 2020-09-02 LAB
BILIRUBIN, POC: NORMAL
BLOOD URINE, POC: NORMAL
CLARITY, POC: CLEAR
COLOR, POC: YELLOW
GLUCOSE URINE, POC: NORMAL
KETONES, POC: NORMAL
LEUKOCYTE EST, POC: NORMAL
NITRITE, POC: NORMAL
PH, POC: 6
PROTEIN, POC: NORMAL
SPECIFIC GRAVITY, POC: 1005
T4 FREE: 1.3 NG/DL (ref 0.9–1.8)
TSH REFLEX: 8.56 UIU/ML (ref 0.27–4.2)
UROBILINOGEN, POC: 0.2

## 2020-09-02 PROCEDURE — 99214 OFFICE O/P EST MOD 30 MIN: CPT | Performed by: INTERNAL MEDICINE

## 2020-09-02 PROCEDURE — 93000 ELECTROCARDIOGRAM COMPLETE: CPT | Performed by: INTERNAL MEDICINE

## 2020-09-02 PROCEDURE — 81002 URINALYSIS NONAUTO W/O SCOPE: CPT | Performed by: INTERNAL MEDICINE

## 2020-09-02 ASSESSMENT — PATIENT HEALTH QUESTIONNAIRE - PHQ9
1. LITTLE INTEREST OR PLEASURE IN DOING THINGS: 0
SUM OF ALL RESPONSES TO PHQ9 QUESTIONS 1 & 2: 0
2. FEELING DOWN, DEPRESSED OR HOPELESS: 0
SUM OF ALL RESPONSES TO PHQ QUESTIONS 1-9: 0
SUM OF ALL RESPONSES TO PHQ QUESTIONS 1-9: 0

## 2020-09-02 NOTE — PROGRESS NOTES
of subluxed 2nd MPJ right on September 3 at [facility].      She has previously had general anesthesia with postop nausea and vomiting. Otherwise well-tolerated. Activities limited by her arthritis, however she is able to walk long distances without any chest pain or shortness of breath. No cardiac or lung disease. She has stopped NSAIDs for the last week. Planned anesthesia: General   Known anesthesia problems: post-op nausea and vomiting   Bleeding risk: No recent or remote history of abnormal bleeding  Personal or FH of DVT/PE: No      Patient Active Problem List   Diagnosis    Psoriasis    Glaucoma    Osteopenia    Hyperlipidemia    Hypothyroidism    Morbid obesity due to excess calories ([address]    Osteoarthritis of right hip    Left carpal tunnel syndrome    Bilateral carpal tunnel syndrome    Right carpal tunnel syndrome    Arthritis of carpometacarpal (CMC) joint of right thumb    Arthritis of carpometacarpal (CMC) joint of left thumb    Psoriatic arthritis ([address]       Past Medical History:   Diagnosis Date    Arthritis     Bone spur     CTS (carpal tunnel syndrome)     Cyst     left foot and spur    Glaucoma     Hyperlipidemia     Hypothyroidism     Osteopenia     Overweight(278.02)     PONV (postoperative nausea and vomiting)     phenergan works well     Psoriatic arthritis ([address]     S/P foot surgery 07/07/2011    excision cyst and resection spur left foot    Scalp psoriasis      Past Surgical History:   Procedure Laterality Date    CARPAL TUNNEL RELEASE Left 02/02/2018    CARPAL TUNNEL RELEASE Right 02/23/2018    right carpal tunnel release    CATARACT REMOVAL Left 2019    COLONOSCOPY      2011    FOOT SURGERY  07/07/2011    w/local anesthesia    JOINT REPLACEMENT  06/2015    Rt.  hip     JOINT REPLACEMENT Left 11/20/2018    hip -Dr Cristian Rodney Left 2020     Family History   Problem Relation Age of Onset    Coronary Art Dis Mother      Social History     Socioeconomic History    Marital status:      Spouse name: Not on file    Number of children: Not on file    Years of education: Not on file    Highest education level: Not on file   Occupational History    Occupation: sales     Comment: chemical protective suits   Social Needs    Financial resource strain: Not on file    Food insecurity     Worry: Not on file     Inability: Not on file   [employer] needs     Medical: Not on file     Non-medical: Not on file   Tobacco Use    Smoking status: Former Smoker     Packs/day: 1.00     Years: 4.00     Pack years: 4.00     Last attempt to quit: 1981     Years since quittin.1    Smokeless tobacco: Never Used   Substance and Sexual Activity    Alcohol use: Yes     Comment: occasionally    Drug use: No    Sexual activity: Not on file   Lifestyle    Physical activity     Days per week: Not on file     Minutes per session: Not on file    Stress: Not on file   Relationships    Social connections     Talks on phone: Not on file     Gets together: Not on file     Attends Hindu service: Not on file     Active member of club or organization: Not on file     Attends meetings of clubs or organizations: Not on file     Relationship status: Not on file    Intimate partner violence     Fear of current or ex partner: Not on file     Emotionally abused: Not on file     Physically abused: Not on file     Forced sexual activity: Not on file   Other Topics Concern    Not on file   Social History Narrative    Not on file       Review of Systems  A comprehensive review of systems was negative except for what was noted in the HPI. Physical Exam   Constitutional: She is oriented to person, place, and time. She appears well-developed and well-nourished. No distress. HENT:   Head: Normocephalic and atraumatic.     Eyes: Conjunctivae and EOM are normal. Pupils are equal, round, and reactive to light.   Neck: Trachea normal and normal range of motion. Neck supple. No JVD present. Carotid bruit is not present. No mass and no thyromegaly present. Cardiovascular: Normal rate, regular rhythm, normal heart sounds and intact distal pulses. Exam reveals no gallop and no friction rub. No murmur heard. Pulmonary/Chest: Effort normal and breath sounds normal. No respiratory distress. She has no wheezes. She has no rales. Abdominal: Soft. Normal aorta and bowel sounds are normal. She exhibits no distension and no mass. There is no hepatosplenomegaly. No tenderness. Musculoskeletal: She exhibits no edema and no tenderness. Neurological: She is alert and oriented to person, place, and time. She has normal strength. No cranial nerve deficit or sensory deficit. Coordination and gait normal.   Skin: Skin is warm and dry. No rash noted. No erythema. Psychiatric: She has a normal mood and affect. Her behavior is normal.     EKG Interpretation:  normal EKG, normal sinus rhythm, unchanged from previous tracings.     Lab Review   Nurse Only on 08/28/2020   Component Date Value    SARS-CoV-2, MALLORY 08/28/2020 NOT DETECTED    Orders Only on 08/28/2020   Component Date Value    Cholesterol, Total 08/28/2020 177     Triglycerides 08/28/2020 97     HDL 08/28/2020 63*    LDL Calculated 08/28/2020 95     VLDL Cholesterol Calcula* 08/28/2020 19     CRP 08/28/2020 11.2*    Sed Rate 08/28/2020 30     Sodium 08/28/2020 140     Potassium 08/28/2020 4.4     Chloride 08/28/2020 104     CO2 08/28/2020 24     Anion Gap 08/28/2020 12     Glucose 08/28/2020 102*    BUN 08/28/2020 18     CREATININE 08/28/2020 0.6     GFR Non- 08/28/2020 >60     GFR  08/28/2020 >60     Calcium 08/28/2020 9.4     Total Protein 08/28/2020 6.8     Alb 08/28/2020 4.1     Albumin/Globulin Ratio 08/28/2020 1.5     Total Bilirubin 08/28/2020 0.3     Alkaline Phosphatase 08/28/2020 69     ALT 08/28/2020 15     AST 08/28/2020 16     Globulin 08/28/2020 2.7     WBC 08/28/2020 4.3     RBC 08/28/2020 3.88*    Hemoglobin 08/28/2020 12.5     Hematocrit 08/28/2020 37.6     MCV 08/28/2020 96.9     MCH 08/28/2020 32.3     MCHC 08/28/2020 33.3     RDW 08/28/2020 14.6     Platelets 42/18/2460 239     MPV 08/28/2020 8.3     Neutrophils % 08/28/2020 48.9     Lymphocytes % 08/28/2020 31.9     Monocytes % 08/28/2020 10.5     Eosinophils % 08/28/2020 8.0     Basophils % 08/28/2020 0.7     Neutrophils Absolute 08/28/2020 2.1     Lymphocytes Absolute 08/28/2020 1.4     Monocytes Absolute 08/28/2020 0.5     Eosinophils Absolute 08/28/2020 0.3     Basophils Absolute 08/28/2020 0.0            Assessment:       75 y.o. patient with planned surgery as above. Known risk factors for perioperative complications: None  Current medications which may produce withdrawal symptoms if withheld perioperatively: none      Plan:   1. Pre-op evaluation  - Preoperative workup as follows: EKG< UA  - Change in medication regimen before surgery: hold all medications the morning of surgery  - Prophylaxis for cardiac events with perioperative beta-blockers: Not indicated  - No contraindications to planned surgery    2. Hammer toe of right foot  - will be surgically managed  - POCT Urinalysis no Micro    3. Bunion of right foot  - will be surgically managed  - POCT Urinalysis no Micro    4. Acquired hypothyroidism  - due for TSH  - continue levothyroxine  - TSH with Reflex;  Future  - POCT Urinalysis no Micro

## 2020-09-03 ENCOUNTER — APPOINTMENT (OUTPATIENT)
Dept: GENERAL RADIOLOGY | Age: 65
End: 2020-09-03
Attending: PODIATRIST
Payer: COMMERCIAL

## 2020-09-03 ENCOUNTER — ANESTHESIA (OUTPATIENT)
Dept: OPERATING ROOM | Age: 65
End: 2020-09-03
Payer: COMMERCIAL

## 2020-09-03 ENCOUNTER — HOSPITAL ENCOUNTER (OUTPATIENT)
Age: 65
Setting detail: OUTPATIENT SURGERY
Discharge: HOME | End: 2020-09-03
Attending: PODIATRIST | Admitting: PODIATRIST
Payer: COMMERCIAL

## 2020-09-03 VITALS — DIASTOLIC BLOOD PRESSURE: 87 MMHG | SYSTOLIC BLOOD PRESSURE: 150 MMHG | OXYGEN SATURATION: 97 % | TEMPERATURE: 96.3 F

## 2020-09-03 VITALS
RESPIRATION RATE: 17 BRPM | BODY MASS INDEX: 39.99 KG/M2 | TEMPERATURE: 96.5 F | WEIGHT: 240 LBS | HEIGHT: 65 IN | HEART RATE: 64 BPM | OXYGEN SATURATION: 96 % | SYSTOLIC BLOOD PRESSURE: 138 MMHG | DIASTOLIC BLOOD PRESSURE: 71 MMHG

## 2020-09-03 LAB
GLUCOSE BLD-MCNC: 95 MG/DL (ref 70–99)
PERFORMED ON: NORMAL

## 2020-09-03 PROCEDURE — 2580000003 HC RX 258: Performed by: PODIATRIST

## 2020-09-03 PROCEDURE — 2580000003 HC RX 258: Performed by: ANESTHESIOLOGY

## 2020-09-03 PROCEDURE — 7100000010 HC PHASE II RECOVERY - FIRST 15 MIN: Performed by: PODIATRIST

## 2020-09-03 PROCEDURE — 6360000002 HC RX W HCPCS: Performed by: NURSE ANESTHETIST, CERTIFIED REGISTERED

## 2020-09-03 PROCEDURE — 3600000004 HC SURGERY LEVEL 4 BASE: Performed by: PODIATRIST

## 2020-09-03 PROCEDURE — 6360000002 HC RX W HCPCS: Performed by: PODIATRIST

## 2020-09-03 PROCEDURE — 6360000002 HC RX W HCPCS

## 2020-09-03 PROCEDURE — 3700000001 HC ADD 15 MINUTES (ANESTHESIA): Performed by: PODIATRIST

## 2020-09-03 PROCEDURE — 7100000011 HC PHASE II RECOVERY - ADDTL 15 MIN: Performed by: PODIATRIST

## 2020-09-03 PROCEDURE — 7100000000 HC PACU RECOVERY - FIRST 15 MIN: Performed by: PODIATRIST

## 2020-09-03 PROCEDURE — 6370000000 HC RX 637 (ALT 250 FOR IP): Performed by: NURSE ANESTHETIST, CERTIFIED REGISTERED

## 2020-09-03 PROCEDURE — C1713 ANCHOR/SCREW BN/BN,TIS/BN: HCPCS | Performed by: PODIATRIST

## 2020-09-03 PROCEDURE — 73630 X-RAY EXAM OF FOOT: CPT

## 2020-09-03 PROCEDURE — 2500000003 HC RX 250 WO HCPCS: Performed by: NURSE ANESTHETIST, CERTIFIED REGISTERED

## 2020-09-03 PROCEDURE — 2709999900 HC NON-CHARGEABLE SUPPLY: Performed by: PODIATRIST

## 2020-09-03 PROCEDURE — 3600000014 HC SURGERY LEVEL 4 ADDTL 15MIN: Performed by: PODIATRIST

## 2020-09-03 PROCEDURE — 3700000000 HC ANESTHESIA ATTENDED CARE: Performed by: PODIATRIST

## 2020-09-03 PROCEDURE — 7100000001 HC PACU RECOVERY - ADDTL 15 MIN: Performed by: PODIATRIST

## 2020-09-03 PROCEDURE — 2500000003 HC RX 250 WO HCPCS: Performed by: PODIATRIST

## 2020-09-03 DEVICE — IMPLANTABLE DEVICE
Type: IMPLANTABLE DEVICE | Site: FOOT | Status: FUNCTIONAL
Brand: FUSEFORCE

## 2020-09-03 RX ORDER — SODIUM CHLORIDE 0.9 % (FLUSH) 0.9 %
10 SYRINGE (ML) INJECTION PRN
Status: DISCONTINUED | OUTPATIENT
Start: 2020-09-03 | End: 2020-09-03 | Stop reason: HOSPADM

## 2020-09-03 RX ORDER — HYDRALAZINE HYDROCHLORIDE 20 MG/ML
5 INJECTION INTRAMUSCULAR; INTRAVENOUS EVERY 5 MIN PRN
Status: DISCONTINUED | OUTPATIENT
Start: 2020-09-03 | End: 2020-09-03 | Stop reason: HOSPADM

## 2020-09-03 RX ORDER — MAGNESIUM HYDROXIDE 1200 MG/15ML
LIQUID ORAL CONTINUOUS PRN
Status: COMPLETED | OUTPATIENT
Start: 2020-09-03 | End: 2020-09-03

## 2020-09-03 RX ORDER — DEXAMETHASONE SODIUM PHOSPHATE 4 MG/ML
INJECTION, SOLUTION INTRA-ARTICULAR; INTRALESIONAL; INTRAMUSCULAR; INTRAVENOUS; SOFT TISSUE PRN
Status: DISCONTINUED | OUTPATIENT
Start: 2020-09-03 | End: 2020-09-03 | Stop reason: SDUPTHER

## 2020-09-03 RX ORDER — FENTANYL CITRATE 50 UG/ML
50 INJECTION, SOLUTION INTRAMUSCULAR; INTRAVENOUS EVERY 5 MIN PRN
Status: DISCONTINUED | OUTPATIENT
Start: 2020-09-03 | End: 2020-09-03 | Stop reason: HOSPADM

## 2020-09-03 RX ORDER — ENALAPRILAT 2.5 MG/2ML
1.25 INJECTION INTRAVENOUS
Status: DISCONTINUED | OUTPATIENT
Start: 2020-09-03 | End: 2020-09-03 | Stop reason: HOSPADM

## 2020-09-03 RX ORDER — SUCCINYLCHOLINE/SOD CL,ISO/PF 200MG/10ML
SYRINGE (ML) INTRAVENOUS PRN
Status: DISCONTINUED | OUTPATIENT
Start: 2020-09-03 | End: 2020-09-03 | Stop reason: SDUPTHER

## 2020-09-03 RX ORDER — LIDOCAINE HYDROCHLORIDE 20 MG/ML
INJECTION, SOLUTION INFILTRATION; PERINEURAL PRN
Status: DISCONTINUED | OUTPATIENT
Start: 2020-09-03 | End: 2020-09-03 | Stop reason: ALTCHOICE

## 2020-09-03 RX ORDER — LABETALOL 20 MG/4 ML (5 MG/ML) INTRAVENOUS SYRINGE
5 EVERY 10 MIN PRN
Status: DISCONTINUED | OUTPATIENT
Start: 2020-09-03 | End: 2020-09-03 | Stop reason: HOSPADM

## 2020-09-03 RX ORDER — LEVOTHYROXINE SODIUM 175 UG/1
175 TABLET ORAL DAILY
Qty: 90 TABLET | Refills: 1 | Status: SHIPPED | OUTPATIENT
Start: 2020-09-03 | End: 2020-11-25 | Stop reason: SDUPTHER

## 2020-09-03 RX ORDER — EPHEDRINE SULFATE 50 MG/ML
INJECTION, SOLUTION INTRAVENOUS PRN
Status: DISCONTINUED | OUTPATIENT
Start: 2020-09-03 | End: 2020-09-03 | Stop reason: SDUPTHER

## 2020-09-03 RX ORDER — BUPIVACAINE HYDROCHLORIDE 5 MG/ML
INJECTION, SOLUTION EPIDURAL; INTRACAUDAL PRN
Status: DISCONTINUED | OUTPATIENT
Start: 2020-09-03 | End: 2020-09-03 | Stop reason: ALTCHOICE

## 2020-09-03 RX ORDER — SODIUM CHLORIDE, SODIUM LACTATE, POTASSIUM CHLORIDE, CALCIUM CHLORIDE 600; 310; 30; 20 MG/100ML; MG/100ML; MG/100ML; MG/100ML
INJECTION, SOLUTION INTRAVENOUS CONTINUOUS
Status: DISCONTINUED | OUTPATIENT
Start: 2020-09-03 | End: 2020-09-03 | Stop reason: HOSPADM

## 2020-09-03 RX ORDER — PROMETHAZINE HYDROCHLORIDE 25 MG/1
25 TABLET ORAL EVERY 6 HOURS PRN
Qty: 28 TABLET | Refills: 0 | Status: SHIPPED | OUTPATIENT
Start: 2020-09-03 | End: 2020-09-10

## 2020-09-03 RX ORDER — LIDOCAINE HYDROCHLORIDE 20 MG/ML
INJECTION, SOLUTION INTRAVENOUS PRN
Status: DISCONTINUED | OUTPATIENT
Start: 2020-09-03 | End: 2020-09-03 | Stop reason: SDUPTHER

## 2020-09-03 RX ORDER — ALBUTEROL SULFATE 90 UG/1
AEROSOL, METERED RESPIRATORY (INHALATION) PRN
Status: DISCONTINUED | OUTPATIENT
Start: 2020-09-03 | End: 2020-09-03 | Stop reason: SDUPTHER

## 2020-09-03 RX ORDER — LIDOCAINE HYDROCHLORIDE 10 MG/ML
1 INJECTION, SOLUTION EPIDURAL; INFILTRATION; INTRACAUDAL; PERINEURAL
Status: DISCONTINUED | OUTPATIENT
Start: 2020-09-03 | End: 2020-09-03 | Stop reason: HOSPADM

## 2020-09-03 RX ORDER — FENTANYL CITRATE 50 UG/ML
25 INJECTION, SOLUTION INTRAMUSCULAR; INTRAVENOUS EVERY 5 MIN PRN
Status: DISCONTINUED | OUTPATIENT
Start: 2020-09-03 | End: 2020-09-03 | Stop reason: HOSPADM

## 2020-09-03 RX ORDER — SODIUM CHLORIDE 0.9 % (FLUSH) 0.9 %
10 SYRINGE (ML) INJECTION EVERY 12 HOURS SCHEDULED
Status: DISCONTINUED | OUTPATIENT
Start: 2020-09-03 | End: 2020-09-03 | Stop reason: HOSPADM

## 2020-09-03 RX ORDER — SODIUM CHLORIDE 9 MG/ML
INJECTION, SOLUTION INTRAVENOUS CONTINUOUS
Status: DISCONTINUED | OUTPATIENT
Start: 2020-09-03 | End: 2020-09-03 | Stop reason: HOSPADM

## 2020-09-03 RX ORDER — ONDANSETRON 2 MG/ML
INJECTION INTRAMUSCULAR; INTRAVENOUS PRN
Status: DISCONTINUED | OUTPATIENT
Start: 2020-09-03 | End: 2020-09-03 | Stop reason: SDUPTHER

## 2020-09-03 RX ORDER — LEVOTHYROXINE SODIUM 0.12 MG/1
125 TABLET ORAL DAILY
Status: ON HOLD | COMMUNITY
End: 2020-09-03

## 2020-09-03 RX ORDER — PROPOFOL 10 MG/ML
INJECTION, EMULSION INTRAVENOUS PRN
Status: DISCONTINUED | OUTPATIENT
Start: 2020-09-03 | End: 2020-09-03 | Stop reason: SDUPTHER

## 2020-09-03 RX ORDER — SCOLOPAMINE TRANSDERMAL SYSTEM 1 MG/1
1 PATCH, EXTENDED RELEASE TRANSDERMAL
Status: DISCONTINUED | OUTPATIENT
Start: 2020-09-03 | End: 2020-09-03 | Stop reason: HOSPADM

## 2020-09-03 RX ORDER — ONDANSETRON 2 MG/ML
4 INJECTION INTRAMUSCULAR; INTRAVENOUS
Status: DISCONTINUED | OUTPATIENT
Start: 2020-09-03 | End: 2020-09-03 | Stop reason: HOSPADM

## 2020-09-03 RX ORDER — PROMETHAZINE HYDROCHLORIDE 25 MG/ML
INJECTION, SOLUTION INTRAMUSCULAR; INTRAVENOUS
Status: COMPLETED
Start: 2020-09-03 | End: 2020-09-03

## 2020-09-03 RX ORDER — OXYCODONE HYDROCHLORIDE AND ACETAMINOPHEN 5; 325 MG/1; MG/1
1 TABLET ORAL EVERY 6 HOURS PRN
Qty: 28 TABLET | Refills: 0 | Status: SHIPPED | OUTPATIENT
Start: 2020-09-03 | End: 2020-09-10

## 2020-09-03 RX ORDER — FENTANYL CITRATE 50 UG/ML
INJECTION, SOLUTION INTRAMUSCULAR; INTRAVENOUS PRN
Status: DISCONTINUED | OUTPATIENT
Start: 2020-09-03 | End: 2020-09-03 | Stop reason: SDUPTHER

## 2020-09-03 RX ORDER — OXYCODONE HYDROCHLORIDE AND ACETAMINOPHEN 5; 325 MG/1; MG/1
1 TABLET ORAL
Status: DISCONTINUED | OUTPATIENT
Start: 2020-09-03 | End: 2020-09-03 | Stop reason: HOSPADM

## 2020-09-03 RX ORDER — IBUPROFEN 800 MG/1
800 TABLET ORAL EVERY 8 HOURS PRN
Qty: 42 TABLET | Refills: 0 | Status: SHIPPED | OUTPATIENT
Start: 2020-09-03 | End: 2020-12-17

## 2020-09-03 RX ORDER — PROMETHAZINE HYDROCHLORIDE 25 MG/ML
12.5 INJECTION, SOLUTION INTRAMUSCULAR; INTRAVENOUS ONCE
Status: COMPLETED | OUTPATIENT
Start: 2020-09-03 | End: 2020-09-03

## 2020-09-03 RX ADMIN — PHENYLEPHRINE HYDROCHLORIDE 150 MCG: 10 INJECTION, SOLUTION INTRAMUSCULAR; INTRAVENOUS; SUBCUTANEOUS at 12:20

## 2020-09-03 RX ADMIN — FENTANYL CITRATE 100 MCG: 50 INJECTION INTRAMUSCULAR; INTRAVENOUS at 14:01

## 2020-09-03 RX ADMIN — ALBUTEROL SULFATE 2 PUFF: 90 AEROSOL, METERED RESPIRATORY (INHALATION) at 14:04

## 2020-09-03 RX ADMIN — SODIUM CHLORIDE, POTASSIUM CHLORIDE, SODIUM LACTATE AND CALCIUM CHLORIDE: 600; 310; 30; 20 INJECTION, SOLUTION INTRAVENOUS at 10:45

## 2020-09-03 RX ADMIN — DEXAMETHASONE SODIUM PHOSPHATE 8 MG: 4 INJECTION, SOLUTION INTRAMUSCULAR; INTRAVENOUS at 14:10

## 2020-09-03 RX ADMIN — PROPOFOL 200 MG: 10 INJECTION, EMULSION INTRAVENOUS at 12:10

## 2020-09-03 RX ADMIN — EPHEDRINE SULFATE 10 MG: 50 INJECTION, SOLUTION INTRAMUSCULAR; INTRAVENOUS; SUBCUTANEOUS at 12:51

## 2020-09-03 RX ADMIN — Medication 120 MG: at 12:12

## 2020-09-03 RX ADMIN — ONDANSETRON 4 MG: 2 INJECTION INTRAMUSCULAR; INTRAVENOUS at 14:10

## 2020-09-03 RX ADMIN — LIDOCAINE HYDROCHLORIDE 50 MG: 20 INJECTION, SOLUTION INTRAVENOUS at 12:10

## 2020-09-03 RX ADMIN — PROMETHAZINE HYDROCHLORIDE 12.5 MG: 25 INJECTION, SOLUTION INTRAMUSCULAR; INTRAVENOUS at 15:17

## 2020-09-03 RX ADMIN — FENTANYL CITRATE 100 MCG: 50 INJECTION INTRAMUSCULAR; INTRAVENOUS at 12:10

## 2020-09-03 RX ADMIN — ALBUTEROL SULFATE 4 PUFF: 90 AEROSOL, METERED RESPIRATORY (INHALATION) at 14:05

## 2020-09-03 RX ADMIN — PROMETHAZINE HYDROCHLORIDE 12.5 MG: 25 INJECTION INTRAMUSCULAR; INTRAVENOUS at 15:17

## 2020-09-03 RX ADMIN — PROPOFOL 100 MG: 10 INJECTION, EMULSION INTRAVENOUS at 14:06

## 2020-09-03 RX ADMIN — EPHEDRINE SULFATE 10 MG: 50 INJECTION, SOLUTION INTRAMUSCULAR; INTRAVENOUS; SUBCUTANEOUS at 12:18

## 2020-09-03 RX ADMIN — CEFAZOLIN 2 G: 10 INJECTION, POWDER, FOR SOLUTION INTRAVENOUS at 12:46

## 2020-09-03 ASSESSMENT — PULMONARY FUNCTION TESTS
PIF_VALUE: 20
PIF_VALUE: 4
PIF_VALUE: 20
PIF_VALUE: 20
PIF_VALUE: 0
PIF_VALUE: 20
PIF_VALUE: 22
PIF_VALUE: 20
PIF_VALUE: 20
PIF_VALUE: 34
PIF_VALUE: 11
PIF_VALUE: 0
PIF_VALUE: 20
PIF_VALUE: 20
PIF_VALUE: 19
PIF_VALUE: 20
PIF_VALUE: 2
PIF_VALUE: 20
PIF_VALUE: 21
PIF_VALUE: 20
PIF_VALUE: 19
PIF_VALUE: 22
PIF_VALUE: 20
PIF_VALUE: 17
PIF_VALUE: 20
PIF_VALUE: 10
PIF_VALUE: 22
PIF_VALUE: 20
PIF_VALUE: 20
PIF_VALUE: 1
PIF_VALUE: 20
PIF_VALUE: 22
PIF_VALUE: 20
PIF_VALUE: 22
PIF_VALUE: 20
PIF_VALUE: 20
PIF_VALUE: 3
PIF_VALUE: 22
PIF_VALUE: 20
PIF_VALUE: 22
PIF_VALUE: 20
PIF_VALUE: 19
PIF_VALUE: 20
PIF_VALUE: 22
PIF_VALUE: 20
PIF_VALUE: 23
PIF_VALUE: 20
PIF_VALUE: 23
PIF_VALUE: 22
PIF_VALUE: 22
PIF_VALUE: 1
PIF_VALUE: 20
PIF_VALUE: 22
PIF_VALUE: 20
PIF_VALUE: 20
PIF_VALUE: 23
PIF_VALUE: 20
PIF_VALUE: 22
PIF_VALUE: 20
PIF_VALUE: 1
PIF_VALUE: 20
PIF_VALUE: 22
PIF_VALUE: 20
PIF_VALUE: 0
PIF_VALUE: 20
PIF_VALUE: 22
PIF_VALUE: 18
PIF_VALUE: 20
PIF_VALUE: 20
PIF_VALUE: 23
PIF_VALUE: 15
PIF_VALUE: 20

## 2020-09-03 ASSESSMENT — PAIN SCALES - GENERAL
PAINLEVEL_OUTOF10: 0

## 2020-09-03 ASSESSMENT — PAIN - FUNCTIONAL ASSESSMENT: PAIN_FUNCTIONAL_ASSESSMENT: 0-10

## 2020-09-03 NOTE — PROGRESS NOTES
PACU Transfer to [facility]    Vitals:    09/03/20 1615   BP: (!) 169/83   Pulse: 67   Resp: 12   Temp: 96.3 °F (35.7 °C)   SpO2: 94%       No intake or output data in the 24 hours ending 09/03/20 1630  Voided on bedpan-not measured  Pain assessment:Pain Level: 0    Patient transferred to care of [facility] RN.    9/3/2020 4:30 PM

## 2020-09-03 NOTE — BRIEF OP NOTE
Brief Postoperative Note      Patient:  Chetna Joseph  YOB: 1955  MRN: [id number]    Date of Procedure: 9/3/2020    Pre-Op Diagnosis: Hallux rigidus of right foot [M20.21] Hammertoe of second toe of right foot [M20.41]    Post-Op Diagnosis: Same       Procedure(s):  AKIN OSTEOTOMY RIGHT HALLUX, PROXIMAL INTERPHALANGEAL JOINT ARTHRODESIS WITH SEQUENTIAL REDUCTION AND DORSAL CAPSULOTOMY    Surgeon(s):  Andrea Scott DPM    Assistant:  Resident: MARZENA Be MS4    Anesthesia: General    Injectables: pre-op 20 cc 2% Lidocaine plain, and post-op 30 0.5% Marcaine plain     Hemostasis: pneumatic ankle tourniquet at 250 mmHg for 120 minutes    Materials: 3-0 Vicryl, 4-0 Vicryl, 4-0 Monocryl, 8 mm x 8 mm Fuseforce Nitinol Staple, 0.045 k-wire      Estimated Blood Loss: less than 10 cc    Complications: None    Specimens: Was Not Obtained    Findings: Iincreased hallux intetrphalangeal angle, overlapping 2nd digit with hammertoe deformity, right foot    Britt Sheppard DPM  Podiatric Resident PGY2  Pager [phone] or PerfectServe  9/3/2020, 2:48 PM      Electronically signed by Britt Sheppard DPM on 9/3/2020 at 2:44 PM

## 2020-09-03 NOTE — PROGRESS NOTES
Ambulatory Surgery/Procedure Discharge Note    Vitals:    09/03/20 1637   BP: 138/71   Pulse: 64   Resp: 17   Temp: 96.5 °F (35.8 °C)   SpO2: 96%     Patient meets discharge criteria based on Melanie score. No intake/output data recorded. Restroom use offered before discharge. Yes    Pain assessment:  none  Pain Level: 0    Patient states she is ready to go home. Dressing is clean, dry and intact. Assisted patient with dressing and placed post op shoe on. Discharge instructions reviewed with S.O. Prescriptions in patient folder to be filled at pharmacy of choice. Patient discharged to home/self care.  Patient discharged via wheel chair by transporter to waiting family/S.O.       9/3/2020 5:41 PM

## 2020-09-03 NOTE — PROGRESS NOTES
Admitted to pacu at this time. VSS on monitors. Report given by CRNA. Was given puffs from inhaler when awakening for wheezes.  OA in place

## 2020-09-03 NOTE — H&P
Deborah Tejada    [id number]    [facility] Same Day Surgery Update H & P  Department of General Surgery   Surgical Service   Pre-operative History and Physical  Last H & P within the last 30 days. DIAGNOSIS:   Hallux rigidus of right foot [M20.21]  Hammertoe of second toe of right foot [M20.41]    PROCEDURE:  [place] REPAIR OF HAMMERTOE,ONE [[id number]] (FIRST METATARSAL OSTEOTOMY WITH SCREW FIXATION RIGHT FOOT, AKIN OSTEOTOMY RIGHT HALLUX, PROXIMAL INTERPHALANGEAL JOINT ARTHRODESIS WITH SEQUENTIAL REDUCTION AND DORSAL CAPSULOTOMY, METATARSAL OSTEOTOMY SECOND RIGHT, OPEN REPAIR OF SUBLUXED SECOND METATARSOPHALANGEAL JOINT RIGHT;)     HISTORY OF PRESENT ILLNESS:   Patient with chronic right foot pain, valgus deformity of the 1st MTP joint and hammertoe deformity of the 2nd toe. The symptoms have been recalcitrant to conservative treatment and the patient presents today for the above procedure. Covid 19:  Patient denies fever, chills, cough or known exposure to Covid-19. Patient reports they have not been quarantined at home since Covid-19 test.      Past Medical History:        Diagnosis Date    Arthritis     Bone spur     CTS (carpal tunnel syndrome)     Cyst     left foot and spur    Dental crowns present     molars    Glaucoma     Hyperlipidemia     Hypothyroidism     Osteopenia     Overweight(278.02)     PONV (postoperative nausea and vomiting)     phenergan works well     Psoriatic arthritis ([address]     S/P foot surgery 07/07/2011    excision cyst and resection spur left foot    Scalp psoriasis      Past Surgical History:        Procedure Laterality Date    CARPAL TUNNEL RELEASE Left 02/02/2018    CARPAL TUNNEL RELEASE Right 02/23/2018    right carpal tunnel release    CATARACT REMOVAL Left 2019    COLONOSCOPY      2011    FOOT SURGERY  07/07/2011    w/local anesthesia    JOINT REPLACEMENT  06/2015    Rt.  hip     JOINT REPLACEMENT Left 11/20/2018    hip -Dr Ronny Sparks  2003  REFRACTIVE SURGERY Left      Past Social History:  Social History     Socioeconomic History    Marital status:      Spouse name: None    Number of children: None    Years of education: None    Highest education level: None   Occupational History    Occupation: sales     Comment: chemical protective suits   Social Needs    Financial resource strain: None    Food insecurity     Worry: None     Inability: None    Transportation needs     Medical: None     Non-medical: None   Tobacco Use    Smoking status: Former Smoker     Packs/day: 1.00     Years: 4.00     Pack years: 4.00     Last attempt to quit: 1981     Years since quittin.1    Smokeless tobacco: Never Used   Substance and Sexual Activity    Alcohol use: Yes     Comment: occasionally    Drug use: No    Sexual activity: None   Lifestyle    Physical activity     Days per week: None     Minutes per session: None    Stress: None   Relationships    Social connections     Talks on phone: None     Gets together: None     Attends [employer] service: None     Active member of club or organization: None     Attends meetings of clubs or organizations: None     Relationship status: None    Intimate partner violence     Fear of current or ex partner: None     Emotionally abused: None     Physically abused: None     Forced sexual activity: None   Other Topics Concern    None   Social History Narrative    None         Medications Prior to Admission:      Prior to Admission medications    Medication Sig Start Date End Date Taking? Authorizing Provider   levothyroxine (SYNTHROID) 125 MCG tablet Take 125 mcg by mouth Daily   Yes Historical Provider, MD   secukinumab (COSENTYX) 150 MG/ML SOSY INJECT ONE SYRINGE SUBCUTANEOUSLY EVERY 4 WEEKS. REFRIGERATE. ALLOW 15TO 30 MINUTES AT ROOM TEMP PRIOR TO ADMINISTRATION. 20  Yes Jose Raul Marley MD   desonide (DESOWEN) 0.05 % cream Apply topically 2 times daily.   Patient taking differently: Apply topically daily. 4/3/20  Yes Anaid Dupont MD   atorvastatin (LIPITOR) 20 MG tablet Take 1 tablet by mouth daily 9/5/19  Yes Zena Frye MD   naproxen (NAPROSYN) 500 MG tablet Take 500 mg by mouth 2 times daily (with meals)   Yes Historical Provider, MD   gabapentin (NEURONTIN) 300 MG capsule Take 3 capsules by mouth nightly. 2/19/19  Yes Historical Provider, MD   Vitamin D (CHOLECALCIFEROL) 1000 UNITS CAPS capsule Take 1,000 Units by mouth daily. Yes Historical Provider, MD   latanoprost (XALATAN) 0.005 % ophthalmic solution Place 1 drop into both eyes nightly    Yes Historical Provider, MD   timolol (TIMOPTIC-XR) 0.5 % ophthalmic gel-forming Place 1 drop into both eyes daily    Yes Historical Provider, MD         Allergies:  Codeine phosphate    PHYSICAL EXAM:      BP (!) 147/98   Pulse 68   Temp 98 °F (36.7 °C) (Oral)   Resp 18   Ht 5' 5\" (1.651 m)   Wt 240 lb (108.9 kg)   SpO2 96%   BMI 39.94 kg/m²      Airway:  Airway patent with no audible stridor    Heart:  Regular rate and rhythm, No murmur noted    Lungs:  No increased work of breathing, good air exchange, clear to auscultation bilaterally, no crackles or wheezing    Abdomen:  Soft, non-distended, non-tender, normal active bowel sounds, no masses palpated    ASSESSMENT AND PLAN    Patient is a 75 y.o. female with above specified procedure planned. 1.  Patient seen and focused exam done today- no new changes since last physical exam on 9/1/20    2. Access to ancillary services are available per request of the provider.     Carley Mims, PATRICIA - CNP     9/3/2020

## 2020-09-03 NOTE — ANESTHESIA PRE PROCEDURE
Department of Anesthesiology  Preprocedure Note       Name:  Mateus Ríos   Age:  75 y.o.  :  1955                                          MRN:  [id number]         Date:  9/3/2020      Surgeon: Naomi Mcghee):  Héctor Brennan DPM    Procedure: Procedure(s):  FIRST METATARSAL OSTEOTOMY WITH SCREW FIXATION RIGHT FOOT, AKIN OSTEOTOMY RIGHT HALLUX, PROXIMAL INTERPHALANGEAL JOINT ARTHRODESIS WITH SEQUENTIAL REDUCTION AND DORSAL CAPSULOTOMY, METATARSAL OSTEOTOMY SECOND RIGHT, OPEN REPAIR OF SUBLUXED SECOND METATARSOPHALANGEAL JOINT RIGHT; Medications prior to admission:   Prior to Admission medications    Medication Sig Start Date End Date Taking? Authorizing Provider   levothyroxine (SYNTHROID) 125 MCG tablet Take 125 mcg by mouth Daily   Yes Historical Provider, MD   secukinumab (COSENTYX) 150 MG/ML SOSY INJECT ONE SYRINGE SUBCUTANEOUSLY EVERY 4 WEEKS. REFRIGERATE. ALLOW 15TO 30 MINUTES AT ROOM TEMP PRIOR TO ADMINISTRATION. 20  Yes Olivia Bailey MD   desonide (DESOWEN) 0.05 % cream Apply topically 2 times daily. Patient taking differently: Apply topically daily. 4/3/20  Yes Olivia Bailey MD   atorvastatin (LIPITOR) 20 MG tablet Take 1 tablet by mouth daily 19  Yes Kortney Morelos MD   naproxen (NAPROSYN) 500 MG tablet Take 500 mg by mouth 2 times daily (with meals)   Yes Historical Provider, MD   gabapentin (NEURONTIN) 300 MG capsule Take 3 capsules by mouth nightly. 19  Yes Historical Provider, MD   Vitamin D (CHOLECALCIFEROL) 1000 UNITS CAPS capsule Take 1,000 Units by mouth daily.    Yes Historical Provider, MD   latanoprost (XALATAN) 0.005 % ophthalmic solution Place 1 drop into both eyes nightly    Yes Historical Provider, MD   timolol (TIMOPTIC-XR) 0.5 % ophthalmic gel-forming Place 1 drop into both eyes daily    Yes Historical Provider, MD       Current medications:    Current Facility-Administered Medications   Medication Dose Route Frequency Provider Last Rate Last Dose    sodium chloride flush 0.9 % injection 10 mL  10 mL Intravenous 2 times per day Haylee Paradise, DO        sodium chloride flush 0.9 % injection 10 mL  10 mL Intravenous PRN Haylee Paradise, DO        0.9 % sodium chloride infusion   Intravenous Continuous Haylee Paradise, DO        lactated ringers infusion   Intravenous Continuous Haylee Paradise,  mL/hr at 09/03/20 1045      ceFAZolin (ANCEF) 2 g in dextrose 5 % 50 mL IVPB  2 g Intravenous Once Glena Danyel, DPM        lactated ringers infusion   Intravenous Continuous Krzysztof Patel MD        sodium chloride flush 0.9 % injection 10 mL  10 mL Intravenous 2 times per day Krzysztof Patel MD        sodium chloride flush 0.9 % injection 10 mL  10 mL Intravenous PRN Krzysztof Patel MD        lidocaine PF 1 % injection 1 mL  1 mL Intradermal Once PRN Krzysztof Patel MD        scopolamine (TRANSDERM-SCOP) transdermal patch 1 patch  1 patch Transdermal Q72H Krzysztof Patel MD           Allergies:     Allergies   Allergen Reactions    Codeine Phosphate      Nausea and vomiting       Problem List:    Patient Active Problem List   Diagnosis Code    Psoriasis L40.9    Glaucoma H40.9    Osteopenia M85.80    Hyperlipidemia E78.5    Hypothyroidism E03.9    Morbid obesity due to excess calories ([address] E66.01    Osteoarthritis of right hip M16.11    Left carpal tunnel syndrome G56.02    Bilateral carpal tunnel syndrome G56.03    Right carpal tunnel syndrome G56.01    Arthritis of carpometacarpal (CMC) joint of right thumb M18.11    Arthritis of carpometacarpal (CMC) joint of left thumb M18.12    Psoriatic arthritis ([address] L40.50       Past Medical History:        Diagnosis Date    Arthritis     Bone spur     CTS (carpal tunnel syndrome)     Cyst     left foot and spur    Dental crowns present     molars    Glaucoma     Hyperlipidemia     Hypothyroidism     Osteopenia     Overweight(278.02)     PONV (postoperative nausea and vomiting)     phenergan works well     Psoriatic arthritis ([address]     S/P foot surgery 2011    excision cyst and resection spur left foot    Scalp psoriasis        Past Surgical History:        Procedure Laterality Date    CARPAL TUNNEL RELEASE Left 2018    CARPAL TUNNEL RELEASE Right 2018    right carpal tunnel release    CATARACT REMOVAL Left     COLONOSCOPY          FOOT SURGERY  2011    w/local anesthesia    JOINT REPLACEMENT  2015    Rt. hip     JOINT REPLACEMENT Left 2018    hip -Dr Annemarie Sparks Left        Social History:    Social History     Tobacco Use    Smoking status: Former Smoker     Packs/day: 1.00     Years: 4.00     Pack years: 4.00     Last attempt to quit: 1981     Years since quittin.1    Smokeless tobacco: Never Used   Substance Use Topics    Alcohol use: Yes     Comment: occasionally                                Counseling given: Not Answered      Vital Signs (Current):   Vitals:    20 1428 20 1015   BP:  (!) 147/98   Pulse:  68   Resp:  18   Temp:  98 °F (36.7 °C)   TempSrc:  Oral   SpO2:  96%   Weight: 230 lb (104.3 kg) 240 lb (108.9 kg)   Height: 5' 5\" (1.651 m) 5' 5\" (1.651 m)                                              BP Readings from Last 3 Encounters:   20 (!) 147/98   20 138/70   20 124/76       NPO Status: Time of last liquid consumption:                         Time of last solid consumption:                         Date of last liquid consumption: 20                        Date of last solid food consumption: 20    BMI:   Wt Readings from Last 3 Encounters:   20 240 lb (108.9 kg)   20 248 lb (112.5 kg)   20 243 lb (110.2 kg)     Body mass index is 39.94 kg/m².     CBC:   Lab Results   Component Value Date    WBC 4.3 2020    RBC 3.88 2020    HGB 12.5 2020    HCT 37.6 2020    MCV 96.9 2020    RDW 14.6 2020     08/28/2020       CMP:   Lab Results   Component Value Date     08/28/2020    K 4.4 08/28/2020     08/28/2020    CO2 24 08/28/2020    BUN 18 08/28/2020    CREATININE 0.6 08/28/2020    GFRAA >60 08/28/2020    GFRAA >60 07/09/2012    AGRATIO 1.5 08/28/2020    LABGLOM >60 08/28/2020    GLUCOSE 102 08/28/2020    PROT 6.8 08/28/2020    PROT 7.0 07/09/2012    CALCIUM 9.4 08/28/2020    BILITOT 0.3 08/28/2020    ALKPHOS 69 08/28/2020    AST 16 08/28/2020    ALT 15 08/28/2020       POC Tests:   Recent Labs     09/03/20  1053   POCGLU 95       Coags: No results found for: PROTIME, INR, APTT    HCG (If Applicable): No results found for: PREGTESTUR, PREGSERUM, HCG, HCGQUANT     ABGs: No results found for: PHART, PO2ART, TWN3EGR, JOM5DZS, BEART, B6CMZCCE     Type & Screen (If Applicable):  No results found for: LABABO, LABRH    Drug/Infectious Status (If Applicable):  No results found for: HIV, HEPCAB    COVID-19 Screening (If Applicable):   Lab Results   Component Value Date    COVID19 NOT DETECTED 08/28/2020         Anesthesia Evaluation  Patient summary reviewed   history of anesthetic complications: PONV. Airway: Mallampati: IV  TM distance: >3 FB   Neck ROM: full  Mouth opening: > = 3 FB Dental: normal exam         Pulmonary:                              Cardiovascular:Negative CV ROS                      Neuro/Psych:                ROS comment: glaucoma GI/Hepatic/Renal:        Morbid obesity: BMI 40. Endo/Other:    (+) hypothyroidism::., .    Arthritis: .Psoriatic arthritis ([address]                Abdominal:           Vascular:                                        Anesthesia Plan      general     ASA 3     (Agrees to nerve block  If needed)  Induction: intravenous. Anesthetic plan and risks discussed with patient.                     Chavo Kan MD   9/3/2020

## 2020-09-04 NOTE — OP NOTE
[address], [address]                                OPERATIVE REPORT    PATIENT NAME: Yogi Ramirez                      :        1955  MED REC NO:   [id number]                          ROOM:  ACCOUNT NO:   [de-identified]                           ADMIT DATE: 2020  PROVIDER:     Laura Garcia DPM    DATE OF PROCEDURE:  2020    SURGEON:  Laura Garcia DPM    ASSISTANT:  Sigrid Parikh DPM, PGY-2    PREOPERATIVE DIAGNOSES:  1. Hallux abductovalgus deformity, right foot. 2.  Hammertoe deformity, second toe, right foot. 3.  Dorsal subluxation, second MPJ, right foot. POSTOPERATIVE DIAGNOSES:  1. Hallux abductovalgus deformity, right foot. 2.  Hammertoe deformity, second toe, right foot. 3.  Dorsal subluxation, second MPJ, right foot. PROCEDURES:  1. Akin proximal phalanx osteotomy, right great toe. 2. PIPJ arthrodesis with sequential reduction and dorsal capsulotomy,  right second toe, with K-wire fixation. PATHOLOGY:  No specimen. ANESTHESIA:  General anesthesia with a local block consisting of total  20 mL of 2% lidocaine plain. Postoperative injection includes 30 mL of  0.5% Marcaine plain. ESTIMATED BLOOD LOSS:  Less than 10 mL. HEMOSTASIS:  A pneumatic ankle tourniquet at 250 mmHg. MATERIALS:  Include 3-0 and 4-0 Vicryl suture, 4-0 Monocryl suture, a  0.045 K-wire x1, a Interview FuseFORCE nitinol 8 mm x 8 mm staple  x1. INTRAOPERATIVE FINDINGS:  The original plan was to correct bunion  deformity with a first metatarsal osteotomy; however, upon removal of  the dorsomedial elements, it was noted that the patient only had soft  bone and there was concern for bony healing. Decision was made to  proceed with an aggressive soft tissue release with a proximal phalanx  osteotomy. _____ a rectus first metatarsal MPJ.   Being happy with this  correction, no further procedure was maintain screw  fixation. The decision was made then to proceed with an aggressive John  osteotomy to realign the joint by soft tissue release. At this time,  decision was made to proceed with an Akin osteotomy. A guidewire was  placed in the proximal lateral aspect of the base of the proximal  phalanx. Once the guidewire was in place and it was perpendicular to  the weightbearing surface, a wedge osteotomy was created with the apex  oriented laterally. This wedge of bone was then released. The  osteotomy was clamped. The lateral hinge was kept in place and the  osteotomy was _____ to close down the hallux interphalangeal angle. Being happy with this correction, utilizing a [facility] FuseFORCE 8  mm x 8 mm staple, the osteotomy was fixated, the temporary fixation was  removed. The first MPJ was put through a range of motion with forefoot  loaded, which was excellent and the joint was aligned. C-arm  fluoroscopy showed excellent bone-to-bone apposition at the osteotomy  and realignment of the joint. Being happy with this correction, the  wound was irrigated. A medial capsulorrhaphy was performed to close  down the redundancy created from the resection of the medial eminence  and the wound was then closed in layers using both 3-0 and 4-0 Vicryl  sutures and the skin was closed with a 4-0 Monocryl suture. Upon  completion of this, attention was directed towards the next procedure. PIPJ ARTHRODESIS WITH SEQUENTIAL REDUCTION OF RIGHT SECOND TOE:  At this  time, an incision was made over the right second toe extending  proximally to the metatarsophalangeal joint. This incision was carried  with a #15 blade. Dissection was carried down to the deep fascia. The  deep fascia was incised. At this time, a transverse tenotomy was  created over the PIPJ. Once adequate soft tissue exposures were  released, the extensor winn apparatus was released.   The forefoot was  loaded by the clicking and pushup test.  There was still dorsal  subluxation at the metatarsophalangeal joint and so the decision was  made to proceed with the next procedure. DORSAL CAPSULOTOMY, RIGHT SECOND METATARSOPHALANGEAL JOINT:  At this  time, utilizing a #15-blade, the dorsal capsular structures were  released both dorsally, medially, and laterally. McGlamry elevator was  then inserted into the second MPJ and plantar structures were released. At this time, the forefoot was loaded by the clicking and pushup test.   The digit then sat in a rectus position at the metatarsophalangeal joint  and the previously noted subluxation was reduced. Being happy with this  correction, attention was directed towards the head of the proximal  phalanx. The base of the middle phalanx was resected with a power saw. At this time, a 0.045 K-wire was retrograded out the base of the middle  phalanx and retrograded down towards the proximal phalanx. C-arm  fluoroscopy showed excellent alignment of the second metatarsophalangeal  joint. The digit then sat on a rectus position. The wound was then  irrigated with copious amounts of normal sterile saline as we had done  periodically throughout the procedure. The long extensor tendon was  repaired utilizing 3-0 Vicryl suture. The tendon was tight when it was  repaired and so, it was lengthened via three kristi-transections of the  tendon. It then came together with no tension. Subcutaneous tissues  were closed using 4-0 Vicryl suture and the skin was closed using 4-0  Monocryl suture in a running subcuticular stitch. Upon completion of  wound closure, a postoperative injection consisting a total of 30 mL of  0.5% Marcaine plain was administered to all surgical incision sites. The K-wire that was retrograded to fixate the arthrodesis was cut, bent,  and a Jurgan's ball was applied.   The pneumatic ankle tourniquet was  then rapidly deflated and a prompt instantaneous hyperemic response  noted to all digits of the right foot. At this time, the wounds were  then dressed with Betadine-soaked Adaptic, sterile 4 x 4s, 4 x 8s,  sterile Kerlix, and Jessica Knack. A gentle compression dressing was applied  and a postoperative shoe will be dispensed in the recovery area. The patient tolerated the procedure and anesthesia well. The patient  left the OR with vital signs stable and vascular status intact to all  digits of the right foot. Following a period of postoperative  monitoring, the patient will be discharged to home with oral and written  instructions per myself. She will follow up in my office in the next  three to five days for her first postoperative visit.         Fernando Mcnally DPM    D: 09/04/2020 7:18:03       T: 09/04/2020 9:30:47     [email or web address]  Job#: [id number]     Doc#: [id number]    CC:  Maynor Whitaker DPM

## 2020-09-17 ENCOUNTER — OFFICE VISIT (OUTPATIENT)
Dept: RHEUMATOLOGY | Age: 65
End: 2020-09-17
Payer: COMMERCIAL

## 2020-09-17 VITALS — HEIGHT: 65 IN | BODY MASS INDEX: 39.99 KG/M2 | TEMPERATURE: 98 F | WEIGHT: 240 LBS

## 2020-09-17 PROCEDURE — 99214 OFFICE O/P EST MOD 30 MIN: CPT | Performed by: INTERNAL MEDICINE

## 2020-09-17 RX ORDER — SECUKINUMAB 150 MG/ML
INJECTION SUBCUTANEOUS
Qty: 2 SYRINGE | Refills: 3 | Status: SHIPPED | OUTPATIENT
Start: 2020-09-17 | End: 2021-12-13 | Stop reason: ALTCHOICE

## 2020-09-17 NOTE — PROGRESS NOTES
[address] [place] [address], [address] [place] [address] T) [phone] (F)      Dear Dr. Juma Padron MD:  Please find Rheumatology assessment. Thank you for giving me the opportunity to be involved in Romana Agustin's care and I look forward following Romana Cullen along with you. If you have any questions or concerns please feel free to reach me. Note is transcribed using voice recognition software. Inadvertent computerized transcription errors may be present. Patient identification: Elda Agustin,: ,66 y.o. Sex: female     Assessment / Plan: Romana Cullen was seen today for follow-up. Diagnoses and all orders for this visit:    Psoriasis    Psoriatic arthritis ([facility] [address]    High risk medication use    Other orders  -     Secukinumab, 300 MG Dose, (COSENTYX, 300 MG DOSE,) 150 MG/ML SOSY; Inject 300 mg sc Q 28 days. Please dispense pen. Psoriasis-in remission. Psoriatic arthritis-continued activity on Cosentyx 150 mg q. 4 weekly. Several finger joints are inflamed. Is wearing boot in her right foot after bunion surgery. Hand OA- stable. Previous Rx- Enbrel- lost efficacy Humira-nonspecific facial eruption, and lost efficacy over time    Plan-  Increase Cosentyx 300 mg every 4 weekly. Labs are stable. Continue NSAIDs PRN. Call me with worsening symptoms  Reassessment in 3 months. Patient indicates understanding and agrees with the management plan. I reviewed patient's history, referral documents and electronic medical records. #######################################################################    Subjective-  Follow-up for psoriasis, psoriatic arthritis and generalized osteoarthritis.     Interval changes-  She has been experiencing more pain, stiffness and swelling in her finger joints mainly MCPs and PIPs for last 3 to 4 weeks. She is also using walker for ambulation, has boot in her right foot after bunion surgery. I believe that mechanical stress from the walker might have caused the flare. Skin psoriasis is admission. Has been taking ibuprofen for symptom control. Tolerating medications well. Denies any GI side effects. she no longer on sulfasalazine. Denies any rashes, GI side effects, intercurrent infections. All other review of systems are negative. Past Medical History:   Diagnosis Date    Arthritis     Bone spur     CTS (carpal tunnel syndrome)     Cyst     left foot and spur    Dental crowns present     molars    Glaucoma     Hyperlipidemia     Hypothyroidism     Osteopenia     Overweight(278.02)     PONV (postoperative nausea and vomiting)     phenergan works well     Psoriatic arthritis ([facility] [address]     S/P foot surgery 07/07/2011    excision cyst and resection spur left foot    Scalp psoriasis      Past Surgical History:   Procedure Laterality Date    ARTHRODESIS Right 9/3/2020    AKIN OSTEOTOMY RIGHT HALLUX, PROXIMAL INTERPHALANGEAL JOINT ARTHRODESIS WITH SEQUENTIAL REDUCTION AND DORSAL CAPSULOTOMY performed by Arron Christianson DPM at [facility] Left 02/02/2018    CARPAL TUNNEL RELEASE Right 02/23/2018    right carpal tunnel release    CATARACT REMOVAL Left 2019    COLONOSCOPY      2011    FOOT SURGERY  07/07/2011    w/local anesthesia    JOINT REPLACEMENT  06/2015    Rt.  hip     JOINT REPLACEMENT Left 11/20/2018    hip -Dr Rachel Mcduffie Left 2020       No family history of autoimmune diseases    Current Outpatient Medications   Medication Sig Dispense Refill    levothyroxine (SYNTHROID) 175 MCG tablet Take 1 tablet by mouth daily 90 tablet 1    ibuprofen (ADVIL;MOTRIN) 800 MG tablet Take 1 tablet by mouth every 8 hours as needed for Pain 42 tablet 0    secukinumab (COSENTYX) 150 MG/ML SOSY INJECT ONE SYRINGE SUBCUTANEOUSLY EVERY 4 WEEKS. REFRIGERATE. ALLOW 15TO 30 MINUTES AT ROOM TEMP PRIOR TO ADMINISTRATION. 1 Syringe 5    desonide (DESOWEN) 0.05 % cream Apply topically 2 times daily. (Patient taking differently: Apply topically daily.) 60 g 2    atorvastatin (LIPITOR) 20 MG tablet Take 1 tablet by mouth daily 90 tablet 3    naproxen (NAPROSYN) 500 MG tablet Take 500 mg by mouth 2 times daily (with meals)      gabapentin (NEURONTIN) 300 MG capsule Take 3 capsules by mouth nightly. 0    Vitamin D (CHOLECALCIFEROL) 1000 UNITS CAPS capsule Take 1,000 Units by mouth daily.  latanoprost (XALATAN) 0.005 % ophthalmic solution Place 1 drop into both eyes nightly       timolol (TIMOPTIC-XR) 0.5 % ophthalmic gel-forming Place 1 drop into both eyes daily       Secukinumab, 300 MG Dose, (COSENTYX, 300 MG DOSE,) 150 MG/ML SOSY Inject 300 mg sc Q 28 days. Please dispense pen. 2 Syringe 3     No current facility-administered medications for this visit. Allergies   Allergen Reactions    Codeine Phosphate      Nausea and vomiting       PHYSICAL EXAM:    Vitals:    Temp 98 °F (36.7 °C) (Oral)   Ht 5' 5\" (1.651 m)   Wt 240 lb (108.9 kg)   BMI 39.94 kg/m²   General appearance/ Psychiatric: well nourished, and well groomed, normal judgement, alert, appears stated age and cooperative. MKS:   28 joint JOINT COUNT:                               Right                                                  Left   Swell Tender Swell Tender   PIP1           PIP2  x x      PIP3           PIP4  x x       PIP5 x  x      MCP1           MCP2          MCP3 x x       MCP4           MCP5 x x       Wrist           Elbow           Shoulder          Knee             Loose fist right hand, multiple tender and swollen joint as above. Advanced OA changes are also present across PIPs and DIPs. Boot in her R foot. Skin: no rashes.   No nail or digital changes. DATA:   Lab Results   Component Value Date    WBC 4.3 08/28/2020    HGB 12.5 08/28/2020    HCT 37.6 08/28/2020    MCV 96.9 08/28/2020     08/28/2020         Chemistry        Component Value Date/Time     08/28/2020 0818    K 4.4 08/28/2020 0818     08/28/2020 0818    CO2 24 08/28/2020 0818    BUN 18 08/28/2020 0818    CREATININE 0.6 08/28/2020 0818        Component Value Date/Time    CALCIUM 9.4 08/28/2020 0818    ALKPHOS 69 08/28/2020 0818    AST 16 08/28/2020 0818    ALT 15 08/28/2020 0818    BILITOT 0.3 08/28/2020 0818          Lab Results   Component Value Date    LABURIC 5.5 09/10/2018     Lab Results   Component Value Date    SEDRATE 30 08/28/2020     Lab Results   Component Value Date    CRP 11.2 (H) 08/28/2020     Lab Results   Component Value Date    AQUILES Negative 09/10/2018    SEDRATE 30 08/28/2020     No results found for: CKTOTAL  Lab Results   Component Value Date    TSH 2.07 07/09/2012     Lab Results   Component Value Date    VITD25 31.6 08/16/2017         Radiology Review:   10/1/2018-  Both hand x-ray images were retrieved, images were shown to patient, has significant osteoarthritic changes in the DIP, PIP's as well as joint space narrowing of MCP joints mainly his third fourth. R 2 nd PIP has central erosion with new bone formation,which has progressed when comparing studies from Jan 2018. A/P- See above.

## 2020-11-25 ENCOUNTER — PATIENT MESSAGE (OUTPATIENT)
Dept: INTERNAL MEDICINE CLINIC | Age: 65
End: 2020-11-25

## 2020-11-25 RX ORDER — ATORVASTATIN CALCIUM 20 MG/1
20 TABLET, FILM COATED ORAL DAILY
Qty: 90 TABLET | Refills: 3 | Status: SHIPPED | OUTPATIENT
Start: 2020-11-25 | End: 2021-09-03 | Stop reason: SDUPTHER

## 2020-11-25 RX ORDER — LEVOTHYROXINE SODIUM 175 UG/1
175 TABLET ORAL DAILY
Qty: 90 TABLET | Refills: 1 | Status: SHIPPED | OUTPATIENT
Start: 2020-11-25 | End: 2021-08-10

## 2020-12-11 ENCOUNTER — VIRTUAL VISIT (OUTPATIENT)
Dept: RHEUMATOLOGY | Age: 65
End: 2020-12-11
Payer: COMMERCIAL

## 2020-12-11 PROCEDURE — 99214 OFFICE O/P EST MOD 30 MIN: CPT | Performed by: INTERNAL MEDICINE

## 2020-12-11 RX ORDER — SECUKINUMAB 150 MG/ML
300 INJECTION SUBCUTANEOUS ONCE
Qty: 2 ML | Refills: 3 | Status: SHIPPED | OUTPATIENT
Start: 2020-12-11 | End: 2020-12-11

## 2020-12-11 NOTE — PROGRESS NOTES
Segundo Thomson is a 77 y.o. female being evaluated by a Virtual Visit (video visit) encounter to address concerns as mentioned above. A caregiver was present when appropriate. Due to this being a TeleHealth encounter (During OPC-94 public health emergency), evaluation of the following organ systems was limited: Vitals/Constitutional/EENT/Resp/CV/GI//MS/Neuro/Skin/Heme-Lymph-Imm. Pursuant to the emergency declaration under the [address] [place] [address] and the [employer] Act, this Virtual Visit was conducted with patient's (and/or legal guardian's) consent, to reduce the patient's risk of exposure to COVID-19 and provide necessary medical care. The patient (and/or legal guardian) has also been advised to contact this office for worsening conditions or problems, and seek emergency medical treatment and/or call 911 if deemed necessary. Patient identification was verified at the start of the visit: Yes    Total time spent for this encounter: Not billed by time    Services were provided through a video synchronous discussion virtually to substitute for in-person clinic visit. Patient and provider were located at their individual homes. --Dari Garcia MD on 12/11/2020 at 11:10 AM    An electronic signature was used to authenticate this note. [address] [place] [address] ) [phone] (L)      Dear Dr. Deb Kasper MD:  Please find Rheumatology assessment. Thank you for giving me the opportunity to be involved in Andrew Agustin's care and I look forward following [facility] along with you.  If you have any questions or concerns please feel free to reach me. Note is transcribed using voice recognition software. Inadvertent computerized transcription errors may be present. Patient identification: Jose Roberto Agustin,: ,89 y.o. Sex: female     Assessment / Plan: Qian Mcneill was seen today for follow-up and arthritis. Diagnoses and all orders for this visit:    Psoriatic arthritis ([facility] [address]    Psoriasis    High risk medication use    Primary osteoarthritis of both hands        Psoriasis-in remission. Psoriatic arthritis-noticed improvement with 300 mg of Cosentyx especially in terms of swelling and stiffness. Still has persistent stiffness which is likely from osteoarthritis she also has advanced osteoarthritis with Heberden's and Zaire's nodules. Tolerating medications well. Previous Rx- Enbrel- lost efficacy Humira-nonspecific facial eruption, and lost efficacy over time    Plan-  Check labs to monitor disease activity and medication side effects. Continue current dose of Cosentyx. Occasionally utilizes ibuprofen. Call us with any symptoms of flares, follow-up in 4 months. Patient indicates understanding and agrees with the management plan. I reviewed patient's history, referral documents and electronic medical records. #######################################################################    Subjective-  Follow-up for psoriasis, psoriatic arthritis and generalized osteoarthritis. Interval changes-  She tells me that she has noticed  improvement with higher dose of Cosentyx especially in terms of swelling and pain in discomfort-more so in her left hand. She still has residual bony swelling and discomfort which is likely from osteoarthritic changes. No flares since last seen. She is tolerating medications well. Denies any GI upset or diarrhea. The requirement of ibuprofen has also gone down significantly, occasionally takes it. Skin psoriasis is in remission.   All other review of systems are negative. Past Medical History:   Diagnosis Date    Arthritis     Bone spur     CTS (carpal tunnel syndrome)     Cyst     left foot and spur    Dental crowns present     molars    Glaucoma     Hyperlipidemia     Hypothyroidism     Osteopenia     Overweight(278.02)     PONV (postoperative nausea and vomiting)     phenergan works well     Psoriatic arthritis ([address]     S/P foot surgery 07/07/2011    excision cyst and resection spur left foot    Scalp psoriasis      Past Surgical History:   Procedure Laterality Date    ARTHRODESIS Right 9/3/2020    AKIN OSTEOTOMY RIGHT HALLUX, PROXIMAL INTERPHALANGEAL JOINT ARTHRODESIS WITH SEQUENTIAL REDUCTION AND DORSAL CAPSULOTOMY performed by Heather Estes DPM at [place] [address] Left 02/02/2018    CARPAL TUNNEL RELEASE Right 02/23/2018    right carpal tunnel release    CATARACT REMOVAL Left 2019    COLONOSCOPY      2011    FOOT SURGERY  07/07/2011    w/local anesthesia    JOINT REPLACEMENT  06/2015    Rt. hip     JOINT REPLACEMENT Left 11/20/2018    hip -Dr Gary Hicks Left 2020       No family history of autoimmune diseases    Current Outpatient Medications   Medication Sig Dispense Refill    levothyroxine (SYNTHROID) 175 MCG tablet Take 1 tablet by mouth daily 90 tablet 1    atorvastatin (LIPITOR) 20 MG tablet Take 1 tablet by mouth daily 90 tablet 3    Secukinumab, 300 MG Dose, (COSENTYX, 300 MG DOSE,) 150 MG/ML SOSY Inject 300 mg sc Q 28 days. Please dispense pen. 2 Syringe 3    ibuprofen (ADVIL;MOTRIN) 800 MG tablet Take 1 tablet by mouth every 8 hours as needed for Pain 42 tablet 0    secukinumab (COSENTYX) 150 MG/ML SOSY INJECT ONE SYRINGE SUBCUTANEOUSLY EVERY 4 WEEKS. REFRIGERATE. ALLOW 15TO 30 MINUTES AT ROOM TEMP PRIOR TO ADMINISTRATION. 1 Syringe 5    desonide (DESOWEN) 0.05 % cream Apply topically 2 times daily.  (Patient taking differently: Apply topically daily.) 60 g 2    naproxen (NAPROSYN) 500 MG tablet Take 500 mg by mouth 2 times daily (with meals)      gabapentin (NEURONTIN) 300 MG capsule Take 3 capsules by mouth nightly. 0    Vitamin D (CHOLECALCIFEROL) 1000 UNITS CAPS capsule Take 1,000 Units by mouth daily.  latanoprost (XALATAN) 0.005 % ophthalmic solution Place 1 drop into both eyes nightly       timolol (TIMOPTIC-XR) 0.5 % ophthalmic gel-forming Place 1 drop into both eyes daily        No current facility-administered medications for this visit. Allergies   Allergen Reactions    Codeine Phosphate      Nausea and vomiting       PHYSICAL EXAM:    Vitals: There were no vitals taken for this visit. General appearance/ Psychiatric: well nourished, and well groomed, normal judgement, alert, appears stated age and cooperative. She looks comfortable, has normal breathing effort. MKS:   28 joint JOINT COUNT:                               Right                                                  Left   Swell Tender Swell Tender   PIP1           PIP2         PIP3           PIP4          PIP5         MCP1           MCP2          MCP3         MCP4           MCP5         Wrist           Elbow           Shoulder          Knee             I am able to see swelling of PIPs and DIPs, really cannot differentiate if it is synovitis or just from the OA changes. In her prior visit she did have advanced OA findings with Heberden's and Zaire's nodules. No overt musculoskeletal findings to be seen otherwise. Skin: no rashes. No nail or digital changes.     DATA:   Lab Results   Component Value Date    WBC 4.3 08/28/2020    HGB 12.5 08/28/2020    HCT 37.6 08/28/2020    MCV 96.9 08/28/2020     08/28/2020         Chemistry        Component Value Date/Time     08/28/2020 0818    K 4.4 08/28/2020 0818     08/28/2020 0818    CO2 24 08/28/2020 0818    BUN 18 08/28/2020 0818    CREATININE 0.6 08/28/2020 0818        Component Value Date/Time    CALCIUM 9.4 08/28/2020 0818    ALKPHOS 69 08/28/2020 0818    AST 16 08/28/2020 0818    ALT 15 08/28/2020 0818    BILITOT 0.3 08/28/2020 0818          Lab Results   Component Value Date    LABURIC 5.5 09/10/2018     Lab Results   Component Value Date    SEDRATE 30 08/28/2020     Lab Results   Component Value Date    CRP 11.2 (H) 08/28/2020     Lab Results   Component Value Date    AQUILES Negative 09/10/2018    SEDRATE 30 08/28/2020     No results found for: CKTOTAL  Lab Results   Component Value Date    TSH 2.07 07/09/2012     Lab Results   Component Value Date    VITD25 31.6 08/16/2017         Radiology Review:   10/1/2018-  Both hand x-ray images were retrieved, images were shown to patient, has significant osteoarthritic changes in the DIP, PIP's as well as joint space narrowing of MCP joints mainly his third fourth. R 2 nd PIP has central erosion with new bone formation,which has progressed when comparing studies from Jan 2018. A/P- See above.

## 2020-12-16 DIAGNOSIS — L40.50 PSORIATIC ARTHRITIS (HCC): ICD-10-CM

## 2020-12-16 DIAGNOSIS — Z79.899 HIGH RISK MEDICATION USE: ICD-10-CM

## 2020-12-16 LAB
A/G RATIO: 1.7 (ref 1.1–2.2)
ALBUMIN SERPL-MCNC: 4.1 G/DL (ref 3.4–5)
ALP BLD-CCNC: 79 U/L (ref 40–129)
ALT SERPL-CCNC: 16 U/L (ref 10–40)
ANION GAP SERPL CALCULATED.3IONS-SCNC: 11 MMOL/L (ref 3–16)
AST SERPL-CCNC: 17 U/L (ref 15–37)
BASOPHILS ABSOLUTE: 0 K/UL (ref 0–0.2)
BASOPHILS RELATIVE PERCENT: 0.8 %
BILIRUB SERPL-MCNC: 0.4 MG/DL (ref 0–1)
BUN BLDV-MCNC: 16 MG/DL (ref 7–20)
CALCIUM SERPL-MCNC: 9.1 MG/DL (ref 8.3–10.6)
CHLORIDE BLD-SCNC: 102 MMOL/L (ref 99–110)
CHOLESTEROL, FASTING: 179 MG/DL (ref 0–199)
CO2: 26 MMOL/L (ref 21–32)
CREAT SERPL-MCNC: 0.6 MG/DL (ref 0.6–1.2)
EOSINOPHILS ABSOLUTE: 0.5 K/UL (ref 0–0.6)
EOSINOPHILS RELATIVE PERCENT: 13.3 %
GFR AFRICAN AMERICAN: >60
GFR NON-AFRICAN AMERICAN: >60
GLOBULIN: 2.4 G/DL
GLUCOSE BLD-MCNC: 108 MG/DL (ref 70–99)
HCT VFR BLD CALC: 37 % (ref 36–48)
HDLC SERPL-MCNC: 67 MG/DL (ref 40–60)
HEMOGLOBIN: 12.2 G/DL (ref 12–16)
LDL CHOLESTEROL CALCULATED: 90 MG/DL
LYMPHOCYTES ABSOLUTE: 1.3 K/UL (ref 1–5.1)
LYMPHOCYTES RELATIVE PERCENT: 32.8 %
MCH RBC QN AUTO: 31.6 PG (ref 26–34)
MCHC RBC AUTO-ENTMCNC: 32.8 G/DL (ref 31–36)
MCV RBC AUTO: 96.2 FL (ref 80–100)
MONOCYTES ABSOLUTE: 0.3 K/UL (ref 0–1.3)
MONOCYTES RELATIVE PERCENT: 8.2 %
NEUTROPHILS ABSOLUTE: 1.8 K/UL (ref 1.7–7.7)
NEUTROPHILS RELATIVE PERCENT: 44.9 %
PDW BLD-RTO: 14.6 % (ref 12.4–15.4)
PLATELET # BLD: 247 K/UL (ref 135–450)
PMV BLD AUTO: 8.5 FL (ref 5–10.5)
POTASSIUM SERPL-SCNC: 4.4 MMOL/L (ref 3.5–5.1)
RBC # BLD: 3.85 M/UL (ref 4–5.2)
SODIUM BLD-SCNC: 139 MMOL/L (ref 136–145)
TOTAL PROTEIN: 6.5 G/DL (ref 6.4–8.2)
TRIGLYCERIDE, FASTING: 112 MG/DL (ref 0–150)
VLDLC SERPL CALC-MCNC: 22 MG/DL
WBC # BLD: 4.1 K/UL (ref 4–11)

## 2020-12-17 ENCOUNTER — TELEPHONE (OUTPATIENT)
Dept: INTERNAL MEDICINE CLINIC | Age: 65
End: 2020-12-17

## 2020-12-17 ENCOUNTER — OFFICE VISIT (OUTPATIENT)
Dept: INTERNAL MEDICINE CLINIC | Age: 65
End: 2020-12-17
Payer: COMMERCIAL

## 2020-12-17 VITALS
DIASTOLIC BLOOD PRESSURE: 84 MMHG | WEIGHT: 252 LBS | BODY MASS INDEX: 41.93 KG/M2 | TEMPERATURE: 96.9 F | SYSTOLIC BLOOD PRESSURE: 140 MMHG

## 2020-12-17 PROCEDURE — 99213 OFFICE O/P EST LOW 20 MIN: CPT | Performed by: INTERNAL MEDICINE

## 2020-12-17 RX ORDER — GABAPENTIN 300 MG/1
300 CAPSULE ORAL NIGHTLY PRN
Qty: 90 CAPSULE | Refills: 0 | Status: SHIPPED | OUTPATIENT
Start: 2020-12-17 | End: 2021-03-25

## 2020-12-17 RX ORDER — TIZANIDINE 2 MG/1
2 TABLET ORAL 4 TIMES DAILY PRN
Qty: 40 TABLET | Refills: 0 | Status: SHIPPED | OUTPATIENT
Start: 2020-12-17 | End: 2021-09-03

## 2020-12-17 RX ORDER — GABAPENTIN 300 MG/1
300 CAPSULE ORAL NIGHTLY PRN
Qty: 90 CAPSULE | Refills: 0 | Status: SHIPPED | OUTPATIENT
Start: 2020-12-17 | End: 2020-12-17 | Stop reason: SDUPTHER

## 2020-12-17 NOTE — PROGRESS NOTES
2020     Deborah Agustin (:  1955) is a 77 y.o. female, here for evaluation of the following medical concerns:    Chief Complaint   Patient presents with    Pain     pt c/o stabbing pain on the right side x1 day,otc tylenol        HPI    R flank pain - started yesterday around 3pm. Went from aching to stabbing then back to aching. Had some nausea, no fevers/chills. No urinary symptoms. Took tylenol, it helped a little. Pain does not radiate to the front. No history of kidney stones. Did not do any heavy lifting or unusual activity. Review of Systems   Genitourinary: Positive for flank pain. Negative for dysuria, frequency and hematuria. Prior to Visit Medications    Medication Sig Taking? Authorizing Provider   tiZANidine (ZANAFLEX) 2 MG tablet Take 1 tablet by mouth 4 times daily as needed (muscle spasm) Yes Adeline May MD   gabapentin (NEURONTIN) 300 MG capsule Take 1 capsule by mouth nightly as needed (pain) for up to 90 days. Yes Adeline May MD   levothyroxine (SYNTHROID) 175 MCG tablet Take 1 tablet by mouth daily Yes Adeline May MD   atorvastatin (LIPITOR) 20 MG tablet Take 1 tablet by mouth daily Yes Adeline May MD   Secukinumab, 300 MG Dose, (COSENTYX, 300 MG DOSE,) 150 MG/ML SOSY Inject 300 mg sc Q 28 days. Please dispense pen. Yes Stas Thornton MD   desonide (DESOWEN) 0.05 % cream Apply topically 2 times daily. Patient taking differently: Apply topically daily. Yes Stas Thornton MD   Vitamin D (CHOLECALCIFEROL) 1000 UNITS CAPS capsule Take 1,000 Units by mouth daily.  Yes Historical Provider, MD   latanoprost (XALATAN) 0.005 % ophthalmic solution Place 1 drop into both eyes nightly  Yes Historical Provider, MD   timolol (TIMOPTIC-XR) 0.5 % ophthalmic gel-forming Place 1 drop into both eyes daily  Yes Historical Provider, MD        Past Medical History:   Diagnosis Date    Arthritis     Bone spur     CTS (carpal tunnel syndrome)     Cyst     left foot and spur  Dental crowns present     molars    Glaucoma     Hyperlipidemia     Hypothyroidism     Osteopenia     Overweight(278.02)     PONV (postoperative nausea and vomiting)     phenergan works well     Psoriatic arthritis ([address]     S/P foot surgery 2011    excision cyst and resection spur left foot    Scalp psoriasis        Past Surgical History:   Procedure Laterality Date    ARTHRODESIS Right 9/3/2020    AKIN OSTEOTOMY RIGHT HALLUX, PROXIMAL INTERPHALANGEAL JOINT ARTHRODESIS WITH SEQUENTIAL REDUCTION AND DORSAL CAPSULOTOMY performed by Phoenix Mai DPM at [facility] Left 2018    CARPAL TUNNEL RELEASE Right 2018    right carpal tunnel release    CATARACT REMOVAL Left     COLONOSCOPY          FOOT SURGERY  2011    w/local anesthesia    JOINT REPLACEMENT  2015    Rt. hip     JOINT REPLACEMENT Left 2018    hip -Dr Ricky [place] Left        Social History     Tobacco Use    Smoking status: Former Smoker     Packs/day: 1.00     Years: 4.00     Pack years: 4.00     Quit date: 1981     Years since quittin.4    Smokeless tobacco: Never Used   Substance Use Topics    Alcohol use: Yes     Comment: occasionally        Family History   Problem Relation Age of Onset    Coronary Art Dis Mother        Vitals:    20 1435 20 1437   BP: (!) 140/84 (!) 140/84   Temp: 96.9 °F (36.1 °C)    TempSrc: Oral    Weight: 252 lb (114.3 kg)      Estimated body mass index is 41.93 kg/m² as calculated from the following:    Height as of 20: 5' 5\" (1.651 m). Weight as of this encounter: 252 lb (114.3 kg). Physical Exam  Vitals signs reviewed. Constitutional:       General: She is not in acute distress. Appearance: Normal appearance. HENT:      Head: Normocephalic and atraumatic. Cardiovascular:      Rate and Rhythm: Normal rate and regular rhythm. Heart sounds: Normal heart sounds. Pulmonary:      Effort: Pulmonary effort is normal.      Breath sounds: Normal breath sounds. Abdominal:      General: Abdomen is flat. Bowel sounds are normal. There is no distension. Palpations: Abdomen is soft. Tenderness: There is no abdominal tenderness. There is no right CVA tenderness. Musculoskeletal:      Comments: Mild tenderness over right lower thoracic back, no paraspinal tenderness   Skin:     General: Skin is warm and dry. Neurological:      Mental Status: She is alert. ASSESSMENT/PLAN:  1. Acute right flank pain  - kidney stone vs muscle spasm vs other pain, unlikely to be infection  - UA in the office was normal, no blood or leukocytes. Less likely to be kidney stone, will treat with NSAIDs, tizanidine prn, can use the gabapentin again as well  - if not improving in the next few days will need further evaluation      Return if symptoms worsen or fail to improve.

## 2020-12-17 NOTE — TELEPHONE ENCOUNTER
Patient called stating she has a dull pain stabbing pain on right side on rib cage in the back. It started about 3:30 pm yesterday and kept her up all night long. Please call to advise.

## 2021-03-25 ENCOUNTER — VIRTUAL VISIT (OUTPATIENT)
Dept: RHEUMATOLOGY | Age: 66
End: 2021-03-25
Payer: COMMERCIAL

## 2021-03-25 DIAGNOSIS — M19.041 PRIMARY OSTEOARTHRITIS OF BOTH HANDS: ICD-10-CM

## 2021-03-25 DIAGNOSIS — M19.042 PRIMARY OSTEOARTHRITIS OF BOTH HANDS: ICD-10-CM

## 2021-03-25 DIAGNOSIS — L40.9 PSORIASIS: ICD-10-CM

## 2021-03-25 DIAGNOSIS — Z79.899 HIGH RISK MEDICATION USE: ICD-10-CM

## 2021-03-25 DIAGNOSIS — L40.50 PSORIATIC ARTHRITIS (HCC): Primary | ICD-10-CM

## 2021-03-25 PROCEDURE — 99214 OFFICE O/P EST MOD 30 MIN: CPT | Performed by: INTERNAL MEDICINE

## 2021-03-25 NOTE — PROGRESS NOTES
Brittany Silva is a 77 y.o. female being evaluated by a Virtual Visit (video visit) encounter to address concerns as mentioned above. A caregiver was present when appropriate. Due to this being a TeleHealth encounter (During GWRQQ-42 public health emergency), evaluation of the following organ systems was limited: Vitals/Constitutional/EENT/Resp/CV/GI//MS/Neuro/Skin/Heme-Lymph-Imm. Pursuant to the emergency declaration under the [address] [place] [address] and the [employer] Act, this Virtual Visit was conducted with patient's (and/or legal guardian's) consent, to reduce the patient's risk of exposure to COVID-19 and provide necessary medical care. The patient (and/or legal guardian) has also been advised to contact this office for worsening conditions or problems, and seek emergency medical treatment and/or call 911 if deemed necessary. Patient identification was verified at the start of the visit: Yes    Total time spent for this encounter: Not billed by time    Services were provided through a video synchronous discussion virtually to substitute for in-person clinic visit. Patient and provider were located at their individual homes. --Miguel Michaels MD on 3/25/2021 at 10:08 AM    An electronic signature was used to authenticate this note. [address] [place] [address] (S) [phone] (F)      Dear Dr. Carlos Tipton MD:  Please find Rheumatology assessment. Thank you for giving me the opportunity to be involved in hospitals Vamsi's care and I look forward following hospitals along with you.  If you have any questions or concerns please feel free to reach me. Note is transcribed using voice recognition software. Inadvertent computerized transcription errors may be present. Patient identification: Acosta Agustin,: ,68 y.o. Sex: female     Assessment / Plan: Dennise Page was seen today for follow-up and arthritis. Diagnoses and all orders for this visit:    Psoriatic arthritis ([facility] [address]    High risk medication use    Psoriasis    Primary osteoarthritis of both hands        Psoriasis-in remission. Psoriatic arthritis-stable on 300 mg of Cosentyx every 4 weekly. Safety labs were normal from 2020. Osteoarthritis hands-getting worse, at times affect ADLs and recreational activities. Has advanced osteoarthritis with Heberden's and Zarie's nodules. Recommend trying Voltaren gel over-the-counter. Previous Rx- Enbrel- lost efficacy Humira-nonspecific facial eruption, and lost efficacy over time    Plan-  As above. Safety labs to be checked prior to next appointment in 3 months. Patient indicates understanding and agrees with the management plan. I reviewed patient's history, referral documents and electronic medical records. #######################################################################    Subjective-  Follow-up for psoriasis, psoriatic arthritis and generalized osteoarthritis. Interval changes-  She continues to have stiffness and discomfort in her finger joints mainly from osteoarthritis and deformities from OA. Psoriasis and psoriatic arthritis is doing fairly well. No overt flares, swelling. Tolerating Cosentyx well. Denies any GI discomfort, diarrhea, infections. All other review of systems are negative.       Current Outpatient Medications   Medication Sig Dispense Refill    tiZANidine (ZANAFLEX) 2 MG tablet Take 1 tablet by mouth 4 times daily as needed (muscle spasm) 40 tablet 0    gabapentin (NEURONTIN) 300 MG capsule Take 1 capsule by mouth nightly as needed (pain) for up to 90 days. 90 capsule 0    levothyroxine (SYNTHROID) 175 MCG tablet Take 1 tablet by mouth daily 90 tablet 1    atorvastatin (LIPITOR) 20 MG tablet Take 1 tablet by mouth daily 90 tablet 3    Secukinumab, 300 MG Dose, (COSENTYX, 300 MG DOSE,) 150 MG/ML SOSY Inject 300 mg sc Q 28 days. Please dispense pen. 2 Syringe 3    desonide (DESOWEN) 0.05 % cream Apply topically 2 times daily. (Patient taking differently: Apply topically daily.) 60 g 2    Vitamin D (CHOLECALCIFEROL) 1000 UNITS CAPS capsule Take 1,000 Units by mouth daily.  latanoprost (XALATAN) 0.005 % ophthalmic solution Place 1 drop into both eyes nightly       timolol (TIMOPTIC-XR) 0.5 % ophthalmic gel-forming Place 1 drop into both eyes daily        No current facility-administered medications for this visit. Allergies   Allergen Reactions    Codeine Phosphate      Nausea and vomiting       PHYSICAL EXAM:    Vitals: There were no vitals taken for this visit. General appearance/ Psychiatric: well nourished, and well groomed, normal judgement, alert, appears stated age and cooperative. She looks comfortable, has normal breathing effort. MKS:   28 joint JOINT COUNT:                               Right                                                  Left   Swell Tender Swell Tender   PIP1           PIP2         PIP3           PIP4          PIP5         MCP1           MCP2          MCP3         MCP4           MCP5         Wrist           Elbow           Shoulder          Knee             Other than osteoarthritic changes in her finger joints MCPs, PIPs, no findings are appreciated in [facility]. Skin: no rashes.      DATA:   Lab Results   Component Value Date    WBC 4.1 12/16/2020    HGB 12.2 12/16/2020    HCT 37.0 12/16/2020    MCV 96.2 12/16/2020     12/16/2020         Chemistry        Component Value Date/Time     12/16/2020 0849    K 4.4 12/16/2020 0849     12/16/2020 0849    CO2 26 12/16/2020 0849 BUN 16 12/16/2020 0849    CREATININE 0.6 12/16/2020 0849        Component Value Date/Time    CALCIUM 9.1 12/16/2020 0849    ALKPHOS 79 12/16/2020 0849    AST 17 12/16/2020 0849    ALT 16 12/16/2020 0849    BILITOT 0.4 12/16/2020 0849          Lab Results   Component Value Date    LABURIC 5.5 09/10/2018     Lab Results   Component Value Date    SEDRATE 30 08/28/2020     Lab Results   Component Value Date    CRP 11.2 (H) 08/28/2020     Lab Results   Component Value Date    AQUILES Negative 09/10/2018    SEDRATE 30 08/28/2020     No results found for: CKTOTAL  Lab Results   Component Value Date    TSH 2.07 07/09/2012     Lab Results   Component Value Date    VITD25 31.6 08/16/2017         Radiology Review:   10/1/2018-  Both hand x-ray images were retrieved, images were shown to patient, has significant osteoarthritic changes in the DIP, PIP's as well as joint space narrowing of MCP joints mainly his third fourth. R 2 nd PIP has central erosion with new bone formation,which has progressed when comparing studies from Jan 2018. A/P- See above.

## 2021-07-07 ENCOUNTER — OFFICE VISIT (OUTPATIENT)
Dept: RHEUMATOLOGY | Age: 66
End: 2021-07-07
Payer: COMMERCIAL

## 2021-07-07 VITALS
HEIGHT: 65 IN | SYSTOLIC BLOOD PRESSURE: 120 MMHG | DIASTOLIC BLOOD PRESSURE: 76 MMHG | BODY MASS INDEX: 41.99 KG/M2 | WEIGHT: 252 LBS

## 2021-07-07 DIAGNOSIS — L40.9 PSORIASIS: ICD-10-CM

## 2021-07-07 DIAGNOSIS — L40.50 PSORIATIC ARTHRITIS (HCC): Primary | ICD-10-CM

## 2021-07-07 DIAGNOSIS — M19.042 PRIMARY OSTEOARTHRITIS OF BOTH HANDS: ICD-10-CM

## 2021-07-07 DIAGNOSIS — M19.041 PRIMARY OSTEOARTHRITIS OF BOTH HANDS: ICD-10-CM

## 2021-07-07 DIAGNOSIS — Z79.899 HIGH RISK MEDICATION USE: ICD-10-CM

## 2021-07-07 PROCEDURE — 99214 OFFICE O/P EST MOD 30 MIN: CPT | Performed by: INTERNAL MEDICINE

## 2021-07-08 DIAGNOSIS — Z01.818 PRE-OP EVALUATION: ICD-10-CM

## 2021-07-08 DIAGNOSIS — Z79.899 HIGH RISK MEDICATION USE: ICD-10-CM

## 2021-07-08 LAB
A/G RATIO: 1.5 (ref 1.1–2.2)
ALBUMIN SERPL-MCNC: 4.2 G/DL (ref 3.4–5)
ALP BLD-CCNC: 87 U/L (ref 40–129)
ALT SERPL-CCNC: 18 U/L (ref 10–40)
ANION GAP SERPL CALCULATED.3IONS-SCNC: 13 MMOL/L (ref 3–16)
AST SERPL-CCNC: 18 U/L (ref 15–37)
BACTERIA: ABNORMAL /HPF
BASOPHILS ABSOLUTE: 0 K/UL (ref 0–0.2)
BASOPHILS RELATIVE PERCENT: 0.7 %
BILIRUB SERPL-MCNC: 0.3 MG/DL (ref 0–1)
BILIRUBIN URINE: NEGATIVE
BLOOD, URINE: NEGATIVE
BUN BLDV-MCNC: 15 MG/DL (ref 7–20)
C-REACTIVE PROTEIN: 4.7 MG/L (ref 0–5.1)
CALCIUM SERPL-MCNC: 8.8 MG/DL (ref 8.3–10.6)
CHLORIDE BLD-SCNC: 104 MMOL/L (ref 99–110)
CHOLESTEROL, FASTING: 193 MG/DL (ref 0–199)
CLARITY: ABNORMAL
CO2: 23 MMOL/L (ref 21–32)
COLOR: YELLOW
CREAT SERPL-MCNC: 0.6 MG/DL (ref 0.6–1.2)
EOSINOPHILS ABSOLUTE: 0.6 K/UL (ref 0–0.6)
EOSINOPHILS RELATIVE PERCENT: 14.3 %
EPITHELIAL CELLS, UA: 28 /HPF (ref 0–5)
GFR AFRICAN AMERICAN: >60
GFR NON-AFRICAN AMERICAN: >60
GLOBULIN: 2.8 G/DL
GLUCOSE BLD-MCNC: 116 MG/DL (ref 70–99)
GLUCOSE URINE: NEGATIVE MG/DL
HCT VFR BLD CALC: 37.7 % (ref 36–48)
HDLC SERPL-MCNC: 65 MG/DL (ref 40–60)
HEMOGLOBIN: 12.7 G/DL (ref 12–16)
HYALINE CASTS: 2 /LPF (ref 0–8)
KETONES, URINE: NEGATIVE MG/DL
LDL CHOLESTEROL CALCULATED: 98 MG/DL
LEUKOCYTE ESTERASE, URINE: NEGATIVE
LYMPHOCYTES ABSOLUTE: 1.2 K/UL (ref 1–5.1)
LYMPHOCYTES RELATIVE PERCENT: 29.2 %
MCH RBC QN AUTO: 32.2 PG (ref 26–34)
MCHC RBC AUTO-ENTMCNC: 33.8 G/DL (ref 31–36)
MCV RBC AUTO: 95.2 FL (ref 80–100)
MICROSCOPIC EXAMINATION: YES
MONOCYTES ABSOLUTE: 0.3 K/UL (ref 0–1.3)
MONOCYTES RELATIVE PERCENT: 7.5 %
NEUTROPHILS ABSOLUTE: 2 K/UL (ref 1.7–7.7)
NEUTROPHILS RELATIVE PERCENT: 48.3 %
NITRITE, URINE: NEGATIVE
PDW BLD-RTO: 14.5 % (ref 12.4–15.4)
PH UA: 6 (ref 5–8)
PLATELET # BLD: 260 K/UL (ref 135–450)
PMV BLD AUTO: 8 FL (ref 5–10.5)
POTASSIUM SERPL-SCNC: 4.3 MMOL/L (ref 3.5–5.1)
PROTEIN UA: ABNORMAL MG/DL
RBC # BLD: 3.96 M/UL (ref 4–5.2)
RBC UA: 3 /HPF (ref 0–4)
SEDIMENTATION RATE, ERYTHROCYTE: 25 MM/HR (ref 0–30)
SODIUM BLD-SCNC: 140 MMOL/L (ref 136–145)
SPECIFIC GRAVITY UA: 1.02 (ref 1–1.03)
TOTAL PROTEIN: 7 G/DL (ref 6.4–8.2)
TRIGLYCERIDE, FASTING: 150 MG/DL (ref 0–150)
URINE TYPE: ABNORMAL
UROBILINOGEN, URINE: 0.2 E.U./DL
VLDLC SERPL CALC-MCNC: 30 MG/DL
WBC # BLD: 4.2 K/UL (ref 4–11)
WBC UA: 4 /HPF (ref 0–5)

## 2021-07-08 RX ORDER — GABAPENTIN 300 MG/1
300 CAPSULE ORAL NIGHTLY PRN
Qty: 90 CAPSULE | Refills: 1 | Status: SHIPPED | OUTPATIENT
Start: 2021-07-08 | End: 2022-02-11

## 2021-07-12 LAB
QUANTI TB GOLD PLUS: NEGATIVE
QUANTI TB1 MINUS NIL: 0 IU/ML (ref 0–0.34)
QUANTI TB2 MINUS NIL: 0 IU/ML (ref 0–0.34)
QUANTIFERON MITOGEN: 9.71 IU/ML
QUANTIFERON NIL: 0.01 IU/ML

## 2021-08-09 DIAGNOSIS — E03.9 ACQUIRED HYPOTHYROIDISM: ICD-10-CM

## 2021-08-10 RX ORDER — LEVOTHYROXINE SODIUM 175 MCG
TABLET ORAL
Qty: 90 TABLET | Refills: 1 | Status: SHIPPED | OUTPATIENT
Start: 2021-08-10 | End: 2022-02-02

## 2021-09-03 ENCOUNTER — OFFICE VISIT (OUTPATIENT)
Dept: INTERNAL MEDICINE CLINIC | Age: 66
End: 2021-09-03
Payer: COMMERCIAL

## 2021-09-03 VITALS
TEMPERATURE: 97.2 F | WEIGHT: 248.2 LBS | HEIGHT: 65 IN | OXYGEN SATURATION: 95 % | SYSTOLIC BLOOD PRESSURE: 130 MMHG | HEART RATE: 73 BPM | BODY MASS INDEX: 41.35 KG/M2 | DIASTOLIC BLOOD PRESSURE: 78 MMHG

## 2021-09-03 DIAGNOSIS — L40.50 PSORIATIC ARTHRITIS (HCC): ICD-10-CM

## 2021-09-03 DIAGNOSIS — R21 RASH OF FACE: ICD-10-CM

## 2021-09-03 DIAGNOSIS — E03.9 ACQUIRED HYPOTHYROIDISM: ICD-10-CM

## 2021-09-03 DIAGNOSIS — Z00.00 ROUTINE GENERAL MEDICAL EXAMINATION AT A HEALTH CARE FACILITY: Primary | ICD-10-CM

## 2021-09-03 DIAGNOSIS — E78.5 HYPERLIPIDEMIA, UNSPECIFIED HYPERLIPIDEMIA TYPE: ICD-10-CM

## 2021-09-03 PROCEDURE — 99397 PER PM REEVAL EST PAT 65+ YR: CPT | Performed by: INTERNAL MEDICINE

## 2021-09-03 RX ORDER — ATORVASTATIN CALCIUM 20 MG/1
20 TABLET, FILM COATED ORAL DAILY
Qty: 90 TABLET | Refills: 3 | Status: SHIPPED | OUTPATIENT
Start: 2021-09-03

## 2021-09-03 SDOH — ECONOMIC STABILITY: FOOD INSECURITY: WITHIN THE PAST 12 MONTHS, YOU WORRIED THAT YOUR FOOD WOULD RUN OUT BEFORE YOU GOT MONEY TO BUY MORE.: NEVER TRUE

## 2021-09-03 SDOH — ECONOMIC STABILITY: FOOD INSECURITY: WITHIN THE PAST 12 MONTHS, THE FOOD YOU BOUGHT JUST DIDN'T LAST AND YOU DIDN'T HAVE MONEY TO GET MORE.: NEVER TRUE

## 2021-09-03 ASSESSMENT — PATIENT HEALTH QUESTIONNAIRE - PHQ9
SUM OF ALL RESPONSES TO PHQ QUESTIONS 1-9: 0
SUM OF ALL RESPONSES TO PHQ9 QUESTIONS 1 & 2: 0
SUM OF ALL RESPONSES TO PHQ QUESTIONS 1-9: 0
SUM OF ALL RESPONSES TO PHQ QUESTIONS 1-9: 0
2. FEELING DOWN, DEPRESSED OR HOPELESS: 0
1. LITTLE INTEREST OR PLEASURE IN DOING THINGS: 0

## 2021-09-03 ASSESSMENT — SOCIAL DETERMINANTS OF HEALTH (SDOH): HOW HARD IS IT FOR YOU TO PAY FOR THE VERY BASICS LIKE FOOD, HOUSING, MEDICAL CARE, AND HEATING?: NOT HARD AT ALL

## 2021-09-03 NOTE — PROGRESS NOTES
9/3/2021    Abisai Agustin (:  1955) [place] 77 y.o. female, here for a preventive medicine evaluation. Patient Active Problem List   Diagnosis    Psoriasis    Glaucoma    Osteopenia    Hyperlipidemia    Hypothyroidism    Morbid obesity due to excess calories ([address]    Osteoarthritis of right hip    Bilateral carpal tunnel syndrome    Arthritis of carpometacarpal (CMC) joint of right thumb    Arthritis of carpometacarpal (CMC) joint of left thumb    Psoriatic arthritis ([address]     Overall she has been doing okay, having increased stress recently as her brother is on hospice, but he has had some chronic illnesses for a while. She has been taking the Synthroid, no fatigue or weight gain recently. She plans on getting back into an exercise regimen. She is taking the statin, no side effects. Her psoriatic arthritis medication was changed, she is doing well with that. Her dermatologist gave her hydrocortisone 2.5% cream for the rash on her face and it has worked well, changing medication also helped improve that. She is starting to get a little bit of the rash back again, wondering if she can refill her cream as she is almost out. Colonoscopy was done with Dr. Kyra Thornton in 2017    Review of Systems   Constitutional: Negative for fatigue and unexpected weight change. HENT: Negative for ear pain and sinus pain. Eyes: Negative for visual disturbance. Respiratory: Negative for cough and shortness of breath. Cardiovascular: Negative for chest pain and leg swelling. Gastrointestinal: Negative for blood in stool, constipation and diarrhea. Genitourinary: Negative for difficulty urinating and dysuria. Musculoskeletal: Positive for arthralgias (Improved on medication). Negative for back pain. Skin: Positive for rash (face). Neurological: Negative for weakness. Psychiatric/Behavioral: Negative for dysphoric mood. The patient is not nervous/anxious.          Prior to Visit Medications    Medication Sig Taking? Authorizing Provider   hydrocortisone 2.5 % cream Apply topically 3 times daily to affected area. Yes José Miguel Cerarto MD   atorvastatin (LIPITOR) 20 MG tablet Take 1 tablet by mouth daily Yes José Miguel Cerrato MD   SYNTHROID 175 MCG tablet TAKE 1 TABLET DAILY Yes José Miguel Cerrato MD   gabapentin (NEURONTIN) 300 MG capsule Take 1 capsule by mouth nightly as needed (pain) for up to 180 days. Yes José Miguel Cerrato MD   Secukinumab, 300 MG Dose, (COSENTYX, 300 MG DOSE,) 150 MG/ML SOSY Inject 300 mg sc Q 28 days. Please dispense pen. Yes Linda Rome MD   Vitamin D (CHOLECALCIFEROL) 1000 UNITS CAPS capsule Take 1,000 Units by mouth daily.  Yes Historical Provider, MD   latanoprost (XALATAN) 0.005 % ophthalmic solution Place 1 drop into both eyes nightly  Yes Historical Provider, MD   timolol (TIMOPTIC-XR) 0.5 % ophthalmic gel-forming Place 1 drop into both eyes daily  Yes Historical Provider, MD        Allergies   Allergen Reactions    Codeine Phosphate      Nausea and vomiting       Past Medical History:   Diagnosis Date    Arthritis     Bone spur     CTS (carpal tunnel syndrome)     Cyst     left foot and spur    Dental crowns present     molars    Glaucoma     Hyperlipidemia     Hypothyroidism     Osteopenia     Overweight(278.02)     PONV (postoperative nausea and vomiting)     phenergan works well     P[facility]iatic arthritis ([facility] [address]     S/P foot surgery 07/07/2011    excision cyst and resection spur left foot    Scalp psoriasis        Past Surgical History:   Procedure Laterality Date    ARTHRODESIS Right 9/3/2020    AKIN OSTEOTOMY RIGHT HALLUX, PROXIMAL INTERPHALANGEAL JOINT ARTHRODESIS WITH SEQUENTIAL REDUCTION AND DORSAL CAPSULOTOMY performed by Carmen Frank DPM at [address] [place] [address] Left 02/02/2018    CARPAL TUNNEL RELEASE Right 02/23/2018    right carpal tunnel release    CATARACT REMOVAL Left 2019    COLONOSCOPY      2011    FOOT SURGERY  07/07/2011    w/local anesthesia    JOINT REPLACEMENT  2015    Rt. hip     JOINT REPLACEMENT Left 2018    hip -Dr Richie Fields      REFRACTIVE SURGERY Left        Social History     Socioeconomic History    Marital status:      Spouse name: Not on file    Number of children: Not on file    Years of education: Not on file    Highest education level: Not on file   Occupational History    Occupation: sales     Comment: chemical protective suits   Tobacco Use    Smoking status: Former Smoker     Packs/day: 1.00     Years: 4.00     Pack years: 4.00     Quit date: 1981     Years since quittin.1    Smokeless tobacco: Never Used   Vaping Use    Vaping Use: Never used   Substance and Sexual Activity    Alcohol use: Yes     Comment: occasionally    Drug use: No    Sexual activity: Not on file   Other Topics Concern    Not on file   Social History Narrative    Not on file     Social Determinants of Health     Financial Resource Strain: Low Risk     Difficulty of Paying Living Expenses: Not hard at all   Food Insecurity: No Food Insecurity    Worried About [address] in the Last Year: Never true    [facility] [employer] in the Last Year: Never true   Transportation Needs:     Lack of Transportation (Medical):      Lack of Transportation (Non-Medical):    Physical Activity:     Days of Exercise per Week:     Minutes of Exercise per Session:    Stress:     Feeling of Stress :    Social Connections:     Frequency of Communication with Friends and Family:     Frequency of Social Gatherings with Friends and Family:     Attends Denominational Services:     Active Member of Clubs or Organizations:     Attends Club or Organization Meetings:     Marital Status:    Intimate Partner Violence:     Fear of Current or Ex-Partner:     Emotionally Abused:     Physically Abused:     Sexually Abused:         Family History   Problem Relation Age of Onset    Coronary Art Dis Mother ADVANCEDIRECTIVE: N, <no information>    Vitals:    09/03/21 1159 09/03/21 1204   BP: (!) 150/90 130/78   Site: Left Upper Arm Right Upper Arm   Pulse: 73    Temp: 97.2 °F (36.2 °C)    SpO2: 95%    Weight: 248 lb 3.2 oz (112.6 kg)    Height: 5' 5\" (1.651 m)      Estimated body mass index is 41.3 kg/m² as calculated from the following:    Height as of this encounter: 5' 5\" (1.651 m). Weight as of this encounter: 248 lb 3.2 oz (112.6 kg). Physical Exam  Vitals reviewed. Constitutional:       General: She is not in acute distress. Appearance: Normal appearance. She is well-developed. HENT:      Head: Normocephalic and atraumatic. Right Ear: Tympanic membrane, ear canal and external ear normal.      Left Ear: Tympanic membrane, ear canal and external ear normal.   Eyes:      General: No scleral icterus. Conjunctiva/sclera: Conjunctivae normal.   Neck:      Thyroid: No thyroid mass, thyromegaly or thyroid tenderness. Vascular: No carotid bruit. Cardiovascular:      Rate and Rhythm: Normal rate and regular rhythm. Heart sounds: Normal heart sounds. Pulmonary:      Effort: Pulmonary effort is normal. No respiratory distress. Breath sounds: Normal breath sounds. Abdominal:      General: Abdomen is flat. Bowel sounds are normal. There is no distension. Palpations: Abdomen is soft. There is no mass. Tenderness: There is no abdominal tenderness. Hernia: No hernia is present. Musculoskeletal:      Cervical back: Neck supple. Right lower leg: No edema. Left lower leg: No edema. Lymphadenopathy:      Cervical: No cervical adenopathy. Skin:     General: Skin is warm and dry. Neurological:      Mental Status: She is alert and oriented to person, place, and time. Psychiatric:         Mood and Affect: Mood normal.         Behavior: Behavior normal.         Thought Content:  Thought content normal.         Judgment: Judgment normal.         No flowsheet data found.     Lab Results   Component Value Date    CHOL 177 08/28/2020    CHOL 200 10/01/2019    CHOL 201 11/05/2018    CHOLFAST 193 07/08/2021    CHOLFAST 179 12/16/2020    TRIG 97 08/28/2020    TRIG 103 10/01/2019    TRIG 126 11/05/2018    TRIGLYCFAST 150 07/08/2021    TRIGLYCFAST 112 12/16/2020    HDL 65 07/08/2021    HDL 67 12/16/2020    HDL 63 08/28/2020    HDL 71 07/07/2011    HDL 82 06/28/2010    LDLCALC 98 07/08/2021    LDLCALC 90 12/16/2020    [address] 08/28/2020    GLUCOSE 116 07/08/2021       The 10-year ASCVD risk score (Falguni Rosen, et al., 2013) is: 6.6%    Values used to calculate the score:      Age: 77 years      Sex: Female      Is Non- : No      Diabetic: No      Tobacco smoker: No      Systolic Blood Pressure: 371 mmHg      Is BP treated: No      HDL Cholesterol: 65 mg/dL      Total Cholesterol: 193 mg/dL    Immunization History   Administered Date(s) Administered    COVID-19, Moderna, PF, 100mcg/0.5mL 01/19/2021, 02/17/2021, 08/23/2021    Influenza Vaccine, unspecified formulation 10/15/2017    Influenza Virus Vaccine 10/19/2018    Influenza, High Dose (Fluzone 65 yrs and older) 10/05/2018, 10/29/2020    Influenza, Intradermal, Preservative free 10/16/2017    Influenza, Triv, inactivated, subunit, adjuvanted, IM (Fluad 65 yrs and older) 10/19/2018    Pneumococcal Conjugate 13-valent ([id number]) 08/12/2016    Tdap (Boostrix, Adacel) 07/03/2012    Zoster Live (Zostavax) 06/23/2014       Health Maintenance   Topic Date Due    Diabetes screen  Never done    Colon cancer screen colonoscopy  02/26/2017    TSH testing  08/28/2021    Flu vaccine (1) 09/01/2021    DTaP/Tdap/Td vaccine (2 - Td or Tdap) 07/03/2022    Lipid screen  07/08/2022    Breast cancer screen  12/04/2022    DEXA (modify frequency per FRAX score)  Completed    Shingles Vaccine  Completed    Pneumococcal 65+ years Vaccine  Completed    COVID-19 Vaccine  Completed    Hepatitis C screen  Completed    Hepatitis A vaccine  Aged Out    Hepatitis B vaccine  Aged Out    Hib vaccine  Aged Out    Meningococcal (ACWY) vaccine  Aged Out       ASSESSMENT/PLAN:  1. Routine general medical examination at a health care facility  - Discussed age appropriate preventive care including healthy diet, daily exercise, immunizations and age & gender guided screening tests. 2. Hyperlipidemia, unspecified hyperlipidemia type  - stable  - atorvastatin (LIPITOR) 20 MG tablet; Take 1 tablet by mouth daily  Dispense: 90 tablet; Refill: 3    3. Acquired hypothyroidism  - stable, continue Synthroid 125 mcg daily    4. Psoriatic arthritis ([address]  -Stable on Cosentyx    5. Rash of face  -Refilled hydrocortisone 2.5% cream      Return in about 1 year (around 9/3/2022) for CPE.

## 2021-09-05 PROBLEM — G56.02 LEFT CARPAL TUNNEL SYNDROME: Status: RESOLVED | Noted: 2018-02-14 | Resolved: 2021-09-05

## 2021-09-05 PROBLEM — G56.01 RIGHT CARPAL TUNNEL SYNDROME: Status: RESOLVED | Noted: 2018-02-14 | Resolved: 2021-09-05

## 2021-09-05 ASSESSMENT — ENCOUNTER SYMPTOMS
SINUS PAIN: 0
BACK PAIN: 0
COUGH: 0
BLOOD IN STOOL: 0
CONSTIPATION: 0
SHORTNESS OF BREATH: 0
DIARRHEA: 0

## 2021-09-27 ENCOUNTER — OFFICE VISIT (OUTPATIENT)
Dept: RHEUMATOLOGY | Age: 66
End: 2021-09-27
Payer: COMMERCIAL

## 2021-09-27 VITALS
BODY MASS INDEX: 41.32 KG/M2 | HEIGHT: 65 IN | DIASTOLIC BLOOD PRESSURE: 80 MMHG | SYSTOLIC BLOOD PRESSURE: 120 MMHG | WEIGHT: 248 LBS

## 2021-09-27 DIAGNOSIS — M19.042 PRIMARY OSTEOARTHRITIS OF BOTH HANDS: ICD-10-CM

## 2021-09-27 DIAGNOSIS — L40.9 PSORIASIS: ICD-10-CM

## 2021-09-27 DIAGNOSIS — Z79.899 HIGH RISK MEDICATION USE: ICD-10-CM

## 2021-09-27 DIAGNOSIS — L40.50 PSORIATIC ARTHRITIS (HCC): Primary | ICD-10-CM

## 2021-09-27 DIAGNOSIS — M19.041 PRIMARY OSTEOARTHRITIS OF BOTH HANDS: ICD-10-CM

## 2021-09-27 PROCEDURE — 99214 OFFICE O/P EST MOD 30 MIN: CPT | Performed by: INTERNAL MEDICINE

## 2021-09-27 RX ORDER — PREDNISONE 10 MG/1
TABLET ORAL
Qty: 35 TABLET | Refills: 0 | Status: SHIPPED | OUTPATIENT
Start: 2021-09-27 | End: 2021-10-18

## 2021-09-27 RX ORDER — NAPROXEN 500 MG/1
500 TABLET ORAL 2 TIMES DAILY WITH MEALS
Qty: 180 TABLET | Refills: 0 | Status: SHIPPED | OUTPATIENT
Start: 2021-09-27 | End: 2021-12-20

## 2021-09-27 NOTE — PROGRESS NOTES
[address] (k) [phone] (F)      Dear Dr. Deb Kasper MD:  Please find Rheumatology assessment. Thank you for giving me the opportunity to be involved in [facility] care and I look forward following [facility] along with you. If you have any questions or concerns please feel free to reach me. Note is transcribed using voice recognition software. Inadvertent computerized transcription errors may be present. Patient identification: Tamara Agustin,: ,39 y.o. Sex: female     Assessment / Plan: [facility] was seen today for follow-up. Diagnoses and all orders for this visit:    Psoriatic arthritis ([address]    Psoriasis    High risk medication use    Primary osteoarthritis of both hands    Other orders  -     predniSONE (DELTASONE) 10 MG tablet; 2 tab po daily x 7 days. 1.5 tab po daily x 7 days. 1 tab po daily x 7 days. 1/2 tab po daily 7 days and stop.  -     naproxen (NAPROSYN) 500 MG tablet; Take 1 tablet by mouth 2 times daily (with meals)        Psoriasis-in remission. Had minimal psoriasis seen in her hairline in the past.    Psoriatic arthritis-recent flare that began 10 days ago affecting mainly upper extremity joints (shoulders, elbows, wrist and fingers) bilaterally, is getting somewhat better after she took Cosentyx last week. Will be changing to Medicare first of next month. Osteoarthritis hands-stable-mainly in her finger joints, uses topical diclofenac as needed. Previous Rx- Enbrel- lost efficacy Humira-nonspecific facial eruption, and lost efficacy over time    Plan-  With Medicare, Cosentyx is high tier medication, and is expensive. Therefore we discussed about switching subcu therapy to IV. Given that she failed 2 anti-TNF's-Enbrel and Humira, and has very limited psoriasis-Orencia would perhaps be the best option as it is approved for psoriatic arthritis. I will place therapy plan later this week once Medicare goes into effect. Washout time from Cosentyx to Orencia would be 4 weeks. Side effects, directions, monitoring explained. Meantime, continue prednisone taper for current flare. Diclofenac gel as needed for hand osteoarthritis. Call us with any questions or concerns. RTC 3 months. Patient indicates understanding and agrees with the management plan. I reviewed patient's history, referral documents and electronic medical records. #######################################################################    Subjective-  Follow-up for psoriasis, psoriatic arthritis and generalized osteoarthritis. Interval changes-  She has been experiencing a flare for last 10 days-bilateral shoulders, elbows, wrist and finger joints are affected-shoulders are the most, AM stiffness last for couple of hours, never really gets better. Is having difficulty with daily and personal chores. Symptoms have gotten somewhat better after she injected Cosentyx last week. States that she has not had this bad flare for some time. She does not recall any precipitating factors. No infections, recent vaccinations, excessive physical or emotional stress. She has couple of snf party planned this week, worries about this flare. She would also be changing to Medicare from first of next month. Current Outpatient Medications   Medication Sig Dispense Refill    predniSONE (DELTASONE) 10 MG tablet 2 tab po daily x 7 days. 1.5 tab po daily x 7 days. 1 tab po daily x 7 days. 1/2 tab po daily 7 days and stop.  35 tablet 0    naproxen (NAPROSYN) 500 MG tablet Take 1 tablet by mouth 2 times daily (with meals) 180 tablet 0    hydrocortisone 2.5 % cream Apply topically 3 times daily to affected area. 20 g 1    atorvastatin (LIPITOR) 20 MG tablet Take 1 tablet by mouth daily 90 tablet 3    SYNTHROID 175 MCG tablet TAKE 1 TABLET DAILY 90 tablet 1    gabapentin (NEURONTIN) 300 MG capsule Take 1 capsule by mouth nightly as needed (pain) for up to 180 days. 90 capsule 1    Secukinumab, 300 MG Dose, (COSENTYX, 300 MG DOSE,) 150 MG/ML SOSY Inject 300 mg sc Q 28 days. Please dispense pen. 2 Syringe 3    Vitamin D (CHOLECALCIFEROL) 1000 UNITS CAPS capsule Take 1,000 Units by mouth daily.  latanoprost (XALATAN) 0.005 % ophthalmic solution Place 1 drop into both eyes nightly       timolol (TIMOPTIC-XR) 0.5 % ophthalmic gel-forming Place 1 drop into both eyes daily        No current facility-administered medications for this visit. Allergies   Allergen Reactions    Codeine Phosphate      Nausea and vomiting       PHYSICAL EXAM:    Vitals:    /80   Ht 5' 5\" (1.651 m)   Wt 248 lb (112.5 kg)   BMI 41.27 kg/m²   General appearance/ Psychiatric: well nourished, and well groomed, normal judgement, alert, appears stated age and cooperative. She looks comfortable, has normal breathing effort. MKS:   28 joint JOINT COUNT:                               Right                                                  Left   Swell Tender Swell Tender   PIP1           PIP2         PIP3           PIP4          PIP5         MCP1           MCP2          MCP3         MCP4           MCP5         Wrist           Elbow           Shoulder          Knee             Subjective arthralgias across all PIPs, MCPs, wrists, elbows, shoulders. Shoulder range of motion are painful and limited in extreme degrees. Reactive synovitis across her MCPs bilaterally. Advanced OA changes with large Heberden's and Zaire's nodules are unchanged. She is not able to make full fist because of mechanical deformities as well as inflammation. Skin: no rashes.   Deviously had limited psoriasis in her scalp hairline. Appears comfortable with normal breathing effort. DATA:   Lab Results   Component Value Date    WBC 4.2 07/08/2021    HGB 12.7 07/08/2021    HCT 37.7 07/08/2021    MCV 95.2 07/08/2021     07/08/2021         Chemistry        Component Value Date/Time     07/08/2021 1040    K 4.3 07/08/2021 1040     07/08/2021 1040    CO2 23 07/08/2021 1040    BUN 15 07/08/2021 1040    CREATININE 0.6 07/08/2021 1040        Component Value Date/Time    CALCIUM 8.8 07/08/2021 1040    ALKPHOS 87 07/08/2021 1040    AST 18 07/08/2021 1040    ALT 18 07/08/2021 1040    BILITOT 0.3 07/08/2021 1040          Lab Results   Component Value Date    LABURIC 5.5 09/10/2018     Lab Results   Component Value Date    SEDRATE 25 07/08/2021     Lab Results   Component Value Date    CRP 4.7 07/08/2021     Lab Results   Component Value Date    AQUILES Negative 09/10/2018    SEDRATE 25 07/08/2021     No results found for: CKTOTAL  Lab Results   Component Value Date    TSH 2.07 07/09/2012     Lab Results   Component Value Date    VITD25 31.6 08/16/2017         Radiology Review:   10/1/2018-  Both hand x-ray images were retrieved, images were shown to patient, has significant osteoarthritic changes in the DIP, PIP's as well as joint space narrowing of MCP joints mainly his third fourth. R 2 nd PIP has central erosion with new bone formation,which has progressed when comparing studies from Jan 2018. A/P- See above. Total time 35 minutes that includes the following-  Preparing to see the patient such as reviewing patients records, pre-charting, preparing the visit on the same day, performing a medically appropriate history and physical examination, counseling and educating patient about diagnosis, management plan, ordering appropriate testings, prescriptions, communicating findings to other care providers, and documenting clinical information in electronic medical record.

## 2021-10-07 RX ORDER — SODIUM CHLORIDE 9 MG/ML
INJECTION, SOLUTION INTRAVENOUS CONTINUOUS
Status: CANCELLED | OUTPATIENT
Start: 2021-10-14

## 2021-10-07 RX ORDER — EPINEPHRINE 1 MG/ML
0.3 INJECTION, SOLUTION, CONCENTRATE INTRAVENOUS PRN
Status: CANCELLED | OUTPATIENT
Start: 2021-10-14

## 2021-10-07 RX ORDER — HEPARIN SODIUM (PORCINE) LOCK FLUSH IV SOLN 100 UNIT/ML 100 UNIT/ML
500 SOLUTION INTRAVENOUS PRN
Status: CANCELLED | OUTPATIENT
Start: 2021-10-14

## 2021-10-07 RX ORDER — DIPHENHYDRAMINE HYDROCHLORIDE 50 MG/ML
50 INJECTION INTRAMUSCULAR; INTRAVENOUS ONCE
Status: CANCELLED | OUTPATIENT
Start: 2021-10-14 | End: 2021-10-14

## 2021-10-07 RX ORDER — SODIUM CHLORIDE 0.9 % (FLUSH) 0.9 %
5-40 SYRINGE (ML) INJECTION PRN
Status: CANCELLED | OUTPATIENT
Start: 2021-10-14

## 2021-10-07 RX ORDER — METHYLPREDNISOLONE SODIUM SUCCINATE 125 MG/2ML
125 INJECTION, POWDER, LYOPHILIZED, FOR SOLUTION INTRAMUSCULAR; INTRAVENOUS ONCE
Status: CANCELLED | OUTPATIENT
Start: 2021-10-14 | End: 2021-10-14

## 2021-10-07 RX ORDER — SODIUM CHLORIDE 9 MG/ML
25 INJECTION, SOLUTION INTRAVENOUS PRN
Status: CANCELLED | OUTPATIENT
Start: 2021-10-14

## 2021-10-18 RX ORDER — PREDNISONE 10 MG/1
TABLET ORAL
Qty: 35 TABLET | Refills: 0 | Status: SHIPPED | OUTPATIENT
Start: 2021-10-18

## 2021-10-21 RX ORDER — PREDNISONE 10 MG/1
TABLET ORAL
Qty: 30 TABLET | Refills: 1 | Status: SHIPPED | OUTPATIENT
Start: 2021-10-21

## 2021-10-25 ENCOUNTER — HOSPITAL ENCOUNTER (OUTPATIENT)
Dept: ONCOLOGY | Age: 66
Setting detail: INFUSION SERIES
Discharge: HOME | End: 2021-10-25
Payer: MEDICARE

## 2021-10-25 DIAGNOSIS — L40.50 PSORIATIC ARTHRITIS (HCC): Primary | ICD-10-CM

## 2021-10-25 PROCEDURE — 6360000002 HC RX W HCPCS: Performed by: INTERNAL MEDICINE

## 2021-10-25 PROCEDURE — 96365 THER/PROPH/DIAG IV INF INIT: CPT

## 2021-10-25 PROCEDURE — 2580000003 HC RX 258: Performed by: INTERNAL MEDICINE

## 2021-10-25 RX ORDER — SODIUM CHLORIDE 0.9 % (FLUSH) 0.9 %
5-40 SYRINGE (ML) INJECTION PRN
Status: CANCELLED | OUTPATIENT
Start: 2021-11-08

## 2021-10-25 RX ORDER — SODIUM CHLORIDE 9 MG/ML
25 INJECTION, SOLUTION INTRAVENOUS PRN
Status: CANCELLED | OUTPATIENT
Start: 2021-11-08

## 2021-10-25 RX ORDER — SODIUM CHLORIDE 9 MG/ML
INJECTION, SOLUTION INTRAVENOUS CONTINUOUS
Status: CANCELLED | OUTPATIENT
Start: 2021-11-08

## 2021-10-25 RX ORDER — SODIUM CHLORIDE 9 MG/ML
INJECTION, SOLUTION INTRAVENOUS CONTINUOUS
Status: DISCONTINUED | OUTPATIENT
Start: 2021-10-25 | End: 2021-10-26 | Stop reason: HOSPADM

## 2021-10-25 RX ORDER — METHYLPREDNISOLONE SODIUM SUCCINATE 125 MG/2ML
125 INJECTION, POWDER, LYOPHILIZED, FOR SOLUTION INTRAMUSCULAR; INTRAVENOUS ONCE
Status: CANCELLED | OUTPATIENT
Start: 2021-11-08 | End: 2021-11-08

## 2021-10-25 RX ORDER — DIPHENHYDRAMINE HYDROCHLORIDE 50 MG/ML
50 INJECTION INTRAMUSCULAR; INTRAVENOUS ONCE
Status: CANCELLED | OUTPATIENT
Start: 2021-11-08 | End: 2021-11-08

## 2021-10-25 RX ORDER — HEPARIN SODIUM (PORCINE) LOCK FLUSH IV SOLN 100 UNIT/ML 100 UNIT/ML
500 SOLUTION INTRAVENOUS PRN
Status: CANCELLED | OUTPATIENT
Start: 2021-11-08

## 2021-10-25 RX ADMIN — SODIUM CHLORIDE 1000 MG: 9 INJECTION, SOLUTION INTRAVENOUS at 15:03

## 2021-10-25 RX ADMIN — SODIUM CHLORIDE 1 ML: 9 INJECTION, SOLUTION INTRAVENOUS at 14:12

## 2021-10-25 NOTE — PROGRESS NOTES
Pt seen and assessed at [address] [facility] for 1 Gram Orencia infusion per orders from Dr. Malcolm Jackman. Infused per [facility] policy. Monitoring completed for infusion reactions - see flowsheet. Pt tolerated infusion well and without incident. Pt verbalizes understanding of discharge instructions. Discharged ambulatory to home.

## 2021-11-11 ENCOUNTER — HOSPITAL ENCOUNTER (OUTPATIENT)
Dept: ONCOLOGY | Age: 66
Setting detail: INFUSION SERIES
Discharge: HOME | End: 2021-11-11
Payer: MEDICARE

## 2021-11-11 VITALS
SYSTOLIC BLOOD PRESSURE: 120 MMHG | TEMPERATURE: 98.3 F | RESPIRATION RATE: 18 BRPM | WEIGHT: 240 LBS | HEIGHT: 65 IN | DIASTOLIC BLOOD PRESSURE: 68 MMHG | OXYGEN SATURATION: 95 % | HEART RATE: 72 BPM | BODY MASS INDEX: 39.99 KG/M2

## 2021-11-11 DIAGNOSIS — L40.50 PSORIATIC ARTHRITIS (HCC): Primary | ICD-10-CM

## 2021-11-11 PROCEDURE — 2580000003 HC RX 258: Performed by: INTERNAL MEDICINE

## 2021-11-11 PROCEDURE — 96365 THER/PROPH/DIAG IV INF INIT: CPT

## 2021-11-11 PROCEDURE — 6360000002 HC RX W HCPCS: Performed by: INTERNAL MEDICINE

## 2021-11-11 RX ORDER — DIPHENHYDRAMINE HYDROCHLORIDE 50 MG/ML
50 INJECTION INTRAMUSCULAR; INTRAVENOUS ONCE
Status: CANCELLED | OUTPATIENT
Start: 2021-11-22 | End: 2021-11-22

## 2021-11-11 RX ORDER — SODIUM CHLORIDE 9 MG/ML
INJECTION, SOLUTION INTRAVENOUS CONTINUOUS
Status: CANCELLED | OUTPATIENT
Start: 2021-11-22

## 2021-11-11 RX ORDER — SODIUM CHLORIDE 9 MG/ML
INJECTION, SOLUTION INTRAVENOUS CONTINUOUS
Status: ACTIVE | OUTPATIENT
Start: 2021-11-11 | End: 2021-11-11

## 2021-11-11 RX ORDER — HEPARIN SODIUM (PORCINE) LOCK FLUSH IV SOLN 100 UNIT/ML 100 UNIT/ML
500 SOLUTION INTRAVENOUS PRN
Status: CANCELLED | OUTPATIENT
Start: 2021-11-22

## 2021-11-11 RX ORDER — METHYLPREDNISOLONE SODIUM SUCCINATE 125 MG/2ML
125 INJECTION, POWDER, LYOPHILIZED, FOR SOLUTION INTRAMUSCULAR; INTRAVENOUS ONCE
Status: CANCELLED | OUTPATIENT
Start: 2021-11-22 | End: 2021-11-22

## 2021-11-11 RX ORDER — SODIUM CHLORIDE 0.9 % (FLUSH) 0.9 %
5-40 SYRINGE (ML) INJECTION PRN
Status: CANCELLED | OUTPATIENT
Start: 2021-11-22

## 2021-11-11 RX ORDER — SODIUM CHLORIDE 9 MG/ML
25 INJECTION, SOLUTION INTRAVENOUS PRN
Status: CANCELLED | OUTPATIENT
Start: 2021-11-22

## 2021-11-11 RX ADMIN — SODIUM CHLORIDE 1000 MG: 9 INJECTION, SOLUTION INTRAVENOUS at 10:45

## 2021-11-11 NOTE — PROGRESS NOTES
Pt seen and assessed at [address] [facility] for Orencia infusion per orders from Dr. Charito Wright. Infused per [facility] policy. Monitoring completed for infusion reactions - see flowsheet. Pt tolerated infusion well and without incident. Pt verbalizes understanding of discharge instructions. Discharged ambulatory to home.     Moe Mcdermott RN

## 2021-11-23 ENCOUNTER — HOSPITAL ENCOUNTER (OUTPATIENT)
Dept: ONCOLOGY | Age: 66
Setting detail: INFUSION SERIES
Discharge: HOME | End: 2021-11-23
Payer: MEDICARE

## 2021-11-23 VITALS
RESPIRATION RATE: 16 BRPM | DIASTOLIC BLOOD PRESSURE: 79 MMHG | BODY MASS INDEX: 42.59 KG/M2 | WEIGHT: 255.95 LBS | SYSTOLIC BLOOD PRESSURE: 127 MMHG | TEMPERATURE: 97.5 F | HEART RATE: 67 BPM | OXYGEN SATURATION: 93 %

## 2021-11-23 DIAGNOSIS — L40.50 PSORIATIC ARTHRITIS (HCC): Primary | ICD-10-CM

## 2021-11-23 PROCEDURE — 96365 THER/PROPH/DIAG IV INF INIT: CPT

## 2021-11-23 PROCEDURE — 6360000002 HC RX W HCPCS: Performed by: INTERNAL MEDICINE

## 2021-11-23 PROCEDURE — 2580000003 HC RX 258: Performed by: INTERNAL MEDICINE

## 2021-11-23 RX ORDER — SODIUM CHLORIDE 9 MG/ML
INJECTION, SOLUTION INTRAVENOUS CONTINUOUS
Status: ACTIVE | OUTPATIENT
Start: 2021-11-23 | End: 2021-11-23

## 2021-11-23 RX ORDER — SODIUM CHLORIDE 9 MG/ML
25 INJECTION, SOLUTION INTRAVENOUS PRN
Status: CANCELLED | OUTPATIENT
Start: 2022-01-03

## 2021-11-23 RX ORDER — SODIUM CHLORIDE 9 MG/ML
INJECTION, SOLUTION INTRAVENOUS CONTINUOUS
Status: CANCELLED | OUTPATIENT
Start: 2022-01-03

## 2021-11-23 RX ORDER — HEPARIN SODIUM (PORCINE) LOCK FLUSH IV SOLN 100 UNIT/ML 100 UNIT/ML
500 SOLUTION INTRAVENOUS PRN
Status: CANCELLED | OUTPATIENT
Start: 2022-01-03

## 2021-11-23 RX ORDER — SODIUM CHLORIDE 0.9 % (FLUSH) 0.9 %
5-40 SYRINGE (ML) INJECTION PRN
Status: CANCELLED | OUTPATIENT
Start: 2022-01-03

## 2021-11-23 RX ORDER — METHYLPREDNISOLONE SODIUM SUCCINATE 125 MG/2ML
125 INJECTION, POWDER, LYOPHILIZED, FOR SOLUTION INTRAMUSCULAR; INTRAVENOUS ONCE
Status: CANCELLED | OUTPATIENT
Start: 2022-01-03 | End: 2022-01-03

## 2021-11-23 RX ORDER — DIPHENHYDRAMINE HYDROCHLORIDE 50 MG/ML
50 INJECTION INTRAMUSCULAR; INTRAVENOUS ONCE
Status: CANCELLED | OUTPATIENT
Start: 2022-01-03 | End: 2022-01-03

## 2021-11-23 RX ADMIN — SODIUM CHLORIDE: 9 INJECTION, SOLUTION INTRAVENOUS at 10:47

## 2021-11-23 RX ADMIN — SODIUM CHLORIDE 1000 MG: 9 INJECTION, SOLUTION INTRAVENOUS at 10:48

## 2021-11-23 NOTE — PROGRESS NOTES
Pt seen and assessed at [address]40 [facility] for Orencia infusion per orders from [address] Infused per [facility] policy. Monitoring completed for infusion reactions - see flowsheet. Pt tolerated infusion well and without incident. Pt verbalizes understanding of discharge instructions. Discharged ambulatory to home.   Makenzie Franklin RN

## 2021-12-13 ENCOUNTER — OFFICE VISIT (OUTPATIENT)
Dept: RHEUMATOLOGY | Age: 66
End: 2021-12-13
Payer: MEDICARE

## 2021-12-13 VITALS
DIASTOLIC BLOOD PRESSURE: 76 MMHG | SYSTOLIC BLOOD PRESSURE: 120 MMHG | WEIGHT: 255 LBS | HEIGHT: 65 IN | BODY MASS INDEX: 42.49 KG/M2

## 2021-12-13 DIAGNOSIS — Z79.899 HIGH RISK MEDICATION USE: ICD-10-CM

## 2021-12-13 DIAGNOSIS — M19.041 PRIMARY OSTEOARTHRITIS OF BOTH HANDS: ICD-10-CM

## 2021-12-13 DIAGNOSIS — L40.50 PSORIATIC ARTHRITIS (HCC): Primary | ICD-10-CM

## 2021-12-13 DIAGNOSIS — M19.042 PRIMARY OSTEOARTHRITIS OF BOTH HANDS: ICD-10-CM

## 2021-12-13 DIAGNOSIS — L40.9 PSORIASIS: ICD-10-CM

## 2021-12-13 DIAGNOSIS — M81.0 SENILE OSTEOPOROSIS: ICD-10-CM

## 2021-12-13 PROCEDURE — 1036F TOBACCO NON-USER: CPT | Performed by: INTERNAL MEDICINE

## 2021-12-13 PROCEDURE — G8400 PT W/DXA NO RESULTS DOC: HCPCS | Performed by: INTERNAL MEDICINE

## 2021-12-13 PROCEDURE — G8484 FLU IMMUNIZE NO ADMIN: HCPCS | Performed by: INTERNAL MEDICINE

## 2021-12-13 PROCEDURE — 3017F COLORECTAL CA SCREEN DOC REV: CPT | Performed by: INTERNAL MEDICINE

## 2021-12-13 PROCEDURE — G8417 CALC BMI ABV UP PARAM F/U: HCPCS | Performed by: INTERNAL MEDICINE

## 2021-12-13 PROCEDURE — 4040F PNEUMOC VAC/ADMIN/RCVD: CPT | Performed by: INTERNAL MEDICINE

## 2021-12-13 PROCEDURE — G8427 DOCREV CUR MEDS BY ELIG CLIN: HCPCS | Performed by: INTERNAL MEDICINE

## 2021-12-13 PROCEDURE — 1123F ACP DISCUSS/DSCN MKR DOCD: CPT | Performed by: INTERNAL MEDICINE

## 2021-12-13 PROCEDURE — 1090F PRES/ABSN URINE INCON ASSESS: CPT | Performed by: INTERNAL MEDICINE

## 2021-12-13 PROCEDURE — 99214 OFFICE O/P EST MOD 30 MIN: CPT | Performed by: INTERNAL MEDICINE

## 2021-12-13 NOTE — PROGRESS NOTES
[address] [place] [address] W) [phone] (F)      Dear Dr. Monse Hilario MD:  Please find Rheumatology assessment. Thank you for giving me the opportunity to be involved in [facility]'s care and I look forward following [facility] along with you. If you have any questions or concerns please feel free to reach me. Note is transcribed using voice recognition software. Inadvertent computerized transcription errors may be present. Patient identification: Paris Agustin,: ,48 y.o. Sex: female     Assessment / Plan: [facility] was seen today for follow-up. Diagnoses and all orders for this visit:    Psoriatic arthritis ([address]    Psoriasis    High risk medication use    Primary osteoarthritis of both hands    Senile osteoporosis  -     DEXA BONE DENSITY 2 SITES; Future         Psoriasis-inactive, was limited to begin with- hair line. Psoriatic arthritis- Doing well on Abatacept monthly. Osteoarthritis hands-stable-mainly in her finger joints, uses topical diclofenac as needed. Previous Rx- Enbrel- lost efficacy Humira-nonspecific facial eruption, and lost efficacy over time  Cosentyx- subcu therapy too expensive to afford on Medicare. Screening DEXA scan for osteoporosis    Plan-  Check safety labs with next infusion, continue current treatment. And exercises for osteoarthritis. RTC 3 months. Patient indicates understanding and agrees with the management plan. I reviewed patient's history, referral documents and electronic medical records.     #######################################################################    Subjective-  Follow-up for psoriasis, psoriatic arthritis and generalized osteoarthritis. Interval Darshana Parrish is doing well in terms of arthritis and psoriasis. Continues to have discomfort located from osteoarthritis but mild manageable. No flares of psoriatic arthritis. Normal ADLs and recreational activities. Current Outpatient Medications   Medication Sig Dispense Refill    predniSONE (DELTASONE) 10 MG tablet Take 1 Tab daily. 30 tablet 1    predniSONE (DELTASONE) 10 MG tablet TAKE 2 TABS BY MOUTH DAILY X7 DAYS, 1.5 TAB DAILY X7 DAYS, 1 DAILY X7 DAYS, 1/2 DAILY X7 DAYS 35 tablet 0    naproxen (NAPROSYN) 500 MG tablet Take 1 tablet by mouth 2 times daily (with meals) 180 tablet 0    hydrocortisone 2.5 % cream Apply topically 3 times daily to affected area. 20 g 1    atorvastatin (LIPITOR) 20 MG tablet Take 1 tablet by mouth daily 90 tablet 3    SYNTHROID 175 MCG tablet TAKE 1 TABLET DAILY 90 tablet 1    gabapentin (NEURONTIN) 300 MG capsule Take 1 capsule by mouth nightly as needed (pain) for up to 180 days. 90 capsule 1    Vitamin D (CHOLECALCIFEROL) 1000 UNITS CAPS capsule Take 1,000 Units by mouth daily.  latanoprost (XALATAN) 0.005 % ophthalmic solution Place 1 drop into both eyes nightly       timolol (TIMOPTIC-XR) 0.5 % ophthalmic gel-forming Place 1 drop into both eyes daily       Secukinumab, 300 MG Dose, (COSENTYX, 300 MG DOSE,) 150 MG/ML SOSY Inject 300 mg sc Q 28 days. Please dispense pen. 2 Syringe 3     No current facility-administered medications for this visit. Allergies   Allergen Reactions    Codeine Phosphate      Nausea and vomiting       PHYSICAL EXAM:    Vitals:    /76   Ht 5' 5\" (1.651 m)   Wt 255 lb (115.7 kg)   BMI 42.43 kg/m²   General appearance/ Psychiatric: well nourished, and well groomed, normal judgement, alert, appears stated age and cooperative. She looks comfortable, has normal breathing effort.   MKS:   28 joint JOINT COUNT:                               Right Left   Swell Tender Swell Tender   PIP1           PIP2         PIP3           PIP4          PIP5         MCP1           MCP2          MCP3         MCP4           MCP5         Wrist           Elbow           Shoulder          Knee             Other than asymptomatic OA changes across her DIPs, PIPs with finger deformities-no appreciable tender swollen inflamed joints in upper or lower extremities. Loose fist because of mechanical changes from osteoarthritis. Skin: no rashes. Previously had limited psoriasis in her scalp hairline. Appears comfortable with normal breathing effort. DATA:   Lab Results   Component Value Date    WBC 4.2 07/08/2021    HGB 12.7 07/08/2021    HCT 37.7 07/08/2021    MCV 95.2 07/08/2021     07/08/2021         Chemistry        Component Value Date/Time     07/08/2021 1040    K 4.3 07/08/2021 1040     07/08/2021 1040    CO2 23 07/08/2021 1040    BUN 15 07/08/2021 1040    CREATININE 0.6 07/08/2021 1040        Component Value Date/Time    CALCIUM 8.8 07/08/2021 1040    ALKPHOS 87 07/08/2021 1040    AST 18 07/08/2021 1040    ALT 18 07/08/2021 1040    BILITOT 0.3 07/08/2021 1040          Lab Results   Component Value Date    LABURIC 5.5 09/10/2018     Lab Results   Component Value Date    SEDRATE 25 07/08/2021     Lab Results   Component Value Date    CRP 4.7 07/08/2021     Lab Results   Component Value Date    AQUILES Negative 09/10/2018    SEDRATE 25 07/08/2021     No results found for: CKTOTAL  Lab Results   Component Value Date    TSH 2.07 07/09/2012     Lab Results   Component Value Date    VITD25 31.6 08/16/2017         Radiology Review:   10/1/2018-  Both hand x-ray images were retrieved, images were shown to patient, has significant osteoarthritic changes in the DIP, PIP's as well as joint space narrowing of MCP joints mainly his third fourth.  R 2 nd PIP has central erosion with new bone formation,which has progressed when comparing studies from Jan 2018.  A/P- See above. Total time 30 minutes that includes the following-  Preparing to see the patient such as reviewing patients records, pre-charting, preparing the visit on the same day, performing a medically appropriate history and physical examination, counseling and educating patient about diagnosis, management plan, ordering appropriate testings, prescriptions, communicating findings to other care providers, and documenting clinical information in electronic medical record.

## 2021-12-15 ENCOUNTER — TELEPHONE (OUTPATIENT)
Dept: RHEUMATOLOGY | Age: 66
End: 2021-12-15

## 2021-12-15 NOTE — TELEPHONE ENCOUNTER
Dr Atanacio Ormond requesting care be transferred to [facility] office after December infusion at [facility]. Patient scheduled for treatment at [facility] on 12/23/2021.  Medical Insurance [facility] and [employer].     Orencia ()  Dose: 1000 mg IV Q 4 weeks (began treatment at [facility])  Infusion code: Rojelio  Dx: L40.50

## 2021-12-20 RX ORDER — NAPROXEN 500 MG/1
TABLET ORAL
Qty: 180 TABLET | Refills: 0 | Status: SHIPPED | OUTPATIENT
Start: 2021-12-20 | End: 2022-08-02 | Stop reason: SDUPTHER

## 2021-12-23 ENCOUNTER — HOSPITAL ENCOUNTER (OUTPATIENT)
Dept: ONCOLOGY | Age: 66
Setting detail: INFUSION SERIES
Discharge: HOME | End: 2021-12-23
Payer: MEDICARE

## 2021-12-23 VITALS
OXYGEN SATURATION: 97 % | RESPIRATION RATE: 16 BRPM | TEMPERATURE: 97.5 F | SYSTOLIC BLOOD PRESSURE: 143 MMHG | DIASTOLIC BLOOD PRESSURE: 86 MMHG | HEART RATE: 64 BPM

## 2021-12-23 DIAGNOSIS — L40.50 PSORIATIC ARTHRITIS (HCC): Primary | ICD-10-CM

## 2021-12-23 LAB
A/G RATIO: 1.2 (ref 1.1–2.2)
ALBUMIN SERPL-MCNC: 3.8 G/DL (ref 3.4–5)
ALP BLD-CCNC: 64 U/L (ref 40–129)
ALT SERPL-CCNC: 14 U/L (ref 10–40)
ANION GAP SERPL CALCULATED.3IONS-SCNC: 9 MMOL/L (ref 3–16)
AST SERPL-CCNC: 33 U/L (ref 15–37)
BASOPHILS ABSOLUTE: 0 K/UL (ref 0–0.2)
BASOPHILS RELATIVE PERCENT: 0.7 %
BILIRUB SERPL-MCNC: 0.3 MG/DL (ref 0–1)
BUN BLDV-MCNC: 15 MG/DL (ref 7–20)
C-REACTIVE PROTEIN: 4.9 MG/L (ref 0–5.1)
CALCIUM SERPL-MCNC: 8.6 MG/DL (ref 8.3–10.6)
CHLORIDE BLD-SCNC: 103 MMOL/L (ref 99–110)
CO2: 25 MMOL/L (ref 21–32)
CREAT SERPL-MCNC: <0.5 MG/DL (ref 0.6–1.2)
EOSINOPHILS ABSOLUTE: 0.3 K/UL (ref 0–0.6)
EOSINOPHILS RELATIVE PERCENT: 9 %
GFR AFRICAN AMERICAN: >60
GFR NON-AFRICAN AMERICAN: >60
GLUCOSE BLD-MCNC: 101 MG/DL (ref 70–99)
HCT VFR BLD CALC: 31.7 % (ref 36–48)
HEMOGLOBIN: 10.5 G/DL (ref 12–16)
LYMPHOCYTES ABSOLUTE: 1 K/UL (ref 1–5.1)
LYMPHOCYTES RELATIVE PERCENT: 26.3 %
MCH RBC QN AUTO: 32.2 PG (ref 26–34)
MCHC RBC AUTO-ENTMCNC: 33 G/DL (ref 31–36)
MCV RBC AUTO: 97.5 FL (ref 80–100)
MONOCYTES ABSOLUTE: 0.3 K/UL (ref 0–1.3)
MONOCYTES RELATIVE PERCENT: 7.6 %
NEUTROPHILS ABSOLUTE: 2.2 K/UL (ref 1.7–7.7)
NEUTROPHILS RELATIVE PERCENT: 56.4 %
PDW BLD-RTO: 15.1 % (ref 12.4–15.4)
PLATELET # BLD: 249 K/UL (ref 135–450)
PMV BLD AUTO: 9.2 FL (ref 5–10.5)
POTASSIUM SERPL-SCNC: 5.6 MMOL/L (ref 3.5–5.1)
RBC # BLD: 3.25 M/UL (ref 4–5.2)
SEDIMENTATION RATE, ERYTHROCYTE: 19 MM/HR (ref 0–30)
SODIUM BLD-SCNC: 137 MMOL/L (ref 136–145)
TOTAL PROTEIN: 6.9 G/DL (ref 6.4–8.2)
WBC # BLD: 3.9 K/UL (ref 4–11)

## 2021-12-23 PROCEDURE — 80053 COMPREHEN METABOLIC PANEL: CPT

## 2021-12-23 PROCEDURE — 85652 RBC SED RATE AUTOMATED: CPT

## 2021-12-23 PROCEDURE — 85025 COMPLETE CBC W/AUTO DIFF WBC: CPT

## 2021-12-23 PROCEDURE — 96365 THER/PROPH/DIAG IV INF INIT: CPT

## 2021-12-23 PROCEDURE — 2580000003 HC RX 258: Performed by: INTERNAL MEDICINE

## 2021-12-23 PROCEDURE — 6360000002 HC RX W HCPCS: Performed by: INTERNAL MEDICINE

## 2021-12-23 PROCEDURE — 86140 C-REACTIVE PROTEIN: CPT

## 2021-12-23 PROCEDURE — 36415 COLL VENOUS BLD VENIPUNCTURE: CPT

## 2021-12-23 RX ORDER — SODIUM CHLORIDE 9 MG/ML
INJECTION, SOLUTION INTRAVENOUS CONTINUOUS
Status: CANCELLED | OUTPATIENT
Start: 2022-01-03

## 2021-12-23 RX ORDER — DIPHENHYDRAMINE HYDROCHLORIDE 50 MG/ML
50 INJECTION INTRAMUSCULAR; INTRAVENOUS ONCE
Status: CANCELLED | OUTPATIENT
Start: 2022-01-03 | End: 2022-01-03

## 2021-12-23 RX ORDER — SODIUM CHLORIDE 0.9 % (FLUSH) 0.9 %
5-40 SYRINGE (ML) INJECTION PRN
Status: CANCELLED | OUTPATIENT
Start: 2022-01-03

## 2021-12-23 RX ORDER — METHYLPREDNISOLONE SODIUM SUCCINATE 125 MG/2ML
125 INJECTION, POWDER, LYOPHILIZED, FOR SOLUTION INTRAMUSCULAR; INTRAVENOUS ONCE
Status: CANCELLED | OUTPATIENT
Start: 2022-01-03 | End: 2022-01-03

## 2021-12-23 RX ORDER — SODIUM CHLORIDE 9 MG/ML
25 INJECTION, SOLUTION INTRAVENOUS PRN
Status: CANCELLED | OUTPATIENT
Start: 2022-01-03

## 2021-12-23 RX ORDER — HEPARIN SODIUM (PORCINE) LOCK FLUSH IV SOLN 100 UNIT/ML 100 UNIT/ML
500 SOLUTION INTRAVENOUS PRN
Status: CANCELLED | OUTPATIENT
Start: 2022-01-03

## 2021-12-23 RX ADMIN — SODIUM CHLORIDE 1000 MG: 9 INJECTION, SOLUTION INTRAVENOUS at 10:40

## 2021-12-23 NOTE — PROGRESS NOTES
Pt seen and assessed at [address] [facility] for Orencia infusion per orders from Dr. Jojo Callahan. Infused per [facility] policy. Monitoring completed for infusion reactions - see flowsheet. Pt tolerated infusion well and without incident. Pt verbalizes understanding of discharge instructions. Discharged ambulatory to home.     Angelia Matthews RN

## 2022-01-03 ENCOUNTER — TELEPHONE (OUTPATIENT)
Dept: INFUSION THERAPY | Age: 67
End: 2022-01-03

## 2022-01-03 NOTE — TELEPHONE ENCOUNTER
Needs new VOB. Dr Miranda Parent requesting care be transferred to [facility] office after December infusion at [facility]. Patient next treatment due 1/20/2022.  Medical Insurance [employer] and [address].     Orencia ()  Dose: 1000 mg IV Q 4 weeks (began treatment at [facility])  Infusion code: 00070  Dx: L40.50

## 2022-01-20 ENCOUNTER — HOSPITAL ENCOUNTER (OUTPATIENT)
Dept: ONCOLOGY | Age: 67
Setting detail: INFUSION SERIES
Discharge: HOME | End: 2022-01-20
Payer: MEDICARE

## 2022-01-20 VITALS
HEART RATE: 79 BPM | RESPIRATION RATE: 18 BRPM | BODY MASS INDEX: 43.4 KG/M2 | SYSTOLIC BLOOD PRESSURE: 112 MMHG | DIASTOLIC BLOOD PRESSURE: 69 MMHG | TEMPERATURE: 98.7 F | WEIGHT: 260.8 LBS | OXYGEN SATURATION: 94 %

## 2022-01-20 DIAGNOSIS — L40.50 PSORIATIC ARTHRITIS (HCC): Primary | ICD-10-CM

## 2022-01-20 LAB
A/G RATIO: 1.3 (ref 1.1–2.2)
ALBUMIN SERPL-MCNC: 4.1 G/DL (ref 3.4–5)
ALP BLD-CCNC: 82 U/L (ref 40–129)
ALT SERPL-CCNC: 11 U/L (ref 10–40)
ANION GAP SERPL CALCULATED.3IONS-SCNC: 8 MMOL/L (ref 3–16)
AST SERPL-CCNC: 14 U/L (ref 15–37)
BASOPHILS ABSOLUTE: 0 K/UL (ref 0–0.2)
BASOPHILS RELATIVE PERCENT: 0.5 %
BILIRUB SERPL-MCNC: 0.3 MG/DL (ref 0–1)
BUN BLDV-MCNC: 18 MG/DL (ref 7–20)
C-REACTIVE PROTEIN: 10.9 MG/L (ref 0–5.1)
CALCIUM SERPL-MCNC: 8.7 MG/DL (ref 8.3–10.6)
CHLORIDE BLD-SCNC: 100 MMOL/L (ref 99–110)
CO2: 27 MMOL/L (ref 21–32)
CREAT SERPL-MCNC: 0.6 MG/DL (ref 0.6–1.2)
EOSINOPHILS ABSOLUTE: 0.3 K/UL (ref 0–0.6)
EOSINOPHILS RELATIVE PERCENT: 6.4 %
GFR AFRICAN AMERICAN: >60
GFR NON-AFRICAN AMERICAN: >60
GLUCOSE BLD-MCNC: 107 MG/DL (ref 70–99)
HCT VFR BLD CALC: 38.4 % (ref 36–48)
HEMOGLOBIN: 12.8 G/DL (ref 12–16)
LYMPHOCYTES ABSOLUTE: 1.5 K/UL (ref 1–5.1)
LYMPHOCYTES RELATIVE PERCENT: 31.2 %
MCH RBC QN AUTO: 32.1 PG (ref 26–34)
MCHC RBC AUTO-ENTMCNC: 33.3 G/DL (ref 31–36)
MCV RBC AUTO: 96.3 FL (ref 80–100)
MONOCYTES ABSOLUTE: 0.4 K/UL (ref 0–1.3)
MONOCYTES RELATIVE PERCENT: 7.4 %
NEUTROPHILS ABSOLUTE: 2.7 K/UL (ref 1.7–7.7)
NEUTROPHILS RELATIVE PERCENT: 54.5 %
PDW BLD-RTO: 14.5 % (ref 12.4–15.4)
PLATELET # BLD: 296 K/UL (ref 135–450)
PMV BLD AUTO: 8.1 FL (ref 5–10.5)
POTASSIUM SERPL-SCNC: 4.1 MMOL/L (ref 3.5–5.1)
RBC # BLD: 3.98 M/UL (ref 4–5.2)
SEDIMENTATION RATE, ERYTHROCYTE: 45 MM/HR (ref 0–30)
SODIUM BLD-SCNC: 135 MMOL/L (ref 136–145)
TOTAL PROTEIN: 7.3 G/DL (ref 6.4–8.2)
WBC # BLD: 4.9 K/UL (ref 4–11)

## 2022-01-20 PROCEDURE — 85652 RBC SED RATE AUTOMATED: CPT

## 2022-01-20 PROCEDURE — 86140 C-REACTIVE PROTEIN: CPT

## 2022-01-20 PROCEDURE — 2580000003 HC RX 258: Performed by: INTERNAL MEDICINE

## 2022-01-20 PROCEDURE — 6360000002 HC RX W HCPCS: Performed by: INTERNAL MEDICINE

## 2022-01-20 PROCEDURE — 80053 COMPREHEN METABOLIC PANEL: CPT

## 2022-01-20 PROCEDURE — 85025 COMPLETE CBC W/AUTO DIFF WBC: CPT

## 2022-01-20 PROCEDURE — 96365 THER/PROPH/DIAG IV INF INIT: CPT

## 2022-01-20 RX ORDER — DIPHENHYDRAMINE HYDROCHLORIDE 50 MG/ML
50 INJECTION INTRAMUSCULAR; INTRAVENOUS ONCE
Status: CANCELLED | OUTPATIENT
Start: 2022-02-03 | End: 2022-02-03

## 2022-01-20 RX ORDER — SODIUM CHLORIDE 9 MG/ML
INJECTION, SOLUTION INTRAVENOUS CONTINUOUS
Status: CANCELLED | OUTPATIENT
Start: 2022-02-03

## 2022-01-20 RX ORDER — METHYLPREDNISOLONE SODIUM SUCCINATE 125 MG/2ML
125 INJECTION, POWDER, LYOPHILIZED, FOR SOLUTION INTRAMUSCULAR; INTRAVENOUS ONCE
Status: CANCELLED | OUTPATIENT
Start: 2022-02-03 | End: 2022-02-03

## 2022-01-20 RX ORDER — SODIUM CHLORIDE 0.9 % (FLUSH) 0.9 %
5-40 SYRINGE (ML) INJECTION PRN
Status: CANCELLED | OUTPATIENT
Start: 2022-02-03

## 2022-01-20 RX ORDER — SODIUM CHLORIDE 9 MG/ML
INJECTION, SOLUTION INTRAVENOUS CONTINUOUS
Status: ACTIVE | OUTPATIENT
Start: 2022-01-20 | End: 2022-01-20

## 2022-01-20 RX ORDER — HEPARIN SODIUM (PORCINE) LOCK FLUSH IV SOLN 100 UNIT/ML 100 UNIT/ML
500 SOLUTION INTRAVENOUS PRN
Status: CANCELLED | OUTPATIENT
Start: 2022-02-03

## 2022-01-20 RX ORDER — SODIUM CHLORIDE 9 MG/ML
25 INJECTION, SOLUTION INTRAVENOUS PRN
Status: CANCELLED | OUTPATIENT
Start: 2022-02-03

## 2022-01-20 RX ADMIN — SODIUM CHLORIDE 1000 MG: 9 INJECTION, SOLUTION INTRAVENOUS at 09:43

## 2022-01-25 ENCOUNTER — TELEPHONE (OUTPATIENT)
Dept: INFUSION THERAPY | Age: 67
End: 2022-01-25

## 2022-01-25 NOTE — TELEPHONE ENCOUNTER
Dr Ghanshyam Conrad requesting care be transferred to [facility]. New VOB scanned into chart 1/10/2022.   Checking status,

## 2022-01-26 ENCOUNTER — HOSPITAL ENCOUNTER (OUTPATIENT)
Dept: GENERAL RADIOLOGY | Age: 67
Discharge: HOME | End: 2022-01-26
Payer: MEDICARE

## 2022-01-26 DIAGNOSIS — M81.0 SENILE OSTEOPOROSIS: ICD-10-CM

## 2022-01-26 PROCEDURE — 77080 DXA BONE DENSITY AXIAL: CPT

## 2022-02-02 DIAGNOSIS — E03.9 ACQUIRED HYPOTHYROIDISM: ICD-10-CM

## 2022-02-02 RX ORDER — LEVOTHYROXINE SODIUM 175 MCG
TABLET ORAL
Qty: 90 TABLET | Refills: 1 | Status: SHIPPED | OUTPATIENT
Start: 2022-02-02 | End: 2022-08-01

## 2022-02-11 ENCOUNTER — TELEPHONE (OUTPATIENT)
Dept: INTERNAL MEDICINE CLINIC | Age: 67
End: 2022-02-11

## 2022-02-11 ENCOUNTER — OFFICE VISIT (OUTPATIENT)
Dept: INTERNAL MEDICINE CLINIC | Age: 67
End: 2022-02-11
Payer: MEDICARE

## 2022-02-11 VITALS
DIASTOLIC BLOOD PRESSURE: 68 MMHG | WEIGHT: 257 LBS | BODY MASS INDEX: 43.87 KG/M2 | SYSTOLIC BLOOD PRESSURE: 124 MMHG | HEIGHT: 64 IN

## 2022-02-11 DIAGNOSIS — R31.9 URINARY TRACT INFECTION WITH HEMATURIA, SITE UNSPECIFIED: Primary | ICD-10-CM

## 2022-02-11 DIAGNOSIS — N39.0 URINARY TRACT INFECTION WITH HEMATURIA, SITE UNSPECIFIED: Primary | ICD-10-CM

## 2022-02-11 LAB
BILIRUBIN, POC: NORMAL
BLOOD URINE, POC: NORMAL
CLARITY, POC: NORMAL
COLOR, POC: NORMAL
GLUCOSE URINE, POC: NORMAL
KETONES, POC: NORMAL
LEUKOCYTE EST, POC: NORMAL
NITRITE, POC: NORMAL
PH, POC: 6
PROTEIN, POC: 2000
SPECIFIC GRAVITY, POC: 1
UROBILINOGEN, POC: 0.2

## 2022-02-11 PROCEDURE — 1036F TOBACCO NON-USER: CPT | Performed by: INTERNAL MEDICINE

## 2022-02-11 PROCEDURE — 81002 URINALYSIS NONAUTO W/O SCOPE: CPT | Performed by: INTERNAL MEDICINE

## 2022-02-11 PROCEDURE — G8399 PT W/DXA RESULTS DOCUMENT: HCPCS | Performed by: INTERNAL MEDICINE

## 2022-02-11 PROCEDURE — 1123F ACP DISCUSS/DSCN MKR DOCD: CPT | Performed by: INTERNAL MEDICINE

## 2022-02-11 PROCEDURE — 99213 OFFICE O/P EST LOW 20 MIN: CPT | Performed by: INTERNAL MEDICINE

## 2022-02-11 PROCEDURE — 1090F PRES/ABSN URINE INCON ASSESS: CPT | Performed by: INTERNAL MEDICINE

## 2022-02-11 PROCEDURE — G8484 FLU IMMUNIZE NO ADMIN: HCPCS | Performed by: INTERNAL MEDICINE

## 2022-02-11 PROCEDURE — G8417 CALC BMI ABV UP PARAM F/U: HCPCS | Performed by: INTERNAL MEDICINE

## 2022-02-11 PROCEDURE — 3017F COLORECTAL CA SCREEN DOC REV: CPT | Performed by: INTERNAL MEDICINE

## 2022-02-11 PROCEDURE — 4040F PNEUMOC VAC/ADMIN/RCVD: CPT | Performed by: INTERNAL MEDICINE

## 2022-02-11 PROCEDURE — G8427 DOCREV CUR MEDS BY ELIG CLIN: HCPCS | Performed by: INTERNAL MEDICINE

## 2022-02-11 RX ORDER — SULFAMETHOXAZOLE AND TRIMETHOPRIM 800; 160 MG/1; MG/1
1 TABLET ORAL 2 TIMES DAILY
Qty: 10 TABLET | Refills: 0 | Status: SHIPPED | OUTPATIENT
Start: 2022-02-11 | End: 2022-02-16

## 2022-02-11 NOTE — PROGRESS NOTES
Rosezena Castleman (:  1955) is a 74 y.o. female,Established patient, here for evaluation of the following chief complaint(s):  Urinary Tract Infection (frequency, urine is brown and has small blood clots in it, burning sensation )         ASSESSMENT/PLAN:  1. Urinary tract infection with hematuria, site unspecified  - symptoms and UA consistent with UTI, treat with Bactrim DS 1 tab twice daily x3 days. Send urine for culture. Recheck UA in a few weeks to ensure resolution of hematuria  -     POCT Urinalysis no Micro  -     Culture, Urine  -     Urinalysis with Microscopic; Future      Return if symptoms worsen or fail to improve. Subjective   SUBJECTIVE/OBJECTIVE:  HPI    Symptoms started this morning with hematuria, dysuria, urinary hesitancy. No fevers, chills, back pain. Review of Systems       Objective   Physical Exam  Vitals reviewed. Constitutional:       General: She is not in acute distress. Appearance: Normal appearance. HENT:      Head: Normocephalic and atraumatic. Pulmonary:      Effort: Pulmonary effort is normal. No respiratory distress. Neurological:      Mental Status: She is alert. An electronic signature was used to authenticate this note.     --Miri Marley MD

## 2022-02-11 NOTE — TELEPHONE ENCOUNTER
----- Message from [id number]  sent at 2/11/2022 10:54 AM EST -----  Subject: Appointment Request    Reason for Call: Urgent Adult Urinary Problem    QUESTIONS  Type of Appointment? Established Patient  Reason for appointment request? No appointments available during search  Additional Information for Provider? Patient called to request an   appointment today for a possible UTI. Patient experiencing constant   pressure, little urine when going and slightly discolored. No appointments   shown during search. Please contact patient with all available appointment   options.  ---------------------------------------------------------------------------  --------------  CALL BACK INFO  What is the best way for the office to contact you? OK to leave message on   voicemail  Preferred Call Back Phone Number? [phone]  ---------------------------------------------------------------------------  --------------  SCRIPT ANSWERS  Relationship to Patient? Self  Have you recently (14 days) seen a provider for this problem? No  Have you been diagnosed with, awaiting test results for, or told that you   are suspected of having COVID-19 (Coronavirus)? (If patient has tested   negative or was tested as a requirement for work, school, or travel and   not based on symptoms, answer no)? No  Within the past two weeks have you developed any of the following symptoms   (answer no if symptoms have been present longer than 2 weeks or began   more than 2 weeks ago)? Fever or Chills, Cough, Shortness of breath or   difficulty breathing, Loss of taste or smell, Sore throat, Nasal   congestion, Sneezing or runny nose, Fatigue or generalized body aches   (answer no if pain is specific to a body part e.g. back pain), Diarrhea,   Headache? No  Have you had close contact with someone with COVID-19 in the last 14 days? No  (Service Expert  click yes below to proceed with [employer] As Usual   Scheduling)?  Yes

## 2022-02-14 LAB
ORGANISM: ABNORMAL
ORGANISM: ABNORMAL
URINE CULTURE, ROUTINE: ABNORMAL
URINE CULTURE, ROUTINE: ABNORMAL

## 2022-02-17 ENCOUNTER — HOSPITAL ENCOUNTER (OUTPATIENT)
Dept: ONCOLOGY | Age: 67
Setting detail: INFUSION SERIES
Discharge: HOME | End: 2022-02-17
Payer: MEDICARE

## 2022-02-17 VITALS
HEART RATE: 61 BPM | OXYGEN SATURATION: 97 % | TEMPERATURE: 98.2 F | BODY MASS INDEX: 44.69 KG/M2 | SYSTOLIC BLOOD PRESSURE: 122 MMHG | HEIGHT: 63 IN | DIASTOLIC BLOOD PRESSURE: 64 MMHG | RESPIRATION RATE: 20 BRPM | WEIGHT: 252.21 LBS

## 2022-02-17 DIAGNOSIS — L40.50 PSORIATIC ARTHRITIS (HCC): Primary | ICD-10-CM

## 2022-02-17 PROCEDURE — 6360000002 HC RX W HCPCS: Performed by: INTERNAL MEDICINE

## 2022-02-17 PROCEDURE — 96365 THER/PROPH/DIAG IV INF INIT: CPT

## 2022-02-17 PROCEDURE — 2580000003 HC RX 258: Performed by: INTERNAL MEDICINE

## 2022-02-17 RX ORDER — SODIUM CHLORIDE 0.9 % (FLUSH) 0.9 %
5-40 SYRINGE (ML) INJECTION PRN
Status: DISCONTINUED | OUTPATIENT
Start: 2022-02-17 | End: 2022-02-18 | Stop reason: HOSPADM

## 2022-02-17 RX ORDER — SODIUM CHLORIDE 9 MG/ML
INJECTION, SOLUTION INTRAVENOUS CONTINUOUS
Status: CANCELLED | OUTPATIENT
Start: 2022-03-03

## 2022-02-17 RX ORDER — SODIUM CHLORIDE 0.9 % (FLUSH) 0.9 %
5-40 SYRINGE (ML) INJECTION PRN
Status: CANCELLED | OUTPATIENT
Start: 2022-03-03

## 2022-02-17 RX ORDER — HEPARIN SODIUM (PORCINE) LOCK FLUSH IV SOLN 100 UNIT/ML 100 UNIT/ML
500 SOLUTION INTRAVENOUS PRN
Status: DISCONTINUED | OUTPATIENT
Start: 2022-02-17 | End: 2022-02-18 | Stop reason: HOSPADM

## 2022-02-17 RX ORDER — SODIUM CHLORIDE 9 MG/ML
25 INJECTION, SOLUTION INTRAVENOUS PRN
Status: DISCONTINUED | OUTPATIENT
Start: 2022-02-17 | End: 2022-02-18 | Stop reason: HOSPADM

## 2022-02-17 RX ORDER — DIPHENHYDRAMINE HYDROCHLORIDE 50 MG/ML
50 INJECTION INTRAMUSCULAR; INTRAVENOUS ONCE
Status: CANCELLED | OUTPATIENT
Start: 2022-03-03 | End: 2022-03-03

## 2022-02-17 RX ORDER — SODIUM CHLORIDE 9 MG/ML
25 INJECTION, SOLUTION INTRAVENOUS PRN
Status: CANCELLED | OUTPATIENT
Start: 2022-03-03

## 2022-02-17 RX ORDER — SODIUM CHLORIDE 9 MG/ML
INJECTION, SOLUTION INTRAVENOUS CONTINUOUS
Status: ACTIVE | OUTPATIENT
Start: 2022-02-17 | End: 2022-02-17

## 2022-02-17 RX ORDER — METHYLPREDNISOLONE SODIUM SUCCINATE 125 MG/2ML
125 INJECTION, POWDER, LYOPHILIZED, FOR SOLUTION INTRAMUSCULAR; INTRAVENOUS ONCE
Status: CANCELLED | OUTPATIENT
Start: 2022-03-03 | End: 2022-03-03

## 2022-02-17 RX ORDER — HEPARIN SODIUM (PORCINE) LOCK FLUSH IV SOLN 100 UNIT/ML 100 UNIT/ML
500 SOLUTION INTRAVENOUS PRN
Status: CANCELLED | OUTPATIENT
Start: 2022-03-03

## 2022-02-17 RX ADMIN — SODIUM CHLORIDE 1000 MG: 9 INJECTION, SOLUTION INTRAVENOUS at 09:58

## 2022-02-17 RX ADMIN — SODIUM CHLORIDE 1 ML: 9 INJECTION, SOLUTION INTRAVENOUS at 09:26

## 2022-02-17 RX ADMIN — SODIUM CHLORIDE, PRESERVATIVE FREE 10 ML: 5 INJECTION INTRAVENOUS at 09:20

## 2022-02-17 NOTE — PROGRESS NOTES
Pt seen and assessed at [address] [facility] for 1 Gram Orencia infusion per orders from Dr. Victoria Soni. Infused per [facility] policy. Monitoring completed for infusion reactions - see flowsheet. Pt tolerated infusion well and without incident. Pt verbalizes understanding of discharge instructions. Discharged ambulatory to home.     Electronically signed by Sandoval Garcia RN on 2/17/2022 at 11:18 AM

## 2022-03-15 DIAGNOSIS — R31.9 URINARY TRACT INFECTION WITH HEMATURIA, SITE UNSPECIFIED: ICD-10-CM

## 2022-03-15 DIAGNOSIS — L40.50 PSORIATIC ARTHRITIS (HCC): ICD-10-CM

## 2022-03-15 DIAGNOSIS — N39.0 URINARY TRACT INFECTION WITH HEMATURIA, SITE UNSPECIFIED: ICD-10-CM

## 2022-03-15 LAB
A/G RATIO: 1.7 (ref 1.1–2.2)
ALBUMIN SERPL-MCNC: 4 G/DL (ref 3.4–5)
ALP BLD-CCNC: 81 U/L (ref 40–129)
ALT SERPL-CCNC: 12 U/L (ref 10–40)
ANION GAP SERPL CALCULATED.3IONS-SCNC: 12 MMOL/L (ref 3–16)
AST SERPL-CCNC: 14 U/L (ref 15–37)
BASOPHILS ABSOLUTE: 0 K/UL (ref 0–0.2)
BASOPHILS RELATIVE PERCENT: 0.6 %
BILIRUB SERPL-MCNC: <0.2 MG/DL (ref 0–1)
BILIRUBIN URINE: NEGATIVE
BLOOD, URINE: NEGATIVE
BUN BLDV-MCNC: 15 MG/DL (ref 7–20)
C-REACTIVE PROTEIN: <3 MG/L (ref 0–5.1)
CALCIUM SERPL-MCNC: 8.8 MG/DL (ref 8.3–10.6)
CHLORIDE BLD-SCNC: 105 MMOL/L (ref 99–110)
CLARITY: CLEAR
CO2: 24 MMOL/L (ref 21–32)
COLOR: YELLOW
CREAT SERPL-MCNC: 0.6 MG/DL (ref 0.6–1.2)
EOSINOPHILS ABSOLUTE: 0.3 K/UL (ref 0–0.6)
EOSINOPHILS RELATIVE PERCENT: 5.6 %
EPITHELIAL CELLS, UA: 8 /HPF (ref 0–5)
GFR AFRICAN AMERICAN: >60
GFR NON-AFRICAN AMERICAN: >60
GLUCOSE BLD-MCNC: 83 MG/DL (ref 70–99)
GLUCOSE URINE: NEGATIVE MG/DL
HCT VFR BLD CALC: 37 % (ref 36–48)
HEMOGLOBIN: 12.1 G/DL (ref 12–16)
HYALINE CASTS: 4 /LPF (ref 0–8)
KETONES, URINE: NEGATIVE MG/DL
LEUKOCYTE ESTERASE, URINE: NEGATIVE
LYMPHOCYTES ABSOLUTE: 1.4 K/UL (ref 1–5.1)
LYMPHOCYTES RELATIVE PERCENT: 28.8 %
MCH RBC QN AUTO: 30.8 PG (ref 26–34)
MCHC RBC AUTO-ENTMCNC: 32.6 G/DL (ref 31–36)
MCV RBC AUTO: 94.3 FL (ref 80–100)
MICROSCOPIC EXAMINATION: YES
MONOCYTES ABSOLUTE: 0.4 K/UL (ref 0–1.3)
MONOCYTES RELATIVE PERCENT: 8.2 %
NEUTROPHILS ABSOLUTE: 2.8 K/UL (ref 1.7–7.7)
NEUTROPHILS RELATIVE PERCENT: 56.8 %
NITRITE, URINE: NEGATIVE
PDW BLD-RTO: 13.8 % (ref 12.4–15.4)
PH UA: 6.5 (ref 5–8)
PLATELET # BLD: 285 K/UL (ref 135–450)
PMV BLD AUTO: 8.4 FL (ref 5–10.5)
POTASSIUM SERPL-SCNC: 4.3 MMOL/L (ref 3.5–5.1)
PROTEIN UA: ABNORMAL MG/DL
RBC # BLD: 3.93 M/UL (ref 4–5.2)
RBC UA: 2 /HPF (ref 0–4)
SEDIMENTATION RATE, ERYTHROCYTE: 45 MM/HR (ref 0–30)
SODIUM BLD-SCNC: 141 MMOL/L (ref 136–145)
SPECIFIC GRAVITY UA: 1.03 (ref 1–1.03)
TOTAL PROTEIN: 6.3 G/DL (ref 6.4–8.2)
URINE TYPE: ABNORMAL
UROBILINOGEN, URINE: 1 E.U./DL
WBC # BLD: 5 K/UL (ref 4–11)
WBC UA: 1 /HPF (ref 0–5)

## 2022-03-21 ENCOUNTER — OFFICE VISIT (OUTPATIENT)
Dept: RHEUMATOLOGY | Age: 67
End: 2022-03-21
Payer: MEDICARE

## 2022-03-21 ENCOUNTER — NURSE ONLY (OUTPATIENT)
Dept: INFUSION THERAPY | Age: 67
End: 2022-03-21
Payer: MEDICARE

## 2022-03-21 VITALS
TEMPERATURE: 98.2 F | WEIGHT: 253 LBS | SYSTOLIC BLOOD PRESSURE: 132 MMHG | RESPIRATION RATE: 18 BRPM | DIASTOLIC BLOOD PRESSURE: 70 MMHG | HEART RATE: 64 BPM | BODY MASS INDEX: 44.27 KG/M2

## 2022-03-21 DIAGNOSIS — L40.50 PSORIATIC ARTHRITIS (HCC): Primary | ICD-10-CM

## 2022-03-21 DIAGNOSIS — M19.041 PRIMARY OSTEOARTHRITIS OF BOTH HANDS: ICD-10-CM

## 2022-03-21 DIAGNOSIS — M19.042 PRIMARY OSTEOARTHRITIS OF BOTH HANDS: ICD-10-CM

## 2022-03-21 DIAGNOSIS — Z79.899 HIGH RISK MEDICATION USE: ICD-10-CM

## 2022-03-21 DIAGNOSIS — L40.9 PSORIASIS: ICD-10-CM

## 2022-03-21 DIAGNOSIS — M85.89 OSTEOPENIA OF MULTIPLE SITES: ICD-10-CM

## 2022-03-21 PROCEDURE — G8427 DOCREV CUR MEDS BY ELIG CLIN: HCPCS | Performed by: INTERNAL MEDICINE

## 2022-03-21 PROCEDURE — 1090F PRES/ABSN URINE INCON ASSESS: CPT | Performed by: INTERNAL MEDICINE

## 2022-03-21 PROCEDURE — 99214 OFFICE O/P EST MOD 30 MIN: CPT | Performed by: INTERNAL MEDICINE

## 2022-03-21 PROCEDURE — 3017F COLORECTAL CA SCREEN DOC REV: CPT | Performed by: INTERNAL MEDICINE

## 2022-03-21 PROCEDURE — 96365 THER/PROPH/DIAG IV INF INIT: CPT | Performed by: INTERNAL MEDICINE

## 2022-03-21 PROCEDURE — 4040F PNEUMOC VAC/ADMIN/RCVD: CPT | Performed by: INTERNAL MEDICINE

## 2022-03-21 PROCEDURE — 1123F ACP DISCUSS/DSCN MKR DOCD: CPT | Performed by: INTERNAL MEDICINE

## 2022-03-21 PROCEDURE — G8399 PT W/DXA RESULTS DOCUMENT: HCPCS | Performed by: INTERNAL MEDICINE

## 2022-03-21 RX ORDER — DIPHENHYDRAMINE HYDROCHLORIDE 50 MG/ML
50 INJECTION INTRAMUSCULAR; INTRAVENOUS ONCE
Status: CANCELLED | OUTPATIENT
Start: 2022-04-01 | End: 2022-04-01

## 2022-03-21 RX ORDER — HEPARIN SODIUM (PORCINE) LOCK FLUSH IV SOLN 100 UNIT/ML 100 UNIT/ML
500 SOLUTION INTRAVENOUS PRN
Status: CANCELLED | OUTPATIENT
Start: 2022-04-01

## 2022-03-21 RX ORDER — SODIUM CHLORIDE 9 MG/ML
INJECTION, SOLUTION INTRAVENOUS CONTINUOUS
Status: CANCELLED | OUTPATIENT
Start: 2022-04-01

## 2022-03-21 RX ORDER — SODIUM CHLORIDE 9 MG/ML
25 INJECTION, SOLUTION INTRAVENOUS PRN
Status: CANCELLED | OUTPATIENT
Start: 2022-04-01

## 2022-03-21 RX ORDER — METHYLPREDNISOLONE SODIUM SUCCINATE 125 MG/2ML
125 INJECTION, POWDER, LYOPHILIZED, FOR SOLUTION INTRAMUSCULAR; INTRAVENOUS ONCE
Status: CANCELLED | OUTPATIENT
Start: 2022-04-01 | End: 2022-04-01

## 2022-03-21 RX ORDER — SODIUM CHLORIDE 0.9 % (FLUSH) 0.9 %
5-40 SYRINGE (ML) INJECTION PRN
Status: CANCELLED | OUTPATIENT
Start: 2022-04-01

## 2022-03-21 RX ORDER — EPINEPHRINE 1 MG/ML
0.3 INJECTION, SOLUTION, CONCENTRATE INTRAVENOUS PRN
Status: CANCELLED | OUTPATIENT
Start: 2022-04-01

## 2022-03-21 NOTE — PROGRESS NOTES
[address] [facility], [address] [place] [address] (x) [phone] (F)      Dear Dr. Johanne Adams MD:  Please find Rheumatology assessment. Thank you for giving me the opportunity to be involved in [facility] Vamsi's care and I look forward following [facility] along with you. If you have any questions or concerns please feel free to reach me. Note is transcribed using voice recognition software. Inadvertent computerized transcription errors may be present. Patient identification: Baron Girish Agustin,: ,55 y.o. Sex: female     Assessment / Plan: [facility] was seen today for follow-up. Diagnoses and all orders for this visit:    Psoriatic arthritis ([address]    Psoriasis    High risk medication use    Primary osteoarthritis of both hands    Osteopenia of multiple sites         Psoriasis-new lesions in his scalp, managed with topical therapy. Psoriatic arthritis-doing very well on abatacept IV therapy every 4 weekly    Osteoarthritis-s/p bilateral hip replacements -finger joints, knees stable. DEXA scan-   2022 L-spine T score of -0.7, left radius -2.7. Left radius T-scores are not calibrated, number T-scores are fairly preserved, hips could not be checked because of hip replacements. Recommend continue calcium and vitamin D supplementation. No need for therapeutic intervention. Previous Rx- Enbrel- lost efficacy Humira-nonspecific facial eruption, and lost efficacy over time  Cosentyx- subcu therapy too expensive to afford on Medicare. RTC 3 months. Patient indicates understanding and agrees with the management plan.   I reviewed patient's history, referral documents and electronic medical records. #######################################################################    Subjective-  Follow-up for psoriasis, psoriatic arthritis and generalized osteoarthritis. Interval changes-  Other than scalp psoriasis, she is doing fairly well in terms of psoriatic arthritis and osteoarthritis. Scalp psoriasis manageable with topical, is followed by dermatologist.  Rufino Major has helped with joints. Normal ADLs and recreational activities. No adverse events from medications. Current Outpatient Medications   Medication Sig Dispense Refill    Bimatoprost (LUMIGAN OP) Apply to eye      SYNTHROID 175 MCG tablet TAKE 1 TABLET DAILY 90 tablet 1    naproxen (NAPROSYN) 500 MG tablet TAKE 1 TABLET BY MOUTH TWICE A DAY WITH MEALS 180 tablet 0    predniSONE (DELTASONE) 10 MG tablet Take 1 Tab daily. 30 tablet 1    predniSONE (DELTASONE) 10 MG tablet TAKE 2 TABS BY MOUTH DAILY X7 DAYS, 1.5 TAB DAILY X7 DAYS, 1 DAILY X7 DAYS, 1/2 DAILY X7 DAYS 35 tablet 0    hydrocortisone 2.5 % cream Apply topically 3 times daily to affected area. 20 g 1    atorvastatin (LIPITOR) 20 MG tablet Take 1 tablet by mouth daily 90 tablet 3    gabapentin (NEURONTIN) 300 MG capsule Take 1 capsule by mouth nightly as needed (pain) for up to 180 days. 90 capsule 1    Vitamin D (CHOLECALCIFEROL) 1000 UNITS CAPS capsule Take 1,000 Units by mouth daily.  latanoprost (XALATAN) 0.005 % ophthalmic solution Place 1 drop into both eyes nightly       timolol (TIMOPTIC-XR) 0.5 % ophthalmic gel-forming Place 1 drop into both eyes daily        No current facility-administered medications for this visit. Allergies   Allergen Reactions    Codeine Phosphate      Nausea and vomiting       PHYSICAL EXAM:    Vitals: There were no vitals taken for this visit. General appearance/ Psychiatric: well nourished, and well groomed, normal judgement, alert, appears stated age and cooperative.   She looks comfortable, has normal breathing effort. MKS:   28 joint JOINT COUNT:                               Right                                                  Left   Swell Tender Swell Tender   PIP1           PIP2         PIP3           PIP4          PIP5         MCP1           MCP2          MCP3         MCP4           MCP5         Wrist           Elbow           Shoulder          Knee             I do not appreciate any tender, swollen, inflamed joints in upper or lower extremities. Unchanged-OA changes across her DIPs, PIPs with finger deformities-no appreciable tender swollen inflamed joints in upper or lower extremities. Loose fist because of mechanical changes from osteoarthritis. Skin: Scattered hairline and scalp psoriasis.     DATA:   Lab Results   Component Value Date    WBC 5.0 03/15/2022    HGB 12.1 03/15/2022    HCT 37.0 03/15/2022    MCV 94.3 03/15/2022     03/15/2022         Chemistry        Component Value Date/Time     03/15/2022 1148    K 4.3 03/15/2022 1148     03/15/2022 1148    CO2 24 03/15/2022 1148    BUN 15 03/15/2022 1148    CREATININE 0.6 03/15/2022 1148        Component Value Date/Time    CALCIUM 8.8 03/15/2022 1148    ALKPHOS 81 03/15/2022 1148    AST 14 (L) 03/15/2022 1148    ALT 12 03/15/2022 1148    BILITOT <0.2 03/15/2022 1148          Lab Results   Component Value Date    LABURIC 5.5 09/10/2018     Lab Results   Component Value Date    SEDRATE 45 (H) 03/15/2022     Lab Results   Component Value Date    CRP <3.0 03/15/2022     Lab Results   Component Value Date    AQUILES Negative 09/10/2018    SEDRATE 45 (H) 03/15/2022     No results found for: CKTOTAL  Lab Results   Component Value Date    TSH 2.07 07/09/2012     Lab Results   Component Value Date    VITD25 31.6 08/16/2017         Radiology Review:   10/1/2018-  Both hand x-ray images were retrieved, images were shown to patient, has significant osteoarthritic changes in the DIP, PIP's as well as joint space narrowing of MCP joints mainly his third fourth. R 2 nd PIP has central erosion with new bone formation,which has progressed when comparing studies from Jan 2018. A/P- See above. Total time 31 minutes that includes the following-  Preparing to see the patient such as reviewing patients records, pre-charting, preparing the visit on the same day, performing a medically appropriate history and physical examination, counseling and educating patient about diagnosis, management plan, ordering appropriate testings, prescriptions, communicating findings to other care providers, and documenting clinical information in electronic medical record.

## 2022-03-21 NOTE — PATIENT INSTRUCTIONS
abatacept  Pronunciation:  paula BAY ta sept  Brand:  Amandeep  What is the most important information I should know about abatacept? Follow all directions on your medicine label and package. Tell each of your healthcare providers about all your medical conditions, allergies, and all medicines you use. What is abatacept? Abatacept is used to treat symptoms of rheumatoid arthritis, and to prevent joint damage caused by these conditions. This medicine is for adults and children at least 3years old. Abatacept is also used to treat active psoriatic arthritis in adults. Abatacept is not a cure for any autoimmune disorder and will only treat the symptoms of your condition. Abatacept may also be used for purposes not listed in this medication guide. What should I discuss with my healthcare provider before using abatacept? You should not use abatacept if you are allergic to it. Before using abatacept, tell your doctor if you have ever had tuberculosis, if anyone in your household has tuberculosis, or if you have recently traveled to an area where tuberculosis is common. Tell your doctor if you have ever had:  · a weak immune system;  · any type of infection including a skin infection or open sores;  · infections that go away and come back;  · COPD (chronic obstructive pulmonary disease);  · diabetes;  · hepatitis; or  · if you are scheduled to receive any vaccines. Using abatacept may increase your risk of developing certain types of cancer such as lymphoma (cancer of the lymph nodes). This risk may be greater in older adults. Talk to your doctor about your specific risk. Tell your doctor if you are pregnant or breastfeeding. If you are pregnant, your name may be listed on a pregnancy registry to track the effects of abatacept on the baby. Children using abatacept should be current on all childhood immunizations before starting treatment. How should I use abatacept?   Before you start treatment with abatacept, your doctor may perform tests to make sure you do not have tuberculosis or other infections. Abatacept is injected under the skin, or as an infusion into a vein. A healthcare provider will give your first dose and may teach you how to properly use the medication by yourself. Abatacept is injected under the skin when given to a child between 3and 10years old. Abatacept must be given slowly when injected into a vein, and the IV infusion can take at least 30 minutes to complete. This medicine is usually given every 1 to 4 weeks. Follow your doctor's instructions. Abatacept must be mixed with a liquid (diluent) before using it. When using injections by yourself, be sure you understand how to properly mix and store the medicine. Read and carefully follow any Instructions for Use provided with your medicine. Ask your doctor or pharmacist if you don't understand all instructions. Prepare an injection only when you are ready to give it. Gently swirl but do not shake the medication bottle. Do not use if the medicine looks cloudy, has changed colors, or has particles in it. Call your pharmacist for new medicine. Each vial (bottle) or prefilled syringe is for one use only. Throw it away after one use, even if there is still medicine left inside. Use a needle and syringe only once and then place them in a puncture-proof \"sharps\" container. Follow state or local laws about how to dispose of this container. Keep it out of the reach of children and pets. If you need surgery, tell the surgeon ahead of time that you are using abatacept. If you've ever had hepatitis B, using abatacept can cause this virus to become active or get worse. You may need frequent liver function tests while using this medicine and for several months after you stop. Abatacept can cause false results with certain blood glucose tests, showing high blood sugar readings.  If you have diabetes, talk to your doctor about the best way to test your blood sugar. Autoimmune disorders are often treated with a combination of different drugs. Use all medications as directed and read all medication guides you receive. Do not change your dose or dosing schedule without your doctor's advice. Store abatacept in original carton in a refrigerator. Protect from light and do not freeze. Do not use after the expiration date on the medicine label has passed. If you need to travel with your medicine, place the syringes in a cooler with ice packs. Abatacept mixed with a diluent may be stored in a refrigerator or at room temperature and must be used within 24 hours. What happens if I miss a dose? Call your doctor for instructions if you miss your abatacept dose. What happens if I overdose? Seek emergency medical attention or call the Poison Help line at 1-688.759.8484. What should I avoid while using abatacept? Do not receive a \"live\" vaccine while using abatacept, and for at least 3 months after your treatment ends. The vaccine may not work as well during this time, and may not fully protect you from disease. Live vaccines include measles, mumps, rubella (MMR), rotavirus, typhoid, yellow fever, varicella (chickenpox), zoster (shingles), and nasal flu (influenza) vaccine. Avoid being near people who are sick or have infections. Tell your doctor at once if you develop signs of infection. What are the possible side effects of abatacept? Get emergency medical help if you have signs of an allergic reaction:  hives; difficulty breathing; swelling of your face, lips, tongue, or throat. Some side effects may occur during the injection. Tell your caregiver right away if you feel dizzy, light-headed, itchy, or have a severe headache or trouble breathing within 1 hour after receiving the injection. You may get infections more easily, even serious or fatal infections.  Call your doctor right away if you have signs of infection such as:  · fever, chills, night sweats, flu symptoms, weight loss;  · feeling very tired;  · dry cough, sore throat; or  · warmth, pain, or redness of your skin. Call your doctor at once if you have any of these other serious side effects:  · trouble breathing;  · stabbing chest pain, wheezing, cough with yellow or green mucus;  · pain or burning when you urinate; or  · signs of skin infection such as itching, swelling, warmth, redness, or oozing. Common side effects may include:  · fever;  · nausea, diarrhea, stomach pain;  · headache; or  · cold symptoms such as stuffy nose, sneezing, sore throat, cough. This is not a complete list of side effects and others may occur. Call your doctor for medical advice about side effects. You may report side effects to FDA at 7-196-FDA-0333. What other drugs will affect abatacept? Tell your doctor about all your other medicines, especially:  · adalimumab;  · anakinra;  · certolizumab;  · etanercept;  · golimumab;  · infliximab;  · rituximab; or  · tocilizumab. This list is not complete. Other drugs may affect abatacept, including prescription and over-the-counter medicines, vitamins, and herbal products. Not all possible drug interactions are listed here. Where can I get more information? Your doctor or pharmacist can provide more information about abatacept. Remember, keep this and all other medicines out of the reach of children, never share your medicines with others, and use this medication only for the indication prescribed. Every effort has been made to ensure that the information provided by [address] is accurate, up-to-date, and complete, but no guarantee is made to that effect. Drug information contained herein may be time sensitive. [employer] information has been compiled for use by healthcare practitioners and consumers in the [facility] and therefore [employer] does not warrant that uses outside of the [employer] are appropriate, unless specifically indicated otherwise. [facility] drug information does not endorse drugs, diagnose patients or recommend therapy. [facility] drug information is an informational resource designed to assist licensed healthcare practitioners in caring for their patients and/or to serve consumers viewing this service as a supplement to, and not a substitute for, the expertise, skill, knowledge and judgment of healthcare practitioners. The absence of a warning for a given drug or drug combination in no way should be construed to indicate that the drug or drug combination is safe, effective or appropriate for any given patient. [facility] does not assume any responsibility for any aspect of healthcare administered with the aid of information [facility] provides. The information contained herein is not intended to cover all possible uses, directions, precautions, warnings, drug interactions, allergic reactions, or adverse effects. If you have questions about the drugs you are taking, check with your doctor, nurse or pharmacist.  Copyright 5577-1316 [address] Version: 9.01. Revision date: 11/2/2020. Care instructions adapted under license by [facility] ([facility]). If you have questions about a medical condition or this instruction, always ask your healthcare professional. [place] [address] any warranty or liability for your use of this information. Hello! Thank you for choosing [facility] ([facility]) for your infusion. We look forward to serving you! The Day of Your Infusion   On the day of your appointment, please arrive 5-10 minutes early. You will go to the  and check in. Preparing for Your Infusion   Please be sure to drink at least 16 ounces of water, bring a list of your current medications, insurance card, and copayment. Please wear comfortable clothes and dress in layers. You are welcome to bring a blanket and pillow, your laptop/tablet, and whatever you might need to be comfortable and pass the time during your infusion.     When to Cancel   Please contact your physician if you have a fever, infection, are on antibiotics, have yellow/brown/green drainage, or plan to have surgery. If you have any questions about whether you should receive your infusion, please contact your physician. If you need to cancel your appointment, please call at least 48 hours in advance. If you are more than 15 minutes late, you may be asked to reschedule. In the event of an emergency or reaction, please call 911 or contact your prescriber. We look forward to caring for you. If you have any questions or concerns, please feel free to call or message us thru [address] [place] location: [phone]  [address] [place] [address] [place] location: [phone]      Your Responsibilities:   Provide accurate and complete information about your health, address, telephone number, date of birth, insurance carrier and employer.  Call if you cannot keep your appointment.  Be respectful of your [facility] team.    Please remember that the space is shared with other patients. [facility] asks that you be considerate of your language and conduct.  Give us a copy of your advance directive.  Ask questions if there is anything you do not understand.  Report unexpected changes in your health.  Take responsibility for the consequences of refusing care or not following instructions.  Pay your bills and take responsibility for your copay assistance. Is compliance to medication therapy important? Our nurses understand how crucial a patient's compliance to their prescribed therapy is to the outcomes of the therapy program for their specific disease state. Noncompliance or poor compliance to the patient medication treatment plan significantly increases the risk of poor health outcomes while increasing the risk of an adverse event, overall cost of care, and/or negative impacts on quality of life.     As a partner in your healthcare, we want you to understand that infusion therapy is typically a long term commitment. The treatment decided by you and your physician will require compliance on your part. Reminder Appointments are schedule one appointment ahead if you come every 6-8 weeks, and 2 ahead if you come every 4 weeks. Please know that we cannot infuse if the physician is not present. If we cannot accommodate your needs, please feel free to ask what other options are available. The nurse will do the best to accommodate your needs , there may be times that this is not always possible. Please be patient as we try to accommodate patients in a safe, and timely manner. If the physician is out, be aware that you may need to go to our [facility] to have your treatment in a timely manner. If you have questions, please speak with a member of the Rheumatology staff. We are here to help and guide you through this infusion process and ensure that you get the treatment that you need. To avoid any disruptions in your treatment, we ask that you please notify us immediately if you have ANY insurance changes such as a new plan, new plan number, new group number, or if you might be losing coverage. If the infusion nurse does not receive your new insurance information, your infusion may be temporarily held until we can check your benefits and get an authorization from your new insurance. Please notify us BEFORE your next infusion, as it may take several weeks to check your benefits and for your new insurance to approve your continuing treatment. Please know that we want to help you in anyway we can, so notify us early! If your insurance coverage changes in ANY way you must do the follow:   Contact our office and speak with the Infusion Nurse. All calls will be returned within 48 hours, except on weekends. We will need a copy of both the front and back of your new card.     Infusion appointments will be placed on HOLD until a new Verification of Benefits and/or Prior Authorization (if required) is acquired   a. A NEW VOB (verification of benefits) must be requested to verify coverage for the medication and infusion fees. b. If a Prior Authorization is needed, we will obtain this and provide information needed to your insurance. c. This process can take 3-4 weeks depending on insurance.  Once the new VOB has been obtained the Infusion RN or a member of the Rheumatology staff will contact you to discuss changes in coverage. A new infusion schedule can be made at this time.  Changes in insurance may disqualify you from receiving infusion coverage, however, we will do everything we can to get your treatment, as we know it is necessary for your disease and quality of living.       *Co-payment and deductible is due at time of Service. *As a reminder, anyone on Patient Copay Assistance (Commercial insurance ONLY. This does NOT pertain to Medicare or Medicaid). You should have received information from the drug  regarding how the copay programs work. If you have not received the information, please let us know. You are responsible for sending in the Explanation of Benefits (EOBs) and [place] for dates of service to your individual Savings Program and then following thru to ensure it was approved. If you are having difficulty understanding the program, please reach out immediately for assistance. If these are not sent in a timely manner, you will be responsible for the large portion that your insurance doesn't cover, which can be in the thousands! These charges can add up quickly and we don't want you to be stuck with large bills or your treatment be stopped for non-payment of the medication. *Reminder Appointments are schedule one appointment ahead if you come every 6-8 weeks, and 2 ahead if you come every 4 weeks.  Please know that we cannot infuse if the physician is not present, so if we cannot accommodate your needs, please feel free to ask what other options are available. The nurse will do the best to accommodate your needs , there may be times that this is not always possible. Please be patient as we try to accommodate patients in a safe, and timely manner. If the physician is out, be aware that you may need to go to our [facility], located on campus of [facility], to have your treatment in a timely manner. If you have questions, please speak with a member of the Rheumatology staff. We are here to help and guide you through this infusion process and ensure that you get the treatment that you need. Thank you for letting us take care of you! Thank you for your cooperation and help! We are grateful to serve you!

## 2022-03-21 NOTE — PROGRESS NOTES
3/21/2022  Follow up visit with Dr. Jane Huston. Pt here for Orencia infusion. (previously infused at [facility] No  Adverse effects from previous infusion. Today 253 lbs =114 kg  = 1000 mg orencia, per MD.  Infusion  tolerated well. Vitals stable. Educated patient on today's  medication,  visit process, infusion appointments, and when to notify office. Information added to AVS. Questions answered. Next visit scheduled  in 4 weeks.   IV Gauge: #24  IV Site: left antecubiatl  # of Attempts: 1  IV Start: 0840 am  IV Stop: 0910 am        See Therapy plan, flowsheets, and MAR  Administrations This Visit     abatacept (ORENCIA) 1,000 mg in sodium chloride 0.9 % 100 mL infusion     Admin Date  03/21/2022  08:40 Action  New Bag Dose  1,000 mg Rate  200 mL/hr Route  IntraVENous Site   Administered By  Liv Vogel RN    Ordering Provider: Renetta Siegel MD    Patient Supplied?: No

## 2022-04-19 ENCOUNTER — NURSE ONLY (OUTPATIENT)
Dept: INFUSION THERAPY | Age: 67
End: 2022-04-19
Payer: MEDICARE

## 2022-04-19 VITALS
WEIGHT: 256 LBS | DIASTOLIC BLOOD PRESSURE: 68 MMHG | HEART RATE: 64 BPM | SYSTOLIC BLOOD PRESSURE: 132 MMHG | RESPIRATION RATE: 16 BRPM | BODY MASS INDEX: 44.8 KG/M2 | TEMPERATURE: 98.2 F

## 2022-04-19 DIAGNOSIS — L40.50 PSORIATIC ARTHRITIS (HCC): Primary | ICD-10-CM

## 2022-04-19 PROCEDURE — 96365 THER/PROPH/DIAG IV INF INIT: CPT | Performed by: INTERNAL MEDICINE

## 2022-04-19 RX ORDER — SODIUM CHLORIDE 9 MG/ML
25 INJECTION, SOLUTION INTRAVENOUS PRN
Status: CANCELLED | OUTPATIENT
Start: 2022-05-10

## 2022-04-19 RX ORDER — SODIUM CHLORIDE 0.9 % (FLUSH) 0.9 %
5-40 SYRINGE (ML) INJECTION PRN
Status: CANCELLED | OUTPATIENT
Start: 2022-05-10

## 2022-04-19 RX ORDER — SODIUM CHLORIDE 9 MG/ML
INJECTION, SOLUTION INTRAVENOUS CONTINUOUS
Status: CANCELLED | OUTPATIENT
Start: 2022-05-10

## 2022-04-19 RX ORDER — DIPHENHYDRAMINE HYDROCHLORIDE 50 MG/ML
50 INJECTION INTRAMUSCULAR; INTRAVENOUS ONCE
Status: CANCELLED | OUTPATIENT
Start: 2022-05-10 | End: 2022-05-10

## 2022-04-19 RX ORDER — METHYLPREDNISOLONE SODIUM SUCCINATE 125 MG/2ML
125 INJECTION, POWDER, LYOPHILIZED, FOR SOLUTION INTRAMUSCULAR; INTRAVENOUS ONCE
Status: CANCELLED | OUTPATIENT
Start: 2022-05-10 | End: 2022-05-10

## 2022-04-19 RX ORDER — HEPARIN SODIUM (PORCINE) LOCK FLUSH IV SOLN 100 UNIT/ML 100 UNIT/ML
500 SOLUTION INTRAVENOUS PRN
Status: CANCELLED | OUTPATIENT
Start: 2022-05-10

## 2022-04-19 RX ORDER — EPINEPHRINE 1 MG/ML
0.3 INJECTION, SOLUTION, CONCENTRATE INTRAVENOUS PRN
Status: CANCELLED | OUTPATIENT
Start: 2022-05-10

## 2022-04-19 NOTE — PATIENT INSTRUCTIONS
abatacept  Pronunciation: paula BAY ta sept  Brand: Josencia  What is the most important information I should know about abatacept? Follow all directions on your medicine label and package. Tell each of your healthcare providers about all your medical conditions, allergies, and allmedicines you use. What is abatacept? Abatacept is used to treat symptoms of rheumatoid arthritis, and to prevent joint damage caused by these conditions. This medicine is for adults andchildren at least 3years old. Abatacept is also used to treat active psoriatic arthritis in adults. Abatacept is not a cure for any autoimmune disorder and will only treat the symptoms of your condition. Abatacept may also be used for purposes not listed in this medication guide. What should I discuss with my healthcare provider before using abatacept? You should not use abatacept if you are allergic to it. Before using abatacept, tell your doctor if you have ever had tuberculosis, if anyone in your household has tuberculosis, or if you have recentlytraveled to an area where tuberculosis is common. Tell your doctor if you have ever had:   a weak immune system;   any type of infection including a skin infection or open sores;   infections that go away and come back;   COPD (chronic obstructive pulmonary disease);   diabetes;   hepatitis; or   if you are scheduled to receive any vaccines. Using abatacept may increase your risk of developing certain types of cancer such as lymphoma (cancer of the lymph nodes). This risk may be greater in olderadults. Talk to your doctor about your specific risk. Tell your doctor if you are pregnant or breastfeeding. If you are pregnant, your name may be listed on a pregnancy registry to trackthe effects of abatacept on the baby. Children using abatacept should be current on all childhood immunizationsbefore starting treatment. How should I use abatacept?   Before you start treatment with abatacept, your doctor may perform tests tomake sure you do not have tuberculosis or other infections. Abatacept is injected under the skin, or as an infusion into a vein. A healthcare provider will give your first dose and may teach you how to properlyuse the medication by yourself. Abatacept is injected under the skin when given to a child between 3and 5years old. Abatacept must be given slowly when injected into a vein, and the IV infusioncan take at least 30 minutes to complete. This medicine is usually given every 1 to 4 weeks. Follow your doctor'sinstructions. Abatacept must be mixed with a liquid (diluent) before using it. When using injections by yourself, be sure you understand how to properly mix and storethe medicine. Read and carefully follow any Instructions for Use provided with your medicine. Ask your doctor or pharmacist if you don't understand all instructions. Prepare an injection only when you are ready to give it. Gently swirl but do not shake the medication bottle. Do not use if the medicine looks cloudy, has changed colors, or has particlesin it. Call your pharmacist for new medicine. Each vial (bottle) or prefilled syringe is for one use only. Throw it awayafter one use, even if there is still medicine left inside. Use a needle and syringe only once and then place them in a puncture-proof \"sharps\" container. Follow state or local laws about how to dispose of thiscontainer. Keep it out of the reach of children and pets. If you need surgery, tell the surgeon ahead of time that you are usingabatacept. If you've ever had hepatitis B, using abatacept can cause this virus to become active or get worse. You may need frequent liver function tests while using this medicine and forseveral months after you stop. Abatacept can cause false results with certain blood glucose tests, showing high blood sugar readings.  If you have diabetes, talk to your doctor about thebest way to test your blood sugar.  Autoimmune disorders are often treated with a combination of different drugs. Use all medications as directed and read all medication guides you receive. Do not change your dose or dosing schedule without your doctor's advice. Store abatacept in original carton in a refrigerator. Protect from light and do not freeze. Do not use after the expiration date on the medicine label haspassed. If you need to travel with your medicine, place the syringes in a cooler withice packs. Abatacept mixed with a diluent may be stored in a refrigerator or at [facility] and must be used within 24 hours. What happens if I miss a dose? Call your doctor for instructions if you miss your abatacept dose. What happens if I overdose? Seek emergency medical attention or call the Poison Help line at [phone]. What should I avoid while using abatacept? Do not receive a \"live\" vaccine while using abatacept, and for at least 3 months after your treatment ends. The vaccine may not work as well during this time, and may not fully protect you from disease. Live vaccines include measles, mumps, rubella (MMR), rotavirus, typhoid, yellow fever, varicella (chickenpox), zoster (shingles),and nasal flu (influenza) vaccine. Avoid being near people who are sick or have infections. Tell your doctor atonce if you develop signs of infection. What are the possible side effects of abatacept? Get emergency medical help if you have signs of an allergic reaction: hives; difficulty breathing; swelling of your face, lips, tongue, or throat. Some side effects may occur during the injection. Tell your caregiver right away if you feel dizzy, light-headed, itchy, or have a severe headache ortrouble breathing within 1 hour after receiving the injection. You may get infections more easily, even serious or fatal infections.  Call your doctor right away if you have signs of infection such as:   fever, chills, night sweats, flu symptoms, weight loss;   feeling very tired;   dry cough, sore throat; or   warmth, pain, or redness of your skin. Call your doctor at once if you have any of these other serious side effects:   trouble breathing;   stabbing chest pain, wheezing, cough with yellow or green mucus;   pain or burning when you urinate; or   signs of skin infection such as itching, swelling, warmth, redness, or oozing. Common side effects may include:   fever;   nausea, diarrhea, stomach pain;   headache; or   cold symptoms such as stuffy nose, sneezing, sore throat, cough. This is not a complete list of side effects and others may occur. Call your doctor for medical advice about side effects. You may report side effects toFDA at 6-041-DWV-7728. What other drugs will affect abatacept? Tell your doctor about all your other medicines, especially:   adalimumab;   anakinra;   certolizumab;   etanercept;   golimumab;   infliximab;   rituximab; or   tocilizumab. This list is not complete. Other drugs may affect abatacept, including prescription and over-the-counter medicines, vitamins, and herbal products. Notall possible drug interactions are listed here. Where can I get more information? Your doctor or pharmacist can provide more information about abatacept. Remember, keep this and all other medicines out of the reach of children, never share your medicines with others, and use this medication only for the indication prescribed. Every effort has been made to ensure that the information provided by [address] is accurate, up-to-date, and complete, but no guarantee is made to that effect. Drug information contained herein may be time sensitive. [facility] information has been compiled for use by healthcare practitioners and consumers in the [place] and therefore [facility] does not warrant that uses outside of the [place] are appropriate, unless specifically indicated otherwise.  [facility]'s drug information does not endorse drugs, diagnose patients or recommend therapy. [facility]'s drug information is an informational resource designed to assist licensed healthcare practitioners in caring for their patients and/or to serve consumers viewing this service as a supplement to, and not a substitute for, the expertise, skill, knowledge and judgment of healthcare practitioners. The absence of a warning for a given drug or drug combination in no way should be construed to indicate that the drug or drug combination is safe, effective or appropriate for any given patient. [facility] does not assume any responsibility for any aspect of healthcare administered with the aid of information [facility] provides. The information contained herein is not intended to cover all possible uses, directions, precautions, warnings, drug interactions, allergic reactions, or adverse effects. If you have questions about the drugs you are taking, check with yourdoctor, nurse or pharmacist.  Copyright 5377-8302 [address]. Version: 9.01. Revision date: 11/2/2020. Care instructions adapted under license by [facility] [address] If you have questions about a medical condition or this instruction, always ask your healthcare professional. [place] [address] any warranty or liability for your use of this information. To avoid any disruptions in your treatment, we ask that you please notify us immediately if you have ANY insurance changes such as a new plan, new plan number, new group number, or if you might be losing coverage. If the infusion nurse does not receive your new insurance information, your infusion may be temporarily held until we can check your benefits and get an authorization from your new insurance. Please notify us BEFORE your next infusion, as it may take several weeks to check your benefits and for your new insurance to approve your continuing treatment.   Please know that we want to help you in anyway we can, so notify us early! If your insurance coverage changes in ANY way you must do the follow:   Contact our office and speak with the Infusion Nurse. All calls will be returned within 48 hours, except on weekends. We will need a copy of both the front and back of your new card.  Infusion appointments will be placed on HOLD until a new Verification of Benefits and/or Prior Authorization (if required) is acquired   a. A NEW VOB (verification of benefits) must be requested to verify coverage for the medication and infusion fees. b. If a Prior Authorization is needed, we will obtain this and provide information needed to your insurance. c. This process can take 3-4 weeks depending on insurance.  Once the new VOB has been obtained the Infusion RN or a member of the Rheumatology staff will contact you to discuss changes in coverage. A new infusion schedule can be made at this time.  Changes in insurance may disqualify you from receiving infusion coverage, however, we will do everything we can to get your treatment, as we know it is necessary for your disease and quality of living.       *Co-payment and deductible is due at time of Service. *As a reminder, anyone on Patient Copay Assistance (Commercial insurance ONLY. This does NOT pertain to Medicare or Medicaid). You should have received information from the drug  regarding how the copay programs work. If you have not received the information, please let us know. You are responsible for sending in the Explanation of Benefits (EOBs) and [employer] for dates of service to your individual Savings Program and then following thru to ensure it was approved. If you are having difficulty understanding the program, please reach out immediately for assistance. If these are not sent in a timely manner, you will be responsible for the large portion that your insurance doesn't cover, which can be in the thousands!   These charges can add up quickly and we don't want you to be stuck with large bills or your treatment be stopped for non-payment of the medication. *Reminder Appointments are schedule one appointment ahead if you come every 6-8 weeks, and 2 ahead if you come every 4 weeks. Please know that we cannot infuse if the physician is not present, so if we cannot accommodate your needs, please feel free to ask what other options are available. The nurse will do the best to accommodate your needs , there may be times that this is not always possible. Please be patient as we try to accommodate patients in a safe, and timely manner. If the physician is out, be aware that you may need to go to our [facility], located on campus of [facility], to have your treatment in a timely manner. If you have questions, please speak with a member of the Rheumatology staff. We are here to help and guide you through this infusion process and ensure that you get the treatment that you need. Thank you for letting us take care of you! Thank you for your cooperation and help! We are grateful to serve you!

## 2022-05-24 ENCOUNTER — NURSE ONLY (OUTPATIENT)
Dept: INFUSION THERAPY | Age: 67
End: 2022-05-24
Payer: MEDICARE

## 2022-05-24 VITALS
WEIGHT: 259 LBS | BODY MASS INDEX: 45.32 KG/M2 | DIASTOLIC BLOOD PRESSURE: 74 MMHG | SYSTOLIC BLOOD PRESSURE: 138 MMHG | HEART RATE: 77 BPM | TEMPERATURE: 98.6 F | RESPIRATION RATE: 18 BRPM

## 2022-05-24 DIAGNOSIS — L40.50 PSORIATIC ARTHRITIS (HCC): Primary | ICD-10-CM

## 2022-05-24 PROCEDURE — 96365 THER/PROPH/DIAG IV INF INIT: CPT | Performed by: INTERNAL MEDICINE

## 2022-05-24 RX ORDER — SODIUM CHLORIDE 9 MG/ML
INJECTION, SOLUTION INTRAVENOUS CONTINUOUS
Status: CANCELLED | OUTPATIENT
Start: 2022-06-07

## 2022-05-24 RX ORDER — DIPHENHYDRAMINE HYDROCHLORIDE 50 MG/ML
50 INJECTION INTRAMUSCULAR; INTRAVENOUS ONCE
Status: CANCELLED | OUTPATIENT
Start: 2022-06-07 | End: 2022-06-07

## 2022-05-24 RX ORDER — METHYLPREDNISOLONE SODIUM SUCCINATE 125 MG/2ML
125 INJECTION, POWDER, LYOPHILIZED, FOR SOLUTION INTRAMUSCULAR; INTRAVENOUS ONCE
Status: CANCELLED | OUTPATIENT
Start: 2022-06-07 | End: 2022-06-07

## 2022-05-24 RX ORDER — FAMOTIDINE 10 MG/ML
20 INJECTION, SOLUTION INTRAVENOUS ONCE
Status: CANCELLED | OUTPATIENT
Start: 2022-06-07 | End: 2022-06-07

## 2022-05-24 RX ORDER — SODIUM CHLORIDE 0.9 % (FLUSH) 0.9 %
5-40 SYRINGE (ML) INJECTION PRN
Status: CANCELLED | OUTPATIENT
Start: 2022-06-07

## 2022-05-24 RX ORDER — HEPARIN SODIUM (PORCINE) LOCK FLUSH IV SOLN 100 UNIT/ML 100 UNIT/ML
500 SOLUTION INTRAVENOUS PRN
Status: CANCELLED | OUTPATIENT
Start: 2022-06-07

## 2022-05-24 RX ORDER — SODIUM CHLORIDE 9 MG/ML
25 INJECTION, SOLUTION INTRAVENOUS PRN
Status: CANCELLED | OUTPATIENT
Start: 2022-06-07

## 2022-05-24 RX ORDER — EPINEPHRINE 1 MG/ML
0.3 INJECTION, SOLUTION, CONCENTRATE INTRAVENOUS PRN
Status: CANCELLED | OUTPATIENT
Start: 2022-06-07

## 2022-05-24 NOTE — PROGRESS NOTES
Pt here for Orencia infusion #9    No  Adverse effects from previous infusion, Today 259 lbs =117.5 [id number]. Infusion  tolerated well. Next visit scheduled  in 4 weeks. IV Gauge: #24  IV Site: LAC  # of Attempts: 1   Infusion Start: 0930  Infusion Stop: 7708      IV Volume:140ml  IV Bag Lot# [id number]  IV Bag EXP: 07/30/2023          See flowsheet and MAR.

## 2022-05-24 NOTE — PATIENT INSTRUCTIONS
To avoid any disruptions in your treatment, we ask that you please notify us immediately if you have ANY insurance changes such as a new plan, new plan number, new group number, or if you might be losing coverage. If the infusion nurse does not receive your new insurance information, your infusion may be temporarily held until we can check your benefits and get an authorization from your new insurance. Please notify us BEFORE your next infusion, as it may take several weeks to check your benefits and for your new insurance to approve your continuing treatment. Please know that we want to help you in anyway we can, so notify us early! If your insurance coverage changes in ANY way you must do the follow:   Contact our office and speak with the Infusion Nurse. All calls will be returned within 48 hours, except on weekends. We will need a copy of both the front and back of your new card.  Infusion appointments will be placed on HOLD until a new Verification of Benefits and/or Prior Authorization (if required) is acquired   a. A NEW VOB (verification of benefits) must be requested to verify coverage for the medication and infusion fees. b. If a Prior Authorization is needed, we will obtain this and provide information needed to your insurance. c. This process can take 3-4 weeks depending on insurance.  Once the new VOB has been obtained the Infusion RN or a member of the Rheumatology staff will contact you to discuss changes in coverage. A new infusion schedule can be made at this time.  Changes in insurance may disqualify you from receiving infusion coverage, however, we will do everything we can to get your treatment, as we know it is necessary for your disease and quality of living.       *Co-payment and deductible is due at time of Service. *As a reminder, anyone on Patient Copay Assistance (Commercial insurance ONLY. This does NOT pertain to Medicare or Medicaid).  You should have received information from the drug  regarding how the copay programs work. If you have not received the information, please let us know. You are responsible for sending in the Explanation of Benefits (EOBs) and [employer] for dates of service to your individual Savings Program and then following thru to ensure it was approved. If you are having difficulty understanding the program, please reach out immediately for assistance. If these are not sent in a timely manner, you will be responsible for the large portion that your insurance doesn't cover, which can be in the thousands! These charges can add up quickly and we don't want you to be stuck with large bills or your treatment be stopped for non-payment of the medication. *Reminder Appointments are schedule one appointment ahead if you come every 6-8 weeks, and 2 ahead if you come every 4 weeks. Please know that we cannot infuse if the physician is not present, so if we cannot accommodate your needs, please feel free to ask what other options are available. The nurse will do the best to accommodate your needs , there may be times that this is not always possible. Please be patient as we try to accommodate patients in a safe, and timely manner. If the physician is out, be aware that you may need to go to our [facility], located on campus of [facility], to have your treatment in a timely manner. If you have questions, please speak with a member of the Rheumatology staff. We are here to help and guide you through this infusion process and ensure that you get the treatment that you need. Thank you for letting us take care of you! Thank you for your cooperation and help! We are grateful to serve you!

## 2022-06-21 ENCOUNTER — NURSE ONLY (OUTPATIENT)
Dept: INFUSION THERAPY | Age: 67
End: 2022-06-21

## 2022-06-21 ENCOUNTER — OFFICE VISIT (OUTPATIENT)
Dept: RHEUMATOLOGY | Age: 67
End: 2022-06-21
Payer: MEDICARE

## 2022-06-21 VITALS
BODY MASS INDEX: 45.74 KG/M2 | TEMPERATURE: 97.4 F | SYSTOLIC BLOOD PRESSURE: 138 MMHG | WEIGHT: 261.4 LBS | DIASTOLIC BLOOD PRESSURE: 80 MMHG | RESPIRATION RATE: 18 BRPM | HEART RATE: 78 BPM

## 2022-06-21 DIAGNOSIS — L40.9 PSORIASIS: ICD-10-CM

## 2022-06-21 DIAGNOSIS — Z79.899 HIGH RISK MEDICATION USE: ICD-10-CM

## 2022-06-21 DIAGNOSIS — L40.50 PSORIATIC ARTHRITIS (HCC): Primary | ICD-10-CM

## 2022-06-21 DIAGNOSIS — M19.041 PRIMARY OSTEOARTHRITIS OF BOTH HANDS: ICD-10-CM

## 2022-06-21 DIAGNOSIS — M19.042 PRIMARY OSTEOARTHRITIS OF BOTH HANDS: ICD-10-CM

## 2022-06-21 DIAGNOSIS — M85.89 OSTEOPENIA OF MULTIPLE SITES: ICD-10-CM

## 2022-06-21 LAB
BASOPHILS ABSOLUTE: 0 K/UL (ref 0–0.2)
BASOPHILS RELATIVE PERCENT: 0.4 %
EOSINOPHILS ABSOLUTE: 0.2 K/UL (ref 0–0.6)
EOSINOPHILS RELATIVE PERCENT: 4 %
HCT VFR BLD CALC: 37 % (ref 36–48)
HEMOGLOBIN: 11.9 G/DL (ref 12–16)
LYMPHOCYTES ABSOLUTE: 1.6 K/UL (ref 1–5.1)
LYMPHOCYTES RELATIVE PERCENT: 25.9 %
MCH RBC QN AUTO: 30.9 PG (ref 26–34)
MCHC RBC AUTO-ENTMCNC: 32 G/DL (ref 31–36)
MCV RBC AUTO: 96.4 FL (ref 80–100)
MONOCYTES ABSOLUTE: 0.4 K/UL (ref 0–1.3)
MONOCYTES RELATIVE PERCENT: 5.7 %
NEUTROPHILS ABSOLUTE: 4 K/UL (ref 1.7–7.7)
NEUTROPHILS RELATIVE PERCENT: 64 %
PDW BLD-RTO: 16.3 % (ref 12.4–15.4)
PLATELET # BLD: 255 K/UL (ref 135–450)
PMV BLD AUTO: 8.9 FL (ref 5–10.5)
RBC # BLD: 3.84 M/UL (ref 4–5.2)
WBC # BLD: 6.2 K/UL (ref 4–11)

## 2022-06-21 PROCEDURE — 3017F COLORECTAL CA SCREEN DOC REV: CPT | Performed by: INTERNAL MEDICINE

## 2022-06-21 PROCEDURE — 99214 OFFICE O/P EST MOD 30 MIN: CPT | Performed by: INTERNAL MEDICINE

## 2022-06-21 PROCEDURE — 1123F ACP DISCUSS/DSCN MKR DOCD: CPT | Performed by: INTERNAL MEDICINE

## 2022-06-21 PROCEDURE — G8399 PT W/DXA RESULTS DOCUMENT: HCPCS | Performed by: INTERNAL MEDICINE

## 2022-06-21 PROCEDURE — G8427 DOCREV CUR MEDS BY ELIG CLIN: HCPCS | Performed by: INTERNAL MEDICINE

## 2022-06-21 PROCEDURE — 1090F PRES/ABSN URINE INCON ASSESS: CPT | Performed by: INTERNAL MEDICINE

## 2022-06-21 PROCEDURE — 1036F TOBACCO NON-USER: CPT | Performed by: INTERNAL MEDICINE

## 2022-06-21 PROCEDURE — G8417 CALC BMI ABV UP PARAM F/U: HCPCS | Performed by: INTERNAL MEDICINE

## 2022-06-21 RX ORDER — SODIUM CHLORIDE 0.9 % (FLUSH) 0.9 %
5-40 SYRINGE (ML) INJECTION PRN
Status: CANCELLED | OUTPATIENT
Start: 2022-07-01

## 2022-06-21 RX ORDER — FAMOTIDINE 10 MG/ML
20 INJECTION, SOLUTION INTRAVENOUS ONCE
Status: CANCELLED | OUTPATIENT
Start: 2022-07-01 | End: 2022-07-01

## 2022-06-21 RX ORDER — SODIUM CHLORIDE 9 MG/ML
INJECTION, SOLUTION INTRAVENOUS CONTINUOUS
Status: CANCELLED | OUTPATIENT
Start: 2022-07-01

## 2022-06-21 RX ORDER — SODIUM CHLORIDE 9 MG/ML
INJECTION, SOLUTION INTRAVENOUS CONTINUOUS
Status: SHIPPED | OUTPATIENT
Start: 2022-06-21 | End: 2022-06-22

## 2022-06-21 RX ORDER — HEPARIN SODIUM (PORCINE) LOCK FLUSH IV SOLN 100 UNIT/ML 100 UNIT/ML
500 SOLUTION INTRAVENOUS PRN
Status: CANCELLED | OUTPATIENT
Start: 2022-07-01

## 2022-06-21 RX ORDER — DIPHENHYDRAMINE HYDROCHLORIDE 50 MG/ML
50 INJECTION INTRAMUSCULAR; INTRAVENOUS ONCE
Status: CANCELLED | OUTPATIENT
Start: 2022-07-01 | End: 2022-07-01

## 2022-06-21 RX ORDER — SODIUM CHLORIDE 9 MG/ML
25 INJECTION, SOLUTION INTRAVENOUS PRN
Status: CANCELLED | OUTPATIENT
Start: 2022-07-01

## 2022-06-21 RX ORDER — EPINEPHRINE 1 MG/ML
0.3 INJECTION, SOLUTION, CONCENTRATE INTRAVENOUS PRN
Status: CANCELLED | OUTPATIENT
Start: 2022-07-01

## 2022-06-21 RX ORDER — METHYLPREDNISOLONE SODIUM SUCCINATE 125 MG/2ML
125 INJECTION, POWDER, LYOPHILIZED, FOR SOLUTION INTRAMUSCULAR; INTRAVENOUS ONCE
Status: CANCELLED | OUTPATIENT
Start: 2022-07-01 | End: 2022-07-01

## 2022-06-21 NOTE — PROGRESS NOTES
[address] [place] [address], [address] [place] [address] ) [phone] (F)      Dear Dr. Zena Rodriguez MD:  Please find Rheumatology assessment. Thank you for giving me the opportunity to be involved in [facility] Vamsi's care and I look forward following [facility] along with you. If you have any questions or concerns please feel free to reach me. Note is transcribed using voice recognition software. Inadvertent computerized transcription errors may be present. Patient identification: López Agustin,: ,28 y.o. Sex: female     Assessment / Plan: [facility] was seen today for follow-up. Diagnoses and all orders for this visit:    Psoriatic arthritis ([facility] [address]    Psoriasis    High risk medication use    Primary osteoarthritis of both hands    Osteopenia of multiple sites         1. Psoriasis and psoriatic arthritis-    Psoriasis- active - in scalp,  managed by Derm on topical steroid  therapy. Psoriatic arthritis- acceptable control on abatacept IV therapy every 4 weekly      Previous Rx- Enbrel- lost efficacy Humira-nonspecific facial eruption, and lost efficacy over time  Cosentyx- subcu therapy too expensive to afford on Medicare. 2.  Osteoarthritis-s/p bilateral hip replacements -symptomatic jose finger joints, knees- manageable. 3.  Bone health -  DEXA scan-   2022 L-spine T score of -0.7, left radius -2.7. Left radius T-scores are not calibrated, number T-scores are fairly preserved, hips could not be checked because of hip replacements. Recommend continue calcium and vitamin D supplementation. No need for therapeutic intervention. RTC 3 months.   Patient indicates understanding and agrees with the management plan. I reviewed patient's history, referral documents and electronic medical records. #######################################################################    Subjective-  Follow-up for psoriasis, psoriatic arthritis and generalized osteoarthritis. Interval changes-psoriatic arthritis is under acceptable control. Has stiffness in the finger joints from advanced osteoarthritis. Continues to have scalp psoriasis, followed by dermatology on steroid lotion. Overall, IV abatacept has helped the best in terms of her joints. ADLs and recreational activities are normal.  No side effects from medications. Current Outpatient Medications   Medication Sig Dispense Refill    Bimatoprost (LUMIGAN OP) Apply to eye      SYNTHROID 175 MCG tablet TAKE 1 TABLET DAILY 90 tablet 1    naproxen (NAPROSYN) 500 MG tablet TAKE 1 TABLET BY MOUTH TWICE A DAY WITH MEALS 180 tablet 0    predniSONE (DELTASONE) 10 MG tablet Take 1 Tab daily. 30 tablet 1    predniSONE (DELTASONE) 10 MG tablet TAKE 2 TABS BY MOUTH DAILY X7 DAYS, 1.5 TAB DAILY X7 DAYS, 1 DAILY X7 DAYS, 1/2 DAILY X7 DAYS 35 tablet 0    hydrocortisone 2.5 % cream Apply topically 3 times daily to affected area. 20 g 1    atorvastatin (LIPITOR) 20 MG tablet Take 1 tablet by mouth daily 90 tablet 3    gabapentin (NEURONTIN) 300 MG capsule Take 1 capsule by mouth nightly as needed (pain) for up to 180 days. 90 capsule 1    Vitamin D (CHOLECALCIFEROL) 1000 UNITS CAPS capsule Take 1,000 Units by mouth daily.  latanoprost (XALATAN) 0.005 % ophthalmic solution Place 1 drop into both eyes nightly       timolol (TIMOPTIC-XR) 0.5 % ophthalmic gel-forming Place 1 drop into both eyes daily        No current facility-administered medications for this visit.      Facility-Administered Medications Ordered in Other Visits   Medication Dose Route Frequency Provider Last Rate Last Admin    abatacept (ORENCIA) 1,000 mg in sodium chloride 0.9 % 100 mL infusion  1,000 mg IntraVENous Once Nella Oseguera MD        0.9 % sodium chloride infusion   IntraVENous Continuous Nella Oseguera MD         Allergies   Allergen Reactions    Codeine Phosphate      Nausea and vomiting       PHYSICAL EXAM:    Vitals: There were no vitals taken for this visit. General appearance/ Psychiatric: well nourished, and well groomed, normal judgement, alert, appears stated age and cooperative. She looks comfortable, has normal breathing effort. MKS:   28 joint JOINT COUNT:                               Right                                                  Left   Swell Tender Swell Tender   PIP1           PIP2         PIP3           PIP4          PIP5         MCP1           MCP2          MCP3         MCP4           MCP5         Wrist           Elbow           Shoulder          Knee             Other than osteoarthritic changes in her finger joints that has caused deformities over the years,  I do not appreciate any tender, swollen, inflamed joints in upper or lower extremities. Loose fist because of mechanical changes from osteoarthritis.   Skin: scalp psoriasis +    DATA:   Lab Results   Component Value Date    WBC 5.0 03/15/2022    HGB 12.1 03/15/2022    HCT 37.0 03/15/2022    MCV 94.3 03/15/2022     03/15/2022         Chemistry        Component Value Date/Time     03/15/2022 1148    K 4.3 03/15/2022 1148     03/15/2022 1148    CO2 24 03/15/2022 1148    BUN 15 03/15/2022 1148    CREATININE 0.6 03/15/2022 1148        Component Value Date/Time    CALCIUM 8.8 03/15/2022 1148    ALKPHOS 81 03/15/2022 1148    AST 14 (L) 03/15/2022 1148    ALT 12 03/15/2022 1148    BILITOT <0.2 03/15/2022 1148          Lab Results   Component Value Date    LABURIC 5.5 09/10/2018     Lab Results   Component Value Date    SEDRATE 45 (H) 03/15/2022     Lab Results   Component Value Date    CRP <3.0 03/15/2022     Lab Results   Component Value Date    AQUILES Negative 09/10/2018    SEDRATE 45 (H) 03/15/2022     No results found for: CKTOTAL  Lab Results   Component Value Date    TSH 2.07 07/09/2012     Lab Results   Component Value Date    VITD25 31.6 08/16/2017         Radiology Review:   10/1/2018-  Both hand x-ray images were retrieved, images were shown to patient, has significant osteoarthritic changes in the DIP, PIP's as well as joint space narrowing of MCP joints mainly his third fourth. R 2 nd PIP has central erosion with new bone formation,which has progressed when comparing studies from Jan 2018. A/P- See above. Total time 30 minutes that includes the following-  Preparing to see the patient such as reviewing patients records, pre-charting, preparing the visit on the same day, performing a medically appropriate history and physical examination, counseling and educating patient about diagnosis, management plan, ordering appropriate testings, prescriptions, communicating findings to other care providers, and documenting clinical information in electronic medical record.

## 2022-06-21 NOTE — PROGRESS NOTES
6/21/2022  Follow up visit with Dr. Clay Dawn and labs today: cbc, creatinine, crp, AST, ALT, and sed rate drawn. Pt here for Orencia infusion. No  Adverse effects from previous infusion. Today 261 lbs =118 kg  = 1000 mg orencia, per MD.  Infusion  tolerated well. Vitals stable. Next visit scheduled  in 4 weeks.   IV Gauge: #24  IV Site:  Left antecubital  # of Attempts: 1  Infusion  Start: 1335 pm  Infusion  Stop: 1405 pm      See Therapy plan, flowsheets, and MAR    Administrations This Visit     abatacept (ORENCIA) 1,000 mg in sodium chloride 0.9 % 100 mL infusion     Admin Date  06/21/2022  13:35 Action  New Bag Dose  1,000 mg Rate  200 mL/hr Route  IntraVENous Site   Administered By  Allegra Gutierrez RN    Ordering Provider: Rafi Jj MD    Patient Supplied?: No

## 2022-06-22 LAB
ALT SERPL-CCNC: 20 U/L (ref 10–40)
AST SERPL-CCNC: 29 U/L (ref 15–37)
C-REACTIVE PROTEIN: 4.9 MG/L (ref 0–5.1)
CREAT SERPL-MCNC: 0.5 MG/DL (ref 0.6–1.2)
GFR AFRICAN AMERICAN: >60
GFR NON-AFRICAN AMERICAN: >60
SEDIMENTATION RATE, ERYTHROCYTE: 28 MM/HR (ref 0–30)

## 2022-06-24 LAB
QUANTI TB GOLD PLUS: NEGATIVE
QUANTI TB1 MINUS NIL: 0 IU/ML (ref 0–0.34)
QUANTI TB2 MINUS NIL: 0 IU/ML (ref 0–0.34)
QUANTIFERON MITOGEN: >10 IU/ML
QUANTIFERON NIL: 0.01 IU/ML

## 2022-07-19 ENCOUNTER — NURSE ONLY (OUTPATIENT)
Dept: INFUSION THERAPY | Age: 67
End: 2022-07-19
Payer: MEDICARE

## 2022-07-19 VITALS
TEMPERATURE: 98.8 F | BODY MASS INDEX: 44.8 KG/M2 | RESPIRATION RATE: 18 BRPM | HEART RATE: 78 BPM | SYSTOLIC BLOOD PRESSURE: 144 MMHG | DIASTOLIC BLOOD PRESSURE: 80 MMHG | WEIGHT: 256 LBS

## 2022-07-19 DIAGNOSIS — L40.50 PSORIATIC ARTHRITIS (HCC): Primary | ICD-10-CM

## 2022-07-19 PROCEDURE — 96365 THER/PROPH/DIAG IV INF INIT: CPT | Performed by: INTERNAL MEDICINE

## 2022-07-19 RX ORDER — SODIUM CHLORIDE 9 MG/ML
25 INJECTION, SOLUTION INTRAVENOUS PRN
Status: CANCELLED | OUTPATIENT
Start: 2022-08-16

## 2022-07-19 RX ORDER — FAMOTIDINE 10 MG/ML
20 INJECTION, SOLUTION INTRAVENOUS ONCE
Status: CANCELLED | OUTPATIENT
Start: 2022-08-16 | End: 2022-08-16

## 2022-07-19 RX ORDER — HEPARIN SODIUM (PORCINE) LOCK FLUSH IV SOLN 100 UNIT/ML 100 UNIT/ML
500 SOLUTION INTRAVENOUS PRN
Status: CANCELLED | OUTPATIENT
Start: 2022-08-16

## 2022-07-19 RX ORDER — SODIUM CHLORIDE 9 MG/ML
INJECTION, SOLUTION INTRAVENOUS CONTINUOUS
Status: CANCELLED | OUTPATIENT
Start: 2022-08-16

## 2022-07-19 RX ORDER — DIPHENHYDRAMINE HYDROCHLORIDE 50 MG/ML
50 INJECTION INTRAMUSCULAR; INTRAVENOUS ONCE
Status: CANCELLED | OUTPATIENT
Start: 2022-08-16 | End: 2022-08-16

## 2022-07-19 RX ORDER — SODIUM CHLORIDE 0.9 % (FLUSH) 0.9 %
5-40 SYRINGE (ML) INJECTION PRN
Status: CANCELLED | OUTPATIENT
Start: 2022-08-16

## 2022-07-19 RX ORDER — METHYLPREDNISOLONE SODIUM SUCCINATE 125 MG/2ML
125 INJECTION, POWDER, LYOPHILIZED, FOR SOLUTION INTRAMUSCULAR; INTRAVENOUS ONCE
Status: CANCELLED | OUTPATIENT
Start: 2022-08-16 | End: 2022-08-16

## 2022-07-19 RX ORDER — EPINEPHRINE 1 MG/ML
0.3 INJECTION, SOLUTION, CONCENTRATE INTRAVENOUS PRN
Status: CANCELLED | OUTPATIENT
Start: 2022-08-16

## 2022-08-01 DIAGNOSIS — E03.9 ACQUIRED HYPOTHYROIDISM: ICD-10-CM

## 2022-08-01 RX ORDER — LEVOTHYROXINE SODIUM 175 MCG
TABLET ORAL
Qty: 90 TABLET | Refills: 1 | Status: SHIPPED | OUTPATIENT
Start: 2022-08-01

## 2022-08-02 RX ORDER — NAPROXEN 500 MG/1
500 TABLET ORAL 2 TIMES DAILY WITH MEALS
Qty: 180 TABLET | Refills: 0 | Status: SHIPPED | OUTPATIENT
Start: 2022-08-02

## 2022-08-16 ENCOUNTER — NURSE ONLY (OUTPATIENT)
Dept: INFUSION THERAPY | Age: 67
End: 2022-08-16
Payer: MEDICARE

## 2022-08-16 VITALS
BODY MASS INDEX: 45.32 KG/M2 | WEIGHT: 259 LBS | RESPIRATION RATE: 16 BRPM | DIASTOLIC BLOOD PRESSURE: 73 MMHG | HEART RATE: 68 BPM | TEMPERATURE: 98.6 F | SYSTOLIC BLOOD PRESSURE: 136 MMHG

## 2022-08-16 DIAGNOSIS — L40.50 PSORIATIC ARTHRITIS (HCC): Primary | ICD-10-CM

## 2022-08-16 PROCEDURE — 96365 THER/PROPH/DIAG IV INF INIT: CPT | Performed by: INTERNAL MEDICINE

## 2022-08-16 RX ORDER — SODIUM CHLORIDE 9 MG/ML
INJECTION, SOLUTION INTRAVENOUS CONTINUOUS
Status: CANCELLED | OUTPATIENT
Start: 2022-09-13

## 2022-08-16 RX ORDER — METHYLPREDNISOLONE SODIUM SUCCINATE 125 MG/2ML
125 INJECTION, POWDER, LYOPHILIZED, FOR SOLUTION INTRAMUSCULAR; INTRAVENOUS ONCE
Status: CANCELLED | OUTPATIENT
Start: 2022-09-13 | End: 2022-09-13

## 2022-08-16 RX ORDER — SODIUM CHLORIDE 0.9 % (FLUSH) 0.9 %
5-40 SYRINGE (ML) INJECTION PRN
Status: CANCELLED | OUTPATIENT
Start: 2022-09-13

## 2022-08-16 RX ORDER — FAMOTIDINE 10 MG/ML
20 INJECTION, SOLUTION INTRAVENOUS ONCE
Status: CANCELLED | OUTPATIENT
Start: 2022-09-13 | End: 2022-09-13

## 2022-08-16 RX ORDER — HEPARIN SODIUM (PORCINE) LOCK FLUSH IV SOLN 100 UNIT/ML 100 UNIT/ML
500 SOLUTION INTRAVENOUS PRN
Status: CANCELLED | OUTPATIENT
Start: 2022-09-13

## 2022-08-16 RX ORDER — SODIUM CHLORIDE 9 MG/ML
25 INJECTION, SOLUTION INTRAVENOUS PRN
Status: CANCELLED | OUTPATIENT
Start: 2022-09-13

## 2022-08-16 RX ORDER — EPINEPHRINE 1 MG/ML
0.3 INJECTION, SOLUTION, CONCENTRATE INTRAVENOUS PRN
Status: CANCELLED | OUTPATIENT
Start: 2022-09-13

## 2022-08-16 RX ORDER — DIPHENHYDRAMINE HYDROCHLORIDE 50 MG/ML
50 INJECTION INTRAMUSCULAR; INTRAVENOUS ONCE
Status: CANCELLED | OUTPATIENT
Start: 2022-09-13 | End: 2022-09-13

## 2022-08-16 NOTE — PROGRESS NOTES
8/16/2022  Pt here for Orencia infusion. Patient denies current infections, illnesses, or recent surgeries. No  Adverse effects from previous infusion. Today 259 lbs =117 kg  = 1000 mg orencia, per MD.  Infusion  tolerated well. Vitals stable. Next visit scheduled  in 4 weeks.   IV Gauge: #22 Saf T Intima  IV Site: left antecubital  # of Attempts: 1  Infusion  Start: 0940 am   Infusion  Stop: 1010 am      See Therapy plan, flowsheets, and MAR  Administrations This Visit       abatacept (ORENCIA) 1,000 mg in sodium chloride 0.9 % 100 mL infusion       Admin Date  08/16/2022  09:40 Action  New Bag Dose  1,000 mg Rate  200 mL/hr Route  IntraVENous Site   Administered By  Melyssa Rodriguez RN    Ordering Provider: Jaylen Mirza MD    Patient Supplied?: No    Comments: orencia 1000 mg per dr Anurag Nguyen

## 2022-09-13 ENCOUNTER — NURSE ONLY (OUTPATIENT)
Dept: INFUSION THERAPY | Age: 67
End: 2022-09-13
Payer: MEDICARE

## 2022-09-13 ENCOUNTER — OFFICE VISIT (OUTPATIENT)
Dept: RHEUMATOLOGY | Age: 67
End: 2022-09-13
Payer: MEDICARE

## 2022-09-13 VITALS
SYSTOLIC BLOOD PRESSURE: 143 MMHG | RESPIRATION RATE: 16 BRPM | HEART RATE: 56 BPM | TEMPERATURE: 98.4 F | WEIGHT: 256 LBS | DIASTOLIC BLOOD PRESSURE: 73 MMHG | BODY MASS INDEX: 44.8 KG/M2

## 2022-09-13 VITALS
TEMPERATURE: 98.4 F | RESPIRATION RATE: 16 BRPM | SYSTOLIC BLOOD PRESSURE: 143 MMHG | DIASTOLIC BLOOD PRESSURE: 73 MMHG | HEART RATE: 56 BPM

## 2022-09-13 DIAGNOSIS — L40.50 PSORIATIC ARTHRITIS (HCC): Primary | ICD-10-CM

## 2022-09-13 DIAGNOSIS — M19.041 PRIMARY OSTEOARTHRITIS OF BOTH HANDS: ICD-10-CM

## 2022-09-13 DIAGNOSIS — M85.89 OSTEOPENIA OF MULTIPLE SITES: ICD-10-CM

## 2022-09-13 DIAGNOSIS — L40.50 PSORIATIC ARTHRITIS (HCC): ICD-10-CM

## 2022-09-13 DIAGNOSIS — Z79.899 HIGH RISK MEDICATION USE: ICD-10-CM

## 2022-09-13 DIAGNOSIS — M19.042 PRIMARY OSTEOARTHRITIS OF BOTH HANDS: ICD-10-CM

## 2022-09-13 DIAGNOSIS — L40.9 PSORIASIS: Primary | ICD-10-CM

## 2022-09-13 LAB
ALT SERPL-CCNC: 13 U/L (ref 10–40)
AST SERPL-CCNC: 18 U/L (ref 15–37)
BASOPHILS ABSOLUTE: 0 K/UL (ref 0–0.2)
BASOPHILS RELATIVE PERCENT: 0.8 %
C-REACTIVE PROTEIN: 3.2 MG/L (ref 0–5.1)
CREAT SERPL-MCNC: 0.6 MG/DL (ref 0.6–1.2)
EOSINOPHILS ABSOLUTE: 0.3 K/UL (ref 0–0.6)
EOSINOPHILS RELATIVE PERCENT: 5.1 %
GFR AFRICAN AMERICAN: >60
GFR NON-AFRICAN AMERICAN: >60
HCT VFR BLD CALC: 36.9 % (ref 36–48)
HEMOGLOBIN: 12.4 G/DL (ref 12–16)
LYMPHOCYTES ABSOLUTE: 1.5 K/UL (ref 1–5.1)
LYMPHOCYTES RELATIVE PERCENT: 29.7 %
MCH RBC QN AUTO: 32.1 PG (ref 26–34)
MCHC RBC AUTO-ENTMCNC: 33.7 G/DL (ref 31–36)
MCV RBC AUTO: 95.4 FL (ref 80–100)
MONOCYTES ABSOLUTE: 0.5 K/UL (ref 0–1.3)
MONOCYTES RELATIVE PERCENT: 9.1 %
NEUTROPHILS ABSOLUTE: 2.8 K/UL (ref 1.7–7.7)
NEUTROPHILS RELATIVE PERCENT: 55.3 %
PDW BLD-RTO: 14.7 % (ref 12.4–15.4)
PLATELET # BLD: 268 K/UL (ref 135–450)
PMV BLD AUTO: 8.8 FL (ref 5–10.5)
RBC # BLD: 3.87 M/UL (ref 4–5.2)
SEDIMENTATION RATE, ERYTHROCYTE: 36 MM/HR (ref 0–30)
WBC # BLD: 5 K/UL (ref 4–11)

## 2022-09-13 PROCEDURE — 3017F COLORECTAL CA SCREEN DOC REV: CPT | Performed by: INTERNAL MEDICINE

## 2022-09-13 PROCEDURE — G8417 CALC BMI ABV UP PARAM F/U: HCPCS | Performed by: INTERNAL MEDICINE

## 2022-09-13 PROCEDURE — G8399 PT W/DXA RESULTS DOCUMENT: HCPCS | Performed by: INTERNAL MEDICINE

## 2022-09-13 PROCEDURE — 96365 THER/PROPH/DIAG IV INF INIT: CPT | Performed by: INTERNAL MEDICINE

## 2022-09-13 PROCEDURE — 36415 COLL VENOUS BLD VENIPUNCTURE: CPT | Performed by: INTERNAL MEDICINE

## 2022-09-13 PROCEDURE — 1036F TOBACCO NON-USER: CPT | Performed by: INTERNAL MEDICINE

## 2022-09-13 PROCEDURE — 1123F ACP DISCUSS/DSCN MKR DOCD: CPT | Performed by: INTERNAL MEDICINE

## 2022-09-13 PROCEDURE — 1090F PRES/ABSN URINE INCON ASSESS: CPT | Performed by: INTERNAL MEDICINE

## 2022-09-13 PROCEDURE — 99214 OFFICE O/P EST MOD 30 MIN: CPT | Performed by: INTERNAL MEDICINE

## 2022-09-13 PROCEDURE — G8427 DOCREV CUR MEDS BY ELIG CLIN: HCPCS | Performed by: INTERNAL MEDICINE

## 2022-09-13 RX ORDER — HEPARIN SODIUM (PORCINE) LOCK FLUSH IV SOLN 100 UNIT/ML 100 UNIT/ML
500 SOLUTION INTRAVENOUS PRN
Status: CANCELLED | OUTPATIENT
Start: 2022-10-01

## 2022-09-13 RX ORDER — DIPHENHYDRAMINE HYDROCHLORIDE 50 MG/ML
50 INJECTION INTRAMUSCULAR; INTRAVENOUS ONCE
Status: CANCELLED | OUTPATIENT
Start: 2022-10-01 | End: 2022-10-01

## 2022-09-13 RX ORDER — SODIUM CHLORIDE 9 MG/ML
25 INJECTION, SOLUTION INTRAVENOUS PRN
Status: CANCELLED | OUTPATIENT
Start: 2022-10-01

## 2022-09-13 RX ORDER — SODIUM CHLORIDE 0.9 % (FLUSH) 0.9 %
5-40 SYRINGE (ML) INJECTION PRN
Status: CANCELLED | OUTPATIENT
Start: 2022-10-01

## 2022-09-13 RX ORDER — EPINEPHRINE 1 MG/ML
0.3 INJECTION, SOLUTION, CONCENTRATE INTRAVENOUS PRN
Status: CANCELLED | OUTPATIENT
Start: 2022-10-01

## 2022-09-13 RX ORDER — METHYLPREDNISOLONE SODIUM SUCCINATE 125 MG/2ML
125 INJECTION, POWDER, LYOPHILIZED, FOR SOLUTION INTRAMUSCULAR; INTRAVENOUS ONCE
Status: CANCELLED | OUTPATIENT
Start: 2022-10-01 | End: 2022-10-01

## 2022-09-13 RX ORDER — SODIUM CHLORIDE 9 MG/ML
INJECTION, SOLUTION INTRAVENOUS CONTINUOUS
Status: CANCELLED | OUTPATIENT
Start: 2022-10-01

## 2022-09-13 RX ORDER — FAMOTIDINE 10 MG/ML
20 INJECTION, SOLUTION INTRAVENOUS ONCE
Status: CANCELLED | OUTPATIENT
Start: 2022-10-01 | End: 2022-10-01

## 2022-09-13 NOTE — PROGRESS NOTES
9/13/2022  Follow up visit with Dr. Antonietta Hunter and labs today: cbc, creatinine, crp, AST, ALT, and sed rate drawn. Pt here for Orencia infusion. Patient denies current infections, illnesses, or recent surgeries. No  Adverse effects from previous infusion. Today's weight 256 lbs =116 kg  = 1000 mg orencia, per MD.  Infusion  tolerated well. Vitals stable. Next visit scheduled  in 4 weeks.   IV Gauge: #22 Saf T Intima  IV Site: left antecubital  # of Attempts: 1  Infusion  Start: 0950 am  Infusion  Stop: 1025 am   Medication buy and bill      See Therapy plan, flowsheets, and MAR  Administrations This Visit       abatacept (ORENCIA) 1,000 mg in sodium chloride 0.9 % 100 mL infusion       Admin Date  09/13/2022  09:50 Action  New Bag Dose  1,000 mg Rate  200 mL/hr Route  IntraVENous Site   Administered By  Froilan Hickey RN    Ordering Provider: James Juarez MD    Patient Supplied?: No    Comments: buy and bill

## 2022-09-13 NOTE — PROGRESS NOTES
[address] (z) [phone] (F)      Dear Dr. Liya Rutherford MD:  Please find Rheumatology assessment. Thank you for giving me the opportunity to be involved in [facility] Vamsi's care and I look forward following [facility] along with you. If you have any questions or concerns please feel free to reach me. Note is transcribed using voice recognition software. Inadvertent computerized transcription errors may be present. Patient identification: Chandrika Agustin,: ,1 y.o. Sex: female     Assessment / Plan: [facility] was seen today for follow-up. Diagnoses and all orders for this visit:    Primary osteoarthritis of both hands    Psoriatic arthritis ([address]    Psoriasis    High risk medication use    Osteopenia of multiple sites       1. Psoriasis and psoriatic arthritis-    Psoriasis-active plaque psoriasis in her scalp, using topical clobetasol spray. Advised patient to check with dermatology about additional treatment options. We talked about other treatment options including low-dose methotrexate versus switching infusion to Remicade. Abatacept has done so well in terms of psoriatic arthritis, I would recommend continuing abatacept. Psoriatic arthritis- acceptable control on abatacept IV therapy every 4 weekly      Previous Rx- Enbrel- lost efficacy Humira-nonspecific facial eruption, and lost efficacy over time  Cosentyx- subcu therapy too expensive to afford on Medicare. 2.  Osteoarthritis-s/p bilateral hip replacements -mild intercurrent symptoms in her finger joints, knees, manageable without any additional medications.       3.  Bone health -  DEXA scan- 1/26/2022 L-spine T score of -0.7, left radius -2.7. Left radius T-scores are not calibrated, number T-scores are fairly preserved, hips could not be checked because of hip replacements. Recommend continue calcium and vitamin D supplementation. No need for therapeutic intervention. RTC 3 months. Patient indicates understanding and agrees with the management plan. I reviewed patient's history, referral documents and electronic medical records. #######################################################################    Subjective-  Follow-up for psoriasis, psoriatic arthritis and generalized osteoarthritis. Interval changes-  She is doing well in terms of psoriatic arthritis, no flares or symptoms. Continues to have discomfort and deformities in her finger joints from osteoarthritis, that is unchanged. This does not limit ADLs and recreational activities. Scalp psoriasis is active, multiple large thick plaques, is using topical clobetasol liquid, has not helped much. No active psoriasis in other areas. She is pleased on IV abatacept in terms of psoriatic arthritis. No side effects. Current Outpatient Medications   Medication Sig Dispense Refill    naproxen (NAPROSYN) 500 MG tablet Take 1 tablet by mouth in the morning and 1 tablet in the evening. Take with meals. TAKE 1 TABLET BY MOUTH TWICE A DAY WITH MEALS. 180 tablet 0    SYNTHROID 175 MCG tablet TAKE 1 TABLET DAILY 90 tablet 1    Bimatoprost (LUMIGAN OP) Apply to eye      predniSONE (DELTASONE) 10 MG tablet Take 1 Tab daily. 30 tablet 1    predniSONE (DELTASONE) 10 MG tablet TAKE 2 TABS BY MOUTH DAILY X7 DAYS, 1.5 TAB DAILY X7 DAYS, 1 DAILY X7 DAYS, 1/2 DAILY X7 DAYS 35 tablet 0    hydrocortisone 2.5 % cream Apply topically 3 times daily to affected area.  20 g 1    atorvastatin (LIPITOR) 20 MG tablet Take 1 tablet by mouth daily 90 tablet 3    gabapentin (NEURONTIN) 300 MG capsule Take 1 capsule by mouth nightly as needed (pain) for up to 180 days. 90 capsule 1    Vitamin D (CHOLECALCIFEROL) 1000 UNITS CAPS capsule Take 1,000 Units by mouth daily. latanoprost (XALATAN) 0.005 % ophthalmic solution Place 1 drop into both eyes nightly       timolol (TIMOPTIC-XR) 0.5 % ophthalmic gel-forming Place 1 drop into both eyes daily        No current facility-administered medications for this visit. Facility-Administered Medications Ordered in Other Visits   Medication Dose Route Frequency Provider Last Rate Last Admin    abatacept (ORENCIA) 1,000 mg in sodium chloride 0.9 % 100 mL infusion  1,000 mg IntraVENous Once Colin Silverio MD         Allergies   Allergen Reactions    Codeine Phosphate      Nausea and vomiting       PHYSICAL EXAM:    Vitals:    BP (!) 145/76 Comment: dr Neel Medina aware, okay to tx  Pulse 67   General appearance/ Psychiatric: well nourished, and well groomed, normal judgement, alert, appears stated age and cooperative. She looks comfortable, has normal breathing effort. MKS:   28 joint JOINT COUNT:                               Right                                                  Left   Swell Tender Swell Tender   PIP1           PIP2         PIP3           PIP4          PIP5         MCP1           MCP2          MCP3         MCP4           MCP5         Wrist           Elbow           Shoulder          Knee             OA changes unchanged in her finger joints with Heberden's and Zaire's nodules, do not appreciate any tender, swollen, inflamed joints in upper or lower extremities. Loose fist because of mechanical changes from osteoarthritis. Skin: scalp psoriasis +-multiple plaques in her hairline, behind ears, scalp.     DATA:   Lab Results   Component Value Date    WBC 6.2 06/21/2022    HGB 11.9 (L) 06/21/2022    HCT 37.0 06/21/2022    MCV 96.4 06/21/2022     06/21/2022         Chemistry        Component Value Date/Time     03/15/2022 1148    K 4.3 03/15/2022 1148     03/15/2022 1148    CO2 24 03/15/2022 1148    BUN 15 03/15/2022 1148    CREATININE 0.5 (L) 06/21/2022 1343        Component Value Date/Time    CALCIUM 8.8 03/15/2022 1148    ALKPHOS 81 03/15/2022 1148    AST 29 06/21/2022 1343    ALT 20 06/21/2022 1343    BILITOT <0.2 03/15/2022 1148          Lab Results   Component Value Date    LABURIC 5.5 09/10/2018     Lab Results   Component Value Date    SEDRATE 28 06/21/2022     Lab Results   Component Value Date    CRP 4.9 06/21/2022     Lab Results   Component Value Date    AQUILES Negative 09/10/2018    SEDRATE 28 06/21/2022     No results found for: CKTOTAL  Lab Results   Component Value Date    TSH 2.07 07/09/2012     Lab Results   Component Value Date    VITD25 31.6 08/16/2017         Radiology Review:   10/1/2018-  Both hand x-ray images were retrieved, images were shown to patient, has significant osteoarthritic changes in the DIP, PIP's as well as joint space narrowing of MCP joints mainly his third fourth. R 2 nd PIP has central erosion with new bone formation,which has progressed when comparing studies from Jan 2018. A/P- See above. Total time 32 minutes that includes the following-  Preparing to see the patient such as reviewing patients records, pre-charting, preparing the visit on the same day, performing a medically appropriate history and physical examination, counseling and educating patient about diagnosis, management plan, ordering appropriate testings, prescriptions, communicating findings to other care providers, and documenting clinical information in electronic medical record.

## 2022-10-11 ENCOUNTER — NURSE ONLY (OUTPATIENT)
Dept: INFUSION THERAPY | Age: 67
End: 2022-10-11
Payer: MEDICARE

## 2022-10-11 VITALS
BODY MASS INDEX: 44.45 KG/M2 | WEIGHT: 254 LBS | DIASTOLIC BLOOD PRESSURE: 76 MMHG | SYSTOLIC BLOOD PRESSURE: 142 MMHG | HEART RATE: 72 BPM | RESPIRATION RATE: 16 BRPM | TEMPERATURE: 98.6 F

## 2022-10-11 DIAGNOSIS — L40.50 PSORIATIC ARTHRITIS (HCC): Primary | ICD-10-CM

## 2022-10-11 PROCEDURE — 96365 THER/PROPH/DIAG IV INF INIT: CPT | Performed by: INTERNAL MEDICINE

## 2022-10-11 NOTE — PROGRESS NOTES
10/11/2022  Pt here for Orencia infusion. Patient denies current infections, illnesses, or recent surgeries. No  Adverse effects from previous infusion. Today's weight 254 lbs =115 kg  = 1000 mg orencia, per MD.  Infusion  tolerated well. Vitals stable. Next visit scheduled  in 4 weeks. IV Gauge: #22 Saf T Intima  IV Site: left antecubital  # of Attempts: 1  Infusion  Start: 0935 am  Infusion  Stop: 1005 am  Medication buy and bill.   Chaz torre      See Therapy plan, flowsheets, and MAR  Administrations This Visit       abatacept (ORENCIA) 1,000 mg in sodium chloride 0.9 % 100 mL infusion       Admin Date  10/11/2022  09:35 Action  New Bag Dose  1,000 mg Rate  200 mL/hr Route  IntraVENous Site   Administered By  Raul Graff RN    Ordering Provider: Nela Sanchez MD    Patient Supplied?: No    Comments: buy and bill

## 2022-11-08 ENCOUNTER — NURSE ONLY (OUTPATIENT)
Dept: INFUSION THERAPY | Age: 67
End: 2022-11-08
Payer: MEDICARE

## 2022-11-08 VITALS
TEMPERATURE: 98.7 F | RESPIRATION RATE: 16 BRPM | WEIGHT: 250 LBS | HEART RATE: 63 BPM | DIASTOLIC BLOOD PRESSURE: 75 MMHG | SYSTOLIC BLOOD PRESSURE: 127 MMHG | BODY MASS INDEX: 43.75 KG/M2

## 2022-11-08 DIAGNOSIS — L40.50 PSORIATIC ARTHRITIS (HCC): Primary | ICD-10-CM

## 2022-11-08 DIAGNOSIS — Z79.899 HIGH RISK MEDICATION USE: ICD-10-CM

## 2022-11-08 LAB
ALT SERPL-CCNC: 13 U/L (ref 10–40)
AST SERPL-CCNC: 16 U/L (ref 15–37)
BASOPHILS ABSOLUTE: 0 K/UL (ref 0–0.2)
BASOPHILS RELATIVE PERCENT: 0.7 %
C-REACTIVE PROTEIN: <3 MG/L (ref 0–5.1)
CREAT SERPL-MCNC: 0.6 MG/DL (ref 0.6–1.2)
EOSINOPHILS ABSOLUTE: 0.2 K/UL (ref 0–0.6)
EOSINOPHILS RELATIVE PERCENT: 4.2 %
GFR SERPL CREATININE-BSD FRML MDRD: >60 ML/MIN/{1.73_M2}
HCT VFR BLD CALC: 37.2 % (ref 36–48)
HEMOGLOBIN: 12.5 G/DL (ref 12–16)
LYMPHOCYTES ABSOLUTE: 1.3 K/UL (ref 1–5.1)
LYMPHOCYTES RELATIVE PERCENT: 31.9 %
MCH RBC QN AUTO: 31 PG (ref 26–34)
MCHC RBC AUTO-ENTMCNC: 33.5 G/DL (ref 31–36)
MCV RBC AUTO: 92.8 FL (ref 80–100)
MONOCYTES ABSOLUTE: 0.5 K/UL (ref 0–1.3)
MONOCYTES RELATIVE PERCENT: 11.3 %
NEUTROPHILS ABSOLUTE: 2.2 K/UL (ref 1.7–7.7)
NEUTROPHILS RELATIVE PERCENT: 51.9 %
PDW BLD-RTO: 14.6 % (ref 12.4–15.4)
PLATELET # BLD: 242 K/UL (ref 135–450)
PMV BLD AUTO: 9 FL (ref 5–10.5)
RBC # BLD: 4.01 M/UL (ref 4–5.2)
SEDIMENTATION RATE, ERYTHROCYTE: 29 MM/HR (ref 0–30)
WBC # BLD: 4.1 K/UL (ref 4–11)

## 2022-11-08 PROCEDURE — 36415 COLL VENOUS BLD VENIPUNCTURE: CPT | Performed by: INTERNAL MEDICINE

## 2022-11-08 PROCEDURE — 96365 THER/PROPH/DIAG IV INF INIT: CPT | Performed by: INTERNAL MEDICINE

## 2022-11-08 NOTE — PROGRESS NOTES
11/8/2022  Labs today: cbc, creatinine, crp, AST, ALT, and sed rate drawn. Pt here for Orencia infusion. Patient denies current infections, illnesses, or recent surgeries. No  Adverse effects from previous infusion. Today's weight 250 lbs =113 kg  = 1000 mg orencia, per MD.  Infusion  tolerated well. Vitals stable. Next visit scheduled  in 4 weeks. Pt not planning to change insurance for 2023. IV Gauge: #22 Saf T Intima  IV Site: right antecubital  # of Attempts: 1  Infusion  Start: 0945 am  Infusion  Stop: 1015 am  Medication buy and bill.         See Therapy plan, flowsheets, and MAR  Administrations This Visit       abatacept (ORENCIA) 1,000 mg in sodium chloride 0.9 % 100 mL infusion       Admin Date  11/08/2022  09:45 Action  New Bag Dose  1,000 mg Rate  200 mL/hr Route  IntraVENous Site   Administered By  Singh Melendez RN    Ordering Provider: Unruly Wetzel MD    Patient Supplied?: No    Comments: buy and bill

## 2022-12-06 ENCOUNTER — NURSE ONLY (OUTPATIENT)
Dept: INFUSION THERAPY | Age: 67
End: 2022-12-06
Payer: MEDICARE

## 2022-12-06 VITALS
HEART RATE: 64 BPM | RESPIRATION RATE: 16 BRPM | BODY MASS INDEX: 44.27 KG/M2 | WEIGHT: 253 LBS | DIASTOLIC BLOOD PRESSURE: 70 MMHG | TEMPERATURE: 98 F | SYSTOLIC BLOOD PRESSURE: 132 MMHG

## 2022-12-06 DIAGNOSIS — L40.50 PSORIATIC ARTHRITIS (HCC): Primary | ICD-10-CM

## 2022-12-06 DIAGNOSIS — Z79.899 HIGH RISK MEDICATION USE: ICD-10-CM

## 2022-12-06 PROCEDURE — 96365 THER/PROPH/DIAG IV INF INIT: CPT | Performed by: INTERNAL MEDICINE

## 2022-12-06 NOTE — PROGRESS NOTES
12/6/2022  Pt here for orencia infusion. Dose per Dr Radha Clements orders. Patient denies current infections, illnesses, or recent surgeries. No  Adverse effects from previous infusion. Infusion  tolerated well. Vitals stable. Next visit scheduled. IV Gauge: #22 Saf T intima  IV Site: left antecubital  # of Attempts: 2  Infusion  Start: 0940 am   Infusion  Stop: 1010 am  Medication willem and bill.          See Therapy plan, flowsheets, and MAR  Wt Readings from Last 3 Encounters:   12/06/22 253 lb (114.8 kg)   11/08/22 250 lb (113.4 kg)   10/11/22 254 lb (115.2 kg)      Administrations This Visit       abatacept (ORENCIA) 1,000 mg in sodium chloride 0.9 % 100 mL infusion       Admin Date  12/06/2022  09:40 Action  New Bag Dose  1,000 mg Rate  200 mL/hr Route  IntraVENous Site   Administered By  Andres Jain RN    Ordering Provider: Harrison Greene MD    Patient Supplied?: No    Comments: buy and bill

## 2022-12-08 ENCOUNTER — TELEPHONE (OUTPATIENT)
Dept: INFUSION THERAPY | Age: 67
End: 2022-12-08

## 2022-12-08 NOTE — TELEPHONE ENCOUNTER
VOB: Yes for 2023 and notify patient  CO PAY ASSISTANCE: no      DRUG NAME Orencia ()     CURRENT WEIGHT   IN KILOGRAMS 114.     DOSE:  other 1000 mg      FREQUENCY EVERY 4 WEEKS                          INFUSION STATUS: CONTINUATION of infusion     DX CODE L40.50    NEXT INFUSION DATE : NEXT INFUSION DATE 1/3/2023

## 2023-01-03 ENCOUNTER — NURSE ONLY (OUTPATIENT)
Dept: INFUSION THERAPY | Age: 68
End: 2023-01-03
Payer: MEDICARE

## 2023-01-03 VITALS
RESPIRATION RATE: 16 BRPM | WEIGHT: 253 LBS | SYSTOLIC BLOOD PRESSURE: 132 MMHG | DIASTOLIC BLOOD PRESSURE: 70 MMHG | HEART RATE: 64 BPM | TEMPERATURE: 98.2 F | BODY MASS INDEX: 44.27 KG/M2

## 2023-01-03 DIAGNOSIS — Z79.899 HIGH RISK MEDICATION USE: ICD-10-CM

## 2023-01-03 DIAGNOSIS — L40.50 PSORIATIC ARTHRITIS (HCC): Primary | ICD-10-CM

## 2023-01-03 PROCEDURE — 96365 THER/PROPH/DIAG IV INF INIT: CPT | Performed by: INTERNAL MEDICINE

## 2023-01-03 NOTE — PROGRESS NOTES
1/3/2023  Pt here for Orenica infusion. Dose per Dr Bo Villareal orders. Patient denies current infections, illnesses, or recent surgeries. No  Adverse effects from previous infusion. Infusion  tolerated well. Vitals stable. Next visit scheduled.   IV Gauge: #22 Saf T Intima  IV Site: left antecubital  # of Attempts: 1  Infusion  Start: 0935 am  Infusion  Stop: 1005 am      See Therapy plan, flowsheets, and MAR  Wt Readings from Last 3 Encounters:   01/03/23 253 lb (114.8 kg)   12/06/22 253 lb (114.8 kg)   11/08/22 250 lb (113.4 kg)      Administrations This Visit       abatacept (ORENCIA) 1,000 mg in sodium chloride 0.9 % 100 mL infusion       Admin Date  01/03/2023  09:35 Action  New Bag Dose  1,000 mg Rate  200 mL/hr Route  IntraVENous Site   Administered By  Dali Trejo RN    Ordering Provider: Marianne Cruz MD    Patient Supplied?: No    Comments: buy and bill  orencia 1000 mg per dr Bhavik Villarreal                100 ml Normal Saline Solution    [facility] [id number] Lot [id number] July 1 2023

## 2023-01-09 DIAGNOSIS — E03.9 ACQUIRED HYPOTHYROIDISM: ICD-10-CM

## 2023-01-10 RX ORDER — NAPROXEN 500 MG/1
TABLET ORAL
Qty: 180 TABLET | Refills: 0 | Status: SHIPPED | OUTPATIENT
Start: 2023-01-10

## 2023-01-10 RX ORDER — LEVOTHYROXINE SODIUM 175 MCG
TABLET ORAL
Qty: 90 TABLET | Refills: 0 | Status: SHIPPED | OUTPATIENT
Start: 2023-01-10

## 2023-01-19 DIAGNOSIS — E78.5 HYPERLIPIDEMIA, UNSPECIFIED HYPERLIPIDEMIA TYPE: ICD-10-CM

## 2023-01-19 RX ORDER — ATORVASTATIN CALCIUM 20 MG/1
20 TABLET, FILM COATED ORAL DAILY
Qty: 90 TABLET | Refills: 3 | Status: SHIPPED | OUTPATIENT
Start: 2023-01-19

## 2023-01-19 NOTE — TELEPHONE ENCOUNTER
Refill    atorvastatin (LIPITOR) 20 MG tablet       [facility] [address], [place] - [address] [facility] [phone] - F [phone]   [place] [facility] [address]   Phone:  [phone]  Fax:  [phone]

## 2023-01-31 ENCOUNTER — NURSE ONLY (OUTPATIENT)
Dept: INFUSION THERAPY | Age: 68
End: 2023-01-31
Payer: MEDICARE

## 2023-01-31 VITALS
TEMPERATURE: 98.8 F | RESPIRATION RATE: 16 BRPM | SYSTOLIC BLOOD PRESSURE: 132 MMHG | WEIGHT: 254 LBS | DIASTOLIC BLOOD PRESSURE: 70 MMHG | HEART RATE: 70 BPM | BODY MASS INDEX: 44.45 KG/M2

## 2023-01-31 DIAGNOSIS — L40.50 PSORIATIC ARTHRITIS (HCC): Primary | ICD-10-CM

## 2023-01-31 DIAGNOSIS — Z11.59 ENCOUNTER FOR SCREENING FOR OTHER VIRAL DISEASES: ICD-10-CM

## 2023-01-31 DIAGNOSIS — L40.9 PSORIASIS: ICD-10-CM

## 2023-01-31 DIAGNOSIS — Z79.899 HIGH RISK MEDICATION USE: ICD-10-CM

## 2023-01-31 LAB
HEPATITIS B SURFACE ANTIGEN INTERPRETATION: NORMAL
HEPATITIS C ANTIBODY INTERPRETATION: NORMAL

## 2023-01-31 PROCEDURE — 96365 THER/PROPH/DIAG IV INF INIT: CPT | Performed by: INTERNAL MEDICINE

## 2023-01-31 NOTE — PATIENT INSTRUCTIONS
[address] [place] [address] [facility], [address]   [facility]   [phone]

## 2023-01-31 NOTE — PROGRESS NOTES
1/31/2023  Pt here for Orencia infusion. Dose per Dr Argenis Isabel orders. Patient denies current infections, illnesses, or recent surgeries. No  Adverse effects from previous infusion. Infusion  tolerated well. Vitals stable.   Next infusion visit at [facility] [employer]  IV Gauge: #22 Saf T Intima  IV Site: left antecubital  # of Attempts: 1  Infusion  Start: 1040 am  Infusion  Stop: 1110 am       See Therapy plan, flowsheets, and MAR  Wt Readings from Last 3 Encounters:   01/31/23 254 lb (115.2 kg)   01/03/23 253 lb (114.8 kg)   12/06/22 253 lb (114.8 kg)      Administrations This Visit       abatacept (ORENCIA) 1,000 mg in sodium chloride 0.9 % 100 mL infusion       Admin Date  01/31/2023  10:40 Action  New Bag Dose  1,000 mg Rate  200 mL/hr Route  IntraVENous Site   Administered By  Concepcion Haney RN    Ordering Provider: Lisbet Marquis MD    Patient Supplied?: No    Comments: willem and bill orencia 1000mg paula Turcios

## 2023-02-01 DIAGNOSIS — L40.50 PSORIATIC ARTHRITIS (HCC): Primary | ICD-10-CM

## 2023-02-01 DIAGNOSIS — L40.9 PSORIASIS: ICD-10-CM

## 2023-02-01 RX ORDER — SODIUM CHLORIDE 0.9 % (FLUSH) 0.9 %
5-40 SYRINGE (ML) INJECTION PRN
OUTPATIENT
Start: 2023-02-28

## 2023-02-01 RX ORDER — SODIUM CHLORIDE 9 MG/ML
5-250 INJECTION, SOLUTION INTRAVENOUS PRN
OUTPATIENT
Start: 2023-02-28

## 2023-02-01 RX ORDER — ONDANSETRON 2 MG/ML
8 INJECTION INTRAMUSCULAR; INTRAVENOUS
OUTPATIENT
Start: 2023-02-28

## 2023-02-01 RX ORDER — HEPARIN SODIUM (PORCINE) LOCK FLUSH IV SOLN 100 UNIT/ML 100 UNIT/ML
500 SOLUTION INTRAVENOUS PRN
OUTPATIENT
Start: 2023-02-28

## 2023-02-01 RX ORDER — SODIUM CHLORIDE 9 MG/ML
INJECTION, SOLUTION INTRAVENOUS CONTINUOUS
OUTPATIENT
Start: 2023-02-28

## 2023-02-01 RX ORDER — ALBUTEROL SULFATE 90 UG/1
4 AEROSOL, METERED RESPIRATORY (INHALATION) PRN
OUTPATIENT
Start: 2023-02-28

## 2023-02-01 RX ORDER — ACETAMINOPHEN 325 MG/1
650 TABLET ORAL
OUTPATIENT
Start: 2023-02-28

## 2023-02-01 RX ORDER — EPINEPHRINE 1 MG/ML
0.3 INJECTION, SOLUTION, CONCENTRATE INTRAVENOUS PRN
OUTPATIENT
Start: 2023-02-28

## 2023-02-01 RX ORDER — FAMOTIDINE 10 MG/ML
20 INJECTION, SOLUTION INTRAVENOUS
OUTPATIENT
Start: 2023-02-28

## 2023-02-01 RX ORDER — DIPHENHYDRAMINE HYDROCHLORIDE 50 MG/ML
50 INJECTION INTRAMUSCULAR; INTRAVENOUS
OUTPATIENT
Start: 2023-02-28

## 2023-02-09 ENCOUNTER — OFFICE VISIT (OUTPATIENT)
Dept: INTERNAL MEDICINE CLINIC | Age: 68
End: 2023-02-09
Payer: MEDICARE

## 2023-02-09 VITALS
BODY MASS INDEX: 43.19 KG/M2 | DIASTOLIC BLOOD PRESSURE: 68 MMHG | WEIGHT: 253 LBS | HEIGHT: 64 IN | SYSTOLIC BLOOD PRESSURE: 126 MMHG

## 2023-02-09 DIAGNOSIS — E03.9 ACQUIRED HYPOTHYROIDISM: ICD-10-CM

## 2023-02-09 DIAGNOSIS — Z00.00 INITIAL MEDICARE ANNUAL WELLNESS VISIT: Primary | ICD-10-CM

## 2023-02-09 DIAGNOSIS — E55.9 VITAMIN D INSUFFICIENCY: ICD-10-CM

## 2023-02-09 DIAGNOSIS — G47.00 INSOMNIA, UNSPECIFIED TYPE: ICD-10-CM

## 2023-02-09 DIAGNOSIS — L40.50 PSORIATIC ARTHRITIS (HCC): ICD-10-CM

## 2023-02-09 DIAGNOSIS — E66.01 OBESITY, CLASS III, BMI 40-49.9 (MORBID OBESITY) (HCC): ICD-10-CM

## 2023-02-09 DIAGNOSIS — E78.5 HYPERLIPIDEMIA, UNSPECIFIED HYPERLIPIDEMIA TYPE: ICD-10-CM

## 2023-02-09 PROCEDURE — G8484 FLU IMMUNIZE NO ADMIN: HCPCS | Performed by: INTERNAL MEDICINE

## 2023-02-09 PROCEDURE — G0438 PPPS, INITIAL VISIT: HCPCS | Performed by: INTERNAL MEDICINE

## 2023-02-09 PROCEDURE — 3017F COLORECTAL CA SCREEN DOC REV: CPT | Performed by: INTERNAL MEDICINE

## 2023-02-09 PROCEDURE — 1123F ACP DISCUSS/DSCN MKR DOCD: CPT | Performed by: INTERNAL MEDICINE

## 2023-02-09 RX ORDER — MULTIVITAMIN WITH IRON
250 TABLET ORAL DAILY
COMMUNITY

## 2023-02-09 ASSESSMENT — LIFESTYLE VARIABLES
HOW OFTEN DURING THE LAST YEAR HAVE YOU HAD A FEELING OF GUILT OR REMORSE AFTER DRINKING: 0
HOW OFTEN DURING THE LAST YEAR HAVE YOU FAILED TO DO WHAT WAS NORMALLY EXPECTED FROM YOU BECAUSE OF DRINKING: 0
HOW MANY STANDARD DRINKS CONTAINING ALCOHOL DO YOU HAVE ON A TYPICAL DAY: 3 OR 4
HOW OFTEN DURING THE LAST YEAR HAVE YOU NEEDED AN ALCOHOLIC DRINK FIRST THING IN THE MORNING TO GET YOURSELF GOING AFTER A NIGHT OF HEAVY DRINKING: 0
HOW OFTEN DO YOU HAVE A DRINK CONTAINING ALCOHOL: 2-3 TIMES A WEEK
HAVE YOU OR SOMEONE ELSE BEEN INJURED AS A RESULT OF YOUR DRINKING: 0
HOW OFTEN DURING THE LAST YEAR HAVE YOU BEEN UNABLE TO REMEMBER WHAT HAPPENED THE NIGHT BEFORE BECAUSE YOU HAD BEEN DRINKING: 0
HAS A RELATIVE, FRIEND, DOCTOR, OR ANOTHER HEALTH PROFESSIONAL EXPRESSED CONCERN ABOUT YOUR DRINKING OR SUGGESTED YOU CUT DOWN: 0
HOW OFTEN DURING THE LAST YEAR HAVE YOU FOUND THAT YOU WERE NOT ABLE TO STOP DRINKING ONCE YOU HAD STARTED: 0

## 2023-02-09 ASSESSMENT — PATIENT HEALTH QUESTIONNAIRE - PHQ9
1. LITTLE INTEREST OR PLEASURE IN DOING THINGS: 0
SUM OF ALL RESPONSES TO PHQ QUESTIONS 1-9: 0
2. FEELING DOWN, DEPRESSED OR HOPELESS: 0
SUM OF ALL RESPONSES TO PHQ9 QUESTIONS 1 & 2: 0

## 2023-02-09 NOTE — PATIENT INSTRUCTIONS
Preventing Falls: Care Instructions  Overview     Getting around your home safely can be a challenge if you have injuries or health problems that make it easy for you to fall. Loose rugs and furniture in walkways are among the dangers for many older people who have problems walking or who have poor eyesight. People who have conditions such as arthritis, osteoporosis, or dementia also have to be careful not to fall. You can make your home safer with a few simple measures. Follow-up care is a key part of your treatment and safety. Be sure to make and go to all appointments, and call your doctor if you are having problems. It's also a good idea to know your test results and keep a list of the medicines you take. How can you care for yourself at home? Taking care of yourself  Exercise regularly to improve your strength, muscle tone, and balance. Walk if you can. Swimming may be a good choice if you cannot walk easily. Have your vision and hearing checked each year or any time you notice a change. If you have trouble seeing and hearing, you might not be able to avoid objects and could lose your balance. Know the side effects of the medicines you take. Ask your doctor or pharmacist whether the medicines you take can affect your balance. Sleeping pills or sedatives can affect your balance. Limit the amount of alcohol you drink. Alcohol can impair your balance and other senses. Ask your doctor whether calluses or corns on your feet need to be removed. If you wear loose-fitting shoes because of calluses or corns, you can lose your balance and fall. Talk to your doctor if you have numbness in your feet. You may get dizzy if you do not drink enough water. To prevent dehydration, drink plenty of fluids. Choose water and other clear liquids. If you have kidney, heart, or liver disease and have to limit fluids, talk with your doctor before you increase the amount of fluids you drink.   Preventing falls at home  Remove raised doorway thresholds, throw rugs, and clutter. Repair loose carpet or raised areas in the floor. Move furniture and electrical cords to keep them out of walking paths. Use nonskid floor wax, and wipe up spills right away, especially on ceramic tile floors. If you use a walker or cane, put rubber tips on it. If you use crutches, clean the bottoms of them regularly with an abrasive pad, such as steel wool. Keep your house well lit, especially stairways, porches, and outside walkways. Use night-lights in areas such as hallways and bathrooms. Add extra light switches or use remote switches (such as switches that go on or off when you clap your hands) to make it easier to turn lights on if you have to get up during the night. Install sturdy handrails on stairways. Move items in your cabinets so that the things you use a lot are on the lower shelves (about waist level). Keep a cordless phone and a flashlight with new batteries by your bed. If possible, put a phone in each of the main rooms of your house, or carry a cell phone in case you fall and cannot reach a phone. Or, you can wear a device around your neck or wrist. You push a button that sends a signal for help. Wear low-heeled shoes that fit well and give your feet good support. Use footwear with nonskid soles. Check the heels and soles of your shoes for wear. Repair or replace worn heels or soles. Do not wear socks without shoes on smooth floors, such as wood. Walk on the grass when the sidewalks are slippery. If you live in an area that gets snow and ice in the winter, sprinkle salt on slippery steps and sidewalks. Or ask a family member or friend to do this for you. Preventing falls in the bath  Install grab bars and nonskid mats inside and outside your shower or tub and near the toilet and sinks. Use shower chairs and bath benches. Use a hand-held shower head that will allow you to sit while showering.   Get into a tub or shower by putting the weaker leg in first. Get out of a tub or shower with your strong side first.  Repair loose toilet seats and consider installing a raised toilet seat to make getting on and off the toilet easier. Keep your bathroom door unlocked while you are in the shower. Where can you learn more? Go to http://www.pozo.com/ and enter G117 to learn more about \"Preventing Falls: Care Instructions. \"  Current as of: May 4, 2022               Content Version: 13.5  © 9775-9557 Healthwise, Incorporated. Care instructions adapted under license by [facility] ([facility]). If you have questions about a medical condition or this instruction, always ask your healthcare professional. [address] any warranty or liability for your use of this information. Substance Use Disorder: Care Instructions  Overview     You can improve your life and health by stopping your use of alcohol or drugs. When you don't drink or use drugs, you may feel and sleep better. You may get along better with your family, friends, and coworkers. There are medicines and programs that can help with substance use disorder. How can you care for yourself at home? If you have been given medicine to help keep you sober or reduce your cravings, be sure to take it exactly as prescribed. Talk to your doctor about programs that can help you stop using drugs or drinking alcohol. Do not tempt yourself by keeping alcohol or drugs in your home. Learn how to say no when other people drink or use drugs. Or don't spend time with people who drink or use drugs. Use the time and money spent on drinking or drugs to do something fun with your family or friends. Preventing a relapse  Do not drink alcohol or use drugs at all. Using any amount of alcohol or drugs greatly increases your risk for relapse.   Seek help from organizations such as Alcoholics Anonymous, Narcotics Anonymous, or treatment facilities if you feel the need to drink alcohol or use drugs again. Remember that recovery is a lifelong process. Stay away from situations, friends, or places that may lead you to drink or use drugs. Have a plan to spot and deal with relapse. Learn to recognize changes in your thinking that lead you to drink or use drugs. These are warning signs. Get help before you start to drink or use drugs again. Get help as soon as you can if you relapse. Some people make a plan with another person that outlines what they want that person to do for them if they relapse. The plan usually includes how to handle the relapse and who to notify in case of relapse. Don't give up. Remember that a relapse does not mean that you have failed. Use the experience to learn the triggers that lead you to drink or use drugs. Then quit again. Many people have several relapses before they are able to quit for good. Follow-up care is a key part of your treatment and safety. Be sure to make and go to all appointments, and call your doctor if you are having problems. It's also a good idea to know your test results and keep a list of the medicines you take. When should you call for help? Call 911  anytime you think you may need emergency care. For example, call if:    You feel you cannot stop from hurting yourself or someone else. Call your doctor now or seek immediate medical care if:    You have serious withdrawal symptoms, such as confusion, hallucinations, severe trembling, or seizures. Watch closely for changes in your health, and be sure to contact your doctor if:    You have a relapse.     You need more help or support to stop. Where can you learn more? Go to http://www.pozo.com/ and enter H573 to learn more about \"Substance Use Disorder: Care Instructions. \"  Current as of: August 2, 2022               Content Version: 13.5  © 4865-4223 Healthwise, [employer]. Care instructions adapted under license by [facility] ([facility]).  If you have questions about a medical condition or this instruction, always ask your healthcare professional. [address] any warranty or liability for your use of this information. Hearing Loss: Care Instructions  Overview     Hearing loss is a sudden or slow decrease in how well you hear. It can range from mild to severe. Permanent hearing loss can occur with aging. It also can happen when you are exposed long-term to loud noise. Examples include listening to loud music, riding motorcycles, or being around other loud machines. Hearing loss can affect your work and home life. It can make you feel lonely or depressed. You may feel that you have lost your independence. But hearing aids and other devices can help you hear better and feel connected to others. Follow-up care is a key part of your treatment and safety. Be sure to make and go to all appointments, and call your doctor if you are having problems. It's also a good idea to know your test results and keep a list of the medicines you take. How can you care for yourself at home? Avoid loud noises whenever possible. This helps keep your hearing from getting worse. Always wear hearing protection around loud noises. Wear a hearing aid as directed. See a professional who can help you pick a hearing aid that fits you. Have hearing tests as your doctor suggests. They can show whether your hearing has changed. Your hearing aid may need to be adjusted. Use other devices as needed. These may include:  Telephone amplifiers and hearing aids that can connect to a television, stereo, radio, or microphone. Devices that use lights or vibrations. These alert you to the doorbell, a ringing telephone, or a baby monitor. Television closed-captioning. This shows the words at the bottom of the screen. Most new TVs can do this. TTY (text telephone). This lets you type messages back and forth on the telephone instead of talking or listening.  These devices are also called TDD. When messages are typed on the keyboard, they are sent over the phone line to a receiving TTY. The message is shown on a monitor. Use text messaging, social media, and email if it is hard for you to communicate by telephone. Try to learn a listening technique called speechreading. It is not lipreading. You pay attention to people's gestures, expressions, posture, and tone of voice. These clues can help you understand what a person is saying. Face the person you are talking to, and have them face you. Make sure the lighting is good. You need to see the other person's face clearly. Think about counseling if you need help to adjust to your hearing loss. When should you call for help? Watch closely for changes in your health, and be sure to contact your doctor if:    You think your hearing is getting worse.     You have new symptoms, such as dizziness or nausea. Where can you learn more? Go to http://www.pozo.com/ and enter R798 to learn more about \"Hearing Loss: Care Instructions. \"  Current as of: May 4, 2022               Content Version: 13.5  © 7356-6843 [employer]. Care instructions adapted under license by [facility] [address] If you have questions about a medical condition or this instruction, always ask your healthcare professional. [place] [address] any warranty or liability for your use of this information. Advance Directives: Care Instructions  Overview  An advance directive is a legal way to state your wishes at the end of your life. It tells your family and your doctor what to do if you can't say what you want. There are two main types of advance directives. You can change them any time your wishes change. Living will. This form tells your family and your doctor your wishes about life support and other treatment. The form is also called a declaration. Medical power of .   This form lets you name a person to make treatment decisions for you when you can't speak for yourself. This person is called a health care agent (health care proxy, health care surrogate). The form is also called a durable power of  for health care. If you do not have an advance directive, decisions about your medical care may be made by a family member, or by a doctor or a  who doesn't know you. It may help to think of an advance directive as a gift to the people who care for you. If you have one, they won't have to make tough decisions by themselves. For more information, including forms for your state, see the [address] website (www.caringinfo.org/planning/advance-directives/). Follow-up care is a key part of your treatment and safety. Be sure to make and go to all appointments, and call your doctor if you are having problems. It's also a good idea to know your test results and keep a list of the medicines you take. What should you include in an advance directive? Many states have a unique advance directive form. (It may ask you to address specific issues.) Or you might use a universal form that's approved by many states. If your form doesn't tell you what to address, it may be hard to know what to include in your advance directive. Use the questions below to help you get started. Who do you want to make decisions about your medical care if you are not able to? What life-support measures do you want if you have a serious illness that gets worse over time or can't be cured? What are you most afraid of that might happen? (Maybe you're afraid of having pain, losing your independence, or being kept alive by machines.)  Where would you prefer to die? (Your home? A hospital? A nursing home?)  Do you want to donate your organs when you die? Do you want certain Zoroastrianism practices performed before you die? When should you call for help? Be sure to contact your doctor if you have any questions. Where can you learn more?   Go to http://www.woods.com/ and enter R264 to learn more about \"Advance Directives: Care Instructions. \"  Current as of: June 16, 2022               Content Version: 13.5  © 9441-0404 Healthwise, [employer]. Care instructions adapted under license by [facility] ([facility]). If you have questions about a medical condition or this instruction, always ask your healthcare professional. [place] [address] any warranty or liability for your use of this information. Starting a Weight Loss Plan: Care Instructions  Overview     If you're thinking about losing weight, it can be hard to know where to start. Your doctor can help you set up a weight loss plan that best meets your needs. You may want to take a class on nutrition or exercise, or you could join a weight loss support group. If you have questions about how to make changes to your eating or exercise habits, ask your doctor about seeing a registered dietitian or an exercise specialist.  It can be a big challenge to lose weight. But you don't have to make huge changes at once. Make small changes, and stick with them. When those changes become habit, add a few more changes. If you don't think you're ready to make changes right now, try to pick a date in the future. Make an appointment to see your doctor to discuss whether the time is right for you to start a plan. Follow-up care is a key part of your treatment and safety. Be sure to make and go to all appointments, and call your doctor if you are having problems. It's also a good idea to know your test results and keep a list of the medicines you take. How can you care for yourself at home? Set realistic goals. Many people expect to lose much more weight than is likely. A weight loss of 5% to 10% of your body weight may be enough to improve your health. Get family and friends involved to provide support. Talk to them about why you are trying to lose weight, and ask them to help.  They can help by participating in exercise and having meals with you, even if they may be eating something different. Find what works best for you. If you do not have time or do not like to cook, a program that offers meal replacement bars or shakes may be better for you. Or if you like to prepare meals, finding a plan that includes daily menus and recipes may be best.  Ask your doctor about other health professionals who can help you achieve your weight loss goals. A dietitian can help you make healthy changes in your diet. An exercise specialist or  can help you develop a safe and effective exercise program.  A counselor or psychiatrist can help you cope with issues such as depression, anxiety, or family problems that can make it hard to focus on weight loss. Consider joining a support group for people who are trying to lose weight. Your doctor can suggest groups in your area. Where can you learn more? Go to http://www.woods.com/ and enter U357 to learn more about \"Starting a Weight Loss Plan: Care Instructions. \"  Current as of: August 25, 2022               Content Version: 13.5  © 7246-0516 [employer]. Care instructions adapted under license by [facility] ([facility]). If you have questions about a medical condition or this instruction, always ask your healthcare professional. [address] any warranty or liability for your use of this information. A Healthy Heart: Care Instructions  Your Care Instructions     Coronary artery disease, also called heart disease, occurs when a substance called plaque builds up in the vessels that supply oxygen-rich blood to your heart muscle. This can narrow the blood vessels and reduce blood flow. A heart attack happens when blood flow is completely blocked. A high-fat diet, smoking, and other factors increase the risk of heart disease. Your doctor has found that you have a chance of having heart disease.  You can do lots of things to keep your heart healthy. It may not be easy, but you can change your diet, exercise more, and quit smoking. These steps really work to lower your chance of heart disease. Follow-up care is a key part of your treatment and safety. Be sure to make and go to all appointments, and call your doctor if you are having problems. It's also a good idea to know your test results and keep a list of the medicines you take. How can you care for yourself at home? Diet    Use less salt when you cook and eat. This helps lower your blood pressure. Taste food before salting. Add only a little salt when you think you need it. With time, your taste buds will adjust to less salt.     Eat fewer snack items, fast foods, canned soups, and other high-salt, high-fat, processed foods.     Read food labels and try to avoid saturated and trans fats. They increase your risk of heart disease by raising cholesterol levels.     Limit the amount of solid fat-butter, margarine, and shortening-you eat. Use olive, peanut, or canola oil when you cook. Bake, broil, and steam foods instead of frying them.     Eat a variety of fruit and vegetables every day. Dark green, deep orange, red, or yellow fruits and vegetables are especially good for you. Examples include spinach, carrots, peaches, and berries.     Foods high in fiber can reduce your cholesterol and provide important vitamins and minerals. High-fiber foods include whole-grain cereals and breads, oatmeal, beans, brown rice, citrus fruits, and apples.     Eat lean proteins. Heart-healthy proteins include seafood, lean meats and poultry, eggs, beans, peas, nuts, seeds, and soy products.     Limit drinks and foods with added sugar. These include candy, desserts, and soda pop. Lifestyle changes    If your doctor recommends it, get more exercise. Walking is a good choice. Bit by bit, increase the amount you walk every day. Try for at least 30 minutes on most days of the week. You also may want to swim, bike, or do other activities.     Do not smoke. If you need help quitting, talk to your doctor about stop-smoking programs and medicines. These can increase your chances of quitting for good. Quitting smoking may be the most important step you can take to protect your heart. It is never too late to quit.     Limit alcohol to 2 drinks a day for men and 1 drink a day for women. Too much alcohol can cause health problems.     Manage other health problems such as diabetes, high blood pressure, and high cholesterol. If you think you may have a problem with alcohol or drug use, talk to your doctor. Medicines    Take your medicines exactly as prescribed. Call your doctor if you think you are having a problem with your medicine.     If your doctor recommends aspirin, take the amount directed each day. Make sure you take aspirin and not another kind of pain reliever, such as acetaminophen (Tylenol). When should you call for help? Call 911 if you have symptoms of a heart attack. These may include:    Chest pain or pressure, or a strange feeling in the chest.     Sweating.     Shortness of breath.     Pain, pressure, or a strange feeling in the back, neck, jaw, or upper belly or in one or both shoulders or arms.     Lightheadedness or sudden weakness.     A fast or irregular heartbeat. After you call 911, the  may tell you to chew 1 adult-strength or 2 to 4 low-dose aspirin. Wait for an ambulance. Do not try to drive yourself. Watch closely for changes in your health, and be sure to contact your doctor if you have any problems. Where can you learn more? Go to http://www.pozo.com/ and enter F075 to learn more about \"A Healthy Heart: Care Instructions. \"  Current as of: September 7, 2022               Content Version: 13.5  © 2006-2022 Healthwise, Incorporated. Care instructions adapted under license by [facility] ([facility]).  If you have questions about a medical condition or this instruction, always ask your healthcare professional. [place] [address] any warranty or liability for your use of this information. Personalized Preventive Plan for [place] - 2/9/2023  Medicare offers a range of preventive health benefits. Some of the tests and screenings are paid in full while other may be subject to a deductible, co-insurance, and/or copay. Some of these benefits include a comprehensive review of your medical history including lifestyle, illnesses that may run in your family, and various assessments and screenings as appropriate. After reviewing your medical record and screening and assessments performed today your provider may have ordered immunizations, labs, imaging, and/or referrals for you. A list of these orders (if applicable) as well as your Preventive Care list are included within your After Visit Summary for your review. Other Preventive Recommendations:    A preventive eye exam performed by an eye specialist is recommended every 1-2 years to screen for glaucoma; cataracts, macular degeneration, and other eye disorders. A preventive dental visit is recommended every 6 months. Try to get at least 150 minutes of exercise per week or 10,000 steps per day on a pedometer . Order or download the FREE \"Exercise & Physical Activity: Your Everyday Guide\" from [employer]. Call 7-633.869.1071 or search [employer] online. You need 8636-4938 mg of calcium and 2353-6205 IU of vitamin D per day. It is possible to meet your calcium requirement with diet alone, but a vitamin D supplement is usually necessary to meet this goal.  When exposed to the sun, use a sunscreen that protects against both UVA and UVB radiation with an SPF of 30 or greater. Reapply every 2 to 3 hours or after sweating, drying off with a towel, or swimming. Always wear a seat belt when traveling in a car.  Always wear a helmet when riding a bicycle or motorcycle.

## 2023-02-09 NOTE — PROGRESS NOTES
Medicare Annual Wellness Visit    Nicholas Clements is here for Medicare AWV    Assessment & Plan   Initial Medicare annual wellness visit  Psoriatic arthritis ([facility]  -Stable on Orencia  -     CBC with Auto Differential; Future  -     Comprehensive Metabolic Panel; Future  -     Lipid Panel; Future  Hyperlipidemia, unspecified hyperlipidemia type  -Continue atorvastatin 20 mg daily  -     CBC with Auto Differential; Future  -     Comprehensive Metabolic Panel; Future  -     Lipid Panel; Future  Insomnia, unspecified type   -Discussed sleep hygiene, nonmedication ways to help with falling asleep. Melatonin could help with the sleep initiation, though less likely to help with sleep maintenance. Discussed trazodone, will not start anything at this point  Acquired hypothyroidism  -Continue Synthroid 175 mcg daily  -     TSH with Reflex; Future  Vitamin D insufficiency  -     Vitamin D 25 Hydroxy; Future  Obesity, Class III, BMI 40-49.9 (morbid obesity) ([facility]  -Has had a small interval weight loss, continue with the lifestyle changes     Recommendations for Preventive Services Due: see orders and patient instructions/AVS.  Recommended screening schedule for the next 5-10 years is provided to the patient in written form: see Patient Instructions/AVS.     Return for Medicare Annual Wellness Visit in 1 year. Subjective   The following acute and/or chronic problems were also addressed today:    Overall doing well. She has gotten back in the gym and noticed an improvement with the left hip, she has had issues with it since she had a fracture and subsequent repair. Feeling much stronger. She is having difficulty with sleep. Falling asleep and staying asleep. She does not turn on the TV in the middle of the night, though does watch TV in the bedroom. Part of the reason she wakes up in the middle the night is due to urinary frequency. She restricts fluid before bed.     Patient's complete Health Risk Assessment and screening values have been reviewed and are found in Flowsheets. The following problems were reviewed today and where indicated follow up appointments were made and/or referrals ordered. Positive Risk Factor Screenings with Interventions:    Fall Risk:  Do you feel unsteady or are you worried about falling? : (!) yes  2 or more falls in past year?: no  Fall with injury in past year?: no     Interventions:    Continue with regular exercise and strengthening      Alcohol Screening:  Alcohol Use: Heavy Drinker    Frequency of Alcohol Consumption: 2-3 times a week    Average Number of Drinks: 3 or 4    Frequency of Binge Drinking: Less than monthly      AUDIT-C Score: 5   AUDIT Total Score: 5    Interpretation of AUDIT-C score:   3-7 indicates potential alcohol risk. 8 or more is associated with harmful or hazardous drinking. 15 or more in women, and 15 or more in men, is likely to indicate alcohol dependence. Interventions:  No further intervention, frequently within guidelines               Weight and Activity:  Physical Activity: Sufficiently Active    Days of Exercise per Week: 5 days    Minutes of Exercise per Session: 30 min     On average, how many days per week do you engage in moderate to strenuous exercise (like a brisk walk)?: 5 days  Have you lost any weight without trying in the past 3 months?: (!) Yes  Body mass index: (!) 43.42      Unintentional Weight Loss Interventions:  Patient comments: Related to decreasing caloric intake, doing well overall. Obesity Interventions:  Patient comments: Has decreased caloric intake and now also exercising more                               Objective   Vitals:    02/09/23 1410   BP: 126/68   Site: Right Upper Arm   Weight: 253 lb (114.8 kg)   Height: 5' 4\" (1.626 m)      Body mass index is 43.43 kg/m². Physical Exam  Vitals reviewed. Constitutional:       General: She is not in acute distress. Appearance: Normal appearance. She is well-developed. HENT:      Head: Normocephalic and atraumatic. Right Ear: Tympanic membrane, ear canal and external ear normal.      Left Ear: Tympanic membrane, ear canal and external ear normal.   Eyes:      General: No scleral icterus. Conjunctiva/sclera: Conjunctivae normal.   Neck:      Thyroid: No thyroid mass or thyromegaly. Vascular: No carotid bruit. Trachea: No tracheal deviation. Cardiovascular:      Rate and Rhythm: Normal rate and regular rhythm. Heart sounds: Normal heart sounds. Pulmonary:      Effort: Pulmonary effort is normal. No respiratory distress. Breath sounds: Normal breath sounds. No wheezing or rales. Abdominal:      General: Abdomen is flat. Bowel sounds are normal. There is no distension. Palpations: Abdomen is soft. There is no hepatomegaly or mass. Tenderness: There is no abdominal tenderness. Hernia: No hernia is present. Musculoskeletal:         General: No tenderness or deformity. Normal range of motion. Cervical back: Neck supple. Lymphadenopathy:      Cervical: No cervical adenopathy. Upper Body:      Right upper body: No supraclavicular adenopathy. Left upper body: No supraclavicular adenopathy. Skin:     General: Skin is warm and dry. Nails: There is no clubbing. Neurological:      General: No focal deficit present. Mental Status: She is alert and oriented to person, place, and time. Gait: Gait normal.      Deep Tendon Reflexes: Reflexes are normal and symmetric. Psychiatric:         Mood and Affect: Mood normal.         Speech: Speech normal.         Behavior: Behavior normal. Behavior is cooperative. Thought Content: Thought content normal.         Judgment: Judgment normal.             Allergies   Allergen Reactions    Codeine Phosphate      Nausea and vomiting     Prior to Visit Medications    Medication Sig Taking?  Authorizing Provider   Multiple Vitamin (MULTI-VITAMIN DAILY PO) Take by mouth Yes Historical Provider, MD   magnesium (MAGNESIUM-OXIDE) 250 MG TABS tablet Take 250 mg by mouth daily Yes Historical Provider, MD   abatacept (ORENCIA) 250 MG injection  Yes Historical Provider, MD   atorvastatin (LIPITOR) 20 MG tablet Take 1 tablet by mouth daily Yes Nelda Ken MD   naproxen (NAPROSYN) 500 MG tablet TAKE 1 TABLET EVERY MORNINGAND 1 TABLET EVERY EVENING WITH MEALS Yes Anthony Mary MD   SYNTHROID 175 MCG tablet TAKE 1 TABLET DAILY Yes Nelda Ken MD   Bimatoprost (LUMIGAN OP) Apply to eye Yes Historical Provider, MD   hydrocortisone 2.5 % cream Apply topically 3 times daily to affected area. Yes Nelda Ken MD   gabapentin (NEURONTIN) 300 MG capsule Take 1 capsule by mouth nightly as needed (pain) for up to 180 days. Yes Nelda Ken MD   Vitamin D (CHOLECALCIFEROL) 1000 UNITS CAPS capsule Take 1,000 Units by mouth daily.  Yes Historical Provider, MD   latanoprost (XALATAN) 0.005 % ophthalmic solution Place 1 drop into both eyes nightly  Yes Historical Provider, MD   timolol (TIMOPTIC-XR) 0.5 % ophthalmic gel-forming Place 1 drop into both eyes daily  Yes Historical Provider, MD       CareTeam (Including outside providers/suppliers regularly involved in providing care):   Patient Care Team:  Nelda Ken MD as PCP - General (Internal Medicine)  Nelda Ken MD as PCP - Empaneled Provider     Reviewed and updated this visit:  Tobacco  Allergies  Meds  Med Hx  Surg Hx  Soc Hx  Fam Hx             Nelda Ken MD

## 2023-02-28 ENCOUNTER — HOSPITAL ENCOUNTER (OUTPATIENT)
Dept: ONCOLOGY | Age: 68
Setting detail: INFUSION SERIES
Discharge: HOME | End: 2023-02-28
Payer: MEDICARE

## 2023-02-28 VITALS
HEART RATE: 59 BPM | SYSTOLIC BLOOD PRESSURE: 152 MMHG | OXYGEN SATURATION: 93 % | BODY MASS INDEX: 43.1 KG/M2 | TEMPERATURE: 98.1 F | DIASTOLIC BLOOD PRESSURE: 84 MMHG | RESPIRATION RATE: 16 BRPM | WEIGHT: 251.1 LBS

## 2023-02-28 DIAGNOSIS — E03.9 ACQUIRED HYPOTHYROIDISM: ICD-10-CM

## 2023-02-28 DIAGNOSIS — L40.9 PSORIASIS: ICD-10-CM

## 2023-02-28 DIAGNOSIS — E55.9 VITAMIN D INSUFFICIENCY: ICD-10-CM

## 2023-02-28 DIAGNOSIS — L40.50 PSORIATIC ARTHRITIS (HCC): Primary | ICD-10-CM

## 2023-02-28 DIAGNOSIS — E78.5 HYPERLIPIDEMIA, UNSPECIFIED HYPERLIPIDEMIA TYPE: ICD-10-CM

## 2023-02-28 LAB
A/G RATIO: 1.5 (ref 1.1–2.2)
ALBUMIN SERPL-MCNC: 4.2 G/DL (ref 3.4–5)
ALP BLD-CCNC: 81 U/L (ref 40–129)
ALT SERPL-CCNC: 17 U/L (ref 10–40)
ANION GAP SERPL CALCULATED.3IONS-SCNC: 10 MMOL/L (ref 3–16)
AST SERPL-CCNC: 19 U/L (ref 15–37)
BASOPHILS ABSOLUTE: 0 K/UL (ref 0–0.2)
BASOPHILS RELATIVE PERCENT: 0.5 %
BILIRUB SERPL-MCNC: 0.3 MG/DL (ref 0–1)
BUN BLDV-MCNC: 16 MG/DL (ref 7–20)
CALCIUM SERPL-MCNC: 9.2 MG/DL (ref 8.3–10.6)
CHLORIDE BLD-SCNC: 103 MMOL/L (ref 99–110)
CHOLESTEROL, TOTAL: 171 MG/DL (ref 0–199)
CO2: 26 MMOL/L (ref 21–32)
CREAT SERPL-MCNC: 0.6 MG/DL (ref 0.6–1.2)
EOSINOPHILS ABSOLUTE: 0.2 K/UL (ref 0–0.6)
EOSINOPHILS RELATIVE PERCENT: 4.7 %
GFR SERPL CREATININE-BSD FRML MDRD: >60 ML/MIN/{1.73_M2}
GLUCOSE BLD-MCNC: 92 MG/DL (ref 70–99)
HCT VFR BLD CALC: 38.4 % (ref 36–48)
HDLC SERPL-MCNC: 60 MG/DL (ref 40–60)
HEMOGLOBIN: 12.7 G/DL (ref 12–16)
LDL CHOLESTEROL CALCULATED: 89 MG/DL
LYMPHOCYTES ABSOLUTE: 1.2 K/UL (ref 1–5.1)
LYMPHOCYTES RELATIVE PERCENT: 33.3 %
MCH RBC QN AUTO: 31.1 PG (ref 26–34)
MCHC RBC AUTO-ENTMCNC: 33 G/DL (ref 31–36)
MCV RBC AUTO: 94.1 FL (ref 80–100)
MONOCYTES ABSOLUTE: 0.4 K/UL (ref 0–1.3)
MONOCYTES RELATIVE PERCENT: 10 %
NEUTROPHILS ABSOLUTE: 1.9 K/UL (ref 1.7–7.7)
NEUTROPHILS RELATIVE PERCENT: 51.5 %
PDW BLD-RTO: 14 % (ref 12.4–15.4)
PLATELET # BLD: 244 K/UL (ref 135–450)
PMV BLD AUTO: 8.6 FL (ref 5–10.5)
POTASSIUM SERPL-SCNC: 4.3 MMOL/L (ref 3.5–5.1)
RBC # BLD: 4.08 M/UL (ref 4–5.2)
SODIUM BLD-SCNC: 139 MMOL/L (ref 136–145)
TOTAL PROTEIN: 7 G/DL (ref 6.4–8.2)
TRIGL SERPL-MCNC: 109 MG/DL (ref 0–150)
VITAMIN D 25-HYDROXY: 37.3 NG/ML
VLDLC SERPL CALC-MCNC: 22 MG/DL
WBC # BLD: 3.7 K/UL (ref 4–11)

## 2023-02-28 PROCEDURE — 85025 COMPLETE CBC W/AUTO DIFF WBC: CPT

## 2023-02-28 PROCEDURE — 2580000003 HC RX 258: Performed by: INTERNAL MEDICINE

## 2023-02-28 PROCEDURE — 96365 THER/PROPH/DIAG IV INF INIT: CPT

## 2023-02-28 PROCEDURE — 6360000002 HC RX W HCPCS: Performed by: INTERNAL MEDICINE

## 2023-02-28 PROCEDURE — 80053 COMPREHEN METABOLIC PANEL: CPT

## 2023-02-28 PROCEDURE — 82306 VITAMIN D 25 HYDROXY: CPT

## 2023-02-28 RX ORDER — SODIUM CHLORIDE 0.9 % (FLUSH) 0.9 %
5-40 SYRINGE (ML) INJECTION PRN
OUTPATIENT
Start: 2023-03-26

## 2023-02-28 RX ORDER — ONDANSETRON 2 MG/ML
8 INJECTION INTRAMUSCULAR; INTRAVENOUS
OUTPATIENT
Start: 2023-03-26

## 2023-02-28 RX ORDER — SODIUM CHLORIDE 9 MG/ML
INJECTION, SOLUTION INTRAVENOUS CONTINUOUS
OUTPATIENT
Start: 2023-03-26

## 2023-02-28 RX ORDER — ACETAMINOPHEN 325 MG/1
650 TABLET ORAL
OUTPATIENT
Start: 2023-03-26

## 2023-02-28 RX ORDER — SODIUM CHLORIDE 9 MG/ML
5-250 INJECTION, SOLUTION INTRAVENOUS PRN
OUTPATIENT
Start: 2023-03-26

## 2023-02-28 RX ORDER — DIPHENHYDRAMINE HYDROCHLORIDE 50 MG/ML
50 INJECTION INTRAMUSCULAR; INTRAVENOUS
OUTPATIENT
Start: 2023-03-26

## 2023-02-28 RX ORDER — ALBUTEROL SULFATE 90 UG/1
4 AEROSOL, METERED RESPIRATORY (INHALATION) PRN
OUTPATIENT
Start: 2023-03-26

## 2023-02-28 RX ORDER — HEPARIN SODIUM (PORCINE) LOCK FLUSH IV SOLN 100 UNIT/ML 100 UNIT/ML
500 SOLUTION INTRAVENOUS PRN
OUTPATIENT
Start: 2023-03-26

## 2023-02-28 RX ADMIN — SODIUM CHLORIDE 1000 MG: 9 INJECTION, SOLUTION INTRAVENOUS at 11:12

## 2023-02-28 NOTE — PROGRESS NOTES
Pt seen and assessed at [address] [facility] for 1 Gram Orencia infusion per orders from Dr. Corby Monroe. Infused per [facility] policy. Monitoring completed for infusion reactions - see flowsheet. Pt tolerated infusion well and without incident. Pt verbalizes understanding of discharge instructions. Discharged ambulatory to home.     Electronically signed by Jami Ashley RN on 2/28/2023 at 12:11 PM

## 2023-03-28 ENCOUNTER — HOSPITAL ENCOUNTER (OUTPATIENT)
Dept: ONCOLOGY | Age: 68
Setting detail: INFUSION SERIES
Discharge: HOME | End: 2023-03-28
Payer: MEDICARE

## 2023-03-28 VITALS
BODY MASS INDEX: 42.53 KG/M2 | DIASTOLIC BLOOD PRESSURE: 73 MMHG | HEART RATE: 76 BPM | RESPIRATION RATE: 16 BRPM | SYSTOLIC BLOOD PRESSURE: 121 MMHG | TEMPERATURE: 98.3 F | OXYGEN SATURATION: 94 % | WEIGHT: 247.8 LBS

## 2023-03-28 DIAGNOSIS — L40.9 PSORIASIS: ICD-10-CM

## 2023-03-28 DIAGNOSIS — L40.50 PSORIATIC ARTHRITIS (HCC): Primary | ICD-10-CM

## 2023-03-28 LAB
BASOPHILS # BLD: 0 K/UL (ref 0–0.2)
BASOPHILS NFR BLD: 0.8 %
DEPRECATED RDW RBC AUTO: 14 % (ref 12.4–15.4)
EOSINOPHIL # BLD: 0.4 K/UL (ref 0–0.6)
EOSINOPHIL NFR BLD: 7.1 %
HCT VFR BLD AUTO: 38 % (ref 36–48)
HGB BLD-MCNC: 12.8 G/DL (ref 12–16)
LYMPHOCYTES # BLD: 1.5 K/UL (ref 1–5.1)
LYMPHOCYTES NFR BLD: 27.6 %
MCH RBC QN AUTO: 31 PG (ref 26–34)
MCHC RBC AUTO-ENTMCNC: 33.8 G/DL (ref 31–36)
MCV RBC AUTO: 91.8 FL (ref 80–100)
MONOCYTES # BLD: 0.4 K/UL (ref 0–1.3)
MONOCYTES NFR BLD: 8.2 %
NEUTROPHILS # BLD: 3 K/UL (ref 1.7–7.7)
NEUTROPHILS NFR BLD: 56.3 %
PLATELET # BLD AUTO: 297 K/UL (ref 135–450)
PMV BLD AUTO: 8.2 FL (ref 5–10.5)
RBC # BLD AUTO: 4.14 M/UL (ref 4–5.2)
WBC # BLD AUTO: 5.3 K/UL (ref 4–11)

## 2023-03-28 PROCEDURE — 85025 COMPLETE CBC W/AUTO DIFF WBC: CPT

## 2023-03-28 PROCEDURE — 6360000002 HC RX W HCPCS: Performed by: INTERNAL MEDICINE

## 2023-03-28 PROCEDURE — 2580000003 HC RX 258: Performed by: INTERNAL MEDICINE

## 2023-03-28 PROCEDURE — 96365 THER/PROPH/DIAG IV INF INIT: CPT

## 2023-03-28 RX ORDER — SODIUM CHLORIDE 9 MG/ML
5-250 INJECTION, SOLUTION INTRAVENOUS PRN
OUTPATIENT
Start: 2023-04-25

## 2023-03-28 RX ORDER — SODIUM CHLORIDE 9 MG/ML
INJECTION, SOLUTION INTRAVENOUS CONTINUOUS
OUTPATIENT
Start: 2023-04-25

## 2023-03-28 RX ORDER — SODIUM CHLORIDE 0.9 % (FLUSH) 0.9 %
5-40 SYRINGE (ML) INJECTION PRN
OUTPATIENT
Start: 2023-04-25

## 2023-03-28 RX ORDER — DIPHENHYDRAMINE HYDROCHLORIDE 50 MG/ML
50 INJECTION INTRAMUSCULAR; INTRAVENOUS
OUTPATIENT
Start: 2023-04-25

## 2023-03-28 RX ORDER — HEPARIN SODIUM (PORCINE) LOCK FLUSH IV SOLN 100 UNIT/ML 100 UNIT/ML
500 SOLUTION INTRAVENOUS PRN
OUTPATIENT
Start: 2023-04-25

## 2023-03-28 RX ORDER — ACETAMINOPHEN 325 MG/1
650 TABLET ORAL
OUTPATIENT
Start: 2023-04-25

## 2023-03-28 RX ORDER — ONDANSETRON 2 MG/ML
8 INJECTION INTRAMUSCULAR; INTRAVENOUS
OUTPATIENT
Start: 2023-04-25

## 2023-03-28 RX ORDER — ALBUTEROL SULFATE 90 UG/1
4 AEROSOL, METERED RESPIRATORY (INHALATION) PRN
OUTPATIENT
Start: 2023-04-25

## 2023-03-28 RX ORDER — EPINEPHRINE 1 MG/ML
0.3 INJECTION, SOLUTION INTRAMUSCULAR; SUBCUTANEOUS PRN
OUTPATIENT
Start: 2023-04-25

## 2023-03-28 RX ADMIN — SODIUM CHLORIDE 1000 MG: 9 INJECTION, SOLUTION INTRAVENOUS at 11:02

## 2023-03-28 NOTE — PROGRESS NOTES
Pt seen and assessed at [address] [facility] for 1 Gram Orencia infusion per orders from Dr. Christine Mckeon. Infused per [facility] policy. Monitoring completed for infusion reactions - see flowsheet. Pt tolerated infusion well and without incident. Pt verbalizes understanding of discharge instructions. Discharged ambulatory to home.     Electronically signed by Malathi Esteban RN on 3/28/2023 at 11:35 AM

## 2023-04-25 ENCOUNTER — HOSPITAL ENCOUNTER (OUTPATIENT)
Dept: ONCOLOGY | Age: 68
Setting detail: INFUSION SERIES
Discharge: HOME | End: 2023-04-25
Payer: MEDICARE

## 2023-04-25 VITALS
RESPIRATION RATE: 17 BRPM | OXYGEN SATURATION: 95 % | TEMPERATURE: 97.8 F | SYSTOLIC BLOOD PRESSURE: 111 MMHG | HEART RATE: 59 BPM | DIASTOLIC BLOOD PRESSURE: 72 MMHG

## 2023-04-25 DIAGNOSIS — L40.50 PSORIATIC ARTHRITIS (HCC): Primary | ICD-10-CM

## 2023-04-25 DIAGNOSIS — L40.9 PSORIASIS: ICD-10-CM

## 2023-04-25 PROCEDURE — 96365 THER/PROPH/DIAG IV INF INIT: CPT

## 2023-04-25 PROCEDURE — 2580000003 HC RX 258: Performed by: INTERNAL MEDICINE

## 2023-04-25 PROCEDURE — 6360000002 HC RX W HCPCS: Performed by: INTERNAL MEDICINE

## 2023-04-25 RX ORDER — DIPHENHYDRAMINE HYDROCHLORIDE 50 MG/ML
50 INJECTION INTRAMUSCULAR; INTRAVENOUS
OUTPATIENT
Start: 2023-05-23

## 2023-04-25 RX ORDER — SODIUM CHLORIDE 9 MG/ML
INJECTION, SOLUTION INTRAVENOUS CONTINUOUS
OUTPATIENT
Start: 2023-05-23

## 2023-04-25 RX ORDER — SODIUM CHLORIDE 9 MG/ML
5-250 INJECTION, SOLUTION INTRAVENOUS PRN
OUTPATIENT
Start: 2023-05-23

## 2023-04-25 RX ORDER — ALBUTEROL SULFATE 90 UG/1
4 AEROSOL, METERED RESPIRATORY (INHALATION) PRN
OUTPATIENT
Start: 2023-05-23

## 2023-04-25 RX ORDER — SODIUM CHLORIDE 0.9 % (FLUSH) 0.9 %
5-40 SYRINGE (ML) INJECTION PRN
OUTPATIENT
Start: 2023-05-23

## 2023-04-25 RX ORDER — ONDANSETRON 2 MG/ML
8 INJECTION INTRAMUSCULAR; INTRAVENOUS
OUTPATIENT
Start: 2023-05-23

## 2023-04-25 RX ORDER — EPINEPHRINE 1 MG/ML
0.3 INJECTION, SOLUTION INTRAMUSCULAR; SUBCUTANEOUS PRN
OUTPATIENT
Start: 2023-05-23

## 2023-04-25 RX ORDER — ACETAMINOPHEN 325 MG/1
650 TABLET ORAL
OUTPATIENT
Start: 2023-05-23

## 2023-04-25 RX ORDER — HEPARIN SODIUM (PORCINE) LOCK FLUSH IV SOLN 100 UNIT/ML 100 UNIT/ML
500 SOLUTION INTRAVENOUS PRN
OUTPATIENT
Start: 2023-05-23

## 2023-04-25 RX ADMIN — SODIUM CHLORIDE 1000 MG: 9 INJECTION, SOLUTION INTRAVENOUS at 10:40

## 2023-04-25 NOTE — PROGRESS NOTES
Pt seen and assessed at [address] [facility] for 1 Gram Orencia infusion per orders from Dr. Debra Chase. Infused per [facility] policy. Monitoring completed for infusion reactions - see flowsheet. Pt tolerated infusion well and without incident. Pt verbalizes understanding of discharge instructions.   Discharged ambulatory to home  [facility]

## 2023-06-06 RX ORDER — NAPROXEN 500 MG/1
TABLET ORAL
Qty: 180 TABLET | Refills: 0 | OUTPATIENT
Start: 2023-06-06

## 2023-06-06 NOTE — TELEPHONE ENCOUNTER
Marissa De La Torre is available at her new location to address any patient needs. Directed pharmacy to reach out to the new location for regarding this refill.

## 2023-07-11 ENCOUNTER — APPOINTMENT (OUTPATIENT)
Dept: GENERAL RADIOLOGY | Age: 68
DRG: 560 | End: 2023-07-11
Payer: MEDICARE

## 2023-07-11 ENCOUNTER — ANESTHESIA EVENT (OUTPATIENT)
Dept: OPERATING ROOM | Age: 68
DRG: 560 | End: 2023-07-11
Payer: MEDICARE

## 2023-07-11 ENCOUNTER — ANESTHESIA (OUTPATIENT)
Dept: OPERATING ROOM | Age: 68
DRG: 560 | End: 2023-07-11
Payer: MEDICARE

## 2023-07-11 ENCOUNTER — APPOINTMENT (OUTPATIENT)
Dept: CT IMAGING | Age: 68
DRG: 560 | End: 2023-07-11
Payer: MEDICARE

## 2023-07-11 ENCOUNTER — HOSPITAL ENCOUNTER (INPATIENT)
Age: 68
LOS: 1 days | Discharge: HOME | DRG: 560 | End: 2023-07-12
Attending: EMERGENCY MEDICINE | Admitting: INTERNAL MEDICINE
Payer: MEDICARE

## 2023-07-11 DIAGNOSIS — S73.005A CLOSED DISLOCATION OF LEFT HIP, INITIAL ENCOUNTER (HCC): ICD-10-CM

## 2023-07-11 DIAGNOSIS — S73.005A HIP DISLOCATION, LEFT, INITIAL ENCOUNTER (HCC): Primary | ICD-10-CM

## 2023-07-11 LAB
EKG ATRIAL RATE: 67 BPM
EKG DIAGNOSIS: NORMAL
EKG P AXIS: 50 DEGREES
EKG P-R INTERVAL: 178 MS
EKG Q-T INTERVAL: 420 MS
EKG QRS DURATION: 80 MS
EKG QTC CALCULATION (BAZETT): 443 MS
EKG R AXIS: 26 DEGREES
EKG T AXIS: 62 DEGREES
EKG VENTRICULAR RATE: 67 BPM
INR PPP: 0.98 (ref 0.84–1.16)
PROTHROMBIN TIME: 12.9 SEC (ref 11.5–14.8)

## 2023-07-11 PROCEDURE — 0QS73ZZ REPOSITION LEFT UPPER FEMUR, PERCUTANEOUS APPROACH: ICD-10-PCS | Performed by: ORTHOPAEDIC SURGERY

## 2023-07-11 PROCEDURE — 93005 ELECTROCARDIOGRAM TRACING: CPT

## 2023-07-11 PROCEDURE — 96372 THER/PROPH/DIAG INJ SC/IM: CPT

## 2023-07-11 PROCEDURE — 2580000003 HC RX 258: Performed by: ORTHOPAEDIC SURGERY

## 2023-07-11 PROCEDURE — 6370000000 HC RX 637 (ALT 250 FOR IP)

## 2023-07-11 PROCEDURE — 6360000002 HC RX W HCPCS: Performed by: ORTHOPAEDIC SURGERY

## 2023-07-11 PROCEDURE — 3700000000 HC ANESTHESIA ATTENDED CARE: Performed by: ORTHOPAEDIC SURGERY

## 2023-07-11 PROCEDURE — 1200000000 HC SEMI PRIVATE

## 2023-07-11 PROCEDURE — 73700 CT LOWER EXTREMITY W/O DYE: CPT

## 2023-07-11 PROCEDURE — 73502 X-RAY EXAM HIP UNI 2-3 VIEWS: CPT

## 2023-07-11 PROCEDURE — 6360000002 HC RX W HCPCS: Performed by: EMERGENCY MEDICINE

## 2023-07-11 PROCEDURE — 6360000002 HC RX W HCPCS

## 2023-07-11 PROCEDURE — 85610 PROTHROMBIN TIME: CPT

## 2023-07-11 PROCEDURE — 3600000004 HC SURGERY LEVEL 4 BASE: Performed by: ORTHOPAEDIC SURGERY

## 2023-07-11 PROCEDURE — 27250 TREAT HIP DISLOCATION: CPT

## 2023-07-11 PROCEDURE — 2709999900 HC NON-CHARGEABLE SUPPLY: Performed by: ORTHOPAEDIC SURGERY

## 2023-07-11 PROCEDURE — 6370000000 HC RX 637 (ALT 250 FOR IP): Performed by: ORTHOPAEDIC SURGERY

## 2023-07-11 PROCEDURE — 96376 TX/PRO/DX INJ SAME DRUG ADON: CPT

## 2023-07-11 PROCEDURE — 99285 EMERGENCY DEPT VISIT HI MDM: CPT

## 2023-07-11 PROCEDURE — 6360000002 HC RX W HCPCS: Performed by: ANESTHESIOLOGY

## 2023-07-11 PROCEDURE — 7100000000 HC PACU RECOVERY - FIRST 15 MIN: Performed by: ORTHOPAEDIC SURGERY

## 2023-07-11 PROCEDURE — 2500000003 HC RX 250 WO HCPCS: Performed by: ANESTHESIOLOGY

## 2023-07-11 PROCEDURE — 96374 THER/PROPH/DIAG INJ IV PUSH: CPT

## 2023-07-11 PROCEDURE — 3600000014 HC SURGERY LEVEL 4 ADDTL 15MIN: Performed by: ORTHOPAEDIC SURGERY

## 2023-07-11 PROCEDURE — 7100000001 HC PACU RECOVERY - ADDTL 15 MIN: Performed by: ORTHOPAEDIC SURGERY

## 2023-07-11 PROCEDURE — 36415 COLL VENOUS BLD VENIPUNCTURE: CPT

## 2023-07-11 PROCEDURE — 96375 TX/PRO/DX INJ NEW DRUG ADDON: CPT

## 2023-07-11 PROCEDURE — 2580000003 HC RX 258: Performed by: EMERGENCY MEDICINE

## 2023-07-11 PROCEDURE — 73552 X-RAY EXAM OF FEMUR 2/>: CPT

## 2023-07-11 PROCEDURE — 3700000001 HC ADD 15 MINUTES (ANESTHESIA): Performed by: ORTHOPAEDIC SURGERY

## 2023-07-11 PROCEDURE — 2580000003 HC RX 258: Performed by: ANESTHESIOLOGY

## 2023-07-11 PROCEDURE — 73501 X-RAY EXAM HIP UNI 1 VIEW: CPT

## 2023-07-11 PROCEDURE — 80048 BASIC METABOLIC PNL TOTAL CA: CPT

## 2023-07-11 RX ORDER — SUCCINYLCHOLINE/SOD CL,ISO/PF 200MG/10ML
SYRINGE (ML) INTRAVENOUS PRN
Status: DISCONTINUED | OUTPATIENT
Start: 2023-07-11 | End: 2023-07-11 | Stop reason: SDUPTHER

## 2023-07-11 RX ORDER — ONDANSETRON 2 MG/ML
4 INJECTION INTRAMUSCULAR; INTRAVENOUS EVERY 6 HOURS PRN
Status: DISCONTINUED | OUTPATIENT
Start: 2023-07-11 | End: 2023-07-12 | Stop reason: HOSPADM

## 2023-07-11 RX ORDER — SODIUM CHLORIDE 0.9 % (FLUSH) 0.9 %
5-40 SYRINGE (ML) INJECTION PRN
Status: DISCONTINUED | OUTPATIENT
Start: 2023-07-11 | End: 2023-07-12 | Stop reason: HOSPADM

## 2023-07-11 RX ORDER — LEVOTHYROXINE SODIUM 175 UG/1
175 TABLET ORAL DAILY
Status: DISCONTINUED | OUTPATIENT
Start: 2023-07-12 | End: 2023-07-12 | Stop reason: HOSPADM

## 2023-07-11 RX ORDER — ONDANSETRON 2 MG/ML
INJECTION INTRAMUSCULAR; INTRAVENOUS
Status: DISCONTINUED
Start: 2023-07-11 | End: 2023-07-11 | Stop reason: WASHOUT

## 2023-07-11 RX ORDER — ONDANSETRON 2 MG/ML
4 INJECTION INTRAMUSCULAR; INTRAVENOUS
Status: DISCONTINUED | OUTPATIENT
Start: 2023-07-11 | End: 2023-07-11 | Stop reason: HOSPADM

## 2023-07-11 RX ORDER — MORPHINE SULFATE 4 MG/ML
4 INJECTION INTRAVENOUS ONCE
Status: COMPLETED | OUTPATIENT
Start: 2023-07-11 | End: 2023-07-11

## 2023-07-11 RX ORDER — LIDOCAINE HYDROCHLORIDE 20 MG/ML
INJECTION, SOLUTION INTRAVENOUS PRN
Status: DISCONTINUED | OUTPATIENT
Start: 2023-07-11 | End: 2023-07-11 | Stop reason: SDUPTHER

## 2023-07-11 RX ORDER — POLYETHYLENE GLYCOL 3350 17 G/17G
17 POWDER, FOR SOLUTION ORAL DAILY PRN
Status: DISCONTINUED | OUTPATIENT
Start: 2023-07-11 | End: 2023-07-12 | Stop reason: HOSPADM

## 2023-07-11 RX ORDER — SODIUM CHLORIDE 9 MG/ML
INJECTION, SOLUTION INTRAVENOUS PRN
Status: DISCONTINUED | OUTPATIENT
Start: 2023-07-11 | End: 2023-07-12 | Stop reason: HOSPADM

## 2023-07-11 RX ORDER — PROPOFOL 10 MG/ML
INJECTION, EMULSION INTRAVENOUS PRN
Status: DISCONTINUED | OUTPATIENT
Start: 2023-07-11 | End: 2023-07-11 | Stop reason: SDUPTHER

## 2023-07-11 RX ORDER — TIMOLOL MALEATE 5 MG/ML
1 SOLUTION/ DROPS OPHTHALMIC DAILY
Status: DISCONTINUED | OUTPATIENT
Start: 2023-07-12 | End: 2023-07-11

## 2023-07-11 RX ORDER — LABETALOL HYDROCHLORIDE 5 MG/ML
10 INJECTION, SOLUTION INTRAVENOUS
Status: DISCONTINUED | OUTPATIENT
Start: 2023-07-11 | End: 2023-07-11 | Stop reason: HOSPADM

## 2023-07-11 RX ORDER — MEPERIDINE HYDROCHLORIDE 25 MG/ML
12.5 INJECTION INTRAMUSCULAR; INTRAVENOUS; SUBCUTANEOUS EVERY 5 MIN PRN
Status: DISCONTINUED | OUTPATIENT
Start: 2023-07-11 | End: 2023-07-11 | Stop reason: HOSPADM

## 2023-07-11 RX ORDER — ONDANSETRON 2 MG/ML
4 INJECTION INTRAMUSCULAR; INTRAVENOUS ONCE
Status: COMPLETED | OUTPATIENT
Start: 2023-07-11 | End: 2023-07-11

## 2023-07-11 RX ORDER — TIMOLOL MALEATE 5 MG/ML
1 SOLUTION/ DROPS OPHTHALMIC 2 TIMES DAILY
Status: DISCONTINUED | OUTPATIENT
Start: 2023-07-12 | End: 2023-07-12 | Stop reason: HOSPADM

## 2023-07-11 RX ORDER — LATANOPROST 50 UG/ML
1 SOLUTION/ DROPS OPHTHALMIC NIGHTLY
Status: DISCONTINUED | OUTPATIENT
Start: 2023-07-11 | End: 2023-07-12 | Stop reason: HOSPADM

## 2023-07-11 RX ORDER — ACETAMINOPHEN 650 MG/1
650 SUPPOSITORY RECTAL EVERY 6 HOURS PRN
Status: DISCONTINUED | OUTPATIENT
Start: 2023-07-11 | End: 2023-07-12 | Stop reason: HOSPADM

## 2023-07-11 RX ORDER — HYDROMORPHONE HYDROCHLORIDE 1 MG/ML
1 INJECTION, SOLUTION INTRAMUSCULAR; INTRAVENOUS; SUBCUTANEOUS
Status: COMPLETED | OUTPATIENT
Start: 2023-07-11 | End: 2023-07-11

## 2023-07-11 RX ORDER — SODIUM CHLORIDE 0.9 % (FLUSH) 0.9 %
5-40 SYRINGE (ML) INJECTION EVERY 12 HOURS SCHEDULED
Status: DISCONTINUED | OUTPATIENT
Start: 2023-07-11 | End: 2023-07-11 | Stop reason: HOSPADM

## 2023-07-11 RX ORDER — TIMOLOL MALEATE 5 MG/ML
1 SOLUTION/ DROPS OPHTHALMIC 2 TIMES DAILY
Status: DISCONTINUED | OUTPATIENT
Start: 2023-07-11 | End: 2023-07-11

## 2023-07-11 RX ORDER — SODIUM CHLORIDE 0.9 % (FLUSH) 0.9 %
5-40 SYRINGE (ML) INJECTION EVERY 12 HOURS SCHEDULED
Status: DISCONTINUED | OUTPATIENT
Start: 2023-07-11 | End: 2023-07-12 | Stop reason: HOSPADM

## 2023-07-11 RX ORDER — DEXAMETHASONE SODIUM PHOSPHATE 4 MG/ML
INJECTION, SOLUTION INTRA-ARTICULAR; INTRALESIONAL; INTRAMUSCULAR; INTRAVENOUS; SOFT TISSUE PRN
Status: DISCONTINUED | OUTPATIENT
Start: 2023-07-11 | End: 2023-07-11 | Stop reason: SDUPTHER

## 2023-07-11 RX ORDER — PROMETHAZINE HYDROCHLORIDE 25 MG/ML
25 INJECTION, SOLUTION INTRAMUSCULAR; INTRAVENOUS ONCE
Status: COMPLETED | OUTPATIENT
Start: 2023-07-11 | End: 2023-07-11

## 2023-07-11 RX ORDER — PROCHLORPERAZINE EDISYLATE 5 MG/ML
5 INJECTION INTRAMUSCULAR; INTRAVENOUS
Status: DISCONTINUED | OUTPATIENT
Start: 2023-07-11 | End: 2023-07-11 | Stop reason: HOSPADM

## 2023-07-11 RX ORDER — SODIUM CHLORIDE, SODIUM LACTATE, POTASSIUM CHLORIDE, CALCIUM CHLORIDE 600; 310; 30; 20 MG/100ML; MG/100ML; MG/100ML; MG/100ML
INJECTION, SOLUTION INTRAVENOUS CONTINUOUS PRN
Status: DISCONTINUED | OUTPATIENT
Start: 2023-07-11 | End: 2023-07-11 | Stop reason: SDUPTHER

## 2023-07-11 RX ORDER — SODIUM CHLORIDE 9 MG/ML
INJECTION, SOLUTION INTRAVENOUS PRN
Status: DISCONTINUED | OUTPATIENT
Start: 2023-07-11 | End: 2023-07-11 | Stop reason: HOSPADM

## 2023-07-11 RX ORDER — HYDROMORPHONE HYDROCHLORIDE 1 MG/ML
0.5 INJECTION, SOLUTION INTRAMUSCULAR; INTRAVENOUS; SUBCUTANEOUS EVERY 5 MIN PRN
Status: DISCONTINUED | OUTPATIENT
Start: 2023-07-11 | End: 2023-07-11 | Stop reason: HOSPADM

## 2023-07-11 RX ORDER — SODIUM CHLORIDE, SODIUM LACTATE, POTASSIUM CHLORIDE, AND CALCIUM CHLORIDE .6; .31; .03; .02 G/100ML; G/100ML; G/100ML; G/100ML
1000 INJECTION, SOLUTION INTRAVENOUS ONCE
Status: COMPLETED | OUTPATIENT
Start: 2023-07-11 | End: 2023-07-11

## 2023-07-11 RX ORDER — PROPOFOL 10 MG/ML
1 INJECTION, EMULSION INTRAVENOUS
Status: COMPLETED | OUTPATIENT
Start: 2023-07-11 | End: 2023-07-11

## 2023-07-11 RX ORDER — ACETAMINOPHEN 325 MG/1
650 TABLET ORAL EVERY 6 HOURS PRN
Status: DISCONTINUED | OUTPATIENT
Start: 2023-07-11 | End: 2023-07-12 | Stop reason: HOSPADM

## 2023-07-11 RX ORDER — PROPOFOL 10 MG/ML
INJECTION, EMULSION INTRAVENOUS
Status: COMPLETED
Start: 2023-07-11 | End: 2023-07-11

## 2023-07-11 RX ORDER — SODIUM CHLORIDE 450 MG/100ML
INJECTION, SOLUTION INTRAVENOUS CONTINUOUS
Status: DISCONTINUED | OUTPATIENT
Start: 2023-07-11 | End: 2023-07-12 | Stop reason: HOSPADM

## 2023-07-11 RX ORDER — HYDRALAZINE HYDROCHLORIDE 20 MG/ML
5 INJECTION INTRAMUSCULAR; INTRAVENOUS EVERY 6 HOURS PRN
Status: DISCONTINUED | OUTPATIENT
Start: 2023-07-11 | End: 2023-07-12 | Stop reason: HOSPADM

## 2023-07-11 RX ORDER — PROMETHAZINE HYDROCHLORIDE 25 MG/ML
12.5 INJECTION, SOLUTION INTRAMUSCULAR; INTRAVENOUS EVERY 4 HOURS PRN
Status: DISCONTINUED | OUTPATIENT
Start: 2023-07-11 | End: 2023-07-12 | Stop reason: HOSPADM

## 2023-07-11 RX ORDER — ONDANSETRON 4 MG/1
4 TABLET, ORALLY DISINTEGRATING ORAL EVERY 8 HOURS PRN
Status: DISCONTINUED | OUTPATIENT
Start: 2023-07-11 | End: 2023-07-12 | Stop reason: HOSPADM

## 2023-07-11 RX ORDER — FENTANYL CITRATE 50 UG/ML
INJECTION, SOLUTION INTRAMUSCULAR; INTRAVENOUS PRN
Status: DISCONTINUED | OUTPATIENT
Start: 2023-07-11 | End: 2023-07-11 | Stop reason: SDUPTHER

## 2023-07-11 RX ORDER — SODIUM CHLORIDE 0.9 % (FLUSH) 0.9 %
5-40 SYRINGE (ML) INJECTION PRN
Status: DISCONTINUED | OUTPATIENT
Start: 2023-07-11 | End: 2023-07-11 | Stop reason: HOSPADM

## 2023-07-11 RX ORDER — OXYCODONE HYDROCHLORIDE 5 MG/1
5 TABLET ORAL
Status: DISCONTINUED | OUTPATIENT
Start: 2023-07-11 | End: 2023-07-11 | Stop reason: HOSPADM

## 2023-07-11 RX ORDER — ONDANSETRON 2 MG/ML
INJECTION INTRAMUSCULAR; INTRAVENOUS PRN
Status: DISCONTINUED | OUTPATIENT
Start: 2023-07-11 | End: 2023-07-11 | Stop reason: SDUPTHER

## 2023-07-11 RX ORDER — HYDRALAZINE HYDROCHLORIDE 20 MG/ML
10 INJECTION INTRAMUSCULAR; INTRAVENOUS
Status: DISCONTINUED | OUTPATIENT
Start: 2023-07-11 | End: 2023-07-11 | Stop reason: HOSPADM

## 2023-07-11 RX ADMIN — ACETAMINOPHEN 650 MG: 325 TABLET ORAL at 22:16

## 2023-07-11 RX ADMIN — SODIUM CHLORIDE, PRESERVATIVE FREE 10 ML: 5 INJECTION INTRAVENOUS at 22:11

## 2023-07-11 RX ADMIN — SODIUM CHLORIDE: 4.5 INJECTION, SOLUTION INTRAVENOUS at 22:13

## 2023-07-11 RX ADMIN — MORPHINE SULFATE 4 MG: 4 INJECTION INTRAVENOUS at 13:31

## 2023-07-11 RX ADMIN — PROPOFOL 117 MG: 10 INJECTION, EMULSION INTRAVENOUS at 16:03

## 2023-07-11 RX ADMIN — HYDROMORPHONE HYDROCHLORIDE 1 MG: 1 INJECTION, SOLUTION INTRAMUSCULAR; INTRAVENOUS; SUBCUTANEOUS at 22:46

## 2023-07-11 RX ADMIN — SODIUM CHLORIDE, POTASSIUM CHLORIDE, SODIUM LACTATE AND CALCIUM CHLORIDE 1000 ML: 600; 310; 30; 20 INJECTION, SOLUTION INTRAVENOUS at 14:23

## 2023-07-11 RX ADMIN — PROPOFOL 200 MG: 10 INJECTION, EMULSION INTRAVENOUS at 19:36

## 2023-07-11 RX ADMIN — Medication 160 MG: at 19:36

## 2023-07-11 RX ADMIN — FENTANYL CITRATE 100 MCG: 50 INJECTION, SOLUTION INTRAMUSCULAR; INTRAVENOUS at 19:36

## 2023-07-11 RX ADMIN — PROMETHAZINE HYDROCHLORIDE 12.5 MG: 25 INJECTION INTRAMUSCULAR; INTRAVENOUS at 22:46

## 2023-07-11 RX ADMIN — SODIUM CHLORIDE, SODIUM LACTATE, POTASSIUM CHLORIDE, AND CALCIUM CHLORIDE: .6; .31; .03; .02 INJECTION, SOLUTION INTRAVENOUS at 19:30

## 2023-07-11 RX ADMIN — HYDROMORPHONE HYDROCHLORIDE 1 MG: 1 INJECTION, SOLUTION INTRAMUSCULAR; INTRAVENOUS; SUBCUTANEOUS at 14:22

## 2023-07-11 RX ADMIN — ONDANSETRON 4 MG: 2 INJECTION INTRAMUSCULAR; INTRAVENOUS at 19:55

## 2023-07-11 RX ADMIN — ONDANSETRON 4 MG: 2 INJECTION INTRAMUSCULAR; INTRAVENOUS at 14:22

## 2023-07-11 RX ADMIN — LIDOCAINE HYDROCHLORIDE 100 MG: 20 INJECTION, SOLUTION INTRAVENOUS at 19:36

## 2023-07-11 RX ADMIN — LATANOPROST 1 DROP: 50 SOLUTION OPHTHALMIC at 22:14

## 2023-07-11 RX ADMIN — DEXAMETHASONE SODIUM PHOSPHATE 8 MG: 4 INJECTION, SOLUTION INTRAMUSCULAR; INTRAVENOUS at 19:50

## 2023-07-11 RX ADMIN — ONDANSETRON 4 MG: 2 INJECTION INTRAMUSCULAR; INTRAVENOUS at 13:35

## 2023-07-11 RX ADMIN — PROMETHAZINE HYDROCHLORIDE 25 MG: 25 INJECTION INTRAMUSCULAR; INTRAVENOUS at 14:38

## 2023-07-11 ASSESSMENT — PAIN DESCRIPTION - ONSET
ONSET: ON-GOING
ONSET: ON-GOING

## 2023-07-11 ASSESSMENT — ENCOUNTER SYMPTOMS
NAUSEA: 0
CHEST TIGHTNESS: 0
COLOR CHANGE: 0
SHORTNESS OF BREATH: 0
COUGH: 0
VOMITING: 0

## 2023-07-11 ASSESSMENT — PAIN DESCRIPTION - FREQUENCY
FREQUENCY: CONTINUOUS
FREQUENCY: CONTINUOUS

## 2023-07-11 ASSESSMENT — PAIN DESCRIPTION - ORIENTATION
ORIENTATION: LEFT

## 2023-07-11 ASSESSMENT — PAIN DESCRIPTION - PAIN TYPE
TYPE: ACUTE PAIN

## 2023-07-11 ASSESSMENT — PAIN - FUNCTIONAL ASSESSMENT
PAIN_FUNCTIONAL_ASSESSMENT: 0-10
PAIN_FUNCTIONAL_ASSESSMENT: PREVENTS OR INTERFERES SOME ACTIVE ACTIVITIES AND ADLS
PAIN_FUNCTIONAL_ASSESSMENT: PREVENTS OR INTERFERES SOME ACTIVE ACTIVITIES AND ADLS

## 2023-07-11 ASSESSMENT — LIFESTYLE VARIABLES
HOW MANY STANDARD DRINKS CONTAINING ALCOHOL DO YOU HAVE ON A TYPICAL DAY: 3 OR 4
HOW OFTEN DO YOU HAVE A DRINK CONTAINING ALCOHOL: 2-4 TIMES A MONTH

## 2023-07-11 ASSESSMENT — PAIN DESCRIPTION - LOCATION
LOCATION: HIP

## 2023-07-11 ASSESSMENT — PAIN SCALES - GENERAL
PAINLEVEL_OUTOF10: 8
PAINLEVEL_OUTOF10: 5
PAINLEVEL_OUTOF10: 5
PAINLEVEL_OUTOF10: 10
PAINLEVEL_OUTOF10: 10
PAINLEVEL_OUTOF10: 8
PAINLEVEL_OUTOF10: 4
PAINLEVEL_OUTOF10: 5
PAINLEVEL_OUTOF10: 4
PAINLEVEL_OUTOF10: 10
PAINLEVEL_OUTOF10: 3

## 2023-07-11 ASSESSMENT — PAIN DESCRIPTION - DESCRIPTORS
DESCRIPTORS: ACHING;SHOOTING
DESCRIPTORS: ACHING

## 2023-07-11 NOTE — PROCEDURES
ETCO2  Monitoring done for conscious sedation. Patient is on 3 liters/min via nasal cannula for procedure.     Baseline information:  HR: 73 BP: 164/84  RR: 12 LOC: alert  ETCO2: 44 SpO2: 98    5 minutes after sedation administered:  HR: 73 BP: 156/109  RR: 16 LOC: obtunded  ETCO2: 53 SpO2: 97    10 min after sedation administered:  HR: 66 BP: 154/105  RR: 11 LOC: lethargic  ETCO2: 45 SpO2: 99      well    Patient/caregiver was educated on the proper method of use:  Yes      Level of patient/caregiver understanding able to:   [x] Verbalize understanding   [] Demonstrate understanding       [] Teach back        [] Needs reinforcement       []  No available caregiver               []  Other:     Response to education:  Very Good

## 2023-07-11 NOTE — SEDATION DOCUMENTATION
Patient Name: Sukhi Champagne   Medical Record Number: [id number]  Date: 7/11/2023   Time: 4:52 PM   Room/Bed: 1TR/1TR-01  Conscious Sedation Procedure Note  Indication: hip dislocation    Consent: I have discussed with the patient and/or the patient representative the indication, alternatives, and the possible risks and/or complications of the planned procedure and the anesthesia methods. The patient and/or patient representative appear to understand and agree to proceed. Physician Involvement: The attending physician was present and supervising this procedure. Pre-Sedation Documentation and Exam: I have personally completed a history, physical exam & review of systems for this patient (see notes). Airway Assessment: Mallampati Class  IV - (soft palate not visible)    Prior History of Anesthesia Complications: Nausea without vomiting    ASA Classification: Class 2 - A normal healthy patient with mild systemic disease    Sedation/ Anesthesia Plan: intravenous sedation    Medications Used: propofol intravenously    Monitoring and Safety: The patient was placed on a cardiac monitor and vital signs, pulse oximetry and level of consciousness were continuously evaluated throughout the procedure. The patient was closely monitored until recovery from the medications was complete and the patient had returned to baseline status. Respiratory therapy was on standby at all times during the procedure.     (The following sections must be completed)  Post-Sedation Vital Signs: Vital signs were reviewed and were stable after the procedure (see flow sheet for vitals)            Post-Sedation Exam: Awake, alert, hemodynamically stable, normal respiration, no distress           Complications: None    Inder Peres MD  [facility]  Emergency Medicine

## 2023-07-11 NOTE — ED NOTES
ED TO INPATIENT SBAR HANDOFF    Patient Name: Yogi Love   :  1955  69 y.o. MRN:  [id number]  Preferred Name  [address] [place] [address]  ED Room #:  TRTR  Family/Caregiver Present yes   Restraints no   Sitter no   Sepsis Risk Score Sepsis Risk Score: 0.85    Situation  Code Status: No Order No additional code details. Allergies: Codeine phosphate  Weight: Patient Vitals for the past 96 hrs (Last 3 readings):   Weight   23 1310 258 lb 9.6 oz (117.3 kg)     Arrived from: home  Chief Complaint:   Chief Complaint   Patient presents with    Hip Pain     Left hip pain after prolonged sitting hx of hip replacement. Pt states \"I heard a click and couldn't stand\"     Hospital Problem/Diagnosis:  Principal Problem:    Hip dislocation, left, initial encounter [facility])  Active Problems:    Closed dislocation of left hip ([address])  Resolved Problems:    * No resolved hospital problems. *    Imaging:   CT HIP LEFT WO CONTRAST   Final Result      Hip dislocation      XR HIP LEFT (1 VIEW)   Final Result      Lateral subluxation of the prosthetic femoral head from the acetabular cup. XR FEMUR LEFT (MIN 2 VIEWS)   Final Result   1. Dislocation of the femoral arthroplasty component from the acetabular cup. XR HIP 2-3 VW W PELVIS LEFT   Final Result   1. Dislocation of the femoral component from the acetabular cup on the left.         Abnormal labs: Abnormal Labs Reviewed - No data to display  Critical values: no     Abnormal Assessment Findings:     Background  History:   Past Medical History:   Diagnosis Date    Arthritis     Bone spur     CTS (carpal tunnel syndrome)     Cyst     left foot and spur    Dental crowns present     molars    Glaucoma     High risk medication use 2022    Hyperlipidemia     Hypothyroidism     Osteopenia     Overweight(278.02)     PONV (postoperative nausea and vomiting)     phenergan works well     Psoriatic arthritis ([address])     S/P foot surgery 2011    excision cyst and resection

## 2023-07-12 VITALS
BODY MASS INDEX: 44.15 KG/M2 | HEIGHT: 64 IN | HEART RATE: 70 BPM | WEIGHT: 258.6 LBS | DIASTOLIC BLOOD PRESSURE: 72 MMHG | OXYGEN SATURATION: 90 % | TEMPERATURE: 97.6 F | SYSTOLIC BLOOD PRESSURE: 111 MMHG | RESPIRATION RATE: 18 BRPM

## 2023-07-12 LAB
ANION GAP SERPL CALCULATED.3IONS-SCNC: 12 MMOL/L (ref 3–16)
BASOPHILS # BLD: 0 K/UL (ref 0–0.2)
BASOPHILS NFR BLD: 0.6 %
BUN SERPL-MCNC: 13 MG/DL (ref 7–20)
CALCIUM SERPL-MCNC: 9.2 MG/DL (ref 8.3–10.6)
CHLORIDE SERPL-SCNC: 103 MMOL/L (ref 99–110)
CO2 SERPL-SCNC: 21 MMOL/L (ref 21–32)
CREAT SERPL-MCNC: 0.6 MG/DL (ref 0.6–1.2)
DEPRECATED RDW RBC AUTO: 14.6 % (ref 12.4–15.4)
EOSINOPHIL # BLD: 0 K/UL (ref 0–0.6)
EOSINOPHIL NFR BLD: 0 %
GFR SERPLBLD CREATININE-BSD FMLA CKD-EPI: >60 ML/MIN/{1.73_M2}
GLUCOSE SERPL-MCNC: 152 MG/DL (ref 70–99)
HCT VFR BLD AUTO: 38.3 % (ref 36–48)
HGB BLD-MCNC: 12.5 G/DL (ref 12–16)
LYMPHOCYTES # BLD: 0.9 K/UL (ref 1–5.1)
LYMPHOCYTES NFR BLD: 14.8 %
MCH RBC QN AUTO: 30.9 PG (ref 26–34)
MCHC RBC AUTO-ENTMCNC: 32.8 G/DL (ref 31–36)
MCV RBC AUTO: 94.4 FL (ref 80–100)
MONOCYTES # BLD: 0.2 K/UL (ref 0–1.3)
MONOCYTES NFR BLD: 2.9 %
NEUTROPHILS # BLD: 5.2 K/UL (ref 1.7–7.7)
NEUTROPHILS NFR BLD: 81.7 %
PLATELET # BLD AUTO: 247 K/UL (ref 135–450)
PMV BLD AUTO: 8.2 FL (ref 5–10.5)
POTASSIUM SERPL-SCNC: 4.5 MMOL/L (ref 3.5–5.1)
RBC # BLD AUTO: 4.05 M/UL (ref 4–5.2)
SODIUM SERPL-SCNC: 136 MMOL/L (ref 136–145)
WBC # BLD AUTO: 6.3 K/UL (ref 4–11)

## 2023-07-12 PROCEDURE — 6370000000 HC RX 637 (ALT 250 FOR IP)

## 2023-07-12 PROCEDURE — 97161 PT EVAL LOW COMPLEX 20 MIN: CPT

## 2023-07-12 PROCEDURE — 99222 1ST HOSP IP/OBS MODERATE 55: CPT | Performed by: INTERNAL MEDICINE

## 2023-07-12 PROCEDURE — 6360000002 HC RX W HCPCS

## 2023-07-12 PROCEDURE — 97530 THERAPEUTIC ACTIVITIES: CPT

## 2023-07-12 PROCEDURE — 6370000000 HC RX 637 (ALT 250 FOR IP): Performed by: ORTHOPAEDIC SURGERY

## 2023-07-12 PROCEDURE — 2580000003 HC RX 258: Performed by: ORTHOPAEDIC SURGERY

## 2023-07-12 PROCEDURE — 97165 OT EVAL LOW COMPLEX 30 MIN: CPT

## 2023-07-12 PROCEDURE — 80048 BASIC METABOLIC PNL TOTAL CA: CPT

## 2023-07-12 PROCEDURE — 97116 GAIT TRAINING THERAPY: CPT

## 2023-07-12 PROCEDURE — 36415 COLL VENOUS BLD VENIPUNCTURE: CPT

## 2023-07-12 PROCEDURE — 85025 COMPLETE CBC W/AUTO DIFF WBC: CPT

## 2023-07-12 PROCEDURE — 97535 SELF CARE MNGMENT TRAINING: CPT

## 2023-07-12 RX ORDER — OXYCODONE HYDROCHLORIDE AND ACETAMINOPHEN 5; 325 MG/1; MG/1
1 TABLET ORAL EVERY 4 HOURS PRN
Status: DISCONTINUED | OUTPATIENT
Start: 2023-07-12 | End: 2023-07-12 | Stop reason: HOSPADM

## 2023-07-12 RX ORDER — OXYCODONE HYDROCHLORIDE AND ACETAMINOPHEN 5; 325 MG/1; MG/1
1 TABLET ORAL EVERY 4 HOURS PRN
Qty: 18 TABLET | Refills: 0 | Status: SHIPPED | OUTPATIENT
Start: 2023-07-12 | End: 2023-07-15

## 2023-07-12 RX ORDER — ONDANSETRON 4 MG/1
4 TABLET, ORALLY DISINTEGRATING ORAL EVERY 8 HOURS PRN
Qty: 30 TABLET | Refills: 0 | Status: SHIPPED | OUTPATIENT
Start: 2023-07-12

## 2023-07-12 RX ORDER — ATORVASTATIN CALCIUM 20 MG/1
20 TABLET, FILM COATED ORAL DAILY
Status: DISCONTINUED | OUTPATIENT
Start: 2023-07-12 | End: 2023-07-12 | Stop reason: HOSPADM

## 2023-07-12 RX ORDER — NAPROXEN 250 MG/1
500 TABLET ORAL 2 TIMES DAILY WITH MEALS
Status: DISCONTINUED | OUTPATIENT
Start: 2023-07-12 | End: 2023-07-12 | Stop reason: HOSPADM

## 2023-07-12 RX ORDER — GABAPENTIN 300 MG/1
300 CAPSULE ORAL NIGHTLY PRN
Status: DISCONTINUED | OUTPATIENT
Start: 2023-07-12 | End: 2023-07-12 | Stop reason: HOSPADM

## 2023-07-12 RX ORDER — OXYCODONE HYDROCHLORIDE AND ACETAMINOPHEN 5; 325 MG/1; MG/1
2 TABLET ORAL EVERY 4 HOURS PRN
Status: DISCONTINUED | OUTPATIENT
Start: 2023-07-12 | End: 2023-07-12 | Stop reason: HOSPADM

## 2023-07-12 RX ADMIN — ATORVASTATIN CALCIUM 20 MG: 20 TABLET, FILM COATED ORAL at 09:26

## 2023-07-12 RX ADMIN — SODIUM CHLORIDE, PRESERVATIVE FREE 10 ML: 5 INJECTION INTRAVENOUS at 09:33

## 2023-07-12 RX ADMIN — NAPROXEN 500 MG: 250 TABLET ORAL at 11:36

## 2023-07-12 RX ADMIN — Medication 10 ML: at 09:33

## 2023-07-12 RX ADMIN — PROMETHAZINE HYDROCHLORIDE 12.5 MG: 25 INJECTION INTRAMUSCULAR; INTRAVENOUS at 10:32

## 2023-07-12 RX ADMIN — ACETAMINOPHEN 650 MG: 325 TABLET ORAL at 06:31

## 2023-07-12 RX ADMIN — LEVOTHYROXINE SODIUM 175 MCG: 0.17 TABLET ORAL at 06:31

## 2023-07-12 RX ADMIN — OXYCODONE HYDROCHLORIDE AND ACETAMINOPHEN 2 TABLET: 5; 325 TABLET ORAL at 10:32

## 2023-07-12 RX ADMIN — TIMOLOL MALEATE 1 DROP: 5 SOLUTION OPHTHALMIC at 09:27

## 2023-07-12 RX ADMIN — OXYCODONE HYDROCHLORIDE AND ACETAMINOPHEN 2 TABLET: 5; 325 TABLET ORAL at 14:32

## 2023-07-12 ASSESSMENT — PAIN DESCRIPTION - ONSET
ONSET: ON-GOING

## 2023-07-12 ASSESSMENT — PAIN DESCRIPTION - DESCRIPTORS
DESCRIPTORS: ACHING

## 2023-07-12 ASSESSMENT — PAIN SCALES - GENERAL
PAINLEVEL_OUTOF10: 5
PAINLEVEL_OUTOF10: 7
PAINLEVEL_OUTOF10: 5
PAINLEVEL_OUTOF10: 5
PAINLEVEL_OUTOF10: 3

## 2023-07-12 ASSESSMENT — PAIN DESCRIPTION - ORIENTATION
ORIENTATION: LEFT

## 2023-07-12 ASSESSMENT — PAIN DESCRIPTION - FREQUENCY
FREQUENCY: CONTINUOUS

## 2023-07-12 ASSESSMENT — PAIN DESCRIPTION - PAIN TYPE
TYPE: ACUTE PAIN

## 2023-07-12 ASSESSMENT — PAIN - FUNCTIONAL ASSESSMENT
PAIN_FUNCTIONAL_ASSESSMENT: PREVENTS OR INTERFERES SOME ACTIVE ACTIVITIES AND ADLS

## 2023-07-12 ASSESSMENT — PAIN DESCRIPTION - LOCATION
LOCATION: HIP

## 2023-07-12 NOTE — PROGRESS NOTES
On call ortho surgeon notified of conflicting orders for pt. PT has FWBAT and bedrest orders at this time. Dr. Brigitte Cooper stated bedrest orders to be followed at this time.

## 2023-07-12 NOTE — CARE COORDINATION
Case Management Assessment  Initial Evaluation    Date/Time of Evaluation: 7/12/2023 1:11 PM  Assessment Completed by: HERON Chavez    If patient is discharged prior to next notation, then this note serves as note for discharge by case management. Patient Name: Cyndi Keith                   YOB: 1955  Diagnosis: Closed dislocation of left hip, initial encounter ([address]) [S73.005A]  Hip dislocation, left, initial encounter ([address]) [R82.195V]                   Date / Time: 7/11/2023  1:03 PM    Patient Admission Status: Inpatient   Readmission Risk (Low < 19, Mod (19-27), High > 27): Readmission Risk Score: 11    Current PCP: Paras Chung MD  PCP verified by CM? Yes    Chart Reviewed: Yes      History Provided by: Patient  Patient Orientation: Alert and Oriented    Patient Cognition: Alert    Hospitalization in the last 30 days (Readmission):  No    If yes, Readmission Assessment in CM Navigator will be completed. Advance Directives:      Code Status: Full Code   Patient's Primary Decision Maker is: Legal Next of Kin    Primary Decision Maker: Ismael May - Child - [phone]    Secondary Decision Maker: Leo Matute - Brother/Sister - [phone]    Secondary Decision Maker: Prasanth Goldsmith - Domestic Partner - [phone]    Discharge Planning:    Patient lives with: Alone (boyfriend will stay for 24 hour assist) Type of Home: House  Primary Care Giver: Self  Patient Support Systems include: Spouse/Significant Other   Current Financial resources: Medicare  Current community resources: None  Current services prior to admission: None            Current DME:              Type of Home Care services:  None    ADLS  Prior functional level: Independent in ADLs/IADLs  Current functional level: Independent in ADLs/IADLs    PT AM-PAC: 20 /24  OT AM-PAC: 25 /24    Family can provide assistance at DC:  Yes  Would you like Case Management to discuss the discharge plan with any other family

## 2023-07-12 NOTE — PROGRESS NOTES
Occupational Therapy  Facility/Department: [facility] 5T ORTHO/NEURO  Occupational Therapy Initial Assessment/Treatment    Name: Derick Boogie  : 1955  MRN: [id number]  Date of Service: 2023    Discharge Recommendations: Derick Boogie scored a 22/24 on the AM-PAC ADL Inpatient form. At this time, no further OT is recommended upon discharge due to pt completing ADLs near baseline. Recommend patient returns to prior setting with prior services. Patient Diagnosis(es): The primary encounter diagnosis was Hip dislocation, left, initial encounter ([address] A diagnosis of Closed dislocation of left hip, initial encounter [facility]) was also pertinent to this visit. Past Medical History:  has a past medical history of Arthritis, Bone spur, CTS (carpal tunnel syndrome), Cyst, Dental crowns present, Glaucoma, High risk medication use, Hyperlipidemia, Hypothyroidism, Osteopenia, Overweight(278.02), PONV (postoperative nausea and vomiting), Psoriatic arthritis ([address]), S/P foot surgery, and Scalp psoriasis. Past Surgical History:  has a past surgical history that includes Colonoscopy; Carpal tunnel release (Left, 2018); Foot surgery (2011); Carpal tunnel release (Right, 2018); Refractive surgery (); Cataract removal (Left, ); joint replacement (2015); joint replacement (Left, 2018); Refractive surgery (Left, ); arthrodesis (Right, 9/3/2020); and Hip Closed Reduction (Left, 2023). Assessment   Assessment: Pt is a 69 y.o. female presenting near baseline this date following L hip dislocation and closed reduction. Pt completed mobility with CGA and all parts of toileting, including LB clothing management, with SBA. Pt completed grooming in stance at sink without difficulties. Pt has family and friends to stay with her at DC to assist tasks such as donning footwear and other household tasks. Pt has no concerns regarding DC home.  No further acute OT needs at this

## 2023-07-12 NOTE — ANESTHESIA POSTPROCEDURE EVALUATION
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     Melanie Phase I: Melanie Score: 9    Melanie Phase II:        Anesthesia Post Evaluation    Patient location during evaluation: PACU  Patient participation: complete - patient participated  Level of consciousness: awake and alert  Airway patency: patent  Nausea & Vomiting: no nausea and no vomiting  Complications: no  Cardiovascular status: hemodynamically stable  Respiratory status: acceptable  Hydration status: euvolemic  Multimodal analgesia pain management approach

## 2023-07-12 NOTE — PROGRESS NOTES
Patient admitted to PACU # 16 from OR at 2005 post LEFT HIP CLOSED REDUCTION  per Dr. Kendal Rey. Attached to PACU monitoring system and report received from anesthesia provider. Patient was reported to be hemodynamically stable during procedure. Patient awake on admission and c/o 5/10 pain.

## 2023-07-12 NOTE — PROGRESS NOTES
Pt a/ox4, VSS on RA. Pain managed with PRN medications per MAR. Resting well overnight. Voiding adequately via purewick. Fall precautions in place.

## 2023-07-12 NOTE — OP NOTE
done,  and was very difficult given the patient's size and BMI of 45. We used multiple  personnel, and we were able to flex the hip and adduct it. Then with  retraction, we were able to bring it back in place. They were found to  be reduced. There still appears to be not significantly stable, but  during the range of motion without adduction was found to be stable. We  confirmed with the C-arm in two planes that had been reduced. At this  point, we placed the abduction pillow. The patient was then awakened  and was taken to Recovery in stable condition. POSTOPERATIVE PLAN:  The patient will be re-admitted. We will start PT  and OT with weightbearing as tolerated. To keep the patient with abduction pillow while in  the bed. In fact, the patient's family contacted Dr. Sheila Mccloud as soon as  leaves the hospital for further evaluation for possibility of another  revision surgery.         Sarmad Johns MD    D: 07/11/2023 21:26:12       T: 07/12/2023 1:38:39     [email or web address]  Job#: [id number]     Doc#: [id number]    CC:  Lieutenant Wagner MD

## 2023-07-12 NOTE — DISCHARGE INSTRUCTIONS
Please follow-up with your surgeon Dr. Kenny Raya as discussed. Please follow-up with your primary care doctor in 1 week. Please use the percocet as 1 tablet every 4 hours as needed for up to 3 days.   Please use the Zofran as one tablet every 8 hours as needed for nausea

## 2023-07-12 NOTE — BRIEF OP NOTE
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       Electronically signed by Kaylen Haas MD on 7/11/2023 at 9:20 PM

## 2023-07-12 NOTE — PROGRESS NOTES
Physical Therapy  Facility/Department: [address] [place] [address]   Physical Therapy Initial Assessment/Treatment/Discharge Summary    Name: Uriah Wisdom  : 1955  MRN: [id number]  Date of Service: 2023    Discharge Recommendations: Uriah Wisdom scored a 20/24 on the AM-PAC short mobility form. Current research shows that an AM-PAC score of 18 or greater is typically associated with a discharge to the patient's home setting. PT Equipment Recommendations  Equipment Needed: No (pt owns RW)      Patient Diagnosis(es): The primary encounter diagnosis was Hip dislocation, left, initial encounter ([address] A diagnosis of Closed dislocation of left hip, initial encounter [facility]) was also pertinent to this visit. Past Medical History:  has a past medical history of Arthritis, Bone spur, CTS (carpal tunnel syndrome), Cyst, Dental crowns present, Glaucoma, High risk medication use, Hyperlipidemia, Hypothyroidism, Osteopenia, Overweight(278.02), PONV (postoperative nausea and vomiting), Psoriatic arthritis ([address]), S/P foot surgery, and Scalp psoriasis. Past Surgical History:  has a past surgical history that includes Colonoscopy; Carpal tunnel release (Left, 2018); Foot surgery (2011); Carpal tunnel release (Right, 2018); Refractive surgery (); Cataract removal (Left, ); joint replacement (2015); joint replacement (Left, 2018); Refractive surgery (Left, ); arthrodesis (Right, 9/3/2020); and Hip Closed Reduction (Left, 2023). Assessment   Assessment: 68 y/o pt s/p LEFT HIP CLOSED REDUCTION. Pt currently moving very well with SBA-CGA for functional mobility with us of RW. Pt planning to d/c home with 24hr A and reports no safety concerns about d/c home. Pt with no further acute PT needs at this time.   Therapy Prognosis: Excellent  Decision Making: Low Complexity  Barriers to Learning: none  Requires PT Follow-Up: No  Activity Tolerance  Activity Tolerance: Patient tolerated

## 2023-07-12 NOTE — PLAN OF CARE
Problem: Pain  Goal: Verbalizes/displays adequate comfort level or baseline comfort level  7/12/2023 0901 by Claudia Maya RN  Outcome: Progressing  Note: Pain assessed through numeric pain scale. Pain managed by tylenol. Patient rates pain 5/10. Repositioning and pillow support in bed. Pillow between legs. Problem: Safety - Adult  Goal: Free from fall injury  7/12/2023 0901 by Claudia Maya RN  Outcome: Progressing  Note: Fall precautions in place. Bed in lowest position, call light within reach, bedside table within reach.  Bed rest.

## 2023-07-12 NOTE — PROGRESS NOTES
Pt admitted to room 5513. VSS. 4 eyes skin assessment completed with another RN. Pt oriented to call light and fall precautions.

## 2023-07-12 NOTE — PROGRESS NOTES
Patient discharged home via family. IV removed and pressure applied. Discharge paperwork discussed with patient. Prescriptions picked from outpatient pharmacy and sent home with patient. No other questions or concerns at this time.

## 2023-07-12 NOTE — PROGRESS NOTES
4 Eyes Skin Assessment     NAME:  James Agustin  YOB: 1955  MEDICAL RECORD NUMBER:  [id number]    The patient is being assessed for  Post-Op Surgical    I agree that at least one RN has performed a thorough Head to Toe Skin Assessment on the patient. ALL assessment sites listed below have been assessed. Areas assessed by both nurses:    Head, Face, Ears, Shoulders, Back, Chest, Arms, Elbows, Hands, Sacrum. Buttock, Coccyx, Ischium, Legs. Feet and Heels, and Under Medical Devices         Does the Patient have a Wound?  No noted wound(s)       Dawson Prevention initiated by RN: Yes  Wound Care Orders initiated by RN: No    Pressure Injury (Stage 3,4, Unstageable, DTI, NWPT, and Complex wounds) if present, place Wound referral order by RN under : No    New Ostomies, if present place, Ostomy referral order under : No     Nurse 1 eSignature: Electronically signed by Hugo Trevino RN on 7/11/23 at 9:55 PM EDT    **SHARE this note so that the co-signing nurse can place an eSignature**    Nurse 2 eSignature: {Esignature:[id number]}

## 2023-07-12 NOTE — PLAN OF CARE
Orthopedic plan of care:     POD1 closed reduction of left total hip arthroplasty by Dr. Mazie Eisenmenger. Patient has longstanding history of left hip surgery completed by Dr. Bijal Marquez in 2018. She sustained postop femur fracture which had to be revised in 2019. Patient had done quite well until yesterday when she sat down on a low seat for over 1 hour. When she stood up she felt some clicking in her hip and was unable to ambulate. Today her pain has been much controlled. She has been able to work with physical therapy and ambulate without discomfort. Plan: Okay for discharge per orthopedics. She wishes to follow-up with her original surgeon, Dr. Bijal Marquez outpatient. We would be happy to connect her with Dr Britany Mccabe in the future should she decide she would like to peruse this. I have provided her with a prescription of Percocet and Zofran for pain control until outpatient follow-up.   This Rx was sent to the pharmacy at the [address] [place] [address]

## 2023-07-12 NOTE — DISCHARGE SUMMARY
INTERNAL MEDICINE DEPARTMENT AT [address] [place] [address]  DISCHARGE SUMMARY    Patient ID: Stephanie Pugh                                             Discharge Date: 07/12/2023   Patient's PCP: Kurt Gill MD                                          Discharge Physician: Jessica Espinoza MD  Admit Date: 7/11/2023   Admitting Physician: Jessica Espinoza MD    PROBLEMS DURING HOSPITALIZATION:  Present on Admission:   Closed dislocation of left hip [facility])   Hip dislocation, left, initial encounter [facility])      HPI: Stephanie Pugh is a 69 y.o. female who presented with left hip pain. She has a history of left hip replacement and periprosthetic fracture 4 years ago. It was done by Dr. Adryan Little. She presents to the ED with acute left hip pain. Stated she was sitting on a 12 inch stool for about 2 hours with her legs up. She then was unable to stand and had immediate pain in the left hip. She stated she really did not hear a big thunk but possibly a small click that she often has. She immediately called EMS. She received fentanyl in route. At the ED, she held the leg in extension, had painful range of motion, denied any lower extremity numbness weakness tingling. The following issues were addressed during hospitalization:  Left DEVIN Posterior Dislocation  X-RAY & CT at the ED showed left hip posterior dislocation. Attempts to perform close reductions in the ED have failed and she was admitted to have a closed reduction under general anesthesia. Operation was challenging due to the patient's weight, but manipulation of the hip with the assistance of multiple personnel they were able to flex and adduct the hip, eventually retracting it back in place. Patient will be re-admitted, and she will be started on OT/PT with weightbearing as needed. The patient's family contacted Dr. Adryan Little for possibility of another revision surgery. Physical Exam  Constitutional:       General: She is not in acute distress.      Appearance: Normal

## 2023-07-13 ENCOUNTER — CARE COORDINATION (OUTPATIENT)
Dept: CASE MANAGEMENT | Age: 68
End: 2023-07-13

## 2023-07-13 DIAGNOSIS — Z79.899 HIGH RISK MEDICATION USE: Primary | ICD-10-CM

## 2023-07-13 PROCEDURE — 1111F DSCHRG MED/CURRENT MED MERGE: CPT

## 2023-07-13 NOTE — CARE COORDINATION
[facility] Care Transitions Initial Follow Up Call    Call within 2 business days of discharge:  yes      Patient Current Location:    Home: [address] [place] [id number]    Care Transition Nurse contacted the patient by telephone to perform post hospital discharge assessment. Verified name with patient as identifier. Provided introduction to self, and explanation of the Care Transition Nurse role. Patient: Lonnie Donohue  Patient :  1955  MRN: [id number]   Reason for Admission:  left hip dislocation  Discharge Date:     RARS: 11      Last Discharge [address] [facility]       Date Complaint Diagnosis Description Type Department Provider    23 Hip Pain Hip dislocation, left, initial encounter ([address]) . .. ED to Hosp-Admission (Discharged) (Joss Ribera) Aylin Peters 5T Silvestre Puentes MD; Tata Maravilla MD              Challenges to be reviewed by the provider   Additional needs identified to be addressed with provider:   yes   - declined hfu                                                      Method of communication with provider : chart routing      SUMMARY  CTN spoke to the pt who reports the following:      -Soreness in left hip she rates 510. Last took Percocet at 9:30 am.  Denies numbness / tingling to left leg / toes. -Denies nausea at this time and taking Zofran PRN. -Denies fever, chills, vomiting, cough, congestion, SOB / DB, wheezing, chest pain, LE edema, feeling lightheaded / dizziness, heart palpitations / flutters, fatigue, and weakness.  -Denies issues with fluid intake, appetite, urination, and bowel habits on Tuesday. Took Dulcolax today and passing gas. Encouraged to start SS  while taking Percocet. -CTC and Pt reviewed meds and CTC completed the 1111f  -Pt declined assistance with scheduling HFU with PCP / does not plan to see. -Dr Bijal Marquez / Trena Wang f/u appt . Pt will take all meds as prescribed and schedule / keep doctor's appt.  CTC provided education on s/s that require medical attention

## 2023-07-16 LAB
ANION GAP SERPL CALCULATED.3IONS-SCNC: 12 MMOL/L (ref 3–16)
BUN SERPL-MCNC: 15 MG/DL (ref 7–20)
CALCIUM SERPL-MCNC: 8.8 MG/DL (ref 8.3–10.6)
CHLORIDE SERPL-SCNC: 102 MMOL/L (ref 99–110)
CO2 SERPL-SCNC: 22 MMOL/L (ref 21–32)
CREAT SERPL-MCNC: 0.7 MG/DL (ref 0.6–1.2)
GFR SERPLBLD CREATININE-BSD FMLA CKD-EPI: >60 ML/MIN/{1.73_M2}
GLUCOSE SERPL-MCNC: 247 MG/DL (ref 70–99)
POTASSIUM SERPL-SCNC: 4.4 MMOL/L (ref 3.5–5.1)
SODIUM SERPL-SCNC: 136 MMOL/L (ref 136–145)

## 2023-07-21 ENCOUNTER — CARE COORDINATION (OUTPATIENT)
Dept: CASE MANAGEMENT | Age: 68
End: 2023-07-21

## 2023-07-21 NOTE — CARE COORDINATION
[facility] Care Transitions Follow Up Call    Patient Current Location:    Home: [facility] [place] [facility] [address]    Care Transition Nurse contacted the patient by telephone to perform post hospital discharge assessment. Verified name with patient as identifier. Provided introduction to self, and explanation of the Care Transition Nurse role. Patient: Mercedez Fuentes Patient :  1955  MRN: [id number]  Reason for Admission:  left hip closed reduction   Discharge Date:    RARS: 11    Challenges to be reviewed by the provider   Additional needs identified to be addressed with provider:   no           Method of communication with provider : none      SUMMARY  CTN spoke to the Pt who reports the following:    -\"Doing very well. \"  -Has f/u with surgeon, Dr Valerie Lindo, on .  -Declined additional calls / not needed  -RPM no admitting dx. Care Transition Nurse provided contact information for future needs. Plan for f/u call in -  No further follow-up call indicated  No future appointments. NAMITA Chowdhury, RN  Care Transition Coordinator  Contact Number:  [phone]

## 2023-09-21 ENCOUNTER — HOSPITAL ENCOUNTER (OUTPATIENT)
Dept: PHYSICAL THERAPY | Age: 68
Setting detail: THERAPIES SERIES
Discharge: HOME | End: 2023-09-21

## 2023-09-21 NOTE — CARE COORDINATION
[facility] Outpatient Therapy  [address] [place] [address], [id number] [place] [address], [address] [place] [address] Avenue  Phone: [phone]   Fax: [phone]    Physical Therapy Missed Visit Note     Date:  2023    Patient Name:  Sunita Adorno      :  1955    MRN: [id number]      Cancelled visits to date: 1  Eval 2023  No-shows to date: 0    For today's appointment patient:  [x]  Cancelled  []  Rescheduled appointment  []  No-show     Reason given by patient:  []  Patient ill  []  Conflicting appointment  []  No transportation    []  Conflict with work  [x]  No reason given  []  Other:     Comments:      Electronically signed by:   Alonzo Juan, [address] [place] [id number]

## 2023-09-25 ENCOUNTER — HOSPITAL ENCOUNTER (OUTPATIENT)
Dept: PHYSICAL THERAPY | Age: 68
Setting detail: THERAPIES SERIES
Discharge: HOME | End: 2023-09-25
Payer: MEDICARE

## 2023-09-25 DIAGNOSIS — M25.552 HIP PAIN, ACUTE, LEFT: Primary | ICD-10-CM

## 2023-09-25 PROCEDURE — 97530 THERAPEUTIC ACTIVITIES: CPT | Performed by: PHYSICAL THERAPIST

## 2023-09-25 PROCEDURE — 97161 PT EVAL LOW COMPLEX 20 MIN: CPT | Performed by: PHYSICAL THERAPIST

## 2023-09-25 PROCEDURE — 97110 THERAPEUTIC EXERCISES: CPT | Performed by: PHYSICAL THERAPIST

## 2023-09-25 NOTE — PLAN OF CARE
Adjusted  Patient will increase LE function to allow independence in all self-care activities. Status: [] Progressing: [] Met: [] Not Met: [] Adjusted    TREATMENT PLAN     Frequency/Duration: 2x/week for 6 weeks for the following treatment interventions:    Interventions:  [x] Therapeutic exercise including: strength training, ROM, including postural re-education. [x] NMR activation and proprioception, including postural re-education. [x] Manual therapy as indicated to include: PROM, Gr I-IV mobilizations, and STM  [x] Modalities as needed that may include: Cryotherapy and Electrical Stimulation  [x] Patient education on joint protection, postural re-education, activity modification, progression of HEP.     Electronically Signed by Sukhwinder Koehler, [place] [id number] Date: 09/25/2023

## 2023-09-26 NOTE — FLOWSHEET NOTE
[] Goals require adjustment due to lack of progress  [] Patient is not progressing as expected and requires additional follow up with physician  [] Other:     CHARGE CAPTURE     CHARGE GRID   CPT Code (TIMED) minutes # CPT Code (UNTIMED) #     [x] Therex (97048)  10 min 1  [] EVAL:LOW (42196 - Typically 20 minutes face-to-face) 1    [] Neuromusc. Re-ed (23515)    [] Re-Eval (79447)     [] Manual (41267)    [] Estim Unattended (65297)     [x] Ther. Act (83861) 16 min 1  [] Mech. Traction (86038)     [] Gait (39499)    [] Dry Needle 1-2 muscle (52450)     [] Aquatic Therex (34927)    [] Dry Needle 3+ muscle (80958)     [] Iontophoresis (56393)    [] VASO (69713)     [] Ultrasound (70762)    [] Group Therapy (82070)     [] Estim Attended (14752)    [] Other: Total Timed Code Tx Minutes 26 min        Total Treatment Minutes 45 min        Charge Justification:  (63389) THERAPEUTIC EXERCISE - Provided verbal/tactile cueing for activities related to strengthening, flexibility, endurance, ROM performed to prevent loss of range of motion, maintain or improve muscular strength or increase flexibility, following either an injury or surgery. (74315) [facility]- Reviewed/Progressed HEP activities related to strengthening, flexibility, endurance, ROM performed to prevent loss of range of motion, maintain or improve muscular strength or increase flexibility, following either an injury or surgery. (35077) THERAPEUTIC ACTIVITY- use of dynamic activities to improve functional performance. (Ex include squatting, ascending/descending stairs, walking, bending, lifting, catching, throwing, pushing, pulling, jumping.)  Direct, one on one contact, billed in 15-minute increments.     TREATMENT PLAN   Plan: POC Initiated today- see eval for details    Electronically Signed by Danny Gonzalez, [place] [id number]         Date: 09/25/2023     Note: If patient does not return for scheduled/recommended follow up visits, this note will

## 2023-10-03 ENCOUNTER — HOSPITAL ENCOUNTER (OUTPATIENT)
Dept: PHYSICAL THERAPY | Age: 68
Setting detail: THERAPIES SERIES
Discharge: HOME | End: 2023-10-03
Payer: MEDICARE

## 2023-10-03 PROCEDURE — 97530 THERAPEUTIC ACTIVITIES: CPT | Performed by: PHYSICAL THERAPIST

## 2023-10-03 PROCEDURE — 97110 THERAPEUTIC EXERCISES: CPT | Performed by: PHYSICAL THERAPIST

## 2023-10-03 NOTE — FLOWSHEET NOTE
TIME                          NMR re-education (26863) resistance Sets/time Reps CUES NEEDED                        Therapeutic Activity (18955)  Sets/time     Mini-squats - 1 10 Added this visit   Step ups onto 8\" step - 1 10 Added this visit   Addis Power from 4\" step - 1 10 Added this visit   Step up and over 8\" step - 1 10 Added this visit            Modalities:    No modalities applied this session    Education/Home Exercise Program: Patient HEP program created electronically. Access Code: [email or web address]  URL: [email or web address]  Date: 09/26/2023  Prepared by: Mackenzie Martinez    Exercises  - Active Straight Leg Raise with Quad Set  - 1 x daily - 7 x weekly - 1-2 sets - 10 reps  - Sidelying Hip Abduction  - 1 x daily - 7 x weekly - 1-2 sets - 10 reps  - Prone Hip Extension  - 1 x daily - 7 x weekly - 1-2 sets - 10 reps  - Clam with Resistance  - 1 x daily - 7 x weekly - 1-2 sets - 10 reps  - Supine Hip Adduction Isometric with Ball  - 1 x daily - 7 x weekly - 1-2 sets - 10 reps  - Bridge  - 1 x daily - 7 x weekly - 1-2 sets - 10 reps      ASSESSMENT     Today's Assessment: During therapy this date, patient required verbal cueing, tactile cueing, muscle facilitation, modification of technique, and progression of exercises and program for exercise progression and improving proper muscle recruitment and activation/motor control patterns. Patient will continue to benefit from ongoing evaluation and advanced clinical decision from a Physical Therapist to address and improve muscle strength, neuromuscular control, normalization of gait, and functional mobility to safely return to [facility] without symptoms or restrictions. Medical Necessity Documentation:  I certify that this patient meets the below criteria necessary for medical necessity for care and/or justification of therapy services:   The patient has a musculoskeletal condition(s) with a corresponding ICD-10 code that is of complexity and severity that require

## 2023-10-10 ENCOUNTER — HOSPITAL ENCOUNTER (OUTPATIENT)
Dept: PHYSICAL THERAPY | Age: 68
Setting detail: THERAPIES SERIES
Discharge: HOME | End: 2023-10-10
Payer: MEDICARE

## 2023-10-10 PROCEDURE — 97112 NEUROMUSCULAR REEDUCATION: CPT | Performed by: PHYSICAL THERAPIST

## 2023-10-10 PROCEDURE — 97530 THERAPEUTIC ACTIVITIES: CPT | Performed by: PHYSICAL THERAPIST

## 2023-10-10 PROCEDURE — 97110 THERAPEUTIC EXERCISES: CPT | Performed by: PHYSICAL THERAPIST

## 2023-10-10 NOTE — FLOWSHEET NOTE
[address] [place] [address] and Therapy   [address] [place] [address]., Suite [facility] Hiwot Ponce [address]   office: [phone] fax: [phone]      Physical Therapy: TREATMENT/PROGRESS NOTE   Patient: Cyndi Keith (69 y.o. female)   Treatment Date: 10/10/2023   :  1955 MRN: [id number]   Visit #: 3    Insurance: Payor: MEDICARE / Plan: MEDICARE PART A AND B / Product Type: *No Product type* /   Insurance ID: [id number] - (Medicare)  Secondary Insurance (if applicable): [facility]   Treatment Diagnosis:   Hip pain, acute, left  M25.552      Medical Diagnosis:    Presence of left artificial hip joint [W00.374]   Referring Physician: Nohemy Gottlieb  PCP: Paras Chung MD                             Plan of care signed (Y/N): No. Faxed to referring provider. Date of Patient follow up with Physician:      Progress Report/POC: NO    POC update due (10 Rx/or 30 days whichever is less [address] [place] [address]): 10/25/2023     Visit # Insurance Allowable Auth Needed   3 Medical Necessity []Yes    [x]No     Latex Allergy:  [x]NO      []YES    Preferred Language for Healthcare:   [x]English       []other:    SUBJECTIVE EXAMINATION     Patient Report/Comments: Pt reports good compliance with HEP and with working out at the gym, doing strengthening 3x per week, and stretching class 2x per week. OBJECTIVE EXAMINATION     Observation: Quad weakness evident with eccentric Knee flexion    Test measurements: see eval     Test used Initial score 10/10/2023   Pain Summary VAS 4-5/10 \"Some discomfort\"   Functional questionnaire LEFS 26/80 = 68% impairment    ROM        See eval                Strength  See eval                        RESTRICTIONS/PRECAUTIONS:     Exercises/Interventions:     Therapeutic Ex (92935)  resistance Sets/time Reps Notes/Cues/Progressions   NuStep (Seat/Arms at 7) L4 5 min  Added this visit.  Avg SPM 72   Straight Leg Raise - 1 10           Sidelying Hip Abduction - 1 10

## 2023-10-16 ENCOUNTER — HOSPITAL ENCOUNTER (OUTPATIENT)
Dept: PHYSICAL THERAPY | Age: 68
Setting detail: THERAPIES SERIES
Discharge: HOME | End: 2023-10-16
Payer: MEDICARE

## 2023-10-16 PROCEDURE — 97112 NEUROMUSCULAR REEDUCATION: CPT | Performed by: PHYSICAL THERAPIST

## 2023-10-16 NOTE — PLAN OF CARE
score of 34% or less for the LEFS to assist with return top prior level of function. Status: [] Progressing: [x] Met: [] Not Met: [] Adjusted  LLE AROM = RLE AROM to allow for proper joint functioning as indicated by patients functional deficits. Status: [] Progressing: [x] Met: [] Not Met: [] Adjusted  Pt to improve strength to 4+/5 or better of proximal hip to allow for proper muscle and joint use in functional mobility, ADLs and prior level of function   Status: [x] Progressing: [] Met: [] Not Met: [] Adjusted  Patient will return to  walk 2 blocks  without increased symptoms or restriction to work towards return to prior level of function. Status: [] Progressing: [x] Met: [] Not Met: [] Adjusted  Patient will increase LE function to allow independence in all self-care activities. Status: [] Progressing: [x] Met: [] Not Met: [] Adjusted    Overall Progression Towards Functional goals/ Treatment Progress Update:  [x] Patient is progressing as expected towards functional goals listed. [] Progression is slowed due to complexities/Impairments listed. [] Progression has been slowed due to co-morbidities. [] Plan just implemented, too soon (<30days) to assess goals progression   [] Goals require adjustment due to lack of progress  [] Patient is not progressing as expected and requires additional follow up with physician  [] Other:     CHARGE CAPTURE     CHARGE GRID   CPT Code (TIMED) minutes # CPT Code (UNTIMED) #     [] Therex (79314)     [] EVAL:LOW (97127 - Typically 20 minutes face-to-face)     [x] Neuromusc. Re-ed (15316) 1 20  [] Re-Eval (90375)     [] Manual (94411)    [] Estim Unattended (38768)     [] Ther. Act (37559)    [] Jose De Jesus Hinders.  Traction ([id number])     [] Gait (27608)    [] Dry Needle 1-2 muscle (54913)     [] Aquatic Therex (20763)

## 2023-10-18 DIAGNOSIS — E03.9 ACQUIRED HYPOTHYROIDISM: ICD-10-CM

## 2023-10-18 RX ORDER — LEVOTHYROXINE SODIUM 175 MCG
TABLET ORAL
Qty: 90 TABLET | Refills: 1 | Status: SHIPPED | OUTPATIENT
Start: 2023-10-18

## 2024-01-15 DIAGNOSIS — E78.5 HYPERLIPIDEMIA, UNSPECIFIED HYPERLIPIDEMIA TYPE: ICD-10-CM

## 2024-01-15 DIAGNOSIS — E03.9 ACQUIRED HYPOTHYROIDISM: ICD-10-CM

## 2024-01-15 RX ORDER — LEVOTHYROXINE SODIUM 175 UG/1
175 TABLET ORAL DAILY
Qty: 90 TABLET | Refills: 0 | Status: SHIPPED | OUTPATIENT
Start: 2024-01-15

## 2024-01-15 RX ORDER — ATORVASTATIN CALCIUM 20 MG/1
20 TABLET, FILM COATED ORAL DAILY
Qty: 90 TABLET | Refills: 0 | Status: SHIPPED | OUTPATIENT
Start: 2024-01-15

## 2024-01-15 RX ORDER — GABAPENTIN 300 MG/1
300 CAPSULE ORAL NIGHTLY PRN
Qty: 90 CAPSULE | Refills: 0 | Status: SHIPPED | OUTPATIENT
Start: 2024-01-15 | End: 2024-04-14

## 2024-02-15 SDOH — HEALTH STABILITY: PHYSICAL HEALTH: ON AVERAGE, HOW MANY MINUTES DO YOU ENGAGE IN EXERCISE AT THIS LEVEL?: 30 MIN

## 2024-02-15 SDOH — HEALTH STABILITY: PHYSICAL HEALTH: ON AVERAGE, HOW MANY DAYS PER WEEK DO YOU ENGAGE IN MODERATE TO STRENUOUS EXERCISE (LIKE A BRISK WALK)?: 5 DAYS

## 2024-02-15 ASSESSMENT — PATIENT HEALTH QUESTIONNAIRE - PHQ9
2. FEELING DOWN, DEPRESSED OR HOPELESS: 0
SUM OF ALL RESPONSES TO PHQ9 QUESTIONS 1 & 2: 0
SUM OF ALL RESPONSES TO PHQ QUESTIONS 1-9: 0
1. LITTLE INTEREST OR PLEASURE IN DOING THINGS: 0

## 2024-02-15 ASSESSMENT — LIFESTYLE VARIABLES
HOW OFTEN DO YOU HAVE A DRINK CONTAINING ALCOHOL: 2-4 TIMES A MONTH
HOW MANY STANDARD DRINKS CONTAINING ALCOHOL DO YOU HAVE ON A TYPICAL DAY: 1 OR 2
HOW MANY STANDARD DRINKS CONTAINING ALCOHOL DO YOU HAVE ON A TYPICAL DAY: 1
HOW OFTEN DO YOU HAVE SIX OR MORE DRINKS ON ONE OCCASION: 1
HOW OFTEN DO YOU HAVE A DRINK CONTAINING ALCOHOL: 3

## 2024-02-16 SDOH — ECONOMIC STABILITY: INCOME INSECURITY: HOW HARD IS IT FOR YOU TO PAY FOR THE VERY BASICS LIKE FOOD, HOUSING, MEDICAL CARE, AND HEATING?: NOT HARD AT ALL

## 2024-02-16 SDOH — ECONOMIC STABILITY: FOOD INSECURITY: WITHIN THE PAST 12 MONTHS, YOU WORRIED THAT YOUR FOOD WOULD RUN OUT BEFORE YOU GOT MONEY TO BUY MORE.: NEVER TRUE

## 2024-02-16 SDOH — ECONOMIC STABILITY: TRANSPORTATION INSECURITY
IN THE PAST 12 MONTHS, HAS LACK OF TRANSPORTATION KEPT YOU FROM MEETINGS, WORK, OR FROM GETTING THINGS NEEDED FOR DAILY LIVING?: NO

## 2024-02-16 SDOH — ECONOMIC STABILITY: FOOD INSECURITY: WITHIN THE PAST 12 MONTHS, THE FOOD YOU BOUGHT JUST DIDN'T LAST AND YOU DIDN'T HAVE MONEY TO GET MORE.: NEVER TRUE

## 2024-02-16 SDOH — ECONOMIC STABILITY: HOUSING INSECURITY
IN THE LAST 12 MONTHS, WAS THERE A TIME WHEN YOU DID NOT HAVE A STEADY PLACE TO SLEEP OR SLEPT IN A SHELTER (INCLUDING NOW)?: NO

## 2024-02-19 ENCOUNTER — OFFICE VISIT (OUTPATIENT)
Dept: INTERNAL MEDICINE CLINIC | Age: 69
End: 2024-02-19
Payer: MEDICARE

## 2024-02-19 VITALS
DIASTOLIC BLOOD PRESSURE: 70 MMHG | BODY MASS INDEX: 42 KG/M2 | WEIGHT: 246 LBS | HEIGHT: 64 IN | SYSTOLIC BLOOD PRESSURE: 132 MMHG

## 2024-02-19 DIAGNOSIS — E78.5 HYPERLIPIDEMIA, UNSPECIFIED HYPERLIPIDEMIA TYPE: ICD-10-CM

## 2024-02-19 DIAGNOSIS — E55.9 VITAMIN D DEFICIENCY: ICD-10-CM

## 2024-02-19 DIAGNOSIS — E03.9 ACQUIRED HYPOTHYROIDISM: ICD-10-CM

## 2024-02-19 DIAGNOSIS — Z00.00 MEDICARE ANNUAL WELLNESS VISIT, SUBSEQUENT: Primary | ICD-10-CM

## 2024-02-19 DIAGNOSIS — E66.01 OBESITY, CLASS III, BMI 40-49.9 (MORBID OBESITY) (HCC): ICD-10-CM

## 2024-02-19 DIAGNOSIS — L40.50 PSORIATIC ARTHRITIS (HCC): ICD-10-CM

## 2024-02-19 PROCEDURE — 3017F COLORECTAL CA SCREEN DOC REV: CPT | Performed by: INTERNAL MEDICINE

## 2024-02-19 PROCEDURE — G0439 PPPS, SUBSEQ VISIT: HCPCS | Performed by: INTERNAL MEDICINE

## 2024-02-19 PROCEDURE — G8484 FLU IMMUNIZE NO ADMIN: HCPCS | Performed by: INTERNAL MEDICINE

## 2024-02-19 PROCEDURE — 1123F ACP DISCUSS/DSCN MKR DOCD: CPT | Performed by: INTERNAL MEDICINE

## 2024-02-19 RX ORDER — GABAPENTIN 300 MG/1
300 CAPSULE ORAL NIGHTLY PRN
Qty: 90 CAPSULE | Refills: 3 | Status: SHIPPED | OUTPATIENT
Start: 2024-02-19 | End: 2025-02-13

## 2024-02-19 RX ORDER — ATORVASTATIN CALCIUM 20 MG/1
20 TABLET, FILM COATED ORAL DAILY
Qty: 90 TABLET | Refills: 3 | Status: SHIPPED | OUTPATIENT
Start: 2024-02-19

## 2024-02-19 RX ORDER — LEVOTHYROXINE SODIUM 175 UG/1
175 TABLET ORAL DAILY
Qty: 90 TABLET | Refills: 3 | Status: SHIPPED | OUTPATIENT
Start: 2024-02-19

## 2024-02-19 NOTE — PROGRESS NOTES
Medicare Annual Wellness Visit    Deborah Agustin is here for Medicare AWV    Assessment & Plan   Medicare annual wellness visit, subsequent  Acquired hypothyroidism  - stable, Continue levothyroxine 175 mcg daily  -     Comprehensive Metabolic Panel; Future  -     Lipid Panel; Future  -     CBC with Auto Differential; Future  -     TSH with Reflex; Future  -     Vitamin D 25 Hydroxy; Future  -     levothyroxine (SYNTHROID) 175 MCG tablet; Take 1 tablet by mouth daily, Disp-90 tablet, R-3Normal  Obesity, Class III, BMI 40-49.9 (morbid obesity) ([facility])   - exercising regularly, healthy diet  Psoriatic arthritis (HCC)   - managed by rheumatology, continue Orencia  Hyperlipidemia, unspecified hyperlipidemia type  - stable, continue atorvastatin 20mg daily  -     Comprehensive Metabolic Panel; Future  -     Lipid Panel; Future  -     CBC with Auto Differential; Future  -     TSH with Reflex; Future  -     Vitamin D 25 Hydroxy; Future  -     atorvastatin (LIPITOR) 20 MG tablet; Take 1 tablet by mouth daily, Disp-90 tablet, R-3Normal  Vitamin D deficiency  - continue vit D3 1000 IU daily  -     Comprehensive Metabolic Panel; Future  -     Lipid Panel; Future  -     CBC with Auto Differential; Future  -     TSH with Reflex; Future  -     Vitamin D 25 Hydroxy; Future  Recommendations for Preventive Services Due: see orders and patient instructions/AVS.  Recommended screening schedule for the next 5-10 years is provided to the patient in written form: see Patient Instructions/AVS.     Return in about 1 year (around 2/19/2025) for AWV.     Subjective   The following acute and/or chronic problems were also addressed today:    Doing well - exercising regularly, staying active. Deals with some osteoarthritis, psoriatic arthritis generally controlled, seeing Dr. Coleman.    Patient's complete Health Risk Assessment and screening values have been reviewed and are found in Flowsheets. The following problems were reviewed today and

## 2024-02-20 DIAGNOSIS — E55.9 VITAMIN D DEFICIENCY: ICD-10-CM

## 2024-02-20 DIAGNOSIS — E03.9 ACQUIRED HYPOTHYROIDISM: ICD-10-CM

## 2024-02-20 DIAGNOSIS — E78.5 HYPERLIPIDEMIA, UNSPECIFIED HYPERLIPIDEMIA TYPE: ICD-10-CM

## 2024-02-20 LAB
25(OH)D3 SERPL-MCNC: 29.3 NG/ML
ALBUMIN SERPL-MCNC: 4.1 G/DL (ref 3.4–5)
ALBUMIN/GLOB SERPL: 1.9 {RATIO} (ref 1.1–2.2)
ALP SERPL-CCNC: 75 U/L (ref 40–129)
ALT SERPL-CCNC: 15 U/L (ref 10–40)
ANION GAP SERPL CALCULATED.3IONS-SCNC: 11 MMOL/L (ref 3–16)
AST SERPL-CCNC: 17 U/L (ref 15–37)
BASOPHILS # BLD: 0 K/UL (ref 0–0.2)
BASOPHILS NFR BLD: 0.8 %
BILIRUB SERPL-MCNC: 0.3 MG/DL (ref 0–1)
BUN SERPL-MCNC: 16 MG/DL (ref 7–20)
CALCIUM SERPL-MCNC: 9.1 MG/DL (ref 8.3–10.6)
CHLORIDE SERPL-SCNC: 103 MMOL/L (ref 99–110)
CHOLEST SERPL-MCNC: 186 MG/DL (ref 0–199)
CO2 SERPL-SCNC: 25 MMOL/L (ref 21–32)
CREAT SERPL-MCNC: 0.6 MG/DL (ref 0.6–1.2)
DEPRECATED RDW RBC AUTO: 15 % (ref 12.4–15.4)
EOSINOPHIL # BLD: 0.2 K/UL (ref 0–0.6)
EOSINOPHIL NFR BLD: 5.1 %
GFR SERPLBLD CREATININE-BSD FMLA CKD-EPI: >60 ML/MIN/{1.73_M2}
GLUCOSE SERPL-MCNC: 99 MG/DL (ref 70–99)
HCT VFR BLD AUTO: 37.6 % (ref 36–48)
HDLC SERPL-MCNC: 69 MG/DL (ref 40–60)
HGB BLD-MCNC: 12.5 G/DL (ref 12–16)
LDLC SERPL CALC-MCNC: 99 MG/DL
LYMPHOCYTES # BLD: 1.6 K/UL (ref 1–5.1)
LYMPHOCYTES NFR BLD: 36.4 %
MCH RBC QN AUTO: 31.3 PG (ref 26–34)
MCHC RBC AUTO-ENTMCNC: 33.2 G/DL (ref 31–36)
MCV RBC AUTO: 94.2 FL (ref 80–100)
MONOCYTES # BLD: 0.4 K/UL (ref 0–1.3)
MONOCYTES NFR BLD: 9 %
NEUTROPHILS # BLD: 2.2 K/UL (ref 1.7–7.7)
NEUTROPHILS NFR BLD: 48.7 %
PLATELET # BLD AUTO: 254 K/UL (ref 135–450)
PMV BLD AUTO: 8.7 FL (ref 5–10.5)
POTASSIUM SERPL-SCNC: 4.5 MMOL/L (ref 3.5–5.1)
PROT SERPL-MCNC: 6.3 G/DL (ref 6.4–8.2)
RBC # BLD AUTO: 3.99 M/UL (ref 4–5.2)
SODIUM SERPL-SCNC: 139 MMOL/L (ref 136–145)
T3 SERPL-MCNC: 0.9 NG/ML (ref 0.8–2)
T4 FREE SERPL-MCNC: 1.6 NG/DL (ref 0.9–1.8)
TRIGL SERPL-MCNC: 91 MG/DL (ref 0–150)
TSH SERPL DL<=0.005 MIU/L-ACNC: 0.1 UIU/ML (ref 0.27–4.2)
VLDLC SERPL CALC-MCNC: 18 MG/DL
WBC # BLD AUTO: 4.5 K/UL (ref 4–11)

## 2024-03-01 DIAGNOSIS — E03.9 ACQUIRED HYPOTHYROIDISM: Primary | ICD-10-CM

## 2024-03-29 ENCOUNTER — TRANSCRIBE ORDERS (OUTPATIENT)
Dept: ADMINISTRATIVE | Age: 69
End: 2024-03-29

## 2024-03-29 DIAGNOSIS — M81.0 AGE RELATED OSTEOPOROSIS, UNSPECIFIED PATHOLOGICAL FRACTURE PRESENCE: Primary | ICD-10-CM

## 2024-04-01 ENCOUNTER — HOSPITAL ENCOUNTER (OUTPATIENT)
Dept: GENERAL RADIOLOGY | Age: 69
Discharge: HOME | End: 2024-04-01
Attending: INTERNAL MEDICINE
Payer: MEDICARE

## 2024-04-01 DIAGNOSIS — M81.0 AGE RELATED OSTEOPOROSIS, UNSPECIFIED PATHOLOGICAL FRACTURE PRESENCE: ICD-10-CM

## 2024-04-01 PROCEDURE — 77080 DXA BONE DENSITY AXIAL: CPT

## 2024-04-27 DIAGNOSIS — E78.5 HYPERLIPIDEMIA, UNSPECIFIED HYPERLIPIDEMIA TYPE: ICD-10-CM

## 2024-04-29 RX ORDER — ATORVASTATIN CALCIUM 20 MG/1
20 TABLET, FILM COATED ORAL DAILY
Qty: 90 TABLET | Refills: 3 | Status: SHIPPED | OUTPATIENT
Start: 2024-04-29

## 2024-05-16 DIAGNOSIS — E03.9 ACQUIRED HYPOTHYROIDISM: ICD-10-CM

## 2024-05-16 LAB
T4 FREE SERPL-MCNC: 1.6 NG/DL (ref 0.9–1.8)
TSH SERPL DL<=0.005 MIU/L-ACNC: 0.14 UIU/ML (ref 0.27–4.2)

## 2024-05-17 LAB — T3 SERPL-MCNC: 0.86 NG/ML (ref 0.8–2)

## 2024-07-31 DIAGNOSIS — E03.9 ACQUIRED HYPOTHYROIDISM: ICD-10-CM

## 2024-08-01 LAB — TSH SERPL DL<=0.005 MIU/L-ACNC: 1.76 UIU/ML (ref 0.27–4.2)

## 2024-08-12 SDOH — HEALTH STABILITY: PHYSICAL HEALTH: ON AVERAGE, HOW MANY DAYS PER WEEK DO YOU ENGAGE IN MODERATE TO STRENUOUS EXERCISE (LIKE A BRISK WALK)?: 3 DAYS

## 2024-08-12 SDOH — HEALTH STABILITY: PHYSICAL HEALTH: ON AVERAGE, HOW MANY MINUTES DO YOU ENGAGE IN EXERCISE AT THIS LEVEL?: 60 MIN

## 2024-08-15 ENCOUNTER — OFFICE VISIT (OUTPATIENT)
Dept: INTERNAL MEDICINE CLINIC | Age: 69
End: 2024-08-15

## 2024-08-15 VITALS
HEIGHT: 63 IN | HEART RATE: 97 BPM | WEIGHT: 250 LBS | BODY MASS INDEX: 44.3 KG/M2 | TEMPERATURE: 97 F | DIASTOLIC BLOOD PRESSURE: 80 MMHG | OXYGEN SATURATION: 97 % | SYSTOLIC BLOOD PRESSURE: 124 MMHG

## 2024-08-15 DIAGNOSIS — L40.50 PSORIATIC ARTHRITIS (HCC): ICD-10-CM

## 2024-08-15 DIAGNOSIS — M85.80 OSTEOPENIA, UNSPECIFIED LOCATION: ICD-10-CM

## 2024-08-15 DIAGNOSIS — Z00.00 MEDICARE ANNUAL WELLNESS VISIT, SUBSEQUENT: Primary | ICD-10-CM

## 2024-08-15 DIAGNOSIS — E03.9 ACQUIRED HYPOTHYROIDISM: ICD-10-CM

## 2024-08-15 NOTE — ASSESSMENT & PLAN NOTE
This problem is stable on current regimen. Follows with Dr. Coleman.  - continue orencia  - continue regular exercise  - continue naprosyn prn

## 2024-08-15 NOTE — PROGRESS NOTES
8/15/2024    Deborah Agustin (:  1955) is a 68 y.o. female, here for evaluation of the following medical concerns:    Chief Complaint   Patient presents with    Establish Care        HPI    Patient is here today to establish care.  Prior patient of Dr. Meza.  She was seen 6 months ago in our office.  Follows with rheumatology for psoriatic arthritis. She does stretching and balance classes 2 days of the week and gardening as well.     Colonoscopy was done with Dr. Kearns in    Due for mammogram in Feb      Prior to Visit Medications    Medication Sig Taking? Authorizing Provider   levothyroxine (SYNTHROID) 150 MCG tablet Take 1 tablet by mouth daily  Carlita Meza MD   atorvastatin (LIPITOR) 20 MG tablet TAKE 1 TABLET BY MOUTH EVERY DAY  Carlita Meza MD   gabapentin (NEURONTIN) 300 MG capsule Take 1 capsule by mouth nightly as needed (pain) for up to 360 days.  Carlita Meza MD   Multiple Vitamin (MULTI-VITAMIN DAILY PO) Take by mouth  Nelson Wilkins MD   abatacept (ORENCIA) 250 MG injection   Nelson Wilkins MD   naproxen (NAPROSYN) 500 MG tablet TAKE 1 TABLET EVERY MORNINGAND 1 TABLET EVERY EVENING WITH MEALS  Joann Coleman MD   hydrocortisone 2.5 % cream Apply topically 3 times daily to affected area.  Carlita Meza MD   Vitamin D (CHOLECALCIFEROL) 1000 UNITS CAPS capsule Take 1 capsule by mouth daily  Nelson Wilkins MD   latanoprost (XALATAN) 0.005 % ophthalmic solution Place 1 drop into both eyes nightly  Nelson Wilkins MD   timolol (TIMOPTIC-XR) 0.5 % ophthalmic gel-forming Place 1 drop into both eyes daily  Nelson Wilkins MD        Allergies   Allergen Reactions    Codeine Phosphate      Nausea and vomiting       Past Medical History:   Diagnosis Date    Arthritis     Bone spur     CTS (carpal tunnel syndrome)     Cyst     left foot and spur    Dental crowns present     molars    Glaucoma     High risk medication use 2022    Hyperlipidemia

## 2024-11-01 DIAGNOSIS — E03.9 HYPOTHYROIDISM, UNSPECIFIED TYPE: Primary | ICD-10-CM

## 2024-11-01 RX ORDER — LEVOTHYROXINE SODIUM 150 UG/1
150 TABLET ORAL DAILY
Qty: 90 TABLET | Refills: 0 | Status: SHIPPED | OUTPATIENT
Start: 2024-11-01

## 2024-11-01 NOTE — TELEPHONE ENCOUNTER
Last appointment: 8/15/2024  Next appointment: Visit date not found  Last refill: 5/20/24  Requested Prescriptions     Pending Prescriptions Disp Refills    levothyroxine (SYNTHROID) 150 MCG tablet 90 tablet 1     Sig: Take 1 tablet by mouth daily

## 2025-01-27 DIAGNOSIS — E03.9 HYPOTHYROIDISM, UNSPECIFIED TYPE: ICD-10-CM

## 2025-01-27 RX ORDER — LEVOTHYROXINE SODIUM 150 UG/1
150 TABLET ORAL DAILY
Qty: 90 TABLET | Refills: 1 | Status: SHIPPED | OUTPATIENT
Start: 2025-01-27

## 2025-02-25 DIAGNOSIS — E78.5 HYPERLIPIDEMIA, UNSPECIFIED HYPERLIPIDEMIA TYPE: ICD-10-CM

## 2025-02-25 RX ORDER — ATORVASTATIN CALCIUM 20 MG/1
20 TABLET, FILM COATED ORAL DAILY
Qty: 90 TABLET | Refills: 1 | Status: SHIPPED | OUTPATIENT
Start: 2025-02-25

## 2025-02-25 NOTE — TELEPHONE ENCOUNTER
Refill req from [facility] - [place], [place] - [address] [place] [address] - P [phone] - F [phone]       atorvastatin (LIPITOR) 20 MG tablet [[id number]]

## 2025-03-12 DIAGNOSIS — E03.9 HYPOTHYROIDISM, UNSPECIFIED TYPE: ICD-10-CM

## 2025-03-13 RX ORDER — GABAPENTIN 300 MG/1
300 CAPSULE ORAL NIGHTLY PRN
Qty: 90 CAPSULE | Refills: 0 | Status: SHIPPED | OUTPATIENT
Start: 2025-03-13 | End: 2026-03-08

## 2025-03-13 RX ORDER — LEVOTHYROXINE SODIUM 150 UG/1
150 TABLET ORAL DAILY
Qty: 90 TABLET | Refills: 0 | Status: SHIPPED | OUTPATIENT
Start: 2025-03-13

## 2025-03-13 NOTE — TELEPHONE ENCOUNTER
Last appointment: 8/15/2024 - new pt apt.   Next appointment: Visit date not found    This came in as a year supply.   I did not amend the script or sig, being as it is gabapentin, I thought better the provider review and change.   please review.   Thanks !       Last refill: 2/19/24

## 2025-04-03 LAB — MAMMOGRAPHY, EXTERNAL: NORMAL

## 2025-05-20 SDOH — HEALTH STABILITY: PHYSICAL HEALTH: ON AVERAGE, HOW MANY MINUTES DO YOU ENGAGE IN EXERCISE AT THIS LEVEL?: 30 MIN

## 2025-05-20 SDOH — HEALTH STABILITY: PHYSICAL HEALTH: ON AVERAGE, HOW MANY DAYS PER WEEK DO YOU ENGAGE IN MODERATE TO STRENUOUS EXERCISE (LIKE A BRISK WALK)?: 3 DAYS

## 2025-05-20 ASSESSMENT — LIFESTYLE VARIABLES
HOW MANY STANDARD DRINKS CONTAINING ALCOHOL DO YOU HAVE ON A TYPICAL DAY: 1
HOW OFTEN DO YOU HAVE A DRINK CONTAINING ALCOHOL: 3
HOW MANY STANDARD DRINKS CONTAINING ALCOHOL DO YOU HAVE ON A TYPICAL DAY: 1 OR 2
HOW OFTEN DO YOU HAVE SIX OR MORE DRINKS ON ONE OCCASION: 1
HOW OFTEN DO YOU HAVE A DRINK CONTAINING ALCOHOL: 2-4 TIMES A MONTH

## 2025-05-20 ASSESSMENT — PATIENT HEALTH QUESTIONNAIRE - PHQ9
SUM OF ALL RESPONSES TO PHQ QUESTIONS 1-9: 0
SUM OF ALL RESPONSES TO PHQ QUESTIONS 1-9: 0
2. FEELING DOWN, DEPRESSED OR HOPELESS: NOT AT ALL
SUM OF ALL RESPONSES TO PHQ QUESTIONS 1-9: 0
1. LITTLE INTEREST OR PLEASURE IN DOING THINGS: NOT AT ALL
SUM OF ALL RESPONSES TO PHQ QUESTIONS 1-9: 0

## 2025-05-23 DIAGNOSIS — M85.80 OSTEOPENIA, UNSPECIFIED LOCATION: ICD-10-CM

## 2025-05-23 DIAGNOSIS — Z00.00 MEDICARE ANNUAL WELLNESS VISIT, SUBSEQUENT: ICD-10-CM

## 2025-05-23 LAB
25(OH)D3 SERPL-MCNC: 28 NG/ML
ALBUMIN SERPL-MCNC: 4.2 G/DL (ref 3.4–5)
ALBUMIN/GLOB SERPL: 2.1 {RATIO} (ref 1.1–2.2)
ALP SERPL-CCNC: 72 U/L (ref 40–129)
ALT SERPL-CCNC: 22 U/L (ref 10–40)
ANION GAP SERPL CALCULATED.3IONS-SCNC: 11 MMOL/L (ref 3–16)
AST SERPL-CCNC: 23 U/L (ref 15–37)
BACTERIA URNS QL MICRO: ABNORMAL /HPF
BASOPHILS # BLD: 0 K/UL (ref 0–0.2)
BASOPHILS NFR BLD: 0.7 %
BILIRUB SERPL-MCNC: 0.4 MG/DL (ref 0–1)
BILIRUB UR QL STRIP.AUTO: ABNORMAL
BUN SERPL-MCNC: 14 MG/DL (ref 7–20)
CALCIUM SERPL-MCNC: 9 MG/DL (ref 8.3–10.6)
CHLORIDE SERPL-SCNC: 102 MMOL/L (ref 99–110)
CHOLEST SERPL-MCNC: 159 MG/DL (ref 0–199)
CLARITY UR: ABNORMAL
CO2 SERPL-SCNC: 23 MMOL/L (ref 21–32)
COLOR UR: YELLOW
CREAT SERPL-MCNC: 0.6 MG/DL (ref 0.6–1.2)
DEPRECATED RDW RBC AUTO: 13.9 % (ref 12.4–15.4)
EOSINOPHIL # BLD: 0.1 K/UL (ref 0–0.6)
EOSINOPHIL NFR BLD: 2.6 %
EPI CELLS #/AREA URNS AUTO: 10 /HPF (ref 0–5)
GFR SERPLBLD CREATININE-BSD FMLA CKD-EPI: >90 ML/MIN/{1.73_M2}
GLUCOSE SERPL-MCNC: 93 MG/DL (ref 70–99)
GLUCOSE UR STRIP.AUTO-MCNC: NEGATIVE MG/DL
HCT VFR BLD AUTO: 39.2 % (ref 36–48)
HDLC SERPL-MCNC: 58 MG/DL (ref 40–60)
HGB BLD-MCNC: 13 G/DL (ref 12–16)
HGB UR QL STRIP.AUTO: NEGATIVE
HYALINE CASTS #/AREA URNS AUTO: 1 /LPF (ref 0–8)
KETONES UR STRIP.AUTO-MCNC: NEGATIVE MG/DL
LDLC SERPL CALC-MCNC: 85 MG/DL
LEUKOCYTE ESTERASE UR QL STRIP.AUTO: NEGATIVE
LYMPHOCYTES # BLD: 1.6 K/UL (ref 1–5.1)
LYMPHOCYTES NFR BLD: 40.1 %
MCH RBC QN AUTO: 31.4 PG (ref 26–34)
MCHC RBC AUTO-ENTMCNC: 33.3 G/DL (ref 31–36)
MCV RBC AUTO: 94.2 FL (ref 80–100)
MONOCYTES # BLD: 0.4 K/UL (ref 0–1.3)
MONOCYTES NFR BLD: 10.1 %
NEUTROPHILS # BLD: 1.9 K/UL (ref 1.7–7.7)
NEUTROPHILS NFR BLD: 46.5 %
NITRITE UR QL STRIP.AUTO: NEGATIVE
PH UR STRIP.AUTO: 6 [PH] (ref 5–8)
PLATELET # BLD AUTO: 240 K/UL (ref 135–450)
PMV BLD AUTO: 9.2 FL (ref 5–10.5)
POTASSIUM SERPL-SCNC: 4.4 MMOL/L (ref 3.5–5.1)
PROT SERPL-MCNC: 6.2 G/DL (ref 6.4–8.2)
PROT UR STRIP.AUTO-MCNC: NEGATIVE MG/DL
RBC # BLD AUTO: 4.16 M/UL (ref 4–5.2)
RBC CLUMPS #/AREA URNS AUTO: 2 /HPF (ref 0–4)
SODIUM SERPL-SCNC: 136 MMOL/L (ref 136–145)
SP GR UR STRIP.AUTO: >=1.03 (ref 1–1.03)
T4 FREE SERPL-MCNC: 1.8 NG/DL (ref 0.9–1.8)
TRIGL SERPL-MCNC: 80 MG/DL (ref 0–150)
TSH SERPL DL<=0.005 MIU/L-ACNC: 0.12 UIU/ML (ref 0.27–4.2)
UA DIPSTICK W REFLEX MICRO PNL UR: YES
URN SPEC COLLECT METH UR: ABNORMAL
UROBILINOGEN UR STRIP-ACNC: 0.2 E.U./DL
VLDLC SERPL CALC-MCNC: 16 MG/DL
WBC # BLD AUTO: 4 K/UL (ref 4–11)
WBC #/AREA URNS AUTO: 1 /HPF (ref 0–5)

## 2025-05-24 LAB
EST. AVERAGE GLUCOSE BLD GHB EST-MCNC: 102.5 MG/DL
HBA1C MFR BLD: 5.2 %

## 2025-05-25 ENCOUNTER — RESULTS FOLLOW-UP (OUTPATIENT)
Dept: INTERNAL MEDICINE CLINIC | Age: 70
End: 2025-05-25

## 2025-05-27 RX ORDER — GABAPENTIN 300 MG/1
CAPSULE ORAL
Qty: 90 CAPSULE | Refills: 2 | Status: SHIPPED | OUTPATIENT
Start: 2025-05-27 | End: 2025-05-30 | Stop reason: SDUPTHER

## 2025-05-27 SDOH — ECONOMIC STABILITY: FOOD INSECURITY: WITHIN THE PAST 12 MONTHS, THE FOOD YOU BOUGHT JUST DIDN'T LAST AND YOU DIDN'T HAVE MONEY TO GET MORE.: NEVER TRUE

## 2025-05-27 SDOH — ECONOMIC STABILITY: FOOD INSECURITY: WITHIN THE PAST 12 MONTHS, YOU WORRIED THAT YOUR FOOD WOULD RUN OUT BEFORE YOU GOT MONEY TO BUY MORE.: NEVER TRUE

## 2025-05-27 SDOH — ECONOMIC STABILITY: INCOME INSECURITY: IN THE LAST 12 MONTHS, WAS THERE A TIME WHEN YOU WERE NOT ABLE TO PAY THE MORTGAGE OR RENT ON TIME?: NO

## 2025-05-27 SDOH — ECONOMIC STABILITY: TRANSPORTATION INSECURITY
IN THE PAST 12 MONTHS, HAS THE LACK OF TRANSPORTATION KEPT YOU FROM MEDICAL APPOINTMENTS OR FROM GETTING MEDICATIONS?: NO

## 2025-05-30 ENCOUNTER — OFFICE VISIT (OUTPATIENT)
Dept: INTERNAL MEDICINE CLINIC | Age: 70
End: 2025-05-30

## 2025-05-30 VITALS
HEART RATE: 95 BPM | SYSTOLIC BLOOD PRESSURE: 128 MMHG | BODY MASS INDEX: 40.46 KG/M2 | OXYGEN SATURATION: 69 % | DIASTOLIC BLOOD PRESSURE: 70 MMHG | HEIGHT: 64 IN | WEIGHT: 237 LBS

## 2025-05-30 DIAGNOSIS — E66.813 OBESITY, CLASS 3 (HCC): ICD-10-CM

## 2025-05-30 DIAGNOSIS — L40.50 PSORIATIC ARTHRITIS (HCC): ICD-10-CM

## 2025-05-30 DIAGNOSIS — E78.5 HYPERLIPIDEMIA, UNSPECIFIED HYPERLIPIDEMIA TYPE: ICD-10-CM

## 2025-05-30 DIAGNOSIS — Z00.00 MEDICARE ANNUAL WELLNESS VISIT, SUBSEQUENT: Primary | ICD-10-CM

## 2025-05-30 DIAGNOSIS — E03.9 HYPOTHYROIDISM, UNSPECIFIED TYPE: ICD-10-CM

## 2025-05-30 DIAGNOSIS — H40.1132 PRIMARY OPEN ANGLE GLAUCOMA (POAG) OF BOTH EYES, MODERATE STAGE: ICD-10-CM

## 2025-05-30 RX ORDER — GABAPENTIN 300 MG/1
300 CAPSULE ORAL NIGHTLY
Qty: 90 CAPSULE | Refills: 3 | Status: SHIPPED | OUTPATIENT
Start: 2025-05-30 | End: 2026-02-24

## 2025-05-30 RX ORDER — LEVOTHYROXINE SODIUM 125 UG/1
125 TABLET ORAL DAILY
Qty: 90 TABLET | Refills: 3 | Status: SHIPPED | OUTPATIENT
Start: 2025-05-30 | End: 2026-05-25

## 2025-05-30 RX ORDER — DULOXETIN HYDROCHLORIDE 20 MG/1
20 CAPSULE, DELAYED RELEASE ORAL DAILY
COMMUNITY

## 2025-05-30 RX ORDER — ATORVASTATIN CALCIUM 20 MG/1
20 TABLET, FILM COATED ORAL DAILY
Qty: 90 TABLET | Refills: 3 | Status: SHIPPED | OUTPATIENT
Start: 2025-05-30

## 2025-05-30 RX ORDER — LEVOTHYROXINE SODIUM 137 UG/1
137 TABLET ORAL DAILY
Qty: 90 TABLET | Refills: 3 | Status: SHIPPED | OUTPATIENT
Start: 2025-05-30 | End: 2025-05-30

## 2025-05-30 NOTE — ASSESSMENT & PLAN NOTE
Patient started weight watchers recently and anticipation of her granddaughter's wedding in November.  Has already lost 15 pounds in 60 days.  - Continue weight watchers as this seems to be working very well for her

## 2025-05-30 NOTE — PROGRESS NOTES
Medicare Annual Wellness Visit    Deborah Agustin is here for Medicare AWV    Assessment & Plan   Medicare annual wellness visit, subsequent  Assessment & Plan:  - reviewed routine labs  - mammogram UTD  - colonoscopy UTD - due Dec 2027  - eye dr and dentist appts UTD  - DEXA UTD  - discussed healthy diet and exercise  - discussed outstanding immunizations   Hypothyroidism, unspecified type  Assessment & Plan:  Recent TSH low.  Clinically euthyroid.  Suspect this is due to recent weight loss, patient started weight watchers 2 months ago.  - Decrease Synthroid dose from 150 mcg to 125 mcg daily.  -Repeat TSH in 6 to 8 weeks  - Patient to make follow-up appointment  Orders:  -     TSH reflex to FT4; Future  -     levothyroxine (SYNTHROID) 125 MCG tablet; Take 1 tablet by mouth daily, Disp-90 tablet, R-3Normal  Psoriatic arthritis (HCC)  Assessment & Plan:  This problem is stable on current regimen. Follows with Dr. Coleman.  - continue orencia  - continue regular exercise  - continue naprosyn prn  - Doing better now that she has added Cymbalta  - Patient plans to pursue low-dose radiation treatments for pain with [facility]  Hyperlipidemia, unspecified hyperlipidemia type  -     atorvastatin (LIPITOR) 20 MG tablet; Take 1 tablet by mouth daily, Disp-90 tablet, R-3Normal  Primary open angle glaucoma (POAG) of both eyes, moderate stage  Assessment & Plan:   Dx at age 15. Follows with [facility]. Disease is stable.  - continue regular follow-ups with [facility]  - continue latanaprost and timolol  Obesity, class 3 (E66.813)  Assessment & Plan:   Patient started weight watchers recently and anticipation of her granddaughter's wedding in November.  Has already lost 15 pounds in 60 days.  - Continue weight watchers as this seems to be working very well for her     Return in about 8 weeks (around 7/25/2025) for thyroid follow up.     Subjective       Patient's complete Health Risk Assessment and screening values have been reviewed and are found

## 2025-05-30 NOTE — ASSESSMENT & PLAN NOTE
Dx at age 15. Follows with [facility]. Disease is stable.  - continue regular follow-ups with [facility]  - continue latanaprost and timolol

## 2025-05-30 NOTE — ASSESSMENT & PLAN NOTE
- reviewed routine labs  - mammogram UTD  - colonoscopy UTD - due Dec 2027  - eye dr and dentist appts UTD  - DEXA UTD  - discussed healthy diet and exercise  - discussed outstanding immunizations

## 2025-05-30 NOTE — ASSESSMENT & PLAN NOTE
This problem is stable on current regimen. Follows with Dr. Coleman.  - continue orencia  - continue regular exercise  - continue naprosyn prn  - Doing better now that she has added Cymbalta  - Patient plans to pursue low-dose radiation treatments for pain with [facility]

## 2025-05-30 NOTE — ASSESSMENT & PLAN NOTE
Recent TSH low.  Clinically euthyroid.  Suspect this is due to recent weight loss, patient started weight watchers 2 months ago.  - Decrease Synthroid dose from 150 mcg to 125 mcg daily.  -Repeat TSH in 6 to 8 weeks  - Patient to make follow-up appointment

## 2025-06-29 PROBLEM — Z00.00 MEDICARE ANNUAL WELLNESS VISIT, SUBSEQUENT: Status: RESOLVED | Noted: 2025-05-30 | Resolved: 2025-06-29

## 2025-07-14 DIAGNOSIS — E03.9 HYPOTHYROIDISM, UNSPECIFIED TYPE: ICD-10-CM

## 2025-07-14 LAB — TSH SERPL DL<=0.005 MIU/L-ACNC: 0.62 UIU/ML (ref 0.27–4.2)

## 2025-07-23 ENCOUNTER — APPOINTMENT (OUTPATIENT)
Dept: CT IMAGING | Age: 70
DRG: 305 | End: 2025-07-23
Payer: MEDICARE

## 2025-07-23 ENCOUNTER — HOSPITAL ENCOUNTER (INPATIENT)
Age: 70
LOS: 1 days | Discharge: HOME | DRG: 305 | End: 2025-07-24
Attending: EMERGENCY MEDICINE | Admitting: INTERNAL MEDICINE
Payer: MEDICARE

## 2025-07-23 ENCOUNTER — APPOINTMENT (OUTPATIENT)
Dept: MRI IMAGING | Age: 70
DRG: 305 | End: 2025-07-23
Payer: MEDICARE

## 2025-07-23 DIAGNOSIS — G45.9 TIA (TRANSIENT ISCHEMIC ATTACK): ICD-10-CM

## 2025-07-23 DIAGNOSIS — I63.9 CEREBROVASCULAR ACCIDENT (CVA), UNSPECIFIED MECHANISM (HCC): Primary | ICD-10-CM

## 2025-07-23 PROBLEM — R47.1 DYSARTHRIA: Status: ACTIVE | Noted: 2025-07-23

## 2025-07-23 LAB
ALBUMIN SERPL-MCNC: 4.2 G/DL (ref 3.4–5)
ALBUMIN/GLOB SERPL: 1.6 {RATIO} (ref 1.1–2.2)
ALP SERPL-CCNC: 72 U/L (ref 40–129)
ALT SERPL-CCNC: 17 U/L (ref 10–40)
ANION GAP SERPL CALCULATED.3IONS-SCNC: 10 MMOL/L (ref 3–16)
AST SERPL-CCNC: 25 U/L (ref 15–37)
BACTERIA URNS QL MICRO: ABNORMAL /HPF
BASOPHILS # BLD: 0 K/UL (ref 0–0.2)
BASOPHILS NFR BLD: 0.5 %
BILIRUB SERPL-MCNC: 0.5 MG/DL (ref 0–1)
BILIRUB UR QL STRIP.AUTO: NEGATIVE
BUN SERPL-MCNC: 11 MG/DL (ref 7–20)
CALCIUM SERPL-MCNC: 9.1 MG/DL (ref 8.3–10.6)
CHLORIDE SERPL-SCNC: 100 MMOL/L (ref 99–110)
CLARITY UR: CLEAR
CO2 SERPL-SCNC: 24 MMOL/L (ref 21–32)
COLOR UR: YELLOW
CREAT SERPL-MCNC: 0.7 MG/DL (ref 0.6–1.2)
DEPRECATED RDW RBC AUTO: 14.5 % (ref 12.4–15.4)
EKG ATRIAL RATE: 63 BPM
EKG DIAGNOSIS: NORMAL
EKG P AXIS: 31 DEGREES
EKG P-R INTERVAL: 178 MS
EKG Q-T INTERVAL: 418 MS
EKG QRS DURATION: 86 MS
EKG QTC CALCULATION (BAZETT): 427 MS
EKG R AXIS: 0 DEGREES
EKG T AXIS: 26 DEGREES
EKG VENTRICULAR RATE: 63 BPM
EOSINOPHIL # BLD: 0.1 K/UL (ref 0–0.6)
EOSINOPHIL NFR BLD: 2.2 %
EPI CELLS #/AREA URNS HPF: ABNORMAL /HPF (ref 0–5)
GFR SERPLBLD CREATININE-BSD FMLA CKD-EPI: >90 ML/MIN/{1.73_M2}
GLUCOSE BLD-MCNC: 102 MG/DL (ref 70–99)
GLUCOSE SERPL-MCNC: 105 MG/DL (ref 70–99)
GLUCOSE UR STRIP.AUTO-MCNC: NEGATIVE MG/DL
HCT VFR BLD AUTO: 39.3 % (ref 36–48)
HGB BLD-MCNC: 13.3 G/DL (ref 12–16)
HGB UR QL STRIP.AUTO: NEGATIVE
KETONES UR STRIP.AUTO-MCNC: NEGATIVE MG/DL
LEUKOCYTE ESTERASE UR QL STRIP.AUTO: ABNORMAL
LYMPHOCYTES # BLD: 2.2 K/UL (ref 1–5.1)
LYMPHOCYTES NFR BLD: 41.8 %
MCH RBC QN AUTO: 31.8 PG (ref 26–34)
MCHC RBC AUTO-ENTMCNC: 33.9 G/DL (ref 31–36)
MCV RBC AUTO: 93.8 FL (ref 80–100)
MONOCYTES # BLD: 0.4 K/UL (ref 0–1.3)
MONOCYTES NFR BLD: 8.6 %
NEUTROPHILS # BLD: 2.4 K/UL (ref 1.7–7.7)
NEUTROPHILS NFR BLD: 46.9 %
NITRITE UR QL STRIP.AUTO: NEGATIVE
PERFORMED ON: ABNORMAL
PH UR STRIP.AUTO: 7 [PH] (ref 5–8)
PLATELET # BLD AUTO: 245 K/UL (ref 135–450)
PMV BLD AUTO: 8.2 FL (ref 5–10.5)
POTASSIUM SERPL-SCNC: 4.3 MMOL/L (ref 3.5–5.1)
PROT SERPL-MCNC: 6.8 G/DL (ref 6.4–8.2)
PROT UR STRIP.AUTO-MCNC: NEGATIVE MG/DL
RBC # BLD AUTO: 4.19 M/UL (ref 4–5.2)
RBC #/AREA URNS HPF: ABNORMAL /HPF (ref 0–4)
SODIUM SERPL-SCNC: 134 MMOL/L (ref 136–145)
SP GR UR STRIP.AUTO: 1.01 (ref 1–1.03)
UA COMPLETE W REFLEX CULTURE PNL UR: ABNORMAL
UA DIPSTICK W REFLEX MICRO PNL UR: YES
URN SPEC COLLECT METH UR: ABNORMAL
UROBILINOGEN UR STRIP-ACNC: 0.2 E.U./DL
WBC # BLD AUTO: 5.2 K/UL (ref 4–11)
WBC #/AREA URNS HPF: ABNORMAL /HPF (ref 0–5)

## 2025-07-23 PROCEDURE — 81001 URINALYSIS AUTO W/SCOPE: CPT

## 2025-07-23 PROCEDURE — 93005 ELECTROCARDIOGRAM TRACING: CPT

## 2025-07-23 PROCEDURE — 6370000000 HC RX 637 (ALT 250 FOR IP): Performed by: INTERNAL MEDICINE

## 2025-07-23 PROCEDURE — 70551 MRI BRAIN STEM W/O DYE: CPT

## 2025-07-23 PROCEDURE — 70496 CT ANGIOGRAPHY HEAD: CPT

## 2025-07-23 PROCEDURE — 36415 COLL VENOUS BLD VENIPUNCTURE: CPT

## 2025-07-23 PROCEDURE — 85025 COMPLETE CBC W/AUTO DIFF WBC: CPT

## 2025-07-23 PROCEDURE — 99285 EMERGENCY DEPT VISIT HI MDM: CPT

## 2025-07-23 PROCEDURE — 4A03X5D MEASUREMENT OF ARTERIAL FLOW, INTRACRANIAL, EXTERNAL APPROACH: ICD-10-PCS | Performed by: INTERNAL MEDICINE

## 2025-07-23 PROCEDURE — 80053 COMPREHEN METABOLIC PANEL: CPT

## 2025-07-23 PROCEDURE — 70450 CT HEAD/BRAIN W/O DYE: CPT

## 2025-07-23 PROCEDURE — 6360000004 HC RX CONTRAST MEDICATION

## 2025-07-23 PROCEDURE — 1200000000 HC SEMI PRIVATE

## 2025-07-23 PROCEDURE — 6360000002 HC RX W HCPCS: Performed by: INTERNAL MEDICINE

## 2025-07-23 RX ORDER — DULOXETIN HYDROCHLORIDE 20 MG/1
20 CAPSULE, DELAYED RELEASE ORAL NIGHTLY
Status: DISCONTINUED | OUTPATIENT
Start: 2025-07-23 | End: 2025-07-24 | Stop reason: HOSPADM

## 2025-07-23 RX ORDER — SODIUM CHLORIDE 9 MG/ML
INJECTION, SOLUTION INTRAVENOUS PRN
Status: DISCONTINUED | OUTPATIENT
Start: 2025-07-23 | End: 2025-07-24 | Stop reason: HOSPADM

## 2025-07-23 RX ORDER — LORAZEPAM 1 MG/1
1 TABLET ORAL EVERY 6 HOURS PRN
Status: DISCONTINUED | OUTPATIENT
Start: 2025-07-23 | End: 2025-07-24 | Stop reason: HOSPADM

## 2025-07-23 RX ORDER — ASPIRIN 300 MG/1
300 SUPPOSITORY RECTAL DAILY
Status: DISCONTINUED | OUTPATIENT
Start: 2025-07-23 | End: 2025-07-24 | Stop reason: HOSPADM

## 2025-07-23 RX ORDER — GABAPENTIN 300 MG/1
300 CAPSULE ORAL NIGHTLY
Status: DISCONTINUED | OUTPATIENT
Start: 2025-07-23 | End: 2025-07-24 | Stop reason: HOSPADM

## 2025-07-23 RX ORDER — TRAZODONE HYDROCHLORIDE 50 MG/1
50 TABLET ORAL NIGHTLY
Status: DISCONTINUED | OUTPATIENT
Start: 2025-07-23 | End: 2025-07-24 | Stop reason: HOSPADM

## 2025-07-23 RX ORDER — SODIUM CHLORIDE 0.9 % (FLUSH) 0.9 %
5-40 SYRINGE (ML) INJECTION EVERY 12 HOURS SCHEDULED
Status: DISCONTINUED | OUTPATIENT
Start: 2025-07-23 | End: 2025-07-24 | Stop reason: HOSPADM

## 2025-07-23 RX ORDER — SODIUM CHLORIDE 0.9 % (FLUSH) 0.9 %
5-40 SYRINGE (ML) INJECTION PRN
Status: DISCONTINUED | OUTPATIENT
Start: 2025-07-23 | End: 2025-07-24 | Stop reason: HOSPADM

## 2025-07-23 RX ORDER — LATANOPROST 50 UG/ML
1 SOLUTION/ DROPS OPHTHALMIC NIGHTLY
Status: DISCONTINUED | OUTPATIENT
Start: 2025-07-23 | End: 2025-07-24 | Stop reason: HOSPADM

## 2025-07-23 RX ORDER — ENOXAPARIN SODIUM 100 MG/ML
30 INJECTION SUBCUTANEOUS 2 TIMES DAILY
Status: DISCONTINUED | OUTPATIENT
Start: 2025-07-23 | End: 2025-07-24 | Stop reason: HOSPADM

## 2025-07-23 RX ORDER — TIMOLOL MALEATE 2.5 MG/ML
1 SOLUTION/ DROPS OPHTHALMIC 2 TIMES DAILY
Status: DISCONTINUED | OUTPATIENT
Start: 2025-07-23 | End: 2025-07-24 | Stop reason: HOSPADM

## 2025-07-23 RX ORDER — LEVOTHYROXINE SODIUM 125 UG/1
125 TABLET ORAL DAILY
Status: DISCONTINUED | OUTPATIENT
Start: 2025-07-24 | End: 2025-07-24 | Stop reason: HOSPADM

## 2025-07-23 RX ORDER — POLYETHYLENE GLYCOL 3350 17 G/17G
17 POWDER, FOR SOLUTION ORAL DAILY PRN
Status: DISCONTINUED | OUTPATIENT
Start: 2025-07-23 | End: 2025-07-24 | Stop reason: HOSPADM

## 2025-07-23 RX ORDER — DULOXETIN HYDROCHLORIDE 20 MG/1
20 CAPSULE, DELAYED RELEASE ORAL DAILY
Status: DISCONTINUED | OUTPATIENT
Start: 2025-07-23 | End: 2025-07-23

## 2025-07-23 RX ORDER — ONDANSETRON 4 MG/1
4 TABLET, ORALLY DISINTEGRATING ORAL EVERY 8 HOURS PRN
Status: DISCONTINUED | OUTPATIENT
Start: 2025-07-23 | End: 2025-07-24 | Stop reason: HOSPADM

## 2025-07-23 RX ORDER — ONDANSETRON 2 MG/ML
4 INJECTION INTRAMUSCULAR; INTRAVENOUS EVERY 6 HOURS PRN
Status: DISCONTINUED | OUTPATIENT
Start: 2025-07-23 | End: 2025-07-24 | Stop reason: HOSPADM

## 2025-07-23 RX ORDER — ATORVASTATIN CALCIUM 80 MG/1
80 TABLET, FILM COATED ORAL NIGHTLY
Status: DISCONTINUED | OUTPATIENT
Start: 2025-07-23 | End: 2025-07-24 | Stop reason: HOSPADM

## 2025-07-23 RX ORDER — ATORVASTATIN CALCIUM 20 MG/1
20 TABLET, FILM COATED ORAL DAILY
Status: DISCONTINUED | OUTPATIENT
Start: 2025-07-23 | End: 2025-07-23

## 2025-07-23 RX ORDER — IOPAMIDOL 755 MG/ML
75 INJECTION, SOLUTION INTRAVASCULAR
Status: COMPLETED | OUTPATIENT
Start: 2025-07-23 | End: 2025-07-23

## 2025-07-23 RX ORDER — ASPIRIN 81 MG/1
81 TABLET, CHEWABLE ORAL DAILY
Status: DISCONTINUED | OUTPATIENT
Start: 2025-07-23 | End: 2025-07-24 | Stop reason: HOSPADM

## 2025-07-23 RX ADMIN — GABAPENTIN 300 MG: 300 CAPSULE ORAL at 21:35

## 2025-07-23 RX ADMIN — DULOXETINE 20 MG: 20 CAPSULE, DELAYED RELEASE ORAL at 21:56

## 2025-07-23 RX ADMIN — IOPAMIDOL 75 ML: 755 INJECTION, SOLUTION INTRAVENOUS at 14:15

## 2025-07-23 RX ADMIN — LORAZEPAM 1 MG: 1 TABLET ORAL at 18:29

## 2025-07-23 RX ADMIN — ASPIRIN 81 MG: 81 TABLET, CHEWABLE ORAL at 18:31

## 2025-07-23 RX ADMIN — ENOXAPARIN SODIUM 30 MG: 100 INJECTION SUBCUTANEOUS at 21:35

## 2025-07-23 RX ADMIN — TRAZODONE HYDROCHLORIDE 50 MG: 50 TABLET ORAL at 21:56

## 2025-07-23 ASSESSMENT — PAIN - FUNCTIONAL ASSESSMENT: PAIN_FUNCTIONAL_ASSESSMENT: NONE - DENIES PAIN

## 2025-07-23 NOTE — H&P
History and Physical    Admit Date: 7/23/2025    Patient's PCP: Sara Joe MD    HISTORY OF PRESENT ILLNESS:    This is a very pleasant 69 y.o. female with a history of recent fall who presented with dysarthria.  The patient states this happened yesterday after lunch, when she abruptly had a difficult time finding words.  She states it lasted for about 15 minutes and then resolved, however she states it was followed by a headache.  She states she was at home again, and the same thing happened.  She states she had a difficult time finding words, she states she was well aware of what was going on.  She subsequently came to the ed but the symptoms resolved on their own.  There is however a concern that the patient had subsequent headaches with profound leevation in bp over 200's.  Patient states she also had a recent fall though shse did not fall and hit her head, she fell fast and hard enough where she could not get up this past Thursday.      Past Medical / Surgical History:    Past Medical History:   Diagnosis Date    Arthritis     Bone spur     CTS (carpal tunnel syndrome)     Cyst     left foot and spur    Dental crowns present     molars    Glaucoma     High risk medication use 11/8/2022    Hyperlipidemia     Hypothyroidism     Osteopenia     Overweight(278.02)     PONV (postoperative nausea and vomiting)     phenergan works well     Psoriatic arthritis (HCC)     S/P foot surgery 07/07/2011    excision cyst and resection spur left foot    Scalp psoriasis        Past Surgical History:   Procedure Laterality Date    ARTHRODESIS Right 9/3/2020    AKIN OSTEOTOMY RIGHT HALLUX, PROXIMAL INTERPHALANGEAL JOINT ARTHRODESIS WITH SEQUENTIAL REDUCTION AND DORSAL CAPSULOTOMY performed by Donya Stephens DPM at [facility] OR    CARPAL TUNNEL RELEASE Left 02/02/2018    CARPAL TUNNEL RELEASE Right 02/23/2018    right carpal tunnel release    CATARACT REMOVAL Left 2019    COLONOSCOPY      2011    FOOT SURGERY  07/07/2011

## 2025-07-23 NOTE — ED PROVIDER NOTES
THE [facility]  EMERGENCY DEPARTMENT ENCOUNTER          [facility] RESIDENT NOTE       Date of evaluation: 7/23/2025    Chief Complaint     aphasia    History of Present Illness     Deborah Agustin is a 69 y.o. female with PMHx of psoriatic arthritis, hypothyroidism, glucoma, HLD who presents with aphasia.    Patient reports episode of aphasia 1 hour prior to presentation. Was with friends when she was unable to respond appropriately and friends noticed words did not make sense. A similar episode occurred the day prior. Given these 2 episodes, she presented to the ED. No facial droop, gait abnormality, weakness. No prior history of similar symptoms or stroke or TIA. Patient reports one episode of palpitations four weeks ago during  Recent A1c and lipid panel in May was wnl. She is on atorvastatin 20 mg. No blood thinners.    ASSESSMENT / PLAN  (MEDICAL DECISION MAKING)     INITIAL VITALS: BP: (!) 207/79, Temp: 98.1 °F (36.7 °C), Pulse: 63, Respirations: 18, SpO2: 96 %     Deborha Agustin is a 69 y.o. female who presents with aphasia. On physical exam, patient is currently not aphasic, NIHSS of 0.CT head without ich or mass effect, CTA head and neck without occlusion or aneurysm. Patient is currently stable with no aphasia or focal neurologic deficits. After discussion with patient, would like to admit for further eval with neuro for possible TIA, echo, MRI etc.    Is this patient to be included in the SEP-1 core measure? No Exclusion criteria - the patient is NOT to be included for SEP-1 Core Measure due to: Infection is not suspected    Medical Decision Making  Amount and/or Complexity of Data Reviewed  Labs: ordered.  Radiology: ordered.  ECG/medicine tests: ordered.    Risk  Prescription drug management.  Decision regarding hospitalization.        This patient was also evaluated by the attending physician. All care plans were discussed and agreed upon.    Clinical Impression     1. Cerebrovascular accident (CVA),

## 2025-07-23 NOTE — PROGRESS NOTES
Patient admitted to room 1964. Report received from RN via phone call. VSS on room air. Patient is a/o x4. Patient oriented to room and call light. Rights and responsibilities provided to patient, and welcome packet provided to patient. 4 eyes skin assessment completed with 2nd RN.

## 2025-07-23 NOTE — ED PROVIDER NOTES
ED Attending Attestation Note     Date of evaluation: 7/23/2025    This patient was seen by the resident.  I have seen and examined the patient, agree with the workup, evaluation, management and diagnosis. The care plan has been discussed.      Briefly, Deborah Agustin is a 69 y.o. female with a PMH inclusive of hyperlipidemia who presents for evaluation of 30 minutes of aphasia, now resolved. Happened yesterday as well for about 10 minutes. Patient can recall the episode. Has not has this before.  They also note that she had some palpitations last week.  These resolved.  No history of A-fib.    Notable exam findings include clear and fluent speech, no cranial nerve deficits, 5 out of 5 power in the extremities, normal sensation, normal finger-nose and heel-to-shin, correctly follows multistep commands and names month and age. NIHSS 0.    Assessment/ Medical Decision Making:     History and exam are concerning for TIA.  She has had 2 episodes in the last 24 hours.  No stroke alert due to resolved symptoms and NIHSS of 0 but do feel that she would benefit from admission for neuro workup.       Giovanny Nova MD  07/24/25 [id number]

## 2025-07-23 NOTE — PROGRESS NOTES
Current NIHSS 0    Nursing Core Measures for Stroke:   [x]   Education template documentation (STROKE/TIA). Select only risk factors that are applicable to patient when selecting risk factors.  [x]   Care Plan template documentation (Physiologic Instability - Neurosensory). Selecting this will add care plan rows to the flowsheet under the Neuro section of Head to Toe.  [x]   Verified Swallow Screen completed prior to PO intake of food, drink, medications.          Please verify correct medication route prior to administration for intubated patients, patients who can not swallow or have alternative routes of intake (NG, OG, MO), etc  [x]   VTE Prophylaxis: SCDs ordered/addressed; SCDs: On           (As a reminder, ASA, Plavix, and TPA/TNK are not VTE prophylaxis.)    Reviewed the Following Education with Patient and/or Family:   - Personalized risk factors for patient, along with changes, modifications that will help prevent stroke.  - Signs and Symptoms of Stroke: (Facial droop, weakness/numbness especially on one side, speech difficulty, sudden confusion, sudden loss of vision, sudden severe headache, sudden loss of balance or having difficulty walking, syncope, or seizure)  - How to activate EMS (911)   - Importance of Follow Up Appointments at Discharge   - Importance of Compliance with Medications Prescribed at Discharge  - Available community resources and stroke advocacy groups if needed    Patient and/or family member: verbalized understanding.     Stroke Education booklet given to patient/family (or verified, if given already), which reviews above information. yes         Electronically signed by Marily Sanders RN on 7/23/2025 at 7:20 PM

## 2025-07-23 NOTE — PROGRESS NOTES
4 Eyes Skin Assessment     NAME:  Deborah Agustin  YOB: 1955  MEDICAL RECORD NUMBER:  [id number]    The patient is being assessed for  Admission    I agree that at least one RN has performed a thorough Head to Toe Skin Assessment on the patient. ALL assessment sites listed below have been assessed.      Areas assessed by both nurses:    Head, Face, Ears, Shoulders, Back, Chest, Arms, Elbows, Hands, Sacrum. Buttock, Coccyx, Ischium, Legs. Feet and Heels, and Under Medical Devices         Does the Patient have a Wound? Yes wound(s) were present on assessment. LDA wound assessment was Initiated and completed by RN     -open wound under left breast, bruising to lower right extremity and arm, and abrasion to right arm, bilateral breast redness   Dawson Prevention initiated by RN: No  Wound Care Orders initiated by RN: No    For hospital-acquired stage 1 & 2 and ALL Stage 3,4, Unstageable, DTI, NWPT, and Complex wounds: place order “IP Wound Care/Ostomy Nurse Eval and Treat” by RN under : No    New Ostomies, if present place, Ostomy referral order under : No     Nurse 1 eSignature: Electronically signed by Marily Sanders RN on 7/23/25 at 7:17 PM EDT    **SHARE this note so that the co-signing nurse can place an eSignature**    Nurse 2 eSignature: Electronically signed by Gabriela Yarbrough RN on 7/23/25 at 7:26 PM EDT

## 2025-07-24 ENCOUNTER — APPOINTMENT (OUTPATIENT)
Age: 70
DRG: 305 | End: 2025-07-24
Attending: INTERNAL MEDICINE
Payer: MEDICARE

## 2025-07-24 VITALS
TEMPERATURE: 98 F | SYSTOLIC BLOOD PRESSURE: 128 MMHG | OXYGEN SATURATION: 100 % | HEIGHT: 63 IN | WEIGHT: 229.8 LBS | RESPIRATION RATE: 18 BRPM | BODY MASS INDEX: 40.72 KG/M2 | DIASTOLIC BLOOD PRESSURE: 65 MMHG | HEART RATE: 59 BPM

## 2025-07-24 PROBLEM — R47.89 EPISODES OF SPEECH ARREST: Status: ACTIVE | Noted: 2025-07-24

## 2025-07-24 PROBLEM — G43.109 OCULAR MIGRAINE: Status: ACTIVE | Noted: 2025-07-24

## 2025-07-24 PROBLEM — R47.01 APHASIA: Status: ACTIVE | Noted: 2025-07-24

## 2025-07-24 PROBLEM — I10 PRIMARY HYPERTENSION: Status: ACTIVE | Noted: 2025-07-24

## 2025-07-24 LAB
ANION GAP SERPL CALCULATED.3IONS-SCNC: 10 MMOL/L (ref 3–16)
BUN SERPL-MCNC: 10 MG/DL (ref 7–20)
CALCIUM SERPL-MCNC: 9.3 MG/DL (ref 8.3–10.6)
CHLORIDE SERPL-SCNC: 102 MMOL/L (ref 99–110)
CHOLEST SERPL-MCNC: 169 MG/DL (ref 0–199)
CO2 SERPL-SCNC: 27 MMOL/L (ref 21–32)
CREAT SERPL-MCNC: 0.7 MG/DL (ref 0.6–1.2)
DEPRECATED RDW RBC AUTO: 14.7 % (ref 12.4–15.4)
ECHO AO ASC DIAM: 3.4 CM
ECHO AO ASCENDING AORTA INDEX: 1.66 CM/M2
ECHO AO ROOT DIAM: 3.5 CM
ECHO AO ROOT INDEX: 1.71 CM/M2
ECHO AV AREA PEAK VELOCITY: 2.9 CM2
ECHO AV AREA/BSA PEAK VELOCITY: 1.4 CM2/M2
ECHO AV PEAK GRADIENT: 5 MMHG
ECHO AV PEAK VELOCITY: 1.2 M/S
ECHO AV VELOCITY RATIO: 0.92
ECHO BSA: 2.15 M2
ECHO EST RA PRESSURE: 3 MMHG
ECHO IVC EXP: 1.6 CM
ECHO LA AREA 2C: 19.1 CM2
ECHO LA AREA 4C: 21.4 CM2
ECHO LA MAJOR AXIS: 6.2 CM
ECHO LA MINOR AXIS: 6.2 CM
ECHO LA VOL BP: 53 ML (ref 22–52)
ECHO LA VOL MOD A2C: 49 ML (ref 22–52)
ECHO LA VOL MOD A4C: 58 ML (ref 22–52)
ECHO LA VOL/BSA BIPLANE: 26 ML/M2 (ref 16–34)
ECHO LA VOLUME INDEX MOD A2C: 24 ML/M2 (ref 16–34)
ECHO LA VOLUME INDEX MOD A4C: 28 ML/M2 (ref 16–34)
ECHO LV E' LATERAL VELOCITY: 5.33 CM/S
ECHO LV E' SEPTAL VELOCITY: 4.46 CM/S
ECHO LV EDV 3D: 120 ML
ECHO LV EDV INDEX 3D: 59 ML/M2
ECHO LV EF PHYSICIAN: 58 %
ECHO LV EJECTION FRACTION 3D: 56 %
ECHO LV ESV 3D: 52 ML
ECHO LV ESV INDEX 3D: 25 ML/M2
ECHO LV FRACTIONAL SHORTENING: 33 % (ref 28–44)
ECHO LV INTERNAL DIMENSION DIASTOLE INDEX: 2.2 CM/M2
ECHO LV INTERNAL DIMENSION DIASTOLIC: 4.5 CM (ref 3.9–5.3)
ECHO LV INTERNAL DIMENSION SYSTOLIC INDEX: 1.46 CM/M2
ECHO LV INTERNAL DIMENSION SYSTOLIC: 3 CM
ECHO LV IVSD: 1 CM (ref 0.6–0.9)
ECHO LV MASS 2D: 153.3 G (ref 67–162)
ECHO LV MASS 3D INDEX: 65.9 G/M2
ECHO LV MASS 3D: 135 G
ECHO LV MASS INDEX 2D: 74.8 G/M2 (ref 43–95)
ECHO LV POSTERIOR WALL DIASTOLIC: 1 CM (ref 0.6–0.9)
ECHO LV RELATIVE WALL THICKNESS RATIO: 0.44
ECHO LVOT AREA: 3.1 CM2
ECHO LVOT DIAM: 2 CM
ECHO LVOT MEAN GRADIENT: 3 MMHG
ECHO LVOT PEAK GRADIENT: 5 MMHG
ECHO LVOT PEAK VELOCITY: 1.1 M/S
ECHO LVOT STROKE VOLUME INDEX: 45 ML/M2
ECHO LVOT SV: 92.3 ML
ECHO LVOT VTI: 29.4 CM
ECHO MV A VELOCITY: 0.95 M/S
ECHO MV E DECELERATION TIME (DT): 331 MS
ECHO MV E VELOCITY: 0.77 M/S
ECHO MV E/A RATIO: 0.81
ECHO MV E/E' LATERAL: 14.45
ECHO MV E/E' RATIO (AVERAGED): 15.86
ECHO MV E/E' SEPTAL: 17.26
ECHO PV MAX VELOCITY: 0.7 M/S
ECHO PV PEAK GRADIENT: 2 MMHG
ECHO RA AREA 4C: 17.1 CM2
ECHO RA END SYSTOLIC VOLUME APICAL 4 CHAMBER INDEX BSA: 20 ML/M2
ECHO RA VOLUME: 41 ML
ECHO RIGHT VENTRICULAR SYSTOLIC PRESSURE (RVSP): 6 MMHG
ECHO RV BASAL DIMENSION: 3.7 CM
ECHO RV FREE WALL PEAK S': 14.6 CM/S
ECHO RV MID DIMENSION: 2.2 CM
ECHO RV TAPSE: 2.4 CM (ref 1.7–?)
ECHO TV REGURGITANT MAX VELOCITY: 0.81 M/S
ECHO TV REGURGITANT PEAK GRADIENT: 3 MMHG
GFR SERPLBLD CREATININE-BSD FMLA CKD-EPI: >90 ML/MIN/{1.73_M2}
GLUCOSE SERPL-MCNC: 93 MG/DL (ref 70–99)
HCT VFR BLD AUTO: 39.3 % (ref 36–48)
HDLC SERPL-MCNC: 60 MG/DL (ref 40–60)
HGB BLD-MCNC: 13.7 G/DL (ref 12–16)
LDLC SERPL CALC-MCNC: 91 MG/DL
MCH RBC QN AUTO: 32.5 PG (ref 26–34)
MCHC RBC AUTO-ENTMCNC: 34.7 G/DL (ref 31–36)
MCV RBC AUTO: 93.7 FL (ref 80–100)
PLATELET # BLD AUTO: 227 K/UL (ref 135–450)
PMV BLD AUTO: 8.2 FL (ref 5–10.5)
POTASSIUM SERPL-SCNC: 4 MMOL/L (ref 3.5–5.1)
RBC # BLD AUTO: 4.2 M/UL (ref 4–5.2)
SODIUM SERPL-SCNC: 139 MMOL/L (ref 136–145)
TRIGL SERPL-MCNC: 88 MG/DL (ref 0–150)
VLDLC SERPL CALC-MCNC: 18 MG/DL
WBC # BLD AUTO: 4.3 K/UL (ref 4–11)

## 2025-07-24 PROCEDURE — 83036 HEMOGLOBIN GLYCOSYLATED A1C: CPT

## 2025-07-24 PROCEDURE — 97535 SELF CARE MNGMENT TRAINING: CPT

## 2025-07-24 PROCEDURE — 97116 GAIT TRAINING THERAPY: CPT

## 2025-07-24 PROCEDURE — 99223 1ST HOSP IP/OBS HIGH 75: CPT | Performed by: PSYCHIATRY & NEUROLOGY

## 2025-07-24 PROCEDURE — 93306 TTE W/DOPPLER COMPLETE: CPT

## 2025-07-24 PROCEDURE — 80048 BASIC METABOLIC PNL TOTAL CA: CPT

## 2025-07-24 PROCEDURE — 97165 OT EVAL LOW COMPLEX 30 MIN: CPT

## 2025-07-24 PROCEDURE — 93306 TTE W/DOPPLER COMPLETE: CPT | Performed by: INTERNAL MEDICINE

## 2025-07-24 PROCEDURE — 36415 COLL VENOUS BLD VENIPUNCTURE: CPT

## 2025-07-24 PROCEDURE — 76376 3D RENDER W/INTRP POSTPROCES: CPT | Performed by: INTERNAL MEDICINE

## 2025-07-24 PROCEDURE — 85027 COMPLETE CBC AUTOMATED: CPT

## 2025-07-24 PROCEDURE — 6360000002 HC RX W HCPCS: Performed by: INTERNAL MEDICINE

## 2025-07-24 PROCEDURE — 6370000000 HC RX 637 (ALT 250 FOR IP): Performed by: INTERNAL MEDICINE

## 2025-07-24 PROCEDURE — 80061 LIPID PANEL: CPT

## 2025-07-24 PROCEDURE — 97161 PT EVAL LOW COMPLEX 20 MIN: CPT

## 2025-07-24 RX ORDER — ASPIRIN 81 MG/1
81 TABLET, CHEWABLE ORAL DAILY
Qty: 30 TABLET | Refills: 3 | Status: ON HOLD | OUTPATIENT
Start: 2025-07-25

## 2025-07-24 RX ORDER — HYDROCHLOROTHIAZIDE 25 MG/1
25 TABLET ORAL DAILY
Status: DISCONTINUED | OUTPATIENT
Start: 2025-07-24 | End: 2025-07-24

## 2025-07-24 RX ORDER — TRAZODONE HYDROCHLORIDE 50 MG/1
50 TABLET ORAL NIGHTLY
Qty: 90 TABLET | Refills: 1 | Status: ON HOLD | OUTPATIENT
Start: 2025-07-24

## 2025-07-24 RX ORDER — OLMESARTAN MEDOXOMIL AND HYDROCHLOROTHIAZIDE 40/12.5 40; 12.5 MG/1; MG/1
1 TABLET ORAL DAILY
Status: DISCONTINUED | OUTPATIENT
Start: 2025-07-24 | End: 2025-07-24

## 2025-07-24 RX ORDER — VALSARTAN AND HYDROCHLOROTHIAZIDE 80; 12.5 MG/1; MG/1
1 TABLET, FILM COATED ORAL DAILY
Qty: 90 TABLET | Refills: 1 | Status: ON HOLD | OUTPATIENT
Start: 2025-07-24

## 2025-07-24 RX ORDER — HYDROCHLOROTHIAZIDE 25 MG/1
25 TABLET ORAL DAILY
Status: DISCONTINUED | OUTPATIENT
Start: 2025-07-24 | End: 2025-07-24 | Stop reason: HOSPADM

## 2025-07-24 RX ORDER — LOSARTAN POTASSIUM 25 MG/1
100 TABLET ORAL DAILY
Status: DISCONTINUED | OUTPATIENT
Start: 2025-07-24 | End: 2025-07-24

## 2025-07-24 RX ORDER — LOSARTAN POTASSIUM 25 MG/1
50 TABLET ORAL DAILY
Status: DISCONTINUED | OUTPATIENT
Start: 2025-07-24 | End: 2025-07-24 | Stop reason: HOSPADM

## 2025-07-24 RX ADMIN — TIMOLOL MALEATE 1 DROP: 2.5 SOLUTION OPHTHALMIC at 08:38

## 2025-07-24 RX ADMIN — LOSARTAN POTASSIUM 50 MG: 25 TABLET, FILM COATED ORAL at 11:43

## 2025-07-24 RX ADMIN — LEVOTHYROXINE SODIUM 125 MCG: 0.12 TABLET ORAL at 08:37

## 2025-07-24 RX ADMIN — ENOXAPARIN SODIUM 30 MG: 100 INJECTION SUBCUTANEOUS at 08:37

## 2025-07-24 RX ADMIN — ASPIRIN 81 MG: 81 TABLET, CHEWABLE ORAL at 08:37

## 2025-07-24 RX ADMIN — HYDROCHLOROTHIAZIDE 25 MG: 25 TABLET ORAL at 11:43

## 2025-07-24 NOTE — PLAN OF CARE
Problem: Discharge Planning  Goal: Discharge to home or other facility with appropriate resources  7/24/2025 1102 by Marily Sanders, RN  Outcome: Progressing  Pt involved in discharge planning. Barriers to discharge discussed with pt. Discharge learning needs identified. Discussed with pt any additional needed resources and transportation plans.    Problem: Safety - Adult  Goal: Free from fall injury  7/24/2025 1102 by Marily Sanders, RN  Outcome: Progressing  All fall precautions in place. Bed locked and in lowest position with alarm on. Overbed table and personal belongings within reach. Call light within reach and patient instructed to use call light for assistance. Non-skid socks on.

## 2025-07-24 NOTE — PROGRESS NOTES
Physical Therapy  Facility/Department: [facility] 5T ORTHO/NEURO  Physical Therapy Initial Assessment/Treatment/DC    Name: Deborah Agustin  : 1955  MRN: [id number]  Date of Service: 2025    Discharge Recommendations:  Home with assist PRN   PT Equipment Recommendations  Equipment Needed: No      Patient Diagnosis(es): The encounter diagnosis was Cerebrovascular accident (CVA), unspecified mechanism (HCC).  Past Medical History:  has a past medical history of Arthritis, Bone spur, CTS (carpal tunnel syndrome), Cyst, Dental crowns present, Glaucoma, High risk medication use, Hyperlipidemia, Hypothyroidism, Osteopenia, Overweight(278.02), PONV (postoperative nausea and vomiting), Psoriatic arthritis (HCC), S/P foot surgery, and Scalp psoriasis.  Past Surgical History:  has a past surgical history that includes Colonoscopy; Carpal tunnel release (Left, 2018); Foot surgery (2011); Carpal tunnel release (Right, 2018); Refractive surgery (); Cataract removal (Left, ); joint replacement (2015); joint replacement (Left, 2018); Refractive surgery (Left, ); arthrodesis (Right, 9/3/2020); and Hip Closed Reduction (Left, 2023).    Assessment  Assessment: 68 yo adm with dysarthria.  Pt was able to ambulate safely in room and on unit.  Had no difficulty going up/down few stairs.  Does have some L out-toeing and leg length difference from old hip surgery.  Pt feels she is moving about at her normal and denies feeling any balance or strength deficits.  Plans to have significant other stay with her for few days to keep eye on her.  No further PT needs at this time.  Will sign off- RN aware.  Decision Making: Low Complexity  Requires PT Follow-Up: No  Activity Tolerance  Activity Tolerance: Patient tolerated evaluation without incident    Plan  Safety Devices  Type of Devices: Call light within reach, Chair alarm in place, Nurse notified, Left in chair, Gait belt (visitor

## 2025-07-24 NOTE — PROGRESS NOTES
Pt a/o x4. VSS on RA. NIH remains 0. No change in neuro status overnight. Tolerating ambulation well. Denies pain. Fall precautions are in place.

## 2025-07-24 NOTE — PROGRESS NOTES
Current NIHSS 0    Nursing Core Measures for Stroke:   [x]   Education template documentation (STROKE/TIA). Select only risk factors that are applicable to patient when selecting risk factors.  [x]   Care Plan template documentation (Physiologic Instability - Neurosensory). Selecting this will add care plan rows to the flowsheet under the Neuro section of Head to Toe.  [x]   Verified Swallow Screen completed prior to PO intake of food, drink, medications.          Please verify correct medication route prior to administration for intubated patients, patients who can not swallow or have alternative routes of intake (NG, OG, RI), etc  [x]   VTE Prophylaxis: SCDs ordered/addressed; SCDs: N/A Lovenox           (As a reminder, ASA, Plavix, and TPA/TNK are not VTE prophylaxis.)    Reviewed the Following Education with Patient and/or Family:   - Personalized risk factors for patient, along with changes, modifications that will help prevent stroke.  - Signs and Symptoms of Stroke: (Facial droop, weakness/numbness especially on one side, speech difficulty, sudden confusion, sudden loss of vision, sudden severe headache, sudden loss of balance or having difficulty walking, syncope, or seizure)  - How to activate EMS (911)   - Importance of Follow Up Appointments at Discharge   - Importance of Compliance with Medications Prescribed at Discharge  - Available community resources and stroke advocacy groups if needed    Patient and/or family member: verbalized understanding.     Stroke Education booklet given to patient/family (or verified, if given already), which reviews above information. yes         Electronically signed by Gabriela Yarbrough RN on 7/24/2025 at 1:51 AM

## 2025-07-24 NOTE — CARE COORDINATION
Case Management Assessment            Discharge Note                    Date / Time of Note: 7/24/2025 11:38 AM                  Discharge Note Completed by: EMMY FERREIRA    Patient Name: Deborah Agustin   YOB: 1955  Diagnosis: Dysarthria [R47.1]  Cerebrovascular accident (CVA), unspecified mechanism (HCC) [I63.9]   Date / Time: 7/23/2025  1:09 PM    Current PCP: Sara Bernal MD  Clinic patient: No    Hospitalization in the last 30 days: No       Advance Directives:  Code Status: Full Code  [place] DNR form completed and on chart: Not Indicated    Financial:  Payor: MEDICARE / Plan: MEDICARE PART A AND B / Product Type: *No Product type* /      Pharmacy:    [id number] - [place], [place] - [address] [place] [address] - P [phone] - F [phone]  [address] [place] [address]  [place] [address]  Phone: [phone] Fax: [phone]    [id number] - [place], [place] - [address] - P [phone] - F [phone]  [address] [address]  Phone: [phone] Fax: [phone]    [facility] - [place], [place] - [address] - P [phone] - F [phone]  [address] [address]  Phone: [phone] Fax: [phone]      Assistance purchasing medications?:    Assistance provided by Case Management: None at this time    Does patient want to participate in local refill/ meds to beds program?:      Meds To Beds General Rules:  1. Can ONLY be done Monday- Friday between 8:30am-5pm  2. Prescription(s) must be in pharmacy by 3pm to be filled same day  3.Copy of patient's insurance/ prescription drug card and patient face sheet must be sent along with the prescription(s)  4. Cost of Rx cannot be added to hospital bill. If financial assistance is needed, please contact unit  or ;  or  CANNOT provide pharmacy voucher for patients co-pays  5. Patients can then  the prescription on their way out

## 2025-07-24 NOTE — CONSULTS
Neurology Consultation Note      Patient: Deborah Agustin MRN: [id number]    YOB: 1955  Age: 69 y.o.  Sex: female   Unit: [facility] 5T ORTHO/NEURO Room/Bed: 5509/5509-01 Location: [facility]    Date of Consultation: 7/24/2025  Date of Admission: 7/23/2025  1:09 PM ( LOS: 1 day )  Admitting Physician: JAMES CANO    Primary Care Physician: Sara Bernal MD   Consult Requested By: James Cano MD     Reason for Consult: \"tia\"    ASSESSMENT & RECOMMENDATIONS     Assessment  68yo woman with remote history of ocular migraines and HTN presented to ER with two episodes of word-finding difficulty followed by headache about 24 hours apart with BP as high as 207/79 upon arrival, negative MRI brain, and negative intra/extracranial vessels  First concern is that these events were TIA but given they were both stereotyped episodes,  by so much time, and without any explanation for repeated events in the same vascular territory, feel that these are much less likely TIA  In light of her ocular migraine history, although she does not have these frequently at all, the events over the last two days are very different than her ocular migraines, and her age, still feel that these events are likely most consistent with migraine  Cannot exclude that they were secondary to her elevated BP, which was noted on admission (ie, hypertensive emergency), but also cannot exclude that the elevated BP isn't secondary to TIA  Whether this was TIA, hypertensive emergency, or migraine, the management does not change  She certainly needs better control of her BP and would assume the worst and optimize risk factor modification for secondary prevention of stroke as well     Recommendations  Agree with addition of ASA 81mg daily going forward (new med)  Agree with high-intensity statin and LDL < 70  Tight control of BP going forward with goal SBP < 130 and DBP < 90; will need close f/u with PCP to manage and

## 2025-07-24 NOTE — PROGRESS NOTES
Current NIHSS 0    Nursing Core Measures for Stroke:   [x]   Education template documentation (STROKE/TIA). Select only risk factors that are applicable to patient when selecting risk factors.  [x]   Care Plan template documentation (Physiologic Instability - Neurosensory). Selecting this will add care plan rows to the flowsheet under the Neuro section of Head to Toe.  [x]   Verified Swallow Screen completed prior to PO intake of food, drink, medications.          Please verify correct medication route prior to administration for intubated patients, patients who can not swallow or have alternative routes of intake (NG, OG, OR), etc  [x]   VTE Prophylaxis: SCDs ordered/addressed; SCDs: N/A Lovenox           (As a reminder, ASA, Plavix, and TPA/TNK are not VTE prophylaxis.)    Reviewed the Following Education with Patient and/or Family:   - Personalized risk factors for patient, along with changes, modifications that will help prevent stroke.  - Signs and Symptoms of Stroke: (Facial droop, weakness/numbness especially on one side, speech difficulty, sudden confusion, sudden loss of vision, sudden severe headache, sudden loss of balance or having difficulty walking, syncope, or seizure)  - How to activate EMS (911)   - Importance of Follow Up Appointments at Discharge   - Importance of Compliance with Medications Prescribed at Discharge  - Available community resources and stroke advocacy groups if needed    Patient and/or family member: verbalized understanding.     Stroke Education booklet given to patient/family (or verified, if given already), which reviews above information. yes         Electronically signed by Marily Sanders RN on 7/24/2025 at 11:02 AM

## 2025-07-24 NOTE — PLAN OF CARE
Problem: Discharge Planning  Goal: Discharge to home or other facility with appropriate resources  7/24/2025 0148 by Gabriela Yarbrough RN  Outcome: Progressing  7/24/2025 0147 by Gabriela Yarbrough RN  Outcome: Progressing     Problem: Safety - Adult  Goal: Free from fall injury  7/24/2025 0148 by Gabriela Yarbrough RN  Outcome: Progressing  7/24/2025 0147 by Gabriela Yarbrough RN  Outcome: Progressing     Problem: Neurosensory - Adult  Goal: Achieves stable or improved neurological status  Outcome: Progressing  Goal: Achieves maximal functionality and self care  Outcome: Progressing

## 2025-07-24 NOTE — PROGRESS NOTES
1 PIV removed. Pt educated on AVS, pt fully understood AVS, all follow up questions were answered. All belongings were packed. Pt  to transport home.

## 2025-07-24 NOTE — DISCHARGE SUMMARY
Hospital Discharge Summary    Patient's PCP: Sara Bernal MD  Admit Date: 7/23/2025   Discharge Date: 7/24/2025    Admitting Physician: Dr. Danielle Cano MD  Discharge Physician: Dr. Danielle Cano MD     HPI and hospital course  Very pleasant 70 yo female admitted with dysarthria and hypertensive urgency emergency.  The patient does not have a history of high blood pressure though her bp has been high at home at times.  She had a headache after the episode of dysarthria and on admissionj her sbp was greater than 200.  The patient underwent cta and mri; mri showed some mild chronic small vessel ischemic disease her echo showed mild lvh.  Her bp remained elevated in the hospital and was treated with arb and hctz.  The patient responded very well.  She is being discharged in [facility] initially it was expected her to stay greater than 2 midnights for stroke concerns and undiagnosed untreated blood pressure problems, however, she made an unexpected remarkable improvement and has been cleared for discharge by neurology. Discussed home bp monitoring she is asked to monitor for recurrence of symptoms or new symptoms includign but not limited to chest pain shortness of breath nausea vomiting fevers or chills and seek immediate medical attention or call 911.      Discharge Diagnoses:   Patient Active Problem List   Diagnosis    Psoriasis    Glaucoma    Osteoporosis    Hyperlipidemia    Hypothyroidism    Osteoarthritis of right hip    Bilateral carpal tunnel syndrome    Arthritis of carpometacarpal (CMC) joint of right thumb    Arthritis of carpometacarpal (CMC) joint of left thumb    Psoriatic arthritis (HCC)    High risk medication use    Closed dislocation of left hip (HCC)    Hip dislocation, left, initial encounter ([facility])    Obesity, class 3 (E66.813)    Dysarthria    Ocular migraine    Episodes of speech arrest    Primary hypertension       Physical Exam: /65   Pulse 59   Temp 98 °F (36.7 °C) (Oral)

## 2025-07-25 ENCOUNTER — CARE COORDINATION (OUTPATIENT)
Dept: CASE MANAGEMENT | Age: 70
End: 2025-07-25

## 2025-07-25 DIAGNOSIS — I10 PRIMARY HYPERTENSION: Primary | ICD-10-CM

## 2025-07-25 LAB
EST. AVERAGE GLUCOSE BLD GHB EST-MCNC: 102.5 MG/DL
HBA1C MFR BLD: 5.2 %

## 2025-07-25 PROCEDURE — 1111F DSCHRG MED/CURRENT MED MERGE: CPT | Performed by: STUDENT IN AN ORGANIZED HEALTH CARE EDUCATION/TRAINING PROGRAM

## 2025-07-25 NOTE — CARE COORDINATION
Care Transitions Note    Initial Call - Call within 2 business days of discharge: Yes    Patient Current Location:  Home: [address] [place] [address]    Care Transition Nurse contacted the patient by telephone to perform post hospital discharge assessment, verified name and  as identifiers.  Provided introduction to self, and explanation of the Care Transition Nurse role.    Patient: Deborah Agustin    Patient : 1955   MRN: [id number]    Reason for Admission: Hypertension, CVA  Discharge Date: 25  RURS: Readmission Risk Score: 8.5      Last Discharge Facility       Date Complaint Diagnosis Description Type Department Provider    25 Aphasia Cerebrovascular accident (CVA), unspecified mechanism (HCC) ... ED to Hosp-Admission (Discharged) (ADMITTED) [facility]T Danielle Cano MD; Giovanny Nova.            Was this an external facility discharge? No    Additional needs identified to be addressed with provider   No needs identified             Method of communication with provider: none.    Patients top risk factors for readmission: medical condition-.    Interventions to address risk factors:   Education: .  Review of patient management of conditions/medications: .    Care Summary Note: Patient reports that she is doing well, monitoring BP at home.  This morning the reading was 142/86, down considerably from initial hospital reading.  Discussed discharge instructions and reviewed medications, 1111F completed.  She is taking all of her medications as prescribed and denies any questions or concerns at this time.  She is declining to follow up with PCP, she is out on maternity leave.  She is planning to follow up with Dr. Franklin at [facility] for BP follow up, she reports that PCP is on vacation. CTN will route a message to PCP office.  Patient denies any questions or concerns, CTN will continue with outreach follow up calls.      Care Transition Nurse reviewed discharge instructions, medical

## 2025-07-26 ENCOUNTER — APPOINTMENT (OUTPATIENT)
Dept: GENERAL RADIOLOGY | Age: 70
DRG: 493 | End: 2025-07-26
Payer: MEDICARE

## 2025-07-26 ENCOUNTER — ANESTHESIA EVENT (OUTPATIENT)
Dept: OPERATING ROOM | Age: 70
End: 2025-07-26
Payer: MEDICARE

## 2025-07-26 ENCOUNTER — ANESTHESIA (OUTPATIENT)
Dept: OPERATING ROOM | Age: 70
End: 2025-07-26
Payer: MEDICARE

## 2025-07-26 ENCOUNTER — HOSPITAL ENCOUNTER (INPATIENT)
Age: 70
LOS: 3 days | Discharge: NURSING, REHAB OR LONG-TERM CARE | DRG: 493 | End: 2025-07-29
Attending: EMERGENCY MEDICINE | Admitting: HOSPITALIST
Payer: MEDICARE

## 2025-07-26 DIAGNOSIS — S42.291A OTHER CLOSED DISPLACED FRACTURE OF PROXIMAL END OF RIGHT HUMERUS, INITIAL ENCOUNTER: ICD-10-CM

## 2025-07-26 DIAGNOSIS — S42.201A CLOSED FRACTURE OF PROXIMAL END OF RIGHT HUMERUS, UNSPECIFIED FRACTURE MORPHOLOGY, INITIAL ENCOUNTER: Primary | ICD-10-CM

## 2025-07-26 DIAGNOSIS — D64.9 ANEMIA, UNSPECIFIED TYPE: ICD-10-CM
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
## 2025-07-26 DIAGNOSIS — R52 SEVERE PAIN: ICD-10-CM

## 2025-07-26 PROBLEM — S42.341A CLOSED DISPLACED SPIRAL FRACTURE OF SHAFT OF RIGHT HUMERUS: Status: ACTIVE | Noted: 2025-07-26

## 2025-07-26 LAB
ANION GAP SERPL CALCULATED.3IONS-SCNC: 10 MMOL/L (ref 3–16)
BASOPHILS # BLD: 0.1 K/UL (ref 0–0.2)
BASOPHILS NFR BLD: 1.2 %
BUN SERPL-MCNC: 19 MG/DL (ref 7–20)
CALCIUM SERPL-MCNC: 8.9 MG/DL (ref 8.3–10.6)
CHLORIDE SERPL-SCNC: 100 MMOL/L (ref 99–110)
CO2 SERPL-SCNC: 23 MMOL/L (ref 21–32)
CREAT SERPL-MCNC: 0.7 MG/DL (ref 0.6–1.2)
DEPRECATED RDW RBC AUTO: 14.7 % (ref 12.4–15.4)
EOSINOPHIL # BLD: 0.1 K/UL (ref 0–0.6)
EOSINOPHIL NFR BLD: 1.3 %
GFR SERPLBLD CREATININE-BSD FMLA CKD-EPI: >90 ML/MIN/{1.73_M2}
GLUCOSE SERPL-MCNC: 124 MG/DL (ref 70–99)
HCT VFR BLD AUTO: 37.4 % (ref 36–48)
HGB BLD-MCNC: 12.5 G/DL (ref 12–16)
LYMPHOCYTES # BLD: 1.5 K/UL (ref 1–5.1)
LYMPHOCYTES NFR BLD: 17.4 %
MCH RBC QN AUTO: 31.7 PG (ref 26–34)
MCHC RBC AUTO-ENTMCNC: 33.6 G/DL (ref 31–36)
MCV RBC AUTO: 94.3 FL (ref 80–100)
MONOCYTES # BLD: 0.4 K/UL (ref 0–1.3)
MONOCYTES NFR BLD: 5 %
NEUTROPHILS # BLD: 6.5 K/UL (ref 1.7–7.7)
NEUTROPHILS NFR BLD: 75.1 %
PLATELET # BLD AUTO: 247 K/UL (ref 135–450)
PMV BLD AUTO: 8.1 FL (ref 5–10.5)
POTASSIUM SERPL-SCNC: 4.3 MMOL/L (ref 3.5–5.1)
RBC # BLD AUTO: 3.96 M/UL (ref 4–5.2)
SODIUM SERPL-SCNC: 133 MMOL/L (ref 136–145)
WBC # BLD AUTO: 8.7 K/UL (ref 4–11)

## 2025-07-26 PROCEDURE — 2500000003 HC RX 250 WO HCPCS: Performed by: ORTHOPAEDIC SURGERY

## 2025-07-26 PROCEDURE — 2580000003 HC RX 258: Performed by: ORTHOPAEDIC SURGERY

## 2025-07-26 PROCEDURE — 73060 X-RAY EXAM OF HUMERUS: CPT

## 2025-07-26 PROCEDURE — 6370000000 HC RX 637 (ALT 250 FOR IP): Performed by: ORTHOPAEDIC SURGERY

## 2025-07-26 PROCEDURE — 99285 EMERGENCY DEPT VISIT HI MDM: CPT | Performed by: ORTHOPAEDIC SURGERY

## 2025-07-26 PROCEDURE — 6370000000 HC RX 637 (ALT 250 FOR IP): Performed by: EMERGENCY MEDICINE

## 2025-07-26 PROCEDURE — 6360000002 HC RX W HCPCS

## 2025-07-26 PROCEDURE — 96374 THER/PROPH/DIAG INJ IV PUSH: CPT

## 2025-07-26 PROCEDURE — 80048 BASIC METABOLIC PNL TOTAL CA: CPT

## 2025-07-26 PROCEDURE — 94761 N-INVAS EAR/PLS OXIMETRY MLT: CPT

## 2025-07-26 PROCEDURE — 6360000002 HC RX W HCPCS: Performed by: ORTHOPAEDIC SURGERY

## 2025-07-26 PROCEDURE — 7100000000 HC PACU RECOVERY - FIRST 15 MIN: Performed by: ORTHOPAEDIC SURGERY

## 2025-07-26 PROCEDURE — 6360000002 HC RX W HCPCS: Performed by: ANESTHESIOLOGY

## 2025-07-26 PROCEDURE — 85025 COMPLETE CBC W/AUTO DIFF WBC: CPT

## 2025-07-26 PROCEDURE — 2500000003 HC RX 250 WO HCPCS: Performed by: ANESTHESIOLOGY

## 2025-07-26 PROCEDURE — 2720000010 HC SURG SUPPLY STERILE: Performed by: ORTHOPAEDIC SURGERY

## 2025-07-26 PROCEDURE — 3600000004 HC SURGERY LEVEL 4 BASE: Performed by: ORTHOPAEDIC SURGERY

## 2025-07-26 PROCEDURE — 73030 X-RAY EXAM OF SHOULDER: CPT

## 2025-07-26 PROCEDURE — 99285 EMERGENCY DEPT VISIT HI MDM: CPT

## 2025-07-26 PROCEDURE — 2709999900 HC NON-CHARGEABLE SUPPLY: Performed by: ORTHOPAEDIC SURGERY

## 2025-07-26 PROCEDURE — 6360000002 HC RX W HCPCS: Performed by: EMERGENCY MEDICINE

## 2025-07-26 PROCEDURE — 6370000000 HC RX 637 (ALT 250 FOR IP): Performed by: HOSPITALIST

## 2025-07-26 PROCEDURE — 2700000000 HC OXYGEN THERAPY PER DAY

## 2025-07-26 PROCEDURE — 2580000003 HC RX 258: Performed by: ANESTHESIOLOGY

## 2025-07-26 PROCEDURE — 6360000002 HC RX W HCPCS: Performed by: HOSPITALIST

## 2025-07-26 PROCEDURE — 0PSF04Z REPOSITION RIGHT HUMERAL SHAFT WITH INTERNAL FIXATION DEVICE, OPEN APPROACH: ICD-10-PCS | Performed by: ORTHOPAEDIC SURGERY

## 2025-07-26 PROCEDURE — 24515 OPTX HUMRL SHFT FX PLATE/SCR: CPT | Performed by: ORTHOPAEDIC SURGERY

## 2025-07-26 PROCEDURE — 1200000000 HC SEMI PRIVATE

## 2025-07-26 PROCEDURE — C1769 GUIDE WIRE: HCPCS | Performed by: ORTHOPAEDIC SURGERY

## 2025-07-26 PROCEDURE — 6370000000 HC RX 637 (ALT 250 FOR IP): Performed by: NURSE PRACTITIONER

## 2025-07-26 PROCEDURE — 3700000001 HC ADD 15 MINUTES (ANESTHESIA): Performed by: ORTHOPAEDIC SURGERY

## 2025-07-26 PROCEDURE — 96375 TX/PRO/DX INJ NEW DRUG ADDON: CPT

## 2025-07-26 PROCEDURE — C1713 ANCHOR/SCREW BN/BN,TIS/BN: HCPCS | Performed by: ORTHOPAEDIC SURGERY

## 2025-07-26 PROCEDURE — 3600000014 HC SURGERY LEVEL 4 ADDTL 15MIN: Performed by: ORTHOPAEDIC SURGERY

## 2025-07-26 PROCEDURE — 7100000001 HC PACU RECOVERY - ADDTL 15 MIN: Performed by: ORTHOPAEDIC SURGERY

## 2025-07-26 PROCEDURE — 3700000000 HC ANESTHESIA ATTENDED CARE: Performed by: ORTHOPAEDIC SURGERY

## 2025-07-26 PROCEDURE — 23615 OPTX PROX HUMRL FX W/INT FIX: CPT | Performed by: ORTHOPAEDIC SURGERY

## 2025-07-26 DEVICE — LOCKING SCREW
Type: IMPLANTABLE DEVICE | Status: FUNCTIONAL
Brand: AXSOS

## 2025-07-26 DEVICE — CORTEX SCREW
Type: IMPLANTABLE DEVICE | Status: FUNCTIONAL
Brand: AXSOS

## 2025-07-26 DEVICE — SCREW 3.5 CORTX L24MM: Type: IMPLANTABLE DEVICE | Status: FUNCTIONAL

## 2025-07-26 DEVICE — DISTAL LATERAL FEMUR PLATE
Type: IMPLANTABLE DEVICE | Status: FUNCTIONAL
Brand: AXSOS

## 2025-07-26 RX ORDER — HYDROMORPHONE HYDROCHLORIDE 1 MG/ML
0.25 INJECTION, SOLUTION INTRAMUSCULAR; INTRAVENOUS; SUBCUTANEOUS
Status: DISCONTINUED | OUTPATIENT
Start: 2025-07-26 | End: 2025-07-29 | Stop reason: HOSPADM

## 2025-07-26 RX ORDER — VALSARTAN AND HYDROCHLOROTHIAZIDE 80; 12.5 MG/1; MG/1
1 TABLET, FILM COATED ORAL DAILY
Status: DISCONTINUED | OUTPATIENT
Start: 2025-07-27 | End: 2025-07-26

## 2025-07-26 RX ORDER — MAGNESIUM SULFATE IN WATER 40 MG/ML
2000 INJECTION, SOLUTION INTRAVENOUS PRN
Status: DISCONTINUED | OUTPATIENT
Start: 2025-07-26 | End: 2025-07-29 | Stop reason: HOSPADM

## 2025-07-26 RX ORDER — METOCLOPRAMIDE HYDROCHLORIDE 5 MG/ML
10 INJECTION INTRAMUSCULAR; INTRAVENOUS ONCE
Status: COMPLETED | OUTPATIENT
Start: 2025-07-26 | End: 2025-07-26

## 2025-07-26 RX ORDER — HYDROMORPHONE HYDROCHLORIDE 1 MG/ML
0.5 INJECTION, SOLUTION INTRAMUSCULAR; INTRAVENOUS; SUBCUTANEOUS EVERY 5 MIN PRN
Status: DISCONTINUED | OUTPATIENT
Start: 2025-07-26 | End: 2025-07-26 | Stop reason: HOSPADM

## 2025-07-26 RX ORDER — KETOCONAZOLE 20 MG/G
CREAM TOPICAL
Status: ON HOLD | COMMUNITY

## 2025-07-26 RX ORDER — SODIUM CHLORIDE 9 MG/ML
INJECTION, SOLUTION INTRAVENOUS PRN
Status: DISCONTINUED | OUTPATIENT
Start: 2025-07-26 | End: 2025-07-29 | Stop reason: HOSPADM

## 2025-07-26 RX ORDER — OXYCODONE HYDROCHLORIDE 5 MG/1
5 TABLET ORAL EVERY 4 HOURS PRN
Status: DISCONTINUED | OUTPATIENT
Start: 2025-07-26 | End: 2025-07-29 | Stop reason: HOSPADM

## 2025-07-26 RX ORDER — MAGNESIUM HYDROXIDE 1200 MG/15ML
LIQUID ORAL CONTINUOUS PRN
Status: COMPLETED | OUTPATIENT
Start: 2025-07-26 | End: 2025-07-26

## 2025-07-26 RX ORDER — OMEGA-3S/DHA/EPA/FISH OIL 1000-1400
4 CAPSULE,DELAYED RELEASE (ENTERIC COATED) ORAL DAILY
Status: ON HOLD | COMMUNITY

## 2025-07-26 RX ORDER — SODIUM CHLORIDE 0.9 % (FLUSH) 0.9 %
5-40 SYRINGE (ML) INJECTION EVERY 12 HOURS SCHEDULED
Status: DISCONTINUED | OUTPATIENT
Start: 2025-07-26 | End: 2025-07-29 | Stop reason: HOSPADM

## 2025-07-26 RX ORDER — POLYETHYLENE GLYCOL 3350 17 G/17G
17 POWDER, FOR SOLUTION ORAL DAILY PRN
Status: DISCONTINUED | OUTPATIENT
Start: 2025-07-26 | End: 2025-07-29 | Stop reason: HOSPADM

## 2025-07-26 RX ORDER — PROCHLORPERAZINE EDISYLATE 5 MG/ML
5 INJECTION INTRAMUSCULAR; INTRAVENOUS
Status: DISCONTINUED | OUTPATIENT
Start: 2025-07-26 | End: 2025-07-26 | Stop reason: HOSPADM

## 2025-07-26 RX ORDER — DULOXETIN HYDROCHLORIDE 20 MG/1
20 CAPSULE, DELAYED RELEASE ORAL NIGHTLY
Status: DISCONTINUED | OUTPATIENT
Start: 2025-07-26 | End: 2025-07-29 | Stop reason: HOSPADM

## 2025-07-26 RX ORDER — SUCCINYLCHOLINE/SOD CL,ISO/PF 200MG/10ML
SYRINGE (ML) INTRAVENOUS
Status: DISCONTINUED | OUTPATIENT
Start: 2025-07-26 | End: 2025-07-26 | Stop reason: SDUPTHER

## 2025-07-26 RX ORDER — SODIUM CHLORIDE 0.9 % (FLUSH) 0.9 %
5-40 SYRINGE (ML) INJECTION PRN
Status: DISCONTINUED | OUTPATIENT
Start: 2025-07-26 | End: 2025-07-26 | Stop reason: HOSPADM

## 2025-07-26 RX ORDER — OXYCODONE HYDROCHLORIDE 5 MG/1
5 TABLET ORAL EVERY 4 HOURS PRN
Status: DISCONTINUED | OUTPATIENT
Start: 2025-07-26 | End: 2025-07-26

## 2025-07-26 RX ORDER — HYDROCORTISONE 25 MG/G
CREAM TOPICAL
Status: ON HOLD | COMMUNITY

## 2025-07-26 RX ORDER — SODIUM CHLORIDE, SODIUM LACTATE, POTASSIUM CHLORIDE, CALCIUM CHLORIDE 600; 310; 30; 20 MG/100ML; MG/100ML; MG/100ML; MG/100ML
INJECTION, SOLUTION INTRAVENOUS
Status: DISCONTINUED | OUTPATIENT
Start: 2025-07-26 | End: 2025-07-26 | Stop reason: SDUPTHER

## 2025-07-26 RX ORDER — ACETAMINOPHEN 650 MG/1
650 SUPPOSITORY RECTAL EVERY 6 HOURS PRN
Status: DISCONTINUED | OUTPATIENT
Start: 2025-07-26 | End: 2025-07-29 | Stop reason: HOSPADM

## 2025-07-26 RX ORDER — FENTANYL CITRATE 50 UG/ML
INJECTION, SOLUTION INTRAMUSCULAR; INTRAVENOUS
Status: DISCONTINUED | OUTPATIENT
Start: 2025-07-26 | End: 2025-07-26 | Stop reason: SDUPTHER

## 2025-07-26 RX ORDER — HYDRALAZINE HYDROCHLORIDE 20 MG/ML
10 INJECTION INTRAMUSCULAR; INTRAVENOUS
Status: DISCONTINUED | OUTPATIENT
Start: 2025-07-26 | End: 2025-07-26 | Stop reason: HOSPADM

## 2025-07-26 RX ORDER — ONDANSETRON 2 MG/ML
4 INJECTION INTRAMUSCULAR; INTRAVENOUS
Status: COMPLETED | OUTPATIENT
Start: 2025-07-26 | End: 2025-07-26

## 2025-07-26 RX ORDER — DIPHENHYDRAMINE HYDROCHLORIDE 50 MG/ML
12.5 INJECTION, SOLUTION INTRAMUSCULAR; INTRAVENOUS
Status: DISCONTINUED | OUTPATIENT
Start: 2025-07-26 | End: 2025-07-26 | Stop reason: HOSPADM

## 2025-07-26 RX ORDER — MEPERIDINE HYDROCHLORIDE 25 MG/ML
12.5 INJECTION INTRAMUSCULAR; INTRAVENOUS; SUBCUTANEOUS AS NEEDED
Status: DISCONTINUED | OUTPATIENT
Start: 2025-07-26 | End: 2025-07-26 | Stop reason: HOSPADM

## 2025-07-26 RX ORDER — HYDROMORPHONE HYDROCHLORIDE 1 MG/ML
0.5 INJECTION, SOLUTION INTRAMUSCULAR; INTRAVENOUS; SUBCUTANEOUS
Status: DISCONTINUED | OUTPATIENT
Start: 2025-07-26 | End: 2025-07-29 | Stop reason: HOSPADM

## 2025-07-26 RX ORDER — ENOXAPARIN SODIUM 100 MG/ML
30 INJECTION SUBCUTANEOUS 2 TIMES DAILY
Status: CANCELLED | OUTPATIENT
Start: 2025-07-26

## 2025-07-26 RX ORDER — GABAPENTIN 300 MG/1
300 CAPSULE ORAL NIGHTLY
Status: DISCONTINUED | OUTPATIENT
Start: 2025-07-26 | End: 2025-07-29 | Stop reason: HOSPADM

## 2025-07-26 RX ORDER — EPHEDRINE SULFATE 50 MG/ML
INJECTION INTRAVENOUS
Status: DISCONTINUED | OUTPATIENT
Start: 2025-07-26 | End: 2025-07-26 | Stop reason: SDUPTHER

## 2025-07-26 RX ORDER — HYDROMORPHONE HYDROCHLORIDE 1 MG/ML
0.25 INJECTION, SOLUTION INTRAMUSCULAR; INTRAVENOUS; SUBCUTANEOUS EVERY 5 MIN PRN
Status: DISCONTINUED | OUTPATIENT
Start: 2025-07-26 | End: 2025-07-26 | Stop reason: HOSPADM

## 2025-07-26 RX ORDER — ACETAMINOPHEN 325 MG/1
650 TABLET ORAL ONCE
Status: COMPLETED | OUTPATIENT
Start: 2025-07-26 | End: 2025-07-26

## 2025-07-26 RX ORDER — PROPOFOL 10 MG/ML
INJECTION, EMULSION INTRAVENOUS
Status: DISCONTINUED | OUTPATIENT
Start: 2025-07-26 | End: 2025-07-26 | Stop reason: SDUPTHER

## 2025-07-26 RX ORDER — FAMOTIDINE 10 MG/ML
INJECTION, SOLUTION INTRAVENOUS
Status: DISCONTINUED | OUTPATIENT
Start: 2025-07-26 | End: 2025-07-26 | Stop reason: SDUPTHER

## 2025-07-26 RX ORDER — TRAZODONE HYDROCHLORIDE 50 MG/1
50 TABLET ORAL NIGHTLY
Status: DISCONTINUED | OUTPATIENT
Start: 2025-07-26 | End: 2025-07-29 | Stop reason: HOSPADM

## 2025-07-26 RX ORDER — SODIUM CHLORIDE 0.9 % (FLUSH) 0.9 %
5-40 SYRINGE (ML) INJECTION PRN
Status: DISCONTINUED | OUTPATIENT
Start: 2025-07-26 | End: 2025-07-29 | Stop reason: HOSPADM

## 2025-07-26 RX ORDER — SENNOSIDES 8.6 MG
325 CAPSULE ORAL 2 TIMES DAILY
Status: DISCONTINUED | OUTPATIENT
Start: 2025-07-26 | End: 2025-07-29 | Stop reason: HOSPADM

## 2025-07-26 RX ORDER — SODIUM CHLORIDE 450 MG/100ML
INJECTION, SOLUTION INTRAVENOUS CONTINUOUS
Status: DISCONTINUED | OUTPATIENT
Start: 2025-07-26 | End: 2025-07-27

## 2025-07-26 RX ORDER — CLOBETASOL PROPIONATE 0.05 G/ML
SPRAY TOPICAL
Status: ON HOLD | COMMUNITY

## 2025-07-26 RX ORDER — HYDROCHLOROTHIAZIDE 12.5 MG/1
12.5 CAPSULE ORAL DAILY
Status: DISCONTINUED | OUTPATIENT
Start: 2025-07-27 | End: 2025-07-29 | Stop reason: HOSPADM

## 2025-07-26 RX ORDER — POTASSIUM CHLORIDE 7.45 MG/ML
10 INJECTION INTRAVENOUS PRN
Status: DISCONTINUED | OUTPATIENT
Start: 2025-07-26 | End: 2025-07-29 | Stop reason: HOSPADM

## 2025-07-26 RX ORDER — LATANOPROST 50 UG/ML
1 SOLUTION/ DROPS OPHTHALMIC NIGHTLY
Status: DISCONTINUED | OUTPATIENT
Start: 2025-07-26 | End: 2025-07-29 | Stop reason: HOSPADM

## 2025-07-26 RX ORDER — CALCIUM CARBONATE 300MG(750)
2000 TABLET,CHEWABLE ORAL DAILY
Status: ON HOLD | COMMUNITY

## 2025-07-26 RX ORDER — MELOXICAM 15 MG/1
15 TABLET ORAL DAILY
Status: ON HOLD | COMMUNITY

## 2025-07-26 RX ORDER — ONDANSETRON 2 MG/ML
INJECTION INTRAMUSCULAR; INTRAVENOUS
Status: DISCONTINUED | OUTPATIENT
Start: 2025-07-26 | End: 2025-07-26 | Stop reason: SDUPTHER

## 2025-07-26 RX ORDER — LEVOTHYROXINE SODIUM 125 UG/1
125 TABLET ORAL DAILY
Status: DISCONTINUED | OUTPATIENT
Start: 2025-07-27 | End: 2025-07-29 | Stop reason: HOSPADM

## 2025-07-26 RX ORDER — ACETAMINOPHEN 325 MG/1
650 TABLET ORAL EVERY 6 HOURS PRN
Status: DISCONTINUED | OUTPATIENT
Start: 2025-07-26 | End: 2025-07-29 | Stop reason: HOSPADM

## 2025-07-26 RX ORDER — TIMOLOL MALEATE 5 MG/ML
1 SOLUTION/ DROPS OPHTHALMIC 2 TIMES DAILY
Status: DISCONTINUED | OUTPATIENT
Start: 2025-07-26 | End: 2025-07-29 | Stop reason: HOSPADM

## 2025-07-26 RX ORDER — VALSARTAN 80 MG/1
80 TABLET ORAL DAILY
Status: DISCONTINUED | OUTPATIENT
Start: 2025-07-27 | End: 2025-07-29 | Stop reason: HOSPADM

## 2025-07-26 RX ORDER — ONDANSETRON 4 MG/1
4 TABLET, ORALLY DISINTEGRATING ORAL EVERY 8 HOURS PRN
Status: DISCONTINUED | OUTPATIENT
Start: 2025-07-26 | End: 2025-07-29 | Stop reason: HOSPADM

## 2025-07-26 RX ORDER — FLUOROURACIL 50 MG/G
CREAM TOPICAL 2 TIMES DAILY
Status: ON HOLD | COMMUNITY

## 2025-07-26 RX ORDER — OXYCODONE HYDROCHLORIDE 5 MG/1
5 TABLET ORAL ONCE
Refills: 0 | Status: COMPLETED | OUTPATIENT
Start: 2025-07-26 | End: 2025-07-26

## 2025-07-26 RX ORDER — SODIUM CHLORIDE 9 MG/ML
INJECTION, SOLUTION INTRAVENOUS PRN
Status: DISCONTINUED | OUTPATIENT
Start: 2025-07-26 | End: 2025-07-26 | Stop reason: HOSPADM

## 2025-07-26 RX ORDER — OXYCODONE HYDROCHLORIDE 5 MG/1
10 TABLET ORAL EVERY 4 HOURS PRN
Status: DISCONTINUED | OUTPATIENT
Start: 2025-07-26 | End: 2025-07-29 | Stop reason: HOSPADM

## 2025-07-26 RX ORDER — POTASSIUM CHLORIDE 1500 MG/1
40 TABLET, EXTENDED RELEASE ORAL PRN
Status: DISCONTINUED | OUTPATIENT
Start: 2025-07-26 | End: 2025-07-29 | Stop reason: HOSPADM

## 2025-07-26 RX ORDER — ROCURONIUM BROMIDE 10 MG/ML
INJECTION, SOLUTION INTRAVENOUS
Status: DISCONTINUED | OUTPATIENT
Start: 2025-07-26 | End: 2025-07-26 | Stop reason: SDUPTHER

## 2025-07-26 RX ORDER — SODIUM CHLORIDE 0.9 % (FLUSH) 0.9 %
5-40 SYRINGE (ML) INJECTION EVERY 12 HOURS SCHEDULED
Status: DISCONTINUED | OUTPATIENT
Start: 2025-07-26 | End: 2025-07-26 | Stop reason: HOSPADM

## 2025-07-26 RX ORDER — GLYCOPYRROLATE 0.2 MG/ML
INJECTION INTRAMUSCULAR; INTRAVENOUS
Status: DISCONTINUED | OUTPATIENT
Start: 2025-07-26 | End: 2025-07-26 | Stop reason: SDUPTHER

## 2025-07-26 RX ORDER — SCOPOLAMINE 1 MG/3D
1 PATCH, EXTENDED RELEASE TRANSDERMAL ONCE
Status: DISCONTINUED | OUTPATIENT
Start: 2025-07-26 | End: 2025-07-29 | Stop reason: HOSPADM

## 2025-07-26 RX ORDER — ATORVASTATIN CALCIUM 20 MG/1
20 TABLET, FILM COATED ORAL DAILY
Status: DISCONTINUED | OUTPATIENT
Start: 2025-07-27 | End: 2025-07-29 | Stop reason: HOSPADM

## 2025-07-26 RX ORDER — FENTANYL CITRATE 50 UG/ML
INJECTION, SOLUTION INTRAMUSCULAR; INTRAVENOUS
Status: COMPLETED
Start: 2025-07-26 | End: 2025-07-26

## 2025-07-26 RX ORDER — ONDANSETRON 2 MG/ML
INJECTION INTRAMUSCULAR; INTRAVENOUS
Status: COMPLETED
Start: 2025-07-26 | End: 2025-07-26

## 2025-07-26 RX ORDER — ONDANSETRON 2 MG/ML
4 INJECTION INTRAMUSCULAR; INTRAVENOUS EVERY 6 HOURS PRN
Status: DISCONTINUED | OUTPATIENT
Start: 2025-07-26 | End: 2025-07-29 | Stop reason: HOSPADM

## 2025-07-26 RX ADMIN — ASPIRIN 325 MG: 325 TABLET, COATED ORAL at 22:09

## 2025-07-26 RX ADMIN — GABAPENTIN 300 MG: 300 CAPSULE ORAL at 22:09

## 2025-07-26 RX ADMIN — ACETAMINOPHEN 650 MG: 325 TABLET ORAL at 12:12

## 2025-07-26 RX ADMIN — ONDANSETRON 4 MG: 2 INJECTION, SOLUTION INTRAMUSCULAR; INTRAVENOUS at 17:42

## 2025-07-26 RX ADMIN — TRAZODONE HYDROCHLORIDE 50 MG: 50 TABLET ORAL at 22:09

## 2025-07-26 RX ADMIN — HYDROMORPHONE HYDROCHLORIDE 0.5 MG: 1 INJECTION, SOLUTION INTRAMUSCULAR; INTRAVENOUS; SUBCUTANEOUS at 17:38

## 2025-07-26 RX ADMIN — SODIUM CHLORIDE: 450 INJECTION, SOLUTION INTRAVENOUS at 17:49

## 2025-07-26 RX ADMIN — ROCURONIUM BROMIDE 40 MG: 10 INJECTION, SOLUTION INTRAVENOUS at 15:22

## 2025-07-26 RX ADMIN — EPHEDRINE SULFATE 20 MG: 50 INJECTION INTRAVENOUS at 16:04

## 2025-07-26 RX ADMIN — HYDROMORPHONE HYDROCHLORIDE 0.5 MG: 1 INJECTION, SOLUTION INTRAMUSCULAR; INTRAVENOUS; SUBCUTANEOUS at 19:00

## 2025-07-26 RX ADMIN — DULOXETINE 20 MG: 20 CAPSULE, DELAYED RELEASE ORAL at 22:09

## 2025-07-26 RX ADMIN — HYDROMORPHONE HYDROCHLORIDE 0.5 MG: 1 INJECTION, SOLUTION INTRAMUSCULAR; INTRAVENOUS; SUBCUTANEOUS at 17:57

## 2025-07-26 RX ADMIN — ROCURONIUM BROMIDE 10 MG: 10 INJECTION, SOLUTION INTRAVENOUS at 15:17

## 2025-07-26 RX ADMIN — OXYCODONE 5 MG: 5 TABLET ORAL at 12:12

## 2025-07-26 RX ADMIN — FENTANYL CITRATE 50 MCG: 50 INJECTION INTRAMUSCULAR; INTRAVENOUS at 11:27

## 2025-07-26 RX ADMIN — HYDROMORPHONE HYDROCHLORIDE 0.5 MG: 1 INJECTION, SOLUTION INTRAMUSCULAR; INTRAVENOUS; SUBCUTANEOUS at 22:09

## 2025-07-26 RX ADMIN — FENTANYL CITRATE 100 MCG: 50 INJECTION INTRAMUSCULAR; INTRAVENOUS at 15:15

## 2025-07-26 RX ADMIN — ONDANSETRON 4 MG: 2 INJECTION, SOLUTION INTRAMUSCULAR; INTRAVENOUS at 14:44

## 2025-07-26 RX ADMIN — HYDROMORPHONE HYDROCHLORIDE 1 MG: 1 INJECTION, SOLUTION INTRAMUSCULAR; INTRAVENOUS; SUBCUTANEOUS at 13:23

## 2025-07-26 RX ADMIN — OXYCODONE 10 MG: 5 TABLET ORAL at 20:38

## 2025-07-26 RX ADMIN — Medication 140 MG: at 15:17

## 2025-07-26 RX ADMIN — CEFAZOLIN SODIUM 2000 MG: 1 POWDER, FOR SOLUTION INTRAMUSCULAR; INTRAVENOUS at 15:47

## 2025-07-26 RX ADMIN — SODIUM CHLORIDE, SODIUM LACTATE, POTASSIUM CHLORIDE, AND CALCIUM CHLORIDE: .6; .31; .03; .02 INJECTION, SOLUTION INTRAVENOUS at 15:13

## 2025-07-26 RX ADMIN — PROPOFOL 140 MG: 10 INJECTION, EMULSION INTRAVENOUS at 15:17

## 2025-07-26 RX ADMIN — ONDANSETRON 4 MG: 2 INJECTION, SOLUTION INTRAMUSCULAR; INTRAVENOUS at 15:15

## 2025-07-26 RX ADMIN — GLYCOPYRROLATE 0.2 MG: 0.2 INJECTION INTRAMUSCULAR; INTRAVENOUS at 16:00

## 2025-07-26 RX ADMIN — METOCLOPRAMIDE 10 MG: 5 INJECTION, SOLUTION INTRAMUSCULAR; INTRAVENOUS at 18:53

## 2025-07-26 RX ADMIN — FAMOTIDINE 20 MG: 10 INJECTION, SOLUTION INTRAVENOUS at 15:15

## 2025-07-26 ASSESSMENT — PAIN DESCRIPTION - ORIENTATION
ORIENTATION: RIGHT

## 2025-07-26 ASSESSMENT — PAIN DESCRIPTION - LOCATION
LOCATION: ARM
LOCATION: ARM
LOCATION: SHOULDER
LOCATION: ARM

## 2025-07-26 ASSESSMENT — PAIN SCALES - GENERAL
PAINLEVEL_OUTOF10: 0
PAINLEVEL_OUTOF10: 2
PAINLEVEL_OUTOF10: 9
PAINLEVEL_OUTOF10: 10
PAINLEVEL_OUTOF10: 8
PAINLEVEL_OUTOF10: 8
PAINLEVEL_OUTOF10: 7
PAINLEVEL_OUTOF10: 8
PAINLEVEL_OUTOF10: 10
PAINLEVEL_OUTOF10: 7
PAINLEVEL_OUTOF10: 6
PAINLEVEL_OUTOF10: 2
PAINLEVEL_OUTOF10: 3

## 2025-07-26 ASSESSMENT — PAIN DESCRIPTION - DESCRIPTORS
DESCRIPTORS: ACHING
DESCRIPTORS: ACHING;DISCOMFORT
DESCRIPTORS: THROBBING
DESCRIPTORS: ACHING;DISCOMFORT
DESCRIPTORS: ACHING;DISCOMFORT
DESCRIPTORS: THROBBING
DESCRIPTORS: ACHING

## 2025-07-26 ASSESSMENT — PAIN - FUNCTIONAL ASSESSMENT
PAIN_FUNCTIONAL_ASSESSMENT: ACTIVITIES ARE NOT PREVENTED
PAIN_FUNCTIONAL_ASSESSMENT: PREVENTS OR INTERFERES SOME ACTIVE ACTIVITIES AND ADLS
PAIN_FUNCTIONAL_ASSESSMENT: ACTIVITIES ARE NOT PREVENTED

## 2025-07-26 ASSESSMENT — PAIN DESCRIPTION - ONSET
ONSET: ON-GOING
ONSET: PROGRESSIVE

## 2025-07-26 ASSESSMENT — PAIN DESCRIPTION - FREQUENCY
FREQUENCY: CONTINUOUS

## 2025-07-26 ASSESSMENT — PAIN DESCRIPTION - PAIN TYPE
TYPE: SURGICAL PAIN
TYPE: SURGICAL PAIN
TYPE: ACUTE PAIN
TYPE: SURGICAL PAIN
TYPE: SURGICAL PAIN

## 2025-07-26 NOTE — PROGRESS NOTES
Patient admitted to room 5518. Report received from PACU RN. VSS on 2L NC. Patient is a/o x4. Patient oriented to room and call light. Rights and responsibilities provided to patient, and welcome packet provided to patient. 4 eyes skin assessment completed with 2nd RN.     Electronically signed by Felecia Mcelroy RN on 7/26/2025 at 6:38 PM

## 2025-07-26 NOTE — PROGRESS NOTES
Medicated with PRN Dilaudid then c/o some nausea after eating ice chips. Hx of PONV medicated with PRN Zofran. HOB elevated, significant other called and updated stated he spoke to  after surgery. Informed of pt's room number # 8330 clean and ready.

## 2025-07-26 NOTE — PROGRESS NOTES
Clinical Pharmacy Consult Note  Medication History     Admit Date: 7/26/2025    List of current medications patient is taking is complete. Home Medication list in Epic updated to reflect changes noted below.    Source of information: Patient, dispense history     Patient's home pharmacy:   [id number] - [place], [place] - [address] [place] [address] - P [phone] - F [phone]  [address] [place] [address]  [place] [address]  Phone: [phone] Fax: [phone]    [facility] - [place], [place] - [address] - P [phone] - F [phone]  [address] [address] [facility] [address]  Phone: [phone] Fax: [phone]      Changes made to medication list:     Medications removed - patient reports no longer taking:   Latanoprost 0.005% ophthalmic solution - per dispense history last filled 7/23/2015     Medications added:   Bimatoprost 0.01% ophthalmic solution   Clobetasol 0.05% topical solution   Fluorouracil 5% cream   Fiber 2 g gummy supplement   Hydrocortisone 2.5% cream   Ketoconazole 2% cream   Meloxicam 15 mg tablet   Multivitamin gummy supplement   Vitamin D gummy supplement     Medication doses adjusted:   Trazodone 50 mg tablet - dose changed from nightly to nightly as needed per patient     Other notes:   Patient reports she has been holding supplements ahead of upcoming surgery for the past 5 days   Patient is taking three topical products in combination: clobetasol 0.05% topical solution, hydrocortisone 2.5% cream, and ketoconazole 2% cream   Abatacept 250 mg intravenous injection - patient gets one infusion every 30 days, unable to recall exact date of infusion at time of med rec but reports it was approx. 2.5 weeks ago, stated she will be due for her next infusion in August between the 12 and 15th.   Fluorouracil 5% cream - patient is using 3 days on and 3 days off for cancer     Current Outpatient Medications   Medication Instructions    abatacept (ORENCIA) 250 mg, IntraVENous,  EVERY 30 DAYS    aspirin 81 mg, Oral, DAILY    atorvastatin (LIPITOR) 20 mg, Oral, DAILY    bimatoprost (LUMIGAN) 0.01 % SOLN ophthalmic drops 1 drop, Both Eyes, NIGHTLY    Clobetasol Propionate 0.05 % LIQD Apply topically in combination with hydrocortisone 2.5% cream and ketoconazole 2% cream    DULoxetine (CYMBALTA) 20 mg, Oral, DAILY    Fiber Adult Gummies 4 g, DAILY    fluorouracil (EFUDEX) 5 % cream 2 TIMES DAILY    gabapentin (NEURONTIN) 300 mg, Oral, Nightly    hydrocortisone 2.5 % cream Apply topically In combination with ketoconazole 2% cream and clobetasol 0.05% liquid    ketoconazole (NIZORAL) 2 % cream Apply topically In combination with hydrocortisone 2.5% and clobetasol 0.05% liquid    levothyroxine (SYNTHROID) 125 mcg, Oral, DAILY    meloxicam (MOBIC) 15 mg, DAILY    Multiple Vitamins-Minerals (MULTIVITAMIN GUMMIES ADULT PO) 2 tablets, DAILY    timolol (TIMOPTIC-XR) 0.5 % ophthalmic gel-forming 1 drop, Both Eyes, DAILY    traZODone (DESYREL) 50 mg, Oral, NIGHTLY    valsartan-hydroCHLOROthiazide (DIOVAN-HCT) 80-12.5 MG per tablet 1 tablet, Oral, DAILY    Vitamin D3 2,000 Units, DAILY       Thank you for consulting pharmacy,    Noreen Vasquez, PharmD Candidate [id number]

## 2025-07-26 NOTE — CONSULTS
Consult was requested for this patient after having been seen by me on 7/26 (2d ago) for likely migraine with aura and much lesser possibility of TIA. She is back now for a completely unrelated incident of a mechanical fall with shoulder fracture. Anesthesia is requesting neurologic clearance for surgery this afternoon. I am not in house but, even if the event on last admission was due to a TIA, there would be no neurologic issue that would preclude surgery. Remainder of risks (such as her severely elevated BP on that last admission) as per other services.      Leon Hall MD  Neurology/Neurocritical Care  7/26/2025

## 2025-07-26 NOTE — BRIEF OP NOTE
Brief Postoperative Note      Patient: Deborah Agustin  YOB: 1955  MRN: [id number]    Date of Procedure: 7/26/2025    Pre-Op Diagnosis Codes:   Fracture of proximal humerus and shaft of right humerus.    Post-Op Diagnosis: Same       Procedure(s):  OPEN REDUCTION INTERNAL FIXATION OF RIGHT PROXIMAL HUMERUS AND SHAFT    Surgeon(s):  Simona Shaffer MD    Assistant:  Surgical Assistant: Arcenio Thurman    Anesthesia: General    Estimated Blood Loss (mL): less than 100     Complications: None    Specimens:   * No specimens in log *    Implants:  * No implants in log *      Drains: * No LDAs found *    Findings:  Present At Time Of Surgery (PATOS) (choose all levels that have infection present):  No infection present  Other Findings: Same.    Electronically signed by Simona Shaffer MD on 7/26/2025 at 4:49 PM

## 2025-07-26 NOTE — PROGRESS NOTES
PACU Transfer to Floor Note    Procedure(s):  OPEN REDUCTION INTERNAL FIXATION OF RIGHT PROXIMAL HUMERUS AND SHAFT    Current Allergies: Codeine phosphate    Pt meets criteria as per Riddhi Score and ASPAN Standards to transfer to next phase of care.     No results for input(s): \"POCGLU\" in the last 72 hours.    Vitals:    07/26/25 1800   BP: 125/66   Pulse: 67   Resp: 15   Temp: 97.4 °F (36.3 °C)   SpO2: 96%     Vitals within 20% of pt's admission vitals as per RIDDHI SCORE    SpO2: 96 %    O2 Flow Rate (L/min): 2 L/min      Intake/Output Summary (Last 24 hours) at 7/26/2025 1816  Last data filed at 7/26/2025 1710  Gross per 24 hour   Intake 700 ml   Output 100 ml   Net 600 ml       Pain assessment:  receiving treatment    Pain Level: 6    Patient was assessed for alterations to skin integrity. There were not alterations observed.    Is patient incontinent: no    Handoff report given at bedside.   Family updated and directed to pt room      7/26/2025 6:16 PM

## 2025-07-26 NOTE — ANESTHESIA PRE PROCEDURE
Department of Anesthesiology  Preprocedure Note       Name:  Deborah Agustin   Age:  69 y.o.  :  1955                                          MRN:  [id number]         Date:  2025      Surgeon: Surgeon(s):  Simona Shaffer MD    Procedure: Procedure(s):  OPEN REDUCTION INTERNAL FIXATION OF RIGHT PROXIMAL HUMERUS AND SHAFT    Medications prior to admission:   Prior to Admission medications    Medication Sig Start Date End Date Taking? Authorizing Provider   aspirin 81 MG chewable tablet Take 1 tablet by mouth daily 25   Danielle Cano MD   valsartan-hydroCHLOROthiazide (DIOVAN-HCT) 80-12.5 MG per tablet Take 1 tablet by mouth daily 25   Danielle Cano MD   traZODone (DESYREL) 50 MG tablet Take 1 tablet by mouth nightly 25   Danielle Cano MD   DULoxetine (CYMBALTA) 20 MG extended release capsule Take 1 capsule by mouth daily    ProviderNelson MD   gabapentin (NEURONTIN) 300 MG capsule Take 1 capsule by mouth at bedtime for 270 days. 25  Sara Bernal MD   atorvastatin (LIPITOR) 20 MG tablet Take 1 tablet by mouth daily 25   Sara Bernal MD   levothyroxine (SYNTHROID) 125 MCG tablet Take 1 tablet by mouth daily 25  Sara Bernal MD   abatacept (ORENCIA) 250 MG injection  23   Nelson Wilkins MD   latanoprost (XALATAN) 0.005 % ophthalmic solution Place 1 drop into both eyes nightly    Nelson Wilkins MD   timolol (TIMOPTIC-XR) 0.5 % ophthalmic gel-forming Place 1 drop into both eyes daily    ProviderNelson MD       Current medications:    Current Facility-Administered Medications   Medication Dose Route Frequency Provider Last Rate Last Admin   • ondansetron (ZOFRAN-ODT) disintegrating tablet 4 mg  4 mg Oral Q8H PRN Burke Bae MD        Or   • ondansetron (ZOFRAN) injection 4 mg  4 mg IntraVENous Q6H PRN Burke Bae MD   4 mg at 25 5294   • ceFAZolin (ANCEF) 2,000 mg in sterile water 20 mL IV syringe  2,000 mg

## 2025-07-26 NOTE — H&P
\"BLOODCULT2\"  Organism:   Lab Results   Component Value Date/Time    ORG Klebsiella pneumoniae 02/11/2022 12:00 PM    ORG Escherichia coli 02/11/2022 12:00 PM       Imaging/Diagnostics Last 24 Hours   XR SHOULDER RIGHT (MIN 2 VIEWS)  Result Date: 7/26/2025  XR SHOULDER RIGHT (MIN 2 VIEWS), XR HUMERUS RIGHT (MIN 2 VIEWS) INDICATIONS: Trauma, Injury/Pain Comparison:None FINDINGS: SHOULDER: Mild degenerative AC joint arthropathy. Otherwise, Normal cortices and bone trabecula. Articular surfaces and alignment are normal. No acute fracture or subluxation. Soft tissues are normal HUMERUS: There is oblique fracture with 20 degree angulation of the proximal humerus. No comminuted fragments.     1.  Oblique fracture the proximal humerus and 20 degree angulation 2.  Mild AC joint arthropathy Electronically signed by Bruce Franklin MD    XR HUMERUS RIGHT (MIN 2 VIEWS)  Result Date: 7/26/2025  XR SHOULDER RIGHT (MIN 2 VIEWS), XR HUMERUS RIGHT (MIN 2 VIEWS) INDICATIONS: Trauma, Injury/Pain Comparison:None FINDINGS: SHOULDER: Mild degenerative AC joint arthropathy. Otherwise, Normal cortices and bone trabecula. Articular surfaces and alignment are normal. No acute fracture or subluxation. Soft tissues are normal HUMERUS: There is oblique fracture with 20 degree angulation of the proximal humerus. No comminuted fragments.     1.  Oblique fracture the proximal humerus and 20 degree angulation 2.  Mild AC joint arthropathy Electronically signed by Bruce Franklin MD    Echo (TTE) complete (PRN contrast/bubble/strain/3D)  Result Date: 7/24/2025    Left Ventricle: Normal left ventricular systolic function with a visually estimated EF of 55 - 60%. Left ventricle size is normal. Mildly increased wall thickness. Normal wall motion. Grade I diastolic dysfunction with normal LAP.   Right Ventricle: Right ventricle size is normal. Normal systolic function.   Aortic Valve: Trileaflet valve. Mild sclerosis of the aortic valve cusps. No  bolus injection, otherwise normal. Common carotid arteries: Mild atherosclerotic calcifications of the carotid bifurcations. Right cervical internal carotid artery: No stenosis of the proximal right cervical internal carotid by NASCET criteria. Right external carotid artery: No stenosis Left cervical internal carotid artery: No stenosis of the proximal left cervical internal carotid by NASCET criteria. Left external carotid artery: No stenosis Intracranial internal carotid arteries: Normal Anterior circulation: M1, A1, and their more distal segments are normal. Cervical vertebral arteries: Normal Intracranial vertebral arteries: Normal Basilar artery: Normal PICA, AICA, superior cerebellar arteries: Normal Posterior circulation: Normal. Dural venous sinuses: Normal. Contrast enhanced brain parenchyma: Normal Orbits: Normal Paranasal sinuses and mastoid air cells: Normal Neck soft tissues: Normal Osseous structures: No suspicious osseous lesions. Lung apices: Clear     CTA Head: No arterial steno-occlusive disease or aneurysm. CTA Neck: No arterial steno-occlusive disease or dissection. Electronically signed by Kole Monterroso    CT HEAD WO CONTRAST  Result Date: 7/23/2025  EXAM: CT HEAD WO CONTRAST INDICATION: aphasia; COMPARISON: None. TECHNICAL: Axial CT imaging obtained from vertex to skull base. Axial images and multiplanar reformatted images reviewed.  Individualized dose optimization technique was used in order to meet ALARA standards for radiation dose reduction.  In addition to vendor specific dose reduction algorithms, the dose reduction techniques vary based on the specific scanner utilized but frequently include automated exposure control, adjustment of the mA and/or kV according to patient size, and use of iterative reconstruction technique. CONTRAST: None. FINDINGS: No acute intracranial hemorrhage, mass effect, or extra-axial collection. Age-appropriate ventricular and sulcal size. Gray-white

## 2025-07-26 NOTE — OP NOTE
[address] [address]                            OPERATIVE REPORT      PATIENT NAME: MEG CORTES                : 1955  MED REC NO: [id number]                      ROOM: OR  ACCOUNT NO: [id number]                       ADMIT DATE: 2025  PROVIDER: Simona Shaffer MD      DATE OF PROCEDURE:  2025    SURGEON:  Simona Shaffer MD    ASSISTANT:  Arcenio surgical assistant.    PREOPERATIVE DIAGNOSES:    1. Right proximal humerus minimally displaced fracture.  2. Right humeral shaft markedly displaced unstable spiral fracture.    POSTOPERATIVE DIAGNOSES:    1. Right proximal humerus minimally displaced fracture.  2. Right humeral shaft markedly displaced unstable spiral fracture.    PROCEDURES DONE:    1. Open treatment of right proximal humerus fracture with open reduction and internal fixation.  2. Open treatment of right humeral shaft spiral unstable fracture with open reduction and internal fixation using plate and screws.    ANESTHESIA:  General anesthesia.    ESTIMATED BLOOD LOSS:  Less than 100 mL.    COMPLICATIONS:  None.    APPROACH USED:  Anterior deltopectoral approach.    IMPLANT USED:  Aren titanium 8-hole proximal humerus locking plate and 1 lag screw.    INDICATION:  This is a 69-year-old white female, right-hand dominant, who sustained a fall with a right shoulder and arm injury.  She fell at home.  She was brought by EMS to [facility], where she was found to have a proximal humerus and shaft markedly displaced fracture.  She requested [facility] physician with Dr. Simran ding, and I was contacted by Dr Maloney.  All risks, benefits, and alternatives were discussed with the patient, and she agreed to proceed with surgical fixation.    Given the patient's Body mass index is 40.58 kg/m².and unstable fracture added significant challenge to the procedure. It required significant physical and mental effort. It

## 2025-07-26 NOTE — ED PROVIDER NOTES
[facility]  EMERGENCY DEPARTMENT ENCOUNTER          ATTENDING PHYSICIAN NOTE       Date of evaluation: 7/26/2025    Chief Complaint     Shoulder Injury (R shoulder dislocation-pt tripped over her sandals.)      History of Present Illness     Deborah Agustin is a 69 y.o. female who presents to the emergency department via EMS shortly after tripping over her sandals, landing onto her right arm.  Immediate onset of localized pain.  No numbness or tingling or weakness in the dominant right hand or wrist.  She denies any other injuries and has no head, neck, chest, back, abdominal, or other extremity discomfort.  She was in her normal state of health when she went to bed last night and woke up this morning.  She only takes aspirin, otherwise no anticoagulation.    She received Zofran, fentanyl, and an improvised sling from EMS which had some mild improvement in her discomfort.  It is worse with any movement.    Review of Systems     Pertinent positives and negatives as described in the history of present illness.    Past Medical, Surgical, Family, and Social History     She has a past medical history of Arthritis, Bone spur, CTS (carpal tunnel syndrome), Cyst, Dental crowns present, Glaucoma, High risk medication use, Hyperlipidemia, Hypothyroidism, Ocular migraine, Osteopenia, Overweight(278.02), PONV (postoperative nausea and vomiting), Psoriatic arthritis (HCC), S/P foot surgery, and Scalp psoriasis.  She has a past surgical history that includes Colonoscopy; Carpal tunnel release (Left, 02/02/2018); Foot surgery (07/07/2011); Carpal tunnel release (Right, 02/23/2018); Refractive surgery (2003); Cataract removal (Left, 2019); joint replacement (06/2015); joint replacement (Left, 11/20/2018); Refractive surgery (Left, 2020); arthrodesis (Right, 9/3/2020); and Hip Closed Reduction (Left, 7/11/2023).  Her family history includes Coronary Art Dis in her mother.  She reports that she quit smoking about 44 years

## 2025-07-26 NOTE — PROGRESS NOTES
Patient arrived from OR to PACU # 13 s/p OPEN REDUCTION INTERNAL FIXATION OF RIGHT PROXIMAL HUMERUS AND SHAFT (Right) per Dr. Shaffer. Attached to PACU monitoring device report received from anesthesia  who stated patient hemodynamically stable intraoperatively. Arrived wakeful denied pain on arrival, VSS.

## 2025-07-26 NOTE — ANESTHESIA POSTPROCEDURE EVALUATION
Department of Anesthesiology  Postprocedure Note    Patient: Deborah Agustin  MRN: [id number]  YOB: 1955  Date of evaluation: 7/26/2025    Procedure Summary       Date: 07/26/25 Room / Location: [address]    Anesthesia Start: 1513 Anesthesia Stop: 1710    Procedure: OPEN REDUCTION INTERNAL FIXATION OF RIGHT PROXIMAL HUMERUS AND SHAFT (Right) Diagnosis:       Open fracture of shaft of right humerus, unspecified fracture morphology, initial encounter      (Open fracture of shaft of right humerus, unspecified fracture morphology, initial encounter [S42.301B])    Surgeons: Simona Shaffer MD Responsible Provider: Aramis Fair DO    Anesthesia Type: General ASA Status: 3            Anesthesia Type: General    Melanie Phase I:      Melanie Phase II:      Anesthesia Post Evaluation    Patient location during evaluation: PACU  Patient participation: complete - patient participated  Level of consciousness: awake and alert  Pain score: 0  Airway patency: patent  Nausea & Vomiting: no nausea and no vomiting  Cardiovascular status: hemodynamically stable  Respiratory status: acceptable  Hydration status: stable  Pain management: adequate and satisfactory to patient    No notable events documented.

## 2025-07-26 NOTE — CONSULTS
[facility] Orthopedic Surgery  Consult Note         This patient is seen in consultation at the request of Jose Rafael Preciado MD     Reason for Consult:  Right shoulder pain/ markedly displaced proximal humerus and shaft fracture.    CHIEF COMPLAINT:  Right shoulder pain/ markedly displaced proximal humerus and shaft fracture.    History Obtained From:  patient, domestic partner, electronic medical record    HISTORY OF PRESENT ILLNESS:    Ms. Agustin is a 69 y.o.  female right handed who seen today at [facility] ED for evaluation of a right shoulder injury.  The patient reports that this injury occurred when she fell at home.  She was first seen and evaluated in [facility] ED, when she was x-rayed and I was consulted per her request to be seen by [facility] and I was contacted by Dr Maloney. The patient denies any other injuries. Rates pain a 10/10 VAS moderate, sharp, constant and show no change.  Alleviating factors rest. Movement makes the pain worse, the sling and resting makes the pain better.  No numbness or tingling sensation.      Past Medical History:        Diagnosis Date    Arthritis     Bone spur     CTS (carpal tunnel syndrome)     Cyst     left foot and spur    Dental crowns present     molars    Glaucoma     High risk medication use 11/08/2022    Hyperlipidemia     Hypothyroidism     Ocular migraine     Osteopenia     Overweight(278.02)     PONV (postoperative nausea and vomiting)     phenergan works well     Psoriatic arthritis (HCC)     S/P foot surgery 07/07/2011    excision cyst and resection spur left foot    Scalp psoriasis        Past Surgical History:        Procedure Laterality Date    ARTHRODESIS Right 9/3/2020    AKIN OSTEOTOMY RIGHT HALLUX, PROXIMAL INTERPHALANGEAL JOINT ARTHRODESIS WITH SEQUENTIAL REDUCTION AND DORSAL CAPSULOTOMY performed by Donya Stephens DPM at [facility] OR    CARPAL TUNNEL RELEASE Left 02/02/2018    CARPAL TUNNEL RELEASE Right 02/23/2018    right carpal tunnel release    CATARACT

## 2025-07-26 NOTE — PROGRESS NOTES
4 Eyes Skin Assessment     NAME:  Deborah Agustin  YOB: 1955  MEDICAL RECORD NUMBER:  [id number]    The patient is being assessed for  Post-Op Surgical    I agree that at least one RN has performed a thorough Head to Toe Skin Assessment on the patient. ALL assessment sites listed below have been assessed.      Areas assessed by both nurses:    Head, Face, Ears, Shoulders, Back, Chest, Arms, Elbows, Hands, Sacrum. Buttock, Coccyx, Ischium, Legs. Feet and Heels, and Under Medical Devices         Does the Patient have a Wound? Yes wound(s) were present on assessment. LDA wound assessment was Initiated and completed by RN     -abrasion R arm, scattered bruising, incision R shoulder, wound under left breast, bilateral breast redness    Dawson Prevention initiated by RN: No  Wound Care Orders initiated by RN: No    For hospital-acquired stage 1 & 2 and ALL Stage 3,4, Unstageable, DTI, NWPT, and Complex wounds: place order “IP Wound Care/Ostomy Nurse Eval and Treat” by RN under : No    New Ostomies, if present place, Ostomy referral order under : No     Nurse 1 eSignature: Electronically signed by Marily Sanders RN on 7/26/25 at 7:38 PM EDT    **SHARE this note so that the co-signing nurse can place an eSignature**    Nurse 2 eSignature: Electronically signed by Felecia Mcelroy RN on 7/26/25 at 7:44 PM EDT

## 2025-07-26 NOTE — ED NOTES
Patient Name: Deborah Agustin  : 1955 69 y.o.  MRN: [id number]  ED Room #: TR/TR     Chief complaint:   Chief Complaint   Patient presents with    Shoulder Injury     R shoulder dislocation-pt tripped over her sandals.     Hospital Problem/Diagnosis:   Hospital Problems           Last Modified POA    * (Principal) Humerus head fracture, right, closed, initial encounter 2025 Yes    Closed fracture of right proximal humerus 2025 Yes    Accidental fall 2025 Yes    Closed displaced spiral fracture of shaft of right humerus 2025 Yes         O2 Flow Rate:O2 Device: None (Room air)   (if applicable)  Cardiac Rhythm:   (if applicable)  Active LDA's:   Peripheral IV 25 Distal;Left;Anterior Forearm (Active)            How does patient ambulate? Contact Guard    2. How does patient take pills? Whole with Water    3. Is patient alert? Alert    4. Is patient oriented? To Person, To Place, To Time, To Situation, and Follows Commands    5.   Patient arrived from:  home  Facility Name: ___________________________________________    6. If patient is disoriented or from a Skill Nursing Facility has family been notified of admission? No    7. Patient belongings? Belongings: Clothing    Disposition of belongings? Kept with Patient     8. Any specific patient or family belongings/needs/dynamics?   a. None    9. Miscellaneous comments/pending orders?  a. None      If there are any additional questions please reach out to the Emergency Department.       Robert Dean RN  [phone]

## 2025-07-27 LAB
ALBUMIN SERPL-MCNC: 3.6 G/DL (ref 3.4–5)
ALBUMIN/GLOB SERPL: 1.9 {RATIO} (ref 1.1–2.2)
ALP SERPL-CCNC: 56 U/L (ref 40–129)
ALT SERPL-CCNC: 13 U/L (ref 10–40)
ANION GAP SERPL CALCULATED.3IONS-SCNC: 9 MMOL/L (ref 3–16)
AST SERPL-CCNC: 18 U/L (ref 15–37)
BASOPHILS # BLD: 0 K/UL (ref 0–0.2)
BASOPHILS NFR BLD: 0.5 %
BILIRUB SERPL-MCNC: 0.3 MG/DL (ref 0–1)
BUN SERPL-MCNC: 22 MG/DL (ref 7–20)
CALCIUM SERPL-MCNC: 8.2 MG/DL (ref 8.3–10.6)
CHLORIDE SERPL-SCNC: 98 MMOL/L (ref 99–110)
CO2 SERPL-SCNC: 25 MMOL/L (ref 21–32)
CREAT SERPL-MCNC: 1 MG/DL (ref 0.6–1.2)
DEPRECATED RDW RBC AUTO: 14.8 % (ref 12.4–15.4)
EOSINOPHIL # BLD: 0 K/UL (ref 0–0.6)
EOSINOPHIL NFR BLD: 0.1 %
GFR SERPLBLD CREATININE-BSD FMLA CKD-EPI: 60 ML/MIN/{1.73_M2}
GLUCOSE SERPL-MCNC: 149 MG/DL (ref 70–99)
HCT VFR BLD AUTO: 36.1 % (ref 36–48)
HGB BLD-MCNC: 12.2 G/DL (ref 12–16)
LYMPHOCYTES # BLD: 1.9 K/UL (ref 1–5.1)
LYMPHOCYTES NFR BLD: 21.7 %
MCH RBC QN AUTO: 32.3 PG (ref 26–34)
MCHC RBC AUTO-ENTMCNC: 33.7 G/DL (ref 31–36)
MCV RBC AUTO: 96 FL (ref 80–100)
MONOCYTES # BLD: 0.8 K/UL (ref 0–1.3)
MONOCYTES NFR BLD: 9.4 %
NEUTROPHILS # BLD: 6.1 K/UL (ref 1.7–7.7)
NEUTROPHILS NFR BLD: 68.3 %
PLATELET # BLD AUTO: 223 K/UL (ref 135–450)
PMV BLD AUTO: 8 FL (ref 5–10.5)
POTASSIUM SERPL-SCNC: 4.4 MMOL/L (ref 3.5–5.1)
PROT SERPL-MCNC: 5.5 G/DL (ref 6.4–8.2)
RBC # BLD AUTO: 3.76 M/UL (ref 4–5.2)
SODIUM SERPL-SCNC: 132 MMOL/L (ref 136–145)
WBC # BLD AUTO: 9 K/UL (ref 4–11)

## 2025-07-27 PROCEDURE — 97162 PT EVAL MOD COMPLEX 30 MIN: CPT

## 2025-07-27 PROCEDURE — 36415 COLL VENOUS BLD VENIPUNCTURE: CPT

## 2025-07-27 PROCEDURE — 6370000000 HC RX 637 (ALT 250 FOR IP): Performed by: FAMILY MEDICINE

## 2025-07-27 PROCEDURE — 80053 COMPREHEN METABOLIC PANEL: CPT

## 2025-07-27 PROCEDURE — 1200000000 HC SEMI PRIVATE

## 2025-07-27 PROCEDURE — 97535 SELF CARE MNGMENT TRAINING: CPT

## 2025-07-27 PROCEDURE — 97166 OT EVAL MOD COMPLEX 45 MIN: CPT

## 2025-07-27 PROCEDURE — 97116 GAIT TRAINING THERAPY: CPT

## 2025-07-27 PROCEDURE — 6370000000 HC RX 637 (ALT 250 FOR IP): Performed by: HOSPITALIST

## 2025-07-27 PROCEDURE — 6360000002 HC RX W HCPCS: Performed by: ORTHOPAEDIC SURGERY

## 2025-07-27 PROCEDURE — 6360000002 HC RX W HCPCS: Performed by: HOSPITALIST

## 2025-07-27 PROCEDURE — 2580000003 HC RX 258: Performed by: FAMILY MEDICINE

## 2025-07-27 PROCEDURE — 85025 COMPLETE CBC W/AUTO DIFF WBC: CPT

## 2025-07-27 PROCEDURE — 97530 THERAPEUTIC ACTIVITIES: CPT

## 2025-07-27 PROCEDURE — 6370000000 HC RX 637 (ALT 250 FOR IP): Performed by: ORTHOPAEDIC SURGERY

## 2025-07-27 PROCEDURE — 94150 VITAL CAPACITY TEST: CPT

## 2025-07-27 PROCEDURE — 2580000003 HC RX 258: Performed by: NURSE PRACTITIONER

## 2025-07-27 PROCEDURE — 2500000003 HC RX 250 WO HCPCS: Performed by: ORTHOPAEDIC SURGERY

## 2025-07-27 RX ORDER — 0.9 % SODIUM CHLORIDE 0.9 %
500 INTRAVENOUS SOLUTION INTRAVENOUS ONCE
Status: COMPLETED | OUTPATIENT
Start: 2025-07-27 | End: 2025-07-27

## 2025-07-27 RX ORDER — SODIUM CHLORIDE 9 MG/ML
INJECTION, SOLUTION INTRAVENOUS CONTINUOUS
Status: DISCONTINUED | OUTPATIENT
Start: 2025-07-27 | End: 2025-07-29

## 2025-07-27 RX ORDER — DOCUSATE SODIUM 100 MG/1
100 CAPSULE, LIQUID FILLED ORAL DAILY
Status: DISCONTINUED | OUTPATIENT
Start: 2025-07-27 | End: 2025-07-29 | Stop reason: HOSPADM

## 2025-07-27 RX ADMIN — ONDANSETRON 4 MG: 2 INJECTION, SOLUTION INTRAMUSCULAR; INTRAVENOUS at 08:35

## 2025-07-27 RX ADMIN — SODIUM CHLORIDE: 9 INJECTION, SOLUTION INTRAVENOUS at 16:43

## 2025-07-27 RX ADMIN — OXYCODONE 10 MG: 5 TABLET ORAL at 00:17

## 2025-07-27 RX ADMIN — WATER 2000 MG: 1 INJECTION INTRAMUSCULAR; INTRAVENOUS; SUBCUTANEOUS at 00:17

## 2025-07-27 RX ADMIN — OXYCODONE 10 MG: 5 TABLET ORAL at 23:31

## 2025-07-27 RX ADMIN — ONDANSETRON 4 MG: 2 INJECTION, SOLUTION INTRAMUSCULAR; INTRAVENOUS at 20:04

## 2025-07-27 RX ADMIN — SODIUM CHLORIDE 500 ML: 0.9 INJECTION, SOLUTION INTRAVENOUS at 04:26

## 2025-07-27 RX ADMIN — SODIUM CHLORIDE 500 ML: 900 INJECTION, SOLUTION INTRAVENOUS at 14:36

## 2025-07-27 RX ADMIN — OXYCODONE 10 MG: 5 TABLET ORAL at 20:09

## 2025-07-27 RX ADMIN — ONDANSETRON 4 MG: 2 INJECTION, SOLUTION INTRAMUSCULAR; INTRAVENOUS at 14:20

## 2025-07-27 RX ADMIN — WATER 2000 MG: 1 INJECTION INTRAMUSCULAR; INTRAVENOUS; SUBCUTANEOUS at 08:16

## 2025-07-27 RX ADMIN — LEVOTHYROXINE SODIUM 125 MCG: 0.12 TABLET ORAL at 05:49

## 2025-07-27 RX ADMIN — ASPIRIN 325 MG: 325 TABLET, COATED ORAL at 20:09

## 2025-07-27 RX ADMIN — TRAZODONE HYDROCHLORIDE 50 MG: 50 TABLET ORAL at 20:09

## 2025-07-27 RX ADMIN — DULOXETINE 20 MG: 20 CAPSULE, DELAYED RELEASE ORAL at 20:09

## 2025-07-27 RX ADMIN — HYDROMORPHONE HYDROCHLORIDE 0.5 MG: 1 INJECTION, SOLUTION INTRAMUSCULAR; INTRAVENOUS; SUBCUTANEOUS at 11:46

## 2025-07-27 RX ADMIN — HYDROMORPHONE HYDROCHLORIDE 0.5 MG: 1 INJECTION, SOLUTION INTRAMUSCULAR; INTRAVENOUS; SUBCUTANEOUS at 08:15

## 2025-07-27 RX ADMIN — GABAPENTIN 300 MG: 300 CAPSULE ORAL at 20:09

## 2025-07-27 RX ADMIN — ATORVASTATIN CALCIUM 20 MG: 20 TABLET, FILM COATED ORAL at 14:20

## 2025-07-27 RX ADMIN — OXYCODONE 10 MG: 5 TABLET ORAL at 15:56

## 2025-07-27 RX ADMIN — ASPIRIN 325 MG: 325 TABLET, COATED ORAL at 14:20

## 2025-07-27 RX ADMIN — DOCUSATE SODIUM 100 MG: 100 CAPSULE, LIQUID FILLED ORAL at 20:09

## 2025-07-27 RX ADMIN — OXYCODONE 10 MG: 5 TABLET ORAL at 05:02

## 2025-07-27 RX ADMIN — SODIUM CHLORIDE, PRESERVATIVE FREE 10 ML: 5 INJECTION INTRAVENOUS at 08:16

## 2025-07-27 RX ADMIN — TIMOLOL MALEATE 1 DROP: 5 SOLUTION OPHTHALMIC at 08:16

## 2025-07-27 ASSESSMENT — PAIN DESCRIPTION - PAIN TYPE
TYPE: SURGICAL PAIN

## 2025-07-27 ASSESSMENT — PAIN SCALES - GENERAL
PAINLEVEL_OUTOF10: 8
PAINLEVEL_OUTOF10: 9
PAINLEVEL_OUTOF10: 2
PAINLEVEL_OUTOF10: 2
PAINLEVEL_OUTOF10: 9
PAINLEVEL_OUTOF10: 8
PAINLEVEL_OUTOF10: 3
PAINLEVEL_OUTOF10: 3
PAINLEVEL_OUTOF10: 8
PAINLEVEL_OUTOF10: 3
PAINLEVEL_OUTOF10: 8
PAINLEVEL_OUTOF10: 2
PAINLEVEL_OUTOF10: 8

## 2025-07-27 ASSESSMENT — PAIN DESCRIPTION - FREQUENCY
FREQUENCY: CONTINUOUS

## 2025-07-27 ASSESSMENT — PAIN DESCRIPTION - LOCATION
LOCATION: SHOULDER

## 2025-07-27 ASSESSMENT — PAIN DESCRIPTION - ORIENTATION
ORIENTATION: RIGHT

## 2025-07-27 ASSESSMENT — PAIN DESCRIPTION - ONSET
ONSET: ON-GOING

## 2025-07-27 ASSESSMENT — PAIN - FUNCTIONAL ASSESSMENT

## 2025-07-27 ASSESSMENT — PAIN DESCRIPTION - DESCRIPTORS
DESCRIPTORS: ACHING
DESCRIPTORS: ACHING
DESCRIPTORS: ACHING;DISCOMFORT
DESCRIPTORS: ACHING;DISCOMFORT
DESCRIPTORS: ACHING
DESCRIPTORS: ACHING;DISCOMFORT
DESCRIPTORS: ACHING

## 2025-07-27 NOTE — PLAN OF CARE
Problem: Pain  Goal: Verbalizes/displays adequate comfort level or baseline comfort level  7/27/2025 0748 by Marily Sanders, RN  Outcome: Progressing  Pt endorsing pain to R shoulder. Being treated with PRN pain medication, rest, and frequent repositioning with pillow support for comfort and pressure relief. Pt reports some relief from pain with above interventions.     Problem: Safety - Adult  Goal: Free from fall injury  7/27/2025 0748 by Marily Sanders, RN  Outcome: Progressing  All fall precautions in place. Bed locked and in lowest position with alarm on. Overbed table and personal belongings within reach. Call light within reach and patient instructed to use call light for assistance. Non-skid socks on.

## 2025-07-27 NOTE — CARE COORDINATION
07/27/25 1154   Readmission Assessment   Number of Days since last admission? 1-7 days   Previous Disposition Home Alone   Who is being Interviewed Patient   What was the patient's/caregiver's perception as to why they think they needed to return back to the hospital? Other (Comment)  (fall)   Did you visit your Primary Care Physician after you left the hospital, before you returned this time? No   Why weren't you able to visit your PCP? Did not have an appointment   Did you see a specialist, such as Cardiac, Pulmonary, Orthopedic Physician, etc. after you left the hospital? No   Who advised the patient to return to the hospital? Self-referral   Does the patient report anything that got in the way of taking their medications? No   In our efforts to provide the best possible care to you and others like you, can you think of anything that we could have done to help you after you left the hospital the first time, so that you might not have needed to return so soon? Other (Comment)  (discharge needs)       Case Management Assessment  Initial Evaluation    Date/Time of Evaluation: 7/27/2025 11:55 AM  Assessment Completed by: EMMY FERREIRA    If patient is discharged prior to next notation, then this note serves as note for discharge by case management.    Patient Name: Deborah Agustin                   YOB: 1955  Diagnosis: Accidental fall, initial encounter [W19.XXXA]  Humerus head fracture, right, closed, initial encounter [S42.291A]  Severe pain [R52]  Closed fracture of proximal end of right humerus, unspecified fracture morphology, initial encounter [S42.201A]                   Date / Time: 7/26/2025 10:36 AM    Patient Admission Status: Inpatient   Readmission Risk (Low < 19, Mod (19-27), High > 27): Readmission Risk Score: 10    Current PCP: Sara Bernal MD  PCP verified by CM? Yes    Chart Reviewed: Yes      History Provided by: Patient  Patient Orientation: Alert and Oriented    Patient

## 2025-07-27 NOTE — PROGRESS NOTES
Occupational Therapy  Facility/Department: [facility] 5T ORTHO/NEURO  Occupational Therapy Initial Assessment and Tx    Name: Deborah Agustin  : 1955  MRN: [id number]  Date of Service: 2025    Discharge Recommendations:  IP Rehab  OT Equipment Recommendations  Equipment Needed: No     At baseline this patient was independent  with functional mobility & ADL's. The current hospitalization has resulted in the patient now being limited with all aspects of mobility, negatively impacting the patient's functional independence, ability to safely access their home environment, and ability to complete valued daily tasks and life roles. Deborah Agustin is motivated for recovery and demonstrates the ability to tolerate inpatient rehabilitation 3 hours/day, 5 days/week for optimal return to functional independence, quality of life, and decreased caregiver burden.    Patient Diagnosis(es): The primary encounter diagnosis was Closed fracture of proximal end of right humerus, unspecified fracture morphology, initial encounter. Diagnoses of Accidental fall, initial encounter and Severe pain were also pertinent to this visit.  Past Medical History:  has a past medical history of Arthritis, Bone spur, CTS (carpal tunnel syndrome), Cyst, Dental crowns present, Glaucoma, High risk medication use, Hyperlipidemia, Hypothyroidism, Ocular migraine, Osteopenia, Overweight(278.02), PONV (postoperative nausea and vomiting), Psoriatic arthritis (HCC), S/P foot surgery, and Scalp psoriasis.  Past Surgical History:  has a past surgical history that includes Colonoscopy; Carpal tunnel release (Left, 2018); Foot surgery (2011); Carpal tunnel release (Right, 2018); Refractive surgery (); Cataract removal (Left, ); joint replacement (2015); joint replacement (Left, 2018); Refractive surgery (Left, ); arthrodesis (Right, 9/3/2020); and Hip Closed Reduction (Left, 2023).           Assessment  Performance

## 2025-07-27 NOTE — PROGRESS NOTES
[facility] Medicine Progress Note  V 7.24      Date of Admission: 7/26/2025    Hospital Day: 2      Chief Admission Complaint: Fall right humeral fracture    Subjective: Status post surgery patient states she is nauseous throwing    Presenting Admission History:       69-year-old lady known case of history of migraine, hyperlipidemia, hypothyroidism, and osteopenia presented after a fall     Patient was just discharged yesterday after being admitted for an episode of aphasia.  Neurology thought to be related to migraine versus less likely hypertension versus likely less likely TIA.  She did well since discharge.  Today , while walking on her porch she fell on jillian right side and had severe pain since then.  She did not hit her head or lose consciousness.  Imaging Showed a Right Displaced Humerus fracture for which she is being admitted.     Assessment/Plan:      Right humerus fracture  - Pain control  - Orthopedics recommendation    Nausea vomiting  - IV fluids  - Antiemetic    Ongoing threat to life and/or bodily function without ongoing treatment due to: Right humeral fracture    Consults:      IP CONSULT TO ORTHOPEDIC SURGERY  IP CONSULT TO PRIMARY CARE PROVIDER  IP CONSULT TO NEUROLOGY  IP CONSULT TO SOCIAL WORK      The following subspecialty service(s) Orthopedic Surgery was/were consulted and any/all available notes from yesterday and today were reviewed on 7/27/2025, w/ plan for continued inpatient w/up and management as noted above in the assessment and plan     --------------------------------------------------      Medications:        Infusion Medications    sodium chloride      sodium chloride 75 mL/hr at 07/26/25 2833    sodium chloride       Scheduled Medications    atorvastatin  20 mg Oral Daily    DULoxetine  20 mg Oral Nightly    gabapentin  300 mg Oral Nightly    latanoprost  1 drop Both Eyes Nightly    levothyroxine  125 mcg Oral Daily    timolol  1 drop Both Eyes BID    traZODone  50 mg Oral

## 2025-07-27 NOTE — PROGRESS NOTES
Occupational Therapy & Physical Therapy  Hold    Orders received, chart reviewed. Per discussion with RN pt with episodes of emesis this AM with hypotension, currently receiving fluids. Will hold PT/OT this AM and f/u as able/apprioriate.    Marily Parsons, OTR/NIMA Lobo, PT

## 2025-07-27 NOTE — PROGRESS NOTES
[facility] Orthopedic Surgery  Progress Note        POD # 1, s/p ORIF right proximal humerus and shaft.    Pt comfortable, c/o mild nausea.  Drsg right shoulder D/C/I,  Mild pain with right shoulder ROM, NVI    CBC:   Lab Results   Component Value Date/Time    WBC 9.0 07/27/2025 06:07 AM    RBC 3.76 07/27/2025 06:07 AM    HGB 12.2 07/27/2025 06:07 AM    HCT 36.1 07/27/2025 06:07 AM    MCV 96.0 07/27/2025 06:07 AM    MCH 32.3 07/27/2025 06:07 AM    MCHC 33.7 07/27/2025 06:07 AM    RDW 14.8 07/27/2025 06:07 AM     07/27/2025 06:07 AM    MPV 8.0 07/27/2025 06:07 AM     PT/INR:    Lab Results   Component Value Date/Time    PROTIME 12.9 07/11/2023 10:40 PM    INR 0.98 07/11/2023 10:40 PM     PTT:  No results found for: \"APTT\"[APTT    A/P: s/p ORIF right proximal humerus and shaft.  - Stable  - PT/OT, NWB right arm for 6 weeks, ok for ROM.  - Ok to D/C home tomorrow from ortho standpoint.  - F/U Dr Shaffer in 2 weeks.      Simona Shaffer MD 7/27/2025 10:44 AM

## 2025-07-27 NOTE — PROGRESS NOTES
Pt a/o x4, vss on room air with exception to soft BP. Pt pain and nausea being managed per MAR. Pt ambulated with x1 assist using gait belt, pt tolerated activity. Pt had nausea at beginning of shift with ambulation, but at end of shift pt could ambulate without nausea. Pt tolerating fluids and foods PO. All fall precautions are in place, call light within pt reach.

## 2025-07-27 NOTE — PROGRESS NOTES
Physical Therapy  Facility/Department: [facility] 5T ORTHO/NEURO  Physical Therapy Initial Assessment    Name: Deborah Agustin  : 1955  MRN: [id number]  Date of Service: 2025    Discharge Recommendations:  IP Rehab   PT Equipment Recommendations  Other: defer    At baseline this patient was independent with functional mobility & ADL's. The current hospitalization has resulted in the patient now being limited with all aspects of mobility, negatively impacting the patient's functional independence, ability to safely access their home environment, and ability to complete valued daily tasks and life roles. Deborah Agustin is motivated for recovery and demonstrates the ability to tolerate inpatient rehabilitation 3 hours/day, 5 days/week for optimal return to functional independence, quality of life, and decreased caregiver burden.        Patient Diagnosis(es): The primary encounter diagnosis was Closed fracture of proximal end of right humerus, unspecified fracture morphology, initial encounter. Diagnoses of Accidental fall, initial encounter and Severe pain were also pertinent to this visit.  Past Medical History:  has a past medical history of Arthritis, Bone spur, CTS (carpal tunnel syndrome), Cyst, Dental crowns present, Glaucoma, High risk medication use, Hyperlipidemia, Hypothyroidism, Ocular migraine, Osteopenia, Overweight(278.02), PONV (postoperative nausea and vomiting), Psoriatic arthritis (HCC), S/P foot surgery, and Scalp psoriasis.  Past Surgical History:  has a past surgical history that includes Colonoscopy; Carpal tunnel release (Left, 2018); Foot surgery (2011); Carpal tunnel release (Right, 2018); Refractive surgery (); Cataract removal (Left, ); joint replacement (2015); joint replacement (Left, 2018); Refractive surgery (Left, ); arthrodesis (Right, 9/3/2020); and Hip Closed Reduction (Left, 2023).    Assessment  Body Structures, Functions, Activity

## 2025-07-28 ENCOUNTER — CARE COORDINATION (OUTPATIENT)
Dept: CASE MANAGEMENT | Age: 70
End: 2025-07-28

## 2025-07-28 LAB
ALBUMIN SERPL-MCNC: 3.4 G/DL (ref 3.4–5)
ALBUMIN/GLOB SERPL: 1.5 {RATIO} (ref 1.1–2.2)
ALP SERPL-CCNC: 53 U/L (ref 40–129)
ALT SERPL-CCNC: 11 U/L (ref 10–40)
ANION GAP SERPL CALCULATED.3IONS-SCNC: 8 MMOL/L (ref 3–16)
AST SERPL-CCNC: 20 U/L (ref 15–37)
BASOPHILS # BLD: 0 K/UL (ref 0–0.2)
BASOPHILS NFR BLD: 0.3 %
BILIRUB SERPL-MCNC: 0.5 MG/DL (ref 0–1)
BUN SERPL-MCNC: 18 MG/DL (ref 7–20)
CALCIUM SERPL-MCNC: 8.2 MG/DL (ref 8.3–10.6)
CHLORIDE SERPL-SCNC: 99 MMOL/L (ref 99–110)
CO2 SERPL-SCNC: 24 MMOL/L (ref 21–32)
CREAT SERPL-MCNC: 0.8 MG/DL (ref 0.6–1.2)
DEPRECATED RDW RBC AUTO: 14.6 % (ref 12.4–15.4)
EOSINOPHIL # BLD: 0.1 K/UL (ref 0–0.6)
EOSINOPHIL NFR BLD: 1.2 %
GFR SERPLBLD CREATININE-BSD FMLA CKD-EPI: 78 ML/MIN/{1.73_M2}
GLUCOSE SERPL-MCNC: 116 MG/DL (ref 70–99)
HCT VFR BLD AUTO: 30.3 % (ref 36–48)
HGB BLD-MCNC: 10.1 G/DL (ref 12–16)
LYMPHOCYTES # BLD: 1.4 K/UL (ref 1–5.1)
LYMPHOCYTES NFR BLD: 22.1 %
MCH RBC QN AUTO: 31.6 PG (ref 26–34)
MCHC RBC AUTO-ENTMCNC: 33.4 G/DL (ref 31–36)
MCV RBC AUTO: 94.8 FL (ref 80–100)
MONOCYTES # BLD: 0.6 K/UL (ref 0–1.3)
MONOCYTES NFR BLD: 9.5 %
NEUTROPHILS # BLD: 4.1 K/UL (ref 1.7–7.7)
NEUTROPHILS NFR BLD: 66.9 %
PLATELET # BLD AUTO: 218 K/UL (ref 135–450)
PMV BLD AUTO: 8.3 FL (ref 5–10.5)
POTASSIUM SERPL-SCNC: 3.9 MMOL/L (ref 3.5–5.1)
PROT SERPL-MCNC: 5.7 G/DL (ref 6.4–8.2)
RBC # BLD AUTO: 3.2 M/UL (ref 4–5.2)
SODIUM SERPL-SCNC: 131 MMOL/L (ref 136–145)
WBC # BLD AUTO: 6.2 K/UL (ref 4–11)

## 2025-07-28 PROCEDURE — 80053 COMPREHEN METABOLIC PANEL: CPT

## 2025-07-28 PROCEDURE — 6370000000 HC RX 637 (ALT 250 FOR IP): Performed by: ORTHOPAEDIC SURGERY

## 2025-07-28 PROCEDURE — 85025 COMPLETE CBC W/AUTO DIFF WBC: CPT

## 2025-07-28 PROCEDURE — 36415 COLL VENOUS BLD VENIPUNCTURE: CPT

## 2025-07-28 PROCEDURE — 97535 SELF CARE MNGMENT TRAINING: CPT

## 2025-07-28 PROCEDURE — 6360000002 HC RX W HCPCS: Performed by: STUDENT IN AN ORGANIZED HEALTH CARE EDUCATION/TRAINING PROGRAM

## 2025-07-28 PROCEDURE — 97530 THERAPEUTIC ACTIVITIES: CPT

## 2025-07-28 PROCEDURE — 1200000000 HC SEMI PRIVATE

## 2025-07-28 PROCEDURE — 97116 GAIT TRAINING THERAPY: CPT

## 2025-07-28 PROCEDURE — 6370000000 HC RX 637 (ALT 250 FOR IP): Performed by: FAMILY MEDICINE

## 2025-07-28 PROCEDURE — 6370000000 HC RX 637 (ALT 250 FOR IP): Performed by: HOSPITALIST

## 2025-07-28 PROCEDURE — 6360000002 HC RX W HCPCS: Performed by: ORTHOPAEDIC SURGERY

## 2025-07-28 RX ORDER — PROMETHAZINE HYDROCHLORIDE 25 MG/ML
12.5 INJECTION, SOLUTION INTRAMUSCULAR; INTRAVENOUS EVERY 6 HOURS PRN
Status: DISCONTINUED | OUTPATIENT
Start: 2025-07-28 | End: 2025-07-29 | Stop reason: HOSPADM

## 2025-07-28 RX ADMIN — OXYCODONE 5 MG: 5 TABLET ORAL at 12:08

## 2025-07-28 RX ADMIN — ASPIRIN 325 MG: 325 TABLET, COATED ORAL at 11:29

## 2025-07-28 RX ADMIN — LEVOTHYROXINE SODIUM 125 MCG: 0.12 TABLET ORAL at 06:48

## 2025-07-28 RX ADMIN — GABAPENTIN 300 MG: 300 CAPSULE ORAL at 19:51

## 2025-07-28 RX ADMIN — ONDANSETRON 4 MG: 4 TABLET, ORALLY DISINTEGRATING ORAL at 17:34

## 2025-07-28 RX ADMIN — OXYCODONE 10 MG: 5 TABLET ORAL at 03:18

## 2025-07-28 RX ADMIN — DOCUSATE SODIUM 100 MG: 100 CAPSULE, LIQUID FILLED ORAL at 11:30

## 2025-07-28 RX ADMIN — OXYCODONE 5 MG: 5 TABLET ORAL at 22:10

## 2025-07-28 RX ADMIN — TRAZODONE HYDROCHLORIDE 50 MG: 50 TABLET ORAL at 19:51

## 2025-07-28 RX ADMIN — ONDANSETRON 4 MG: 2 INJECTION, SOLUTION INTRAMUSCULAR; INTRAVENOUS at 23:39

## 2025-07-28 RX ADMIN — OXYCODONE 10 MG: 5 TABLET ORAL at 06:48

## 2025-07-28 RX ADMIN — ASPIRIN 325 MG: 325 TABLET, COATED ORAL at 19:51

## 2025-07-28 RX ADMIN — ONDANSETRON 4 MG: 2 INJECTION, SOLUTION INTRAMUSCULAR; INTRAVENOUS at 03:14

## 2025-07-28 RX ADMIN — ATORVASTATIN CALCIUM 20 MG: 20 TABLET, FILM COATED ORAL at 11:30

## 2025-07-28 RX ADMIN — DULOXETINE 20 MG: 20 CAPSULE, DELAYED RELEASE ORAL at 19:51

## 2025-07-28 RX ADMIN — PROMETHAZINE HYDROCHLORIDE 12.5 MG: 25 INJECTION INTRAMUSCULAR; INTRAVENOUS at 10:08

## 2025-07-28 RX ADMIN — OXYCODONE 5 MG: 5 TABLET ORAL at 17:28

## 2025-07-28 ASSESSMENT — PAIN DESCRIPTION - PAIN TYPE
TYPE: SURGICAL PAIN
TYPE: ACUTE PAIN

## 2025-07-28 ASSESSMENT — PAIN SCALES - GENERAL
PAINLEVEL_OUTOF10: 2
PAINLEVEL_OUTOF10: 8
PAINLEVEL_OUTOF10: 3
PAINLEVEL_OUTOF10: 6
PAINLEVEL_OUTOF10: 6
PAINLEVEL_OUTOF10: 4
PAINLEVEL_OUTOF10: 8
PAINLEVEL_OUTOF10: 3

## 2025-07-28 ASSESSMENT — PAIN - FUNCTIONAL ASSESSMENT
PAIN_FUNCTIONAL_ASSESSMENT: ACTIVITIES ARE NOT PREVENTED
PAIN_FUNCTIONAL_ASSESSMENT: PREVENTS OR INTERFERES SOME ACTIVE ACTIVITIES AND ADLS
PAIN_FUNCTIONAL_ASSESSMENT: ACTIVITIES ARE NOT PREVENTED
PAIN_FUNCTIONAL_ASSESSMENT: ACTIVITIES ARE NOT PREVENTED
PAIN_FUNCTIONAL_ASSESSMENT: PREVENTS OR INTERFERES SOME ACTIVE ACTIVITIES AND ADLS

## 2025-07-28 ASSESSMENT — PAIN DESCRIPTION - LOCATION
LOCATION: ARM
LOCATION: SHOULDER

## 2025-07-28 ASSESSMENT — PAIN DESCRIPTION - ORIENTATION
ORIENTATION: RIGHT

## 2025-07-28 ASSESSMENT — PAIN DESCRIPTION - DESCRIPTORS
DESCRIPTORS: ACHING

## 2025-07-28 ASSESSMENT — PAIN DESCRIPTION - FREQUENCY
FREQUENCY: CONTINUOUS
FREQUENCY: CONTINUOUS

## 2025-07-28 ASSESSMENT — PAIN DESCRIPTION - ONSET
ONSET: ON-GOING
ONSET: ON-GOING

## 2025-07-28 NOTE — PROGRESS NOTES
[facility] - Acute Rehab Unit   After review, this patient is felt to be:       [x]  Dr. Valderrama states this patient is appropriate for rehab.     []  Not appropriate for Acute Inpatient Rehab    []  Referral received and ARU reviewing patient      Can admit when medically ready   Will notify CM/SW with further updates. Thank you for the referral.    Sarah LANE, OTR/L  Clinical Liaison- [facility]   (P): [phone]  (F): [phone]

## 2025-07-28 NOTE — CARE COORDINATION
DISCHARGE PLANNING:  Chart reviewed.  Patient is from home alone, but has friends and family who can assist as needed. No current services. She has a cane, rolling walker, shower chair and manual wheelchair that she does not use. Independent prior to admission.     Current discharge plan is [facility] (accepted).     POD#2 right arm ORIF.  Nausea control.   Monitoring blood pressures (recently hypotensive).    CM team will continue to follow.  Radha Harman RN Case Manager  [phone]

## 2025-07-28 NOTE — PROGRESS NOTES
Hospital Medicine Progress Note        Date of Admission: 7/26/2025    Hospital Day: 3      Chief Admission Complaint: Fall right humeral fracture    Subjective: Reports she still has nausea and vomiting despite having on Zofran and scopolamine.      Presenting Admission History:       69-year-old lady known case of history of migraine, hyperlipidemia, hypothyroidism, and osteopenia presented after a fall     Patient was just discharged yesterday after being admitted for an episode of aphasia.  Neurology thought to be related to migraine versus less likely hypertension versus likely less likely TIA.  She did well since discharge.  Today , while walking on her porch she fell on jillian right side and had severe pain since then.  She did not hit her head or lose consciousness.  Imaging Showed a Right Displaced Humerus fracture for which she is being admitted.     Assessment/Plan:      Right humerus fracture  -Status post ORIF right proximal humerus shaft.  - PT OT recommended ARU.  On aspirin 325 twice daily for DVT prophylaxis as per orthopedic surgery.  - Awaiting control and nausea prior to discharge.    Nausea vomiting  - IV fluids  - Already on Zofran and scopolamine.  Still persistent.  Add Phenergan today.      Hypotension  -Suspect in the setting of nausea, vomiting.  Home antihypertensives are currently on hold.  Gentle IV fluids.  Asymptomatic at this time we will avoid further fluid boluses.    Ongoing threat to life and/or bodily function without ongoing treatment due to: Right humeral fracture    Consults:      IP CONSULT TO ORTHOPEDIC SURGERY  IP CONSULT TO PRIMARY CARE PROVIDER  IP CONSULT TO NEUROLOGY  IP CONSULT TO SOCIAL WORK  IP CONSULT TO PHYSICAL MEDICINE REHAB        --------------------------------------------------      Medications:        Infusion Medications    sodium chloride 100 mL/hr at 07/27/25 1643    sodium chloride      sodium chloride       Scheduled Medications    docusate sodium

## 2025-07-28 NOTE — PROGRESS NOTES
Physical Therapy  Facility/Department: [facility] 5T ORTHO/NEURO  Daily Treatment Note  NAME: Deborah Agustin  : 1955  MRN: [id number]    Date of Service: 2025    Discharge Recommendations:  IP Rehab   PT Equipment Recommendations  Other: defer    Patient Diagnosis(es): The primary encounter diagnosis was Closed fracture of proximal end of right humerus, unspecified fracture morphology, initial encounter. Diagnoses of Accidental fall, initial encounter and Severe pain were also pertinent to this visit.    Assessment  Assessment: Pt trials cane ambualtion for first time this date with CGA-min required for stability and increased pain/dizziness with mobility. BP remains stable (110/50 prior to amb, 106/55 post amb), RN notified. Concerns with pt returning home alone at this time. Pt would benefit from intensive continued skilled PT at [facility] in order to progress indp and safety with functional mobility.  Activity Tolerance: Patient limited by fatigue;Patient limited by endurance  Other: defer    Plan  Physical Therapy Plan  General Plan: 5-7 times per week  Current Treatment Recommendations: Strengthening;ROM;Balance training;Functional mobility training;Transfer training;Endurance training;Gait training;Stair training;Neuromuscular re-education;Return to work related activity;Home exercise program;Safety education & training;Patient/Caregiver education & training;Equipment evaluation, education, & procurement;Therapeutic activities    Restrictions  Position Activity Restriction  Other Position/Activity Restrictions: NWB RUE, up as tolerated     Subjective   Subjective  Subjective: Pt seated in chair upon approach and agreeable to PT  Pain: Pt reports 7/10 pain which increaes with mobility - RN enters to administer pain medicine during session    Objective  Vitals     Transfer Training  Transfer Training: Yes  Overall Level of Assistance: Minimal assistance  Interventions: Verbal cues;Safety awareness training  Sit

## 2025-07-28 NOTE — PROGRESS NOTES
Occupational Therapy  Occupational Therapy  Daily Treatment Note  Patient Name: Deborah Agustin  MRN: [id number]    Chart Reviewed: Yes       Other Position/Activity Restrictions: NWB RUE, up as tolerated     Additional Pertinent Hx: Pt is 69 y.o. admitted to [facility] on 7/26/25 s/p tripping and falling, landing on R arm.  XR shoulder R: Oblique fracture the proximal humerus and 20 degree angulation  2.  Mild AC joint arthropathy.  XR humerus R: Oblique fracture the proximal humerus and 20 degree angulation   2.  Mild AC joint arthropathy.  S/p OPEN REDUCTION INTERNAL FIXATION OF RIGHT PROXIMAL HUMERUS AND SHAFT on 7/26/25.  PMH: arthritis, HLD, hypothyroidism, psoriatic arthritis.        Diagnosis: accidental fall, initial encounter       Subjective: Pt seated in chair upon entry, pt is very pleasant and motivated for therapy session. Pt wanting to toilet and wash up. Pt presents with nausea and 1 episode of emesis during therapy session.    Pain: 7/10 RUE, RN present and pt already received pain meds    Social/Functional History  Lives With: Alone (24 hr A if needed)  Type of Home: House  Home Layout: One level  Home Access: Stairs to enter with rails  Entrance Stairs - Number of Steps: 1  Bathroom Shower/Tub: Walk-in shower  Bathroom Toilet: Handicap height  Bathroom Equipment: Grab bars in shower, Shower chair, Grab bars around toilet  Home Equipment: Cane, Walker - Rolling, Wheelchair - Manual  Has the patient had two or more falls in the past year or any fall with injury in the past year?: Yes  Prior Level of Assist for ADLs: Independent  Prior Level of Assist for Homemaking: Independent  Prior Level of Assist for Ambulation: Independent household ambulator, with or without device, Independent community ambulator, with or without device  Prior Level of Assist for Transfers: Independent  Active : Yes  Occupation: Retired  Type of Occupation:   Leisure & Hobbies: gardening, lunch with

## 2025-07-28 NOTE — CONSULTS
Deborah Agustin  7/28/2025  [id number]    Chief Complaint: Humerus head fracture, right, closed, initial encounter    Subjective:   This is a 69-year-old female with a past medical history including:  Past Medical History:   Diagnosis Date    Arthritis     Bone spur     CTS (carpal tunnel syndrome)     Cyst     left foot and spur    Dental crowns present     molars    Glaucoma     High risk medication use 11/08/2022    Hyperlipidemia     Hypothyroidism     Ocular migraine     Osteopenia     Overweight(278.02)     PONV (postoperative nausea and vomiting)     phenergan works well     Psoriatic arthritis (HCC)     S/P foot surgery 07/07/2011    excision cyst and resection spur left foot    Scalp psoriasis      Who came to the hospital after a fall.  Patient was found to have right humerus fracture and is currently status post ORIF of right proximal humerus shaft.  Patient does require PT/OT and is on aspirin twice a day.  Patient was also suffering from hypotension and required IV fluids.  AM-PAC scores are 17/15 respectively.  Patient is interested in going to acute rehab unit for functional mobility retraining for discharge home.    ROS: No CP, SOB, dyspnea    Objective:  Patient Vitals for the past 24 hrs:   BP Temp Temp src Pulse Resp SpO2   07/28/25 0800 (!) 100/48 99 °F (37.2 °C) Axillary 64 15 96 %   07/28/25 0648 -- -- -- -- 16 --   07/28/25 0348 -- -- -- -- 16 --   07/28/25 0318 -- -- -- -- 16 --   07/28/25 0312 (!) 107/48 97.8 °F (36.6 °C) Temporal 69 16 96 %   07/28/25 0001 -- -- -- -- 16 --   07/27/25 2331 -- -- -- -- 16 --   07/27/25 2324 (!) 102/51 97.8 °F (36.6 °C) Temporal 64 16 94 %   07/27/25 2039 -- -- -- -- 16 --   07/27/25 2009 -- -- -- -- 16 --   07/27/25 1958 (!) 111/51 98.2 °F (36.8 °C) Temporal 67 16 100 %   07/27/25 1640 (!) 98/50 97.8 °F (36.6 °C) Oral 65 18 93 %   07/27/25 1556 -- -- -- -- 18 --   07/27/25 1216 -- -- -- -- 18 --   07/27/25 1146 -- -- -- -- 18 --   07/27/25 1140 (!) 91/54 98 °F

## 2025-07-29 ENCOUNTER — CARE COORDINATION (OUTPATIENT)
Dept: CASE MANAGEMENT | Age: 70
End: 2025-07-29

## 2025-07-29 ENCOUNTER — HOSPITAL ENCOUNTER (INPATIENT)
Age: 70
DRG: 560 | End: 2025-07-29
Attending: PHYSICAL MEDICINE & REHABILITATION | Admitting: PHYSICAL MEDICINE & REHABILITATION
Payer: MEDICARE

## 2025-07-29 VITALS
BODY MASS INDEX: 40.59 KG/M2 | HEART RATE: 60 BPM | DIASTOLIC BLOOD PRESSURE: 77 MMHG | SYSTOLIC BLOOD PRESSURE: 131 MMHG | WEIGHT: 229.06 LBS | OXYGEN SATURATION: 99 % | HEIGHT: 63 IN | RESPIRATION RATE: 16 BRPM | TEMPERATURE: 97.7 F

## 2025-07-29 DIAGNOSIS — S42.341S CLOSED DISPLACED SPIRAL FRACTURE OF SHAFT OF RIGHT HUMERUS, SEQUELA: Primary | ICD-10-CM

## 2025-07-29 PROBLEM — R53.81 DEBILITY: Status: ACTIVE | Noted: 2025-07-29

## 2025-07-29 LAB
ALBUMIN SERPL-MCNC: 3 G/DL (ref 3.4–5)
ALBUMIN/GLOB SERPL: 1.2 {RATIO} (ref 1.1–2.2)
ALP SERPL-CCNC: 50 U/L (ref 40–129)
ALT SERPL-CCNC: 11 U/L (ref 10–40)
ANION GAP SERPL CALCULATED.3IONS-SCNC: 14 MMOL/L (ref 3–16)
AST SERPL-CCNC: 23 U/L (ref 15–37)
BASOPHILS # BLD: 0 K/UL (ref 0–0.2)
BASOPHILS NFR BLD: 0.2 %
BILIRUB SERPL-MCNC: 0.4 MG/DL (ref 0–1)
BUN SERPL-MCNC: 12 MG/DL (ref 7–20)
CALCIUM SERPL-MCNC: 8.9 MG/DL (ref 8.3–10.6)
CHLORIDE SERPL-SCNC: 103 MMOL/L (ref 99–110)
CO2 SERPL-SCNC: 16 MMOL/L (ref 21–32)
CREAT SERPL-MCNC: 0.7 MG/DL (ref 0.6–1.2)
DEPRECATED RDW RBC AUTO: 14.7 % (ref 12.4–15.4)
EOSINOPHIL # BLD: 0.1 K/UL (ref 0–0.6)
EOSINOPHIL NFR BLD: 2 %
GFR SERPLBLD CREATININE-BSD FMLA CKD-EPI: >90 ML/MIN/{1.73_M2}
GLUCOSE SERPL-MCNC: 101 MG/DL (ref 70–99)
HCT VFR BLD AUTO: 31.2 % (ref 36–48)
HGB BLD-MCNC: 10.3 G/DL (ref 12–16)
LYMPHOCYTES # BLD: 1.8 K/UL (ref 1–5.1)
LYMPHOCYTES NFR BLD: 32.5 %
MCH RBC QN AUTO: 32.7 PG (ref 26–34)
MCHC RBC AUTO-ENTMCNC: 33.1 G/DL (ref 31–36)
MCV RBC AUTO: 98.7 FL (ref 80–100)
MONOCYTES # BLD: 0.6 K/UL (ref 0–1.3)
MONOCYTES NFR BLD: 11.1 %
NEUTROPHILS # BLD: 3 K/UL (ref 1.7–7.7)
NEUTROPHILS NFR BLD: 54.2 %
PLATELET # BLD AUTO: 199 K/UL (ref 135–450)
PMV BLD AUTO: 7.9 FL (ref 5–10.5)
POTASSIUM SERPL-SCNC: 4.3 MMOL/L (ref 3.5–5.1)
PROT SERPL-MCNC: 5.6 G/DL (ref 6.4–8.2)
RBC # BLD AUTO: 3.16 M/UL (ref 4–5.2)
SODIUM SERPL-SCNC: 133 MMOL/L (ref 136–145)
WBC # BLD AUTO: 5.5 K/UL (ref 4–11)

## 2025-07-29 PROCEDURE — 6370000000 HC RX 637 (ALT 250 FOR IP): Performed by: HOSPITALIST

## 2025-07-29 PROCEDURE — 97110 THERAPEUTIC EXERCISES: CPT

## 2025-07-29 PROCEDURE — 6360000002 HC RX W HCPCS: Performed by: PHYSICAL MEDICINE & REHABILITATION

## 2025-07-29 PROCEDURE — 6370000000 HC RX 637 (ALT 250 FOR IP): Performed by: ORTHOPAEDIC SURGERY

## 2025-07-29 PROCEDURE — 6370000000 HC RX 637 (ALT 250 FOR IP): Performed by: FAMILY MEDICINE

## 2025-07-29 PROCEDURE — 1280000000 HC REHAB R&B

## 2025-07-29 PROCEDURE — 85025 COMPLETE CBC W/AUTO DIFF WBC: CPT

## 2025-07-29 PROCEDURE — 6370000000 HC RX 637 (ALT 250 FOR IP): Performed by: PHYSICAL MEDICINE & REHABILITATION

## 2025-07-29 PROCEDURE — 80053 COMPREHEN METABOLIC PANEL: CPT

## 2025-07-29 PROCEDURE — 6370000000 HC RX 637 (ALT 250 FOR IP): Performed by: INTERNAL MEDICINE

## 2025-07-29 PROCEDURE — 97535 SELF CARE MNGMENT TRAINING: CPT

## 2025-07-29 PROCEDURE — 6360000002 HC RX W HCPCS: Performed by: ORTHOPAEDIC SURGERY

## 2025-07-29 PROCEDURE — 97116 GAIT TRAINING THERAPY: CPT

## 2025-07-29 PROCEDURE — 36415 COLL VENOUS BLD VENIPUNCTURE: CPT

## 2025-07-29 PROCEDURE — 97530 THERAPEUTIC ACTIVITIES: CPT

## 2025-07-29 PROCEDURE — 2500000003 HC RX 250 WO HCPCS: Performed by: PHYSICAL MEDICINE & REHABILITATION

## 2025-07-29 RX ORDER — CALCIUM CARBONATE 500 MG/1
500 TABLET, CHEWABLE ORAL 2 TIMES DAILY
Status: DISCONTINUED | OUTPATIENT
Start: 2025-07-29 | End: 2025-07-29 | Stop reason: HOSPADM

## 2025-07-29 RX ORDER — BISACODYL 5 MG/1
5 TABLET, DELAYED RELEASE ORAL DAILY
Status: CANCELLED | OUTPATIENT
Start: 2025-07-29

## 2025-07-29 RX ORDER — VALSARTAN AND HYDROCHLOROTHIAZIDE 80; 12.5 MG/1; MG/1
1 TABLET, FILM COATED ORAL DAILY
Qty: 90 TABLET | Refills: 1 | Status: ON HOLD | OUTPATIENT
Start: 2025-07-29

## 2025-07-29 RX ORDER — OXYCODONE HYDROCHLORIDE 5 MG/1
10 TABLET ORAL EVERY 4 HOURS PRN
Refills: 0 | Status: CANCELLED | OUTPATIENT
Start: 2025-07-29

## 2025-07-29 RX ORDER — FAMOTIDINE 20 MG/1
20 TABLET, FILM COATED ORAL 2 TIMES DAILY
Status: DISCONTINUED | OUTPATIENT
Start: 2025-07-29 | End: 2025-07-29 | Stop reason: HOSPADM

## 2025-07-29 RX ORDER — DULOXETIN HYDROCHLORIDE 20 MG/1
20 CAPSULE, DELAYED RELEASE ORAL NIGHTLY
Status: DISPENSED | OUTPATIENT
Start: 2025-07-29

## 2025-07-29 RX ORDER — FAMOTIDINE 20 MG/1
20 TABLET, FILM COATED ORAL 2 TIMES DAILY
Status: DISPENSED | OUTPATIENT
Start: 2025-07-29

## 2025-07-29 RX ORDER — TIMOLOL MALEATE 5 MG/ML
1 SOLUTION/ DROPS OPHTHALMIC 2 TIMES DAILY
Status: DISPENSED | OUTPATIENT
Start: 2025-07-29

## 2025-07-29 RX ORDER — ONDANSETRON 4 MG/1
4 TABLET, ORALLY DISINTEGRATING ORAL EVERY 8 HOURS PRN
Status: DISPENSED | OUTPATIENT
Start: 2025-07-29

## 2025-07-29 RX ORDER — GABAPENTIN 300 MG/1
300 CAPSULE ORAL NIGHTLY
Status: DISPENSED | OUTPATIENT
Start: 2025-07-29

## 2025-07-29 RX ORDER — FAMOTIDINE 20 MG/1
20 TABLET, FILM COATED ORAL 2 TIMES DAILY
Qty: 60 TABLET | Refills: 0 | Status: ON HOLD | OUTPATIENT
Start: 2025-07-29

## 2025-07-29 RX ORDER — CALCIUM CARBONATE 500 MG/1
500 TABLET, CHEWABLE ORAL 2 TIMES DAILY
Qty: 14 TABLET | Refills: 0 | Status: ON HOLD | OUTPATIENT
Start: 2025-07-29 | End: 2025-08-05

## 2025-07-29 RX ORDER — ATORVASTATIN CALCIUM 20 MG/1
20 TABLET, FILM COATED ORAL DAILY
Status: CANCELLED | OUTPATIENT
Start: 2025-07-30

## 2025-07-29 RX ORDER — DOCUSATE SODIUM 100 MG/1
100 CAPSULE, LIQUID FILLED ORAL DAILY
Status: DISPENSED | OUTPATIENT
Start: 2025-07-30

## 2025-07-29 RX ORDER — CALCIUM CARBONATE 500 MG/1
500 TABLET, CHEWABLE ORAL 2 TIMES DAILY
Status: DISPENSED | OUTPATIENT
Start: 2025-07-29

## 2025-07-29 RX ORDER — SODIUM CHLORIDE 0.9 % (FLUSH) 0.9 %
5-40 SYRINGE (ML) INJECTION PRN
Status: CANCELLED | OUTPATIENT
Start: 2025-07-29

## 2025-07-29 RX ORDER — TRAZODONE HYDROCHLORIDE 50 MG/1
50 TABLET ORAL NIGHTLY
Status: CANCELLED | OUTPATIENT
Start: 2025-07-29

## 2025-07-29 RX ORDER — SODIUM CHLORIDE 0.9 % (FLUSH) 0.9 %
5-40 SYRINGE (ML) INJECTION PRN
Status: ACTIVE | OUTPATIENT
Start: 2025-07-29

## 2025-07-29 RX ORDER — CALCIUM CARBONATE 500 MG/1
500 TABLET, CHEWABLE ORAL 2 TIMES DAILY
Status: CANCELLED | OUTPATIENT
Start: 2025-07-29

## 2025-07-29 RX ORDER — POLYETHYLENE GLYCOL 3350 17 G/17G
17 POWDER, FOR SOLUTION ORAL DAILY PRN
Status: ACTIVE | OUTPATIENT
Start: 2025-07-29

## 2025-07-29 RX ORDER — SENNOSIDES 8.6 MG
325 CAPSULE ORAL 2 TIMES DAILY
Qty: 28 TABLET | Refills: 0 | Status: ON HOLD | OUTPATIENT
Start: 2025-07-29 | End: 2025-08-12

## 2025-07-29 RX ORDER — TRAZODONE HYDROCHLORIDE 50 MG/1
50 TABLET ORAL NIGHTLY
Status: DISPENSED | OUTPATIENT
Start: 2025-07-29

## 2025-07-29 RX ORDER — ONDANSETRON 2 MG/ML
4 INJECTION INTRAMUSCULAR; INTRAVENOUS EVERY 6 HOURS PRN
Status: CANCELLED | OUTPATIENT
Start: 2025-07-29

## 2025-07-29 RX ORDER — ATORVASTATIN CALCIUM 20 MG/1
20 TABLET, FILM COATED ORAL DAILY
Status: DISPENSED | OUTPATIENT
Start: 2025-07-30

## 2025-07-29 RX ORDER — SENNOSIDES 8.6 MG
325 CAPSULE ORAL 2 TIMES DAILY
Status: DISPENSED | OUTPATIENT
Start: 2025-07-29

## 2025-07-29 RX ORDER — OXYCODONE HYDROCHLORIDE 5 MG/1
10 TABLET ORAL EVERY 4 HOURS PRN
Refills: 0 | Status: DISPENSED | OUTPATIENT
Start: 2025-07-29

## 2025-07-29 RX ORDER — ONDANSETRON 2 MG/ML
4 INJECTION INTRAMUSCULAR; INTRAVENOUS EVERY 6 HOURS PRN
Status: DISPENSED | OUTPATIENT
Start: 2025-07-29

## 2025-07-29 RX ORDER — ACETAMINOPHEN 325 MG/1
650 TABLET ORAL EVERY 4 HOURS PRN
Status: CANCELLED | OUTPATIENT
Start: 2025-07-29

## 2025-07-29 RX ORDER — TIMOLOL MALEATE 5 MG/ML
1 SOLUTION/ DROPS OPHTHALMIC 2 TIMES DAILY
Status: CANCELLED | OUTPATIENT
Start: 2025-07-29

## 2025-07-29 RX ORDER — DULOXETIN HYDROCHLORIDE 20 MG/1
20 CAPSULE, DELAYED RELEASE ORAL NIGHTLY
Status: CANCELLED | OUTPATIENT
Start: 2025-07-29

## 2025-07-29 RX ORDER — OXYCODONE HYDROCHLORIDE 5 MG/1
5 TABLET ORAL EVERY 4 HOURS PRN
Refills: 0 | Status: CANCELLED | OUTPATIENT
Start: 2025-07-29

## 2025-07-29 RX ORDER — SENNOSIDES 8.6 MG
325 CAPSULE ORAL 2 TIMES DAILY
Status: CANCELLED | OUTPATIENT
Start: 2025-07-29

## 2025-07-29 RX ORDER — SODIUM CHLORIDE 0.9 % (FLUSH) 0.9 %
5-40 SYRINGE (ML) INJECTION EVERY 12 HOURS SCHEDULED
Status: ACTIVE | OUTPATIENT
Start: 2025-07-29

## 2025-07-29 RX ORDER — FAMOTIDINE 20 MG/1
20 TABLET, FILM COATED ORAL 2 TIMES DAILY
Status: CANCELLED | OUTPATIENT
Start: 2025-07-29

## 2025-07-29 RX ORDER — ACETAMINOPHEN 325 MG/1
650 TABLET ORAL EVERY 4 HOURS PRN
Status: DISPENSED | OUTPATIENT
Start: 2025-07-29

## 2025-07-29 RX ORDER — SCOPOLAMINE 1 MG/3D
1 PATCH, EXTENDED RELEASE TRANSDERMAL ONCE
Qty: 1 PATCH | Refills: 0 | Status: ON HOLD | OUTPATIENT
Start: 2025-07-29 | End: 2025-07-29

## 2025-07-29 RX ORDER — ONDANSETRON 4 MG/1
4 TABLET, ORALLY DISINTEGRATING ORAL EVERY 8 HOURS PRN
Qty: 21 TABLET | Refills: 0 | Status: ON HOLD | OUTPATIENT
Start: 2025-07-29 | End: 2025-08-05

## 2025-07-29 RX ORDER — OXYCODONE HYDROCHLORIDE 5 MG/1
5 TABLET ORAL EVERY 4 HOURS PRN
Refills: 0 | Status: DISPENSED | OUTPATIENT
Start: 2025-07-29

## 2025-07-29 RX ORDER — LEVOTHYROXINE SODIUM 125 UG/1
125 TABLET ORAL DAILY
Status: CANCELLED | OUTPATIENT
Start: 2025-07-30

## 2025-07-29 RX ORDER — LATANOPROST 50 UG/ML
1 SOLUTION/ DROPS OPHTHALMIC NIGHTLY
Status: DISPENSED | OUTPATIENT
Start: 2025-07-29

## 2025-07-29 RX ORDER — OXYCODONE HYDROCHLORIDE 5 MG/1
5 TABLET ORAL EVERY 6 HOURS PRN
Qty: 12 TABLET | Refills: 0 | Status: ON HOLD | OUTPATIENT
Start: 2025-07-29 | End: 2025-08-01

## 2025-07-29 RX ORDER — DOCUSATE SODIUM 100 MG/1
100 CAPSULE, LIQUID FILLED ORAL DAILY
Status: CANCELLED | OUTPATIENT
Start: 2025-07-30

## 2025-07-29 RX ORDER — ONDANSETRON 4 MG/1
4 TABLET, ORALLY DISINTEGRATING ORAL EVERY 8 HOURS PRN
Status: CANCELLED | OUTPATIENT
Start: 2025-07-29

## 2025-07-29 RX ORDER — GABAPENTIN 300 MG/1
300 CAPSULE ORAL NIGHTLY
Status: CANCELLED | OUTPATIENT
Start: 2025-07-29

## 2025-07-29 RX ORDER — SODIUM CHLORIDE 0.9 % (FLUSH) 0.9 %
5-40 SYRINGE (ML) INJECTION EVERY 12 HOURS SCHEDULED
Status: CANCELLED | OUTPATIENT
Start: 2025-07-29

## 2025-07-29 RX ORDER — LATANOPROST 50 UG/ML
1 SOLUTION/ DROPS OPHTHALMIC NIGHTLY
Status: CANCELLED | OUTPATIENT
Start: 2025-07-29

## 2025-07-29 RX ORDER — POLYETHYLENE GLYCOL 3350 17 G/17G
17 POWDER, FOR SOLUTION ORAL DAILY PRN
Status: CANCELLED | OUTPATIENT
Start: 2025-07-29

## 2025-07-29 RX ORDER — BISACODYL 5 MG/1
5 TABLET, DELAYED RELEASE ORAL DAILY
Status: DISPENSED | OUTPATIENT
Start: 2025-07-29

## 2025-07-29 RX ADMIN — OXYCODONE 5 MG: 5 TABLET ORAL at 02:31

## 2025-07-29 RX ADMIN — ANTACID TABLETS 500 MG: 500 TABLET, CHEWABLE ORAL at 20:59

## 2025-07-29 RX ADMIN — BISACODYL 5 MG: 5 TABLET, COATED ORAL at 18:33

## 2025-07-29 RX ADMIN — DOCUSATE SODIUM 100 MG: 100 CAPSULE, LIQUID FILLED ORAL at 08:00

## 2025-07-29 RX ADMIN — OXYCODONE 10 MG: 5 TABLET ORAL at 06:38

## 2025-07-29 RX ADMIN — ANTACID TABLETS 500 MG: 500 TABLET, CHEWABLE ORAL at 12:39

## 2025-07-29 RX ADMIN — OXYCODONE 5 MG: 5 TABLET ORAL at 12:37

## 2025-07-29 RX ADMIN — DULOXETINE 20 MG: 20 CAPSULE, DELAYED RELEASE ORAL at 20:59

## 2025-07-29 RX ADMIN — LEVOTHYROXINE SODIUM 125 MCG: 0.12 TABLET ORAL at 06:38

## 2025-07-29 RX ADMIN — TRAZODONE HYDROCHLORIDE 50 MG: 50 TABLET ORAL at 20:59

## 2025-07-29 RX ADMIN — LATANOPROST 1 DROP: 50 SOLUTION OPHTHALMIC at 21:10

## 2025-07-29 RX ADMIN — ACETAMINOPHEN 650 MG: 325 TABLET ORAL at 08:00

## 2025-07-29 RX ADMIN — FAMOTIDINE 20 MG: 20 TABLET, FILM COATED ORAL at 12:37

## 2025-07-29 RX ADMIN — SODIUM CHLORIDE, PRESERVATIVE FREE 10 ML: 5 INJECTION INTRAVENOUS at 20:59

## 2025-07-29 RX ADMIN — ATORVASTATIN CALCIUM 20 MG: 20 TABLET, FILM COATED ORAL at 08:00

## 2025-07-29 RX ADMIN — ASPIRIN 325 MG: 325 TABLET, COATED ORAL at 20:59

## 2025-07-29 RX ADMIN — TIMOLOL MALEATE 1 DROP: 5 SOLUTION OPHTHALMIC at 09:00

## 2025-07-29 RX ADMIN — FAMOTIDINE 20 MG: 20 TABLET, FILM COATED ORAL at 20:59

## 2025-07-29 RX ADMIN — ASPIRIN 325 MG: 325 TABLET, COATED ORAL at 07:59

## 2025-07-29 RX ADMIN — GABAPENTIN 300 MG: 300 CAPSULE ORAL at 20:59

## 2025-07-29 RX ADMIN — ACETAMINOPHEN 650 MG: 325 TABLET ORAL at 14:56

## 2025-07-29 RX ADMIN — ONDANSETRON 4 MG: 2 INJECTION, SOLUTION INTRAMUSCULAR; INTRAVENOUS at 06:38

## 2025-07-29 RX ADMIN — ONDANSETRON 4 MG: 2 INJECTION, SOLUTION INTRAMUSCULAR; INTRAVENOUS at 18:33

## 2025-07-29 ASSESSMENT — PAIN DESCRIPTION - ORIENTATION
ORIENTATION: RIGHT

## 2025-07-29 ASSESSMENT — PAIN SCALES - GENERAL
PAINLEVEL_OUTOF10: 6
PAINLEVEL_OUTOF10: 5
PAINLEVEL_OUTOF10: 5
PAINLEVEL_OUTOF10: 2
PAINLEVEL_OUTOF10: 8
PAINLEVEL_OUTOF10: 0

## 2025-07-29 ASSESSMENT — PAIN - FUNCTIONAL ASSESSMENT
PAIN_FUNCTIONAL_ASSESSMENT: PREVENTS OR INTERFERES SOME ACTIVE ACTIVITIES AND ADLS
PAIN_FUNCTIONAL_ASSESSMENT: PREVENTS OR INTERFERES SOME ACTIVE ACTIVITIES AND ADLS
PAIN_FUNCTIONAL_ASSESSMENT: ACTIVITIES ARE NOT PREVENTED
PAIN_FUNCTIONAL_ASSESSMENT: ACTIVITIES ARE NOT PREVENTED

## 2025-07-29 ASSESSMENT — PAIN DESCRIPTION - FREQUENCY: FREQUENCY: CONTINUOUS

## 2025-07-29 ASSESSMENT — PAIN DESCRIPTION - DESCRIPTORS
DESCRIPTORS: ACHING
DESCRIPTORS: ACHING
DESCRIPTORS: ACHING;SHOOTING
DESCRIPTORS: ACHING;SORE;THROBBING

## 2025-07-29 ASSESSMENT — PAIN DESCRIPTION - LOCATION
LOCATION: SHOULDER

## 2025-07-29 ASSESSMENT — PAIN DESCRIPTION - PAIN TYPE: TYPE: ACUTE PAIN

## 2025-07-29 ASSESSMENT — PAIN DESCRIPTION - ONSET: ONSET: ON-GOING

## 2025-07-29 NOTE — DISCHARGE INSTRUCTIONS
We have prescribed you aspirin 325 mg twice a day 14 days for DVT prophylaxis.  Please follow-up with orthopedic surgery about continuing aspirin 325 mg twice daily with them after 2 weeks.

## 2025-07-29 NOTE — PLAN OF CARE
Problem: Pain  Goal: Verbalizes/displays adequate comfort level or baseline comfort level  Outcome: Progressing   Pt endorses pain. Medication administered per MAR    Problem: Safety - Adult  Goal: Free from fall injury  Outcome: Progressing   Standard safety measures in place - call light within reach

## 2025-07-29 NOTE — PROGRESS NOTES
Physical Therapy  Daily Treatment Note    Discharge Recommendation:  Inpatient Rehab  Equipment Needs:  Defer to next level of care    Assessment:  Pt with improved gait tolerance today. Mobility slow and effortful. Needs occasional rest breaks, but recovers well. Pt from home alone. Currently functioning below baseline s/p fall resulting in R humerus fx/surgical repair. Limited by discomfort, decreased balance, decreased strength. Would benefit from continued IP PT at D/C prior to returning home. Plan is for ARU.     Chart Reviewed: Yes     Other Position/Activity Restrictions: NWB RUE, up as tolerated   Additional Pertinent Hx: Pt is 69 y.o. admitted to [facility] on 7/26/25 s/p tripping and falling, landing on R arm.  XR shoulder R: Oblique fracture the proximal humerus and 20 degree angulation  2.  Mild AC joint arthropathy.  XR humerus R: Oblique fracture the proximal humerus and 20 degree angulation   2.  Mild AC joint arthropathy.  S/p OPEN REDUCTION INTERNAL FIXATION OF RIGHT PROXIMAL HUMERUS AND SHAFT on 7/26/25.  PMH: arthritis, HLD, hypothyroidism, psoriatic arthritis.      Diagnosis: fall   Treatment Diagnosis: decreased functional mobility due to humerus fx    Subjective: Pt in chair initially. Significant other arrived towards end of session.   Pt hopes to go to ARU soon.     Pain: 6/10 R shoulder/upper arm. Ice pack to area. RN has been addressing.     Objective:    Transfers  Sit to stand: CGA from chair (x 3 times); Min from commode   Stand to sit: CGA into chair (x 3 times0; Min assist x 1 onto commode. Decreased eccentric control noted.     Ambulation  Assistance Level: [facility]  Assistive device: Single point cane on L  Distance: 25 ft, 75 ft, 40 ft x 2. Seated rests between walks.   Quality of gait: 3 point gait; decreased step length/height; decreased pace; gait weak, but no overt LOB    Exercises  In chair:  2 x 10 reps B LAQ, heel raises, toe raises, LAQ, hip abd/add, hip flexion  Other: Occasional rest

## 2025-07-29 NOTE — PLAN OF CARE
ARU PATIENT TREATMENT PLAN  [place] [id number] [place] [address]  [phone]    Deborah Agustin    : 1955  [id number]  MRN: [id number]  PHYSICIAN:  Sander Valderrama DO  Primary Problem    Patient Active Problem List   Diagnosis    Psoriasis    Glaucoma    Osteoporosis    Hyperlipidemia    Hypothyroidism    Osteoarthritis of right hip    Bilateral carpal tunnel syndrome    Arthritis of carpometacarpal (CMC) joint of right thumb    Arthritis of carpometacarpal (CMC) joint of left thumb    Psoriatic arthritis (HCC)    High risk medication use    Closed dislocation of left hip (HCC)    Hip dislocation, left, initial encounter (HCC)    Obesity, class 3 (E66.813)    Dysarthria    Ocular migraine    Episodes of speech arrest    Primary hypertension    Humerus head fracture, right, closed, initial encounter    Closed fracture of right proximal humerus    Accidental fall    Closed displaced spiral fracture of shaft of right humerus    Debility       Rehabilitation Diagnosis:  Orthopedic, 8.9, Other Orthopedic  ADMIT DATE:2025    Patient Goals: \"learn strategies to dress and get along one-armed\" \"To improve my balance and be more independent\"  Admitting Impairments: Decreased balance;Decreased safe awareness;Decreased functional mobility ;Decreased ADL status;Decreased high-level IADLs;Decreased strength;Decreased endurance   Activity Restrictions: NWB R UE  Participation Limitations: None   CARE PLAN   NURSING:  Deborah Agustin while on this unit will:  [] Be continent of bowel and bladder     [x] Have an adequate number of bowel movements  [x] Urinate with no urinary retention >300ml in bladder  [] Complete bladder protocol with kaufman removal  [x] Maintain O2 SATs at >89%  [x] Have pain managed while on ARU       [] Be pain free by discharge   [] Have no skin breakdown while on ARU  [] Have improved skin integrity via wound measurements  [] Have no  signs/symptoms of infection at the wound site  [] Be free from injury during hospitalization   [] Complete education with patient/family with understanding demonstrated for:  [] Adjustment   [] Other:     Nursing Interventions will include:  [x] bowel/bladder training   [x] education for medical assistive devices   [x] medication education   [x] O2 saturation management   [x] energy conservation   [x] stress management techniques   [x] fall prevention   [x] alarms protocol   [x] seating and positioning   [x] skin/wound care   [x] pressure relief instruction   [x] dressing changes     [x] infection protection   [x] DVT prophylaxis  [x] assistance with in room safety with transfers to bed, toilet, wheelchair, shower   [x] bathroom activities and hygiene  [] Other:    Patient/Caregiver Education for:  [x] Disease/sustained injury/management     [x] Medication Use  [x] Surgical intervention  [x] Safety  [x] Body mechanics and or joint protection  [x] Health maintenance     [] Other:     PHYSICAL THERAPY:  Goals:                  Short Term Goals  Time Frame for Short Term Goals: 7 days  Short Term Goal 1: Pt will perform all bed mobiltiy with independence  Short Term Goal 2: Pt will perform sit<>stand transfers with independence without AD  Short Term Goal 3: Pt will ambulated 200' with independence without AD  Short Term Goal 4: Pt will ascend/descend 12 stairs with single hand rail and independence.               These goals were reviewed with this patient at the time of assessment and Deborah Agustin is in agreement.     Plan of Care: Pt to be seen 5 out of 7 days per week per ARU protocol (90 minutes with PT)                  Current Treatment Recommendations: Strengthening, ROM, Balance training, Functional mobility training, Transfer training, Endurance training, Gait training, Stair training, Safety education & training, Patient/Caregiver education & training, Therapeutic activities, Home exercise

## 2025-07-29 NOTE — DISCHARGE SUMMARY
V2.0  Discharge Summary    Name:  Deborah Agustin /Age/Sex: 1955 (69 y.o. female)   Admit Date: 2025  Discharge Date: 25    MRN & CSN:  [id number] & [id number] Encounter Date and Time 25 4:28 PM EDT    Attending:  Kvng Cortes MD Discharging Provider: Kvng Cortes MD       Hospital Course:     Brief HPI: 69-year-old lady known case of history of migraine, hyperlipidemia, hypothyroidism, and osteopenia presented after a fall     Patient was just discharged on 25 after being admitted for an episode of aphasia.  Neurology thought to be related to migraine versus less likely hypertension versus likely less likely TIA.  She did well since discharge. On 25 , while walking on her porch she fell on jillian right side and had severe pain since then.  She did not hit her head or lose consciousness.  Imaging Showed a Right Displaced Humerus fracture for which she is being admitted.     Brief Problem Based Course:   Right humerus fracture  -Status post ORIF right proximal humerus shaft.  - PT OT recommended ARU.  On aspirin 325 twice daily for DVT prophylaxis as per orthopedic surgery.     Nausea vomiting  - improved  - Patient was on Zofran and scopolamine.  Phenergan added later on.      Hypotension  -Suspected in the setting of nausea, vomiting.    - improved with fluids      The patient expressed appropriate understanding of, and agreement with the discharge recommendations, medications, and plan.     Consults this admission:  IP CONSULT TO ORTHOPEDIC SURGERY  IP CONSULT TO PRIMARY CARE PROVIDER  IP CONSULT TO NEUROLOGY  IP CONSULT TO SOCIAL WORK  IP CONSULT TO PHYSICAL MEDICINE REHAB  IP CONSULT TO CASE MANAGEMENT  IP CONSULT TO DIETITIAN    Discharge Diagnosis:   Humerus head fracture, right, closed, initial encounter  Hypotension  Nausea vomiting    Discharge Instruction:   Follow up appointments: PCP, orthopedic surgery  Primary care physician: Sara Bernal MD within 1  NECK W CONTRAST  Result Date: 7/23/2025  EXAM: CTA HEAD NECK W CONTRAST INDICATION: Aphasia COMPARISON: Head CT performed concurrently. TECHNIQUE: Standard CTA HEAD NECK W CONTRAST obtained. 3D MIP and multiplanar MPR reconstructions were performed under concurrent radiologist supervision. Up-to-date CT equipment and radiation dose reduction techniques were employed. CONTRAST: Isovue-370 intravenous FINDINGS: Aortic arch: Sequential 3 vessel branching pattern. Mild Atherosclerotic calcification of the arch. Subclavian arteries: Mildly limited in evaluation secondary to streak artifact from contrast bolus injection, otherwise normal. Common carotid arteries: Mild atherosclerotic calcifications of the carotid bifurcations. Right cervical internal carotid artery: No stenosis of the proximal right cervical internal carotid by NASCET criteria. Right external carotid artery: No stenosis Left cervical internal carotid artery: No stenosis of the proximal left cervical internal carotid by NASCET criteria. Left external carotid artery: No stenosis Intracranial internal carotid arteries: Normal Anterior circulation: M1, A1, and their more distal segments are normal. Cervical vertebral arteries: Normal Intracranial vertebral arteries: Normal Basilar artery: Normal PICA, AICA, superior cerebellar arteries: Normal Posterior circulation: Normal. Dural venous sinuses: Normal. Contrast enhanced brain parenchyma: Normal Orbits: Normal Paranasal sinuses and mastoid air cells: Normal Neck soft tissues: Normal Osseous structures: No suspicious osseous lesions. Lung apices: Clear     CTA Head: No arterial steno-occlusive disease or aneurysm. CTA Neck: No arterial steno-occlusive disease or dissection. Electronically signed by Kole Monterroso    CT HEAD WO CONTRAST  Result Date: 7/23/2025  EXAM: CT HEAD WO CONTRAST INDICATION: aphasia; COMPARISON: None. TECHNICAL: Axial CT imaging obtained from vertex to skull base. Axial images and

## 2025-07-29 NOTE — CARE COORDINATION
Case Management Assessment            Discharge Note                    Date / Time of Note: 7/29/2025 2:35 PM                  Discharge Note Completed by: EMMY FERREIRA    Patient Name: Deborah Agustin   YOB: 1955  Diagnosis: Accidental fall, initial encounter [W19.XXXA]  Humerus head fracture, right, closed, initial encounter [S42.291A]  Severe pain [R52]  Closed fracture of proximal end of right humerus, unspecified fracture morphology, initial encounter [S42.201A]   Date / Time: 7/26/2025 10:36 AM    Current PCP: Sara Bernal MD  Clinic patient: No    Hospitalization in the last 30 days: Yes  Readmission Assessment  Number of Days since last admission?: 1-7 days  Previous Disposition: Home Alone  Who is being Interviewed: Patient  What was the patient's/caregiver's perception as to why they think they needed to return back to the hospital?: Other (Comment) (fall)  Did you visit your Primary Care Physician after you left the hospital, before you returned this time?: No  Why weren't you able to visit your PCP?: Did not have an appointment  Did you see a specialist, such as Cardiac, Pulmonary, Orthopedic Physician, etc. after you left the hospital?: No  Who advised the patient to return to the hospital?: Self-referral  Does the patient report anything that got in the way of taking their medications?: No  In our efforts to provide the best possible care to you and others like you, can you think of anything that we could have done to help you after you left the hospital the first time, so that you might not have needed to return so soon?: Other (Comment) (discharge needs)    Advance Directives:  Code Status: Full Code  [place] DNR form completed and on chart: Not Indicated    Financial:  Payor: MEDICARE / Plan: MEDICARE PART A AND B / Product Type: *No Product type* /      Pharmacy:    [facility]/pharmacy #[id number] - [place], [place] - [address] [place] [address] - P [phone] -  [phone] [place]

## 2025-07-29 NOTE — PROGRESS NOTES
NURSING ASSESSMENT: ARU ADMISSION  [facility]    Patient:Deborah Agustin     Rehab Dx/Hx: Other displaced fracture of upper end of right humerus, initial encounter for closed fracture [S42.291A]   :1955  MRN:[id number]  Date of Admit: 2025  Room #: 3109/3109-01    Subjective:   Patient admitted to room 3109 @ 1720 from 5518 via wheelchair.   Alert and oriented x4.  Patient and Significant Other oriented to room and call light system. Oriented to rehab routine and therapy schedules. Informed about care conferences and ordering of meals.    Drug / Medication Review:   Medications were reviewed by RN at time of admission  [x]  No potential or actual clinically significant medication issues were noted.      []   Yes, a clinically significant medication issue was identified                 []  Adverse Drug Event:                  []  Allergy:                  []  Side Effect:                  []  Ineffective Therapy:                  []  Drug Interaction:                 []  Duplicated Therapy:                 []  Untreated Indication:                  []  Non-adherence:                 []  Other:                  Nursing/Pharmacy contacted the physician:     Date:              Time:                  Actions recommended by physician were completed:   Date :            Time:    4 Eyes Skin Assessment   The patient is being assessed for: Admission     I agree that 2 RN's have performed a thorough Head to Toe Skin Assessment on the patient. ALL assessment sites listed below have been assessed.       Areas assessed by both nurses:   [x]   Head, Face, and Ears   [x]   Shoulders, Back, and Chest, Abdomen  [x]   Arms, Elbows, and Hands   [x]   Coccyx, Sacrum, and Ischium  [x]   Legs, Feet, and Heel     Does the Patient have Skin Breakdown?    Incision to R shoulder and under L breast    Bruises R arm and R shin    Abrasion R arm and LLQ    Dawson Prevention initiated:  Yes   Wound Care Orders

## 2025-07-29 NOTE — PROGRESS NOTES
Patient is A&Ox4. VSS this shift. Patient has endorsed pain to Rt shoulder that has been managed per MAR and non-pharm measures. Patient is tolerating PO diet. Tolerating ambulation x1  assist with GB and cane. Voiding adequately. No BM thus far.  Patient updated on plan of care. Fall and safety precautions in place, call light within reach.

## 2025-07-29 NOTE — CARE COORDINATION
DISCHARGE PLANNIN  Chart reviewed.  Patient is from home alone, but has friends and family who can assist as needed. No current services. She has a cane, rolling walker, shower chair and manual wheelchair that she does not use. Independent prior to admission.      Current discharge plan is [facility] (accepted).     POD#3 right arm ORIF.    UPDATE: 9430  Message sent to Dr. Cortes to see if patient can discharge to [facility] today.    CM team will continue to follow.  Radha Harman RN Case Manager  [phone]

## 2025-07-29 NOTE — PROGRESS NOTES
ARU Admission Assessment    Ethnicity  \"Are you of , /a, or Kittitian origin?\"  Check all that apply:  [x] A.  No, not of , /a, or Kittitian Origin  [] B.  Yes, Panamanian, Panamanian American, Chicano/a  [] C.  Yes, Mosotho  [] D.  Yes, John  [] E.  Yes, another , , or Kittitian origin  [] X.  Patient unable to respond  [] Y.  Patient declines to respond    Race  \"What is your race?\"  Check all that apply:  [x] A.  White  [] B.  Black or   [] C.   or   [] D.   Citizen of [place]  [] E.  Chinese  [] F.  Guamanian  [] G. Nepalese  [] H.  Hebrew  [] I.  Danish  [] J.  Other   [] K.    [] L.  New Zealander or [place]  [] M.  Andorran  [] N.  Other   [] X.  Patient unable to respond  [] Y.  Patient declines to respond  [] Z.  None of the above    Language  A.  \"What is your preferred language?\"   English    B.  \"Do you need or want an  to communicate with a doctor or health care staff?\"  Check only one:  [x] 0.  No  [] 1.  Yes  [] 9.  Unable to determine    Transportation  \"Has lack of transportation kept you from medical appointments, meetings, work, or from getting things needed for daily living?\"Check all that apply:  [] A.  Yes, it has kept me from medical appointments or from getting my medications  [] B.  Yes, it has kept me from non-medical meetings, appointments, work, or from getting things that I need  [x] C.  No  [] X.  Patient unable to respond  [] Y.  Patient declines to respond    Hearing  Ability to hear (with hearing aid or hearing appliances if normally used)  [x]  0.  Adequate - no difficulty in normal conversation, social interaction, listening to TV  []  1.  Minimal difficulty - difficulty in some environments (e.g. when person speaks softly or setting is noisy)  []  2.  Moderate difficulty - speaker has to increase volume and speak distinctly   []  3.  Highly impaired - absence of

## 2025-07-29 NOTE — PROGRESS NOTES
Deborah Agustin  7/29/2025  [id number]    Chief Complaint: Humerus head fracture, right, closed, initial encounter    Subjective:   This is a 69-year-old female with a past medical history including:  Past Medical History:   Diagnosis Date    Arthritis     Bone spur     CTS (carpal tunnel syndrome)     Cyst     left foot and spur    Dental crowns present     molars    Glaucoma     High risk medication use 11/08/2022    Hyperlipidemia     Hypothyroidism     Ocular migraine     Osteopenia     Overweight(278.02)     PONV (postoperative nausea and vomiting)     phenergan works well     Psoriatic arthritis (HCC)     S/P foot surgery 07/07/2011    excision cyst and resection spur left foot    Scalp psoriasis      Who came to the hospital after a fall.  Patient was found to have right humerus fracture and is currently status post ORIF of right proximal humerus shaft.  Patient does require PT/OT and is on aspirin twice a day.  Patient was also suffering from hypotension and required IV fluids.  AM-PAC scores are 17/15 respectively.  Patient is interested in going to acute rehab unit for functional mobility retraining for discharge home.    Interval history:  Seen in her room this morning.  No new complaints overnight.  Patient is hopeful for admit to rehab unit today.  Awaiting internal medicine approval.  ROS: No CP, SOB, dyspnea    Objective:  Patient Vitals for the past 24 hrs:   BP Temp Temp src Pulse Resp SpO2   07/29/25 0915 -- 99.1 °F (37.3 °C) Axillary -- -- --   07/29/25 0730 114/60 (!) 100.6 °F (38.1 °C) Oral 68 -- 95 %   07/29/25 0708 -- -- -- -- 15 --   07/29/25 0638 -- -- -- -- 16 --   07/29/25 0301 114/60 98.6 °F (37 °C) Oral 73 16 99 %   07/29/25 0231 -- -- -- -- 16 --   07/28/25 2341 (!) 105/51 97.7 °F (36.5 °C) Temporal 70 16 100 %   07/28/25 2240 -- -- -- -- 16 --   07/28/25 2210 -- -- -- -- 16 --   07/28/25 1941 (!) 115/47 98 °F (36.7 °C) Temporal 70 16 93 %   07/28/25 1621 -- -- -- -- 16 92 %   07/28/25  humerus       Plan:   Admit to ARU today with medical clearance      Patient with new functional deficits and ongoing medical complexity. Demonstrates ability to tolerate 3 hours therapy/day and requires close physician oversight to manage acute medical issues. Deborah Agustin is a good candidate for acute inpatient rehab when medically appropriate.    Thank you for this consult. Please contact me with any questions or concerns.      SUE ElliottP.H  PM&R  7/29/2025  11:01 AM      * This document was created using dictation software.  While all precautions were taken to ensure accuracy, errors may have occurred.  Please disregard any typographical errors.

## 2025-07-29 NOTE — DISCHARGE INSTR - COC
Continuity of Care Form    Patient Name: Deborah Agustin   :  1955  MRN:  [id number]    Admit date:  2025  Discharge date:  ***    Code Status Order: Full Code   Advance Directives:     Admitting Physician:  Burke Bae MD  PCP: Sara Bernal MD    Discharging Nurse: ***  Discharging Hospital Unit/Room#: 5518/5518-01  Discharging Unit Phone Number: ***    Emergency Contact:   Extended Emergency Contact Information  Primary Emergency Contact: Wilfrid Elder           [place], [place]  Home Phone: [phone]  Mobile Phone: [phone]  Relation: Domestic Partner  Secondary Emergency Contact: Shorty Agustin           [facility]  Home Phone: [phone]  Mobile Phone: [phone]  Relation: Child    Past Surgical History:  Past Surgical History:   Procedure Laterality Date    ARM SURGERY Right 2025    OPEN REDUCTION INTERNAL FIXATION OF RIGHT PROXIMAL HUMERUS AND SHAFT performed by Simona Shaffer MD at [facility] OR    ARTHRODESIS Right 2020    AKIN OSTEOTOMY RIGHT HALLUX, PROXIMAL INTERPHALANGEAL JOINT ARTHRODESIS WITH SEQUENTIAL REDUCTION AND DORSAL CAPSULOTOMY performed by Donya Stephens DPM at [facility] OR    CARPAL TUNNEL RELEASE Left 2018    CARPAL TUNNEL RELEASE Right 2018    right carpal tunnel release    CATARACT REMOVAL Left 2019    FOOT SURGERY  2011    w/local anesthesia    HIP CLOSED REDUCTION Left 2023    LEFT HIP CLOSED REDUCTION performed by Simona Shaffer MD at [facility] OR    JOINT REPLACEMENT  2015    Rt. hip     JOINT REPLACEMENT Left 2018    hip -Dr Gomes    REFRACTIVE SURGERY      REFRACTIVE SURGERY Left        Immunization History:   Immunization History   Administered Date(s) Administered    COVID-19, MODERNA BLUE border, Primary or Immunocompromised, (age 12y+), IM, 100 mcg/0.5mL 2021, 2021, 2021    COVID-19, MODERNA Bivalent, (age 12y+), IM, 50 mcg/0.5 mL 2022

## 2025-07-29 NOTE — PROGRESS NOTES
Occupational Therapy  Facility/Department: [facility] 5T ORTHO/NEURO  Occupational Therapy Treatment     Name: Deborah Agustin  : 1955  MRN: [id number]  Date of Service: 2025    Discharge Recommendations:  IP Rehab          Patient Diagnosis(es): The primary encounter diagnosis was Closed fracture of proximal end of right humerus, unspecified fracture morphology, initial encounter. Diagnoses of Accidental fall, initial encounter and Severe pain were also pertinent to this visit.  Past Medical History:  has a past medical history of Arthritis, Bone spur, CTS (carpal tunnel syndrome), Cyst, Dental crowns present, Glaucoma, High risk medication use, Hyperlipidemia, Hypothyroidism, Ocular migraine, Osteopenia, Overweight(278.02), PONV (postoperative nausea and vomiting), Psoriatic arthritis (HCC), S/P foot surgery, and Scalp psoriasis.  Past Surgical History:  has a past surgical history that includes Colonoscopy; Carpal tunnel release (Left, 2018); Foot surgery (2011); Carpal tunnel release (Right, 2018); Refractive surgery (); Cataract removal (Left, ); joint replacement (2015); joint replacement (Left, 2018); Refractive surgery (Left, ); arthrodesis (Right, 2020); Hip Closed Reduction (Left, 2023); and Arm Surgery (Right, 2025).           Assessment  Performance deficits / Impairments: Decreased balance;Decreased safe awareness;Decreased functional mobility ;Decreased ADL status;Decreased high-level IADLs;Decreased strength;Decreased endurance  Assessment: Pt demonstrated good progress in OT today and completed functional transfers w/ SBA and standing level grooming w/ spvn. Pt continues to require mod A for LE dressing and max A for UE dressing due to difficulty w/ NWB RUE. Pt is planning to go to ARU at d/c to maximize functional independence. Continue OT per POC.  REQUIRES OT FOLLOW-UP: Yes  Activity Tolerance  Activity Tolerance: Patient Tolerated

## 2025-07-30 LAB
ANION GAP SERPL CALCULATED.3IONS-SCNC: 9 MMOL/L (ref 3–16)
BASOPHILS # BLD: 0 K/UL (ref 0–0.2)
BASOPHILS NFR BLD: 0.3 %
BUN SERPL-MCNC: 11 MG/DL (ref 7–20)
CALCIUM SERPL-MCNC: 8.9 MG/DL (ref 8.3–10.6)
CHLORIDE SERPL-SCNC: 100 MMOL/L (ref 99–110)
CO2 SERPL-SCNC: 25 MMOL/L (ref 21–32)
CREAT SERPL-MCNC: 0.7 MG/DL (ref 0.6–1.2)
DEPRECATED RDW RBC AUTO: 14 % (ref 12.4–15.4)
EOSINOPHIL # BLD: 0.1 K/UL (ref 0–0.6)
EOSINOPHIL NFR BLD: 2.5 %
GFR SERPLBLD CREATININE-BSD FMLA CKD-EPI: >90 ML/MIN/{1.73_M2}
GLUCOSE SERPL-MCNC: 128 MG/DL (ref 70–99)
HCT VFR BLD AUTO: 27.4 % (ref 36–48)
HGB BLD-MCNC: 9.4 G/DL (ref 12–16)
LYMPHOCYTES # BLD: 1.1 K/UL (ref 1–5.1)
LYMPHOCYTES NFR BLD: 25.9 %
MCH RBC QN AUTO: 32.1 PG (ref 26–34)
MCHC RBC AUTO-ENTMCNC: 34.2 G/DL (ref 31–36)
MCV RBC AUTO: 94 FL (ref 80–100)
MONOCYTES # BLD: 0.4 K/UL (ref 0–1.3)
MONOCYTES NFR BLD: 9.5 %
NEUTROPHILS # BLD: 2.7 K/UL (ref 1.7–7.7)
NEUTROPHILS NFR BLD: 61.8 %
PLATELET # BLD AUTO: 182 K/UL (ref 135–450)
PMV BLD AUTO: 8.1 FL (ref 5–10.5)
POTASSIUM SERPL-SCNC: 4.1 MMOL/L (ref 3.5–5.1)
RBC # BLD AUTO: 2.91 M/UL (ref 4–5.2)
SODIUM SERPL-SCNC: 134 MMOL/L (ref 136–145)
WBC # BLD AUTO: 4.4 K/UL (ref 4–11)

## 2025-07-30 PROCEDURE — 97166 OT EVAL MOD COMPLEX 45 MIN: CPT

## 2025-07-30 PROCEDURE — 6370000000 HC RX 637 (ALT 250 FOR IP): Performed by: PHYSICAL MEDICINE & REHABILITATION

## 2025-07-30 PROCEDURE — 36415 COLL VENOUS BLD VENIPUNCTURE: CPT

## 2025-07-30 PROCEDURE — 1280000000 HC REHAB R&B

## 2025-07-30 PROCEDURE — 2500000003 HC RX 250 WO HCPCS: Performed by: PHYSICAL MEDICINE & REHABILITATION

## 2025-07-30 PROCEDURE — 85025 COMPLETE CBC W/AUTO DIFF WBC: CPT

## 2025-07-30 PROCEDURE — 97530 THERAPEUTIC ACTIVITIES: CPT

## 2025-07-30 PROCEDURE — 80048 BASIC METABOLIC PNL TOTAL CA: CPT

## 2025-07-30 PROCEDURE — 6360000002 HC RX W HCPCS: Performed by: PHYSICAL MEDICINE & REHABILITATION

## 2025-07-30 PROCEDURE — 97116 GAIT TRAINING THERAPY: CPT

## 2025-07-30 PROCEDURE — 97535 SELF CARE MNGMENT TRAINING: CPT

## 2025-07-30 RX ORDER — LACTULOSE 10 G/15ML
20 SOLUTION ORAL 3 TIMES DAILY
Status: DISPENSED | OUTPATIENT
Start: 2025-07-30

## 2025-07-30 RX ADMIN — LACTULOSE 20 G: 10 SOLUTION ORAL at 15:22

## 2025-07-30 RX ADMIN — ASPIRIN 325 MG: 325 TABLET, COATED ORAL at 08:02

## 2025-07-30 RX ADMIN — TIMOLOL MALEATE 1 DROP: 5 SOLUTION OPHTHALMIC at 08:03

## 2025-07-30 RX ADMIN — ATORVASTATIN CALCIUM 20 MG: 20 TABLET, FILM COATED ORAL at 08:02

## 2025-07-30 RX ADMIN — SODIUM CHLORIDE, PRESERVATIVE FREE 10 ML: 5 INJECTION INTRAVENOUS at 20:01

## 2025-07-30 RX ADMIN — LEVOTHYROXINE SODIUM 125 MCG: 0.1 TABLET ORAL at 05:39

## 2025-07-30 RX ADMIN — ANTACID TABLETS 500 MG: 500 TABLET, CHEWABLE ORAL at 08:02

## 2025-07-30 RX ADMIN — FAMOTIDINE 20 MG: 20 TABLET, FILM COATED ORAL at 08:02

## 2025-07-30 RX ADMIN — TIMOLOL MALEATE 1 DROP: 5 SOLUTION OPHTHALMIC at 20:01

## 2025-07-30 RX ADMIN — DULOXETINE 20 MG: 20 CAPSULE, DELAYED RELEASE ORAL at 20:01

## 2025-07-30 RX ADMIN — TRAZODONE HYDROCHLORIDE 50 MG: 50 TABLET ORAL at 20:00

## 2025-07-30 RX ADMIN — ASPIRIN 325 MG: 325 TABLET, COATED ORAL at 20:00

## 2025-07-30 RX ADMIN — DOCUSATE SODIUM 100 MG: 100 CAPSULE, LIQUID FILLED ORAL at 08:02

## 2025-07-30 RX ADMIN — ANTACID TABLETS 500 MG: 500 TABLET, CHEWABLE ORAL at 20:01

## 2025-07-30 RX ADMIN — BISACODYL 5 MG: 5 TABLET, COATED ORAL at 08:02

## 2025-07-30 RX ADMIN — LACTULOSE 20 G: 10 SOLUTION ORAL at 11:14

## 2025-07-30 RX ADMIN — OXYCODONE 10 MG: 5 TABLET ORAL at 08:02

## 2025-07-30 RX ADMIN — FAMOTIDINE 20 MG: 20 TABLET, FILM COATED ORAL at 21:24

## 2025-07-30 RX ADMIN — LATANOPROST 1 DROP: 50 SOLUTION OPHTHALMIC at 20:01

## 2025-07-30 RX ADMIN — OXYCODONE 5 MG: 5 TABLET ORAL at 20:01

## 2025-07-30 RX ADMIN — SODIUM CHLORIDE, PRESERVATIVE FREE 10 ML: 5 INJECTION INTRAVENOUS at 08:03

## 2025-07-30 RX ADMIN — ONDANSETRON 4 MG: 2 INJECTION, SOLUTION INTRAMUSCULAR; INTRAVENOUS at 08:53

## 2025-07-30 RX ADMIN — GABAPENTIN 300 MG: 300 CAPSULE ORAL at 20:00

## 2025-07-30 ASSESSMENT — PAIN DESCRIPTION - ONSET: ONSET: ON-GOING

## 2025-07-30 ASSESSMENT — PAIN SCALES - GENERAL
PAINLEVEL_OUTOF10: 2
PAINLEVEL_OUTOF10: 5
PAINLEVEL_OUTOF10: 3
PAINLEVEL_OUTOF10: 7
PAINLEVEL_OUTOF10: 7

## 2025-07-30 ASSESSMENT — PAIN DESCRIPTION - FREQUENCY: FREQUENCY: CONTINUOUS

## 2025-07-30 ASSESSMENT — PAIN - FUNCTIONAL ASSESSMENT: PAIN_FUNCTIONAL_ASSESSMENT: PREVENTS OR INTERFERES SOME ACTIVE ACTIVITIES AND ADLS

## 2025-07-30 ASSESSMENT — PAIN DESCRIPTION - ORIENTATION
ORIENTATION: RIGHT
ORIENTATION: RIGHT

## 2025-07-30 ASSESSMENT — PAIN DESCRIPTION - DESCRIPTORS: DESCRIPTORS: ACHING;SORE

## 2025-07-30 ASSESSMENT — PAIN DESCRIPTION - LOCATION
LOCATION: ARM
LOCATION: ARM

## 2025-07-30 ASSESSMENT — PAIN DESCRIPTION - PAIN TYPE: TYPE: ACUTE PAIN

## 2025-07-30 NOTE — CONSULTS
Comprehensive Nutrition Assessment    RECOMMENDATIONS:  PO Diet: Regular   Nutrition Supplement: Ensure Max once a day  Nutrition Education: Education/Counseling not indicated     NUTRITION ASSESSMENT:   Nutritional summary & status: New consult for ARU+supplements; Pt admitted after a fall and extreme pain. Pt reports vomitting since saturday 7/26 and unable to keep much of any po down. Pt had an episode of emesis last pm but has not since. Pt reported eating % of breakfast this am and no signs of intolerance. Pt endorses a 24LB wt loss in past few months as intentional and pt is on wt watchers. Pt also reports heavy constipation and feeling uncomfortable. Pt given ducalax+colace this am so hopeful for BM soon. Pt agreeable to Ensure Max once a day while intake remains low. RD to order Ensure Max once a day and continue to monitor.   Admission // PMH: Debility// hyperlipidemia, hypothyroidism, and osteopenia    MALNUTRITION ASSESSMENT  Context of Malnutrition: Acute Illness   Malnutrition Status: At risk for malnutrition  Findings of the 6 clinical characteristics of malnutrition (Minimum of 2 out of 6 clinical characteristics is required to make the diagnosis of moderate or severe Protein Calorie Malnutrition based on AND/ASPEN Guidelines):  Energy Intake:  75% or less of estimated energy requirements for 7 or more days  Weight Loss:  No weight loss (any wt loss is intentional wt loss- on wt watchers)     Body Fat Loss:  No body fat loss     Muscle Mass Loss:  No muscle mass loss    Fluid Accumulation:  Mild (+1RUE) Extremities   Strength:       NUTRITION DIAGNOSIS   Inadequate oral intake related to nausea/vomiting/diarrhea as evidenced by poor intake prior to admission, vomiting, rehab for strength and conditioning    Nutrition Monitoring and Evaluation:   Food/Nutrient Intake Outcomes:  Food and Nutrient Intake, Supplement Intake  Physical Signs/Symptoms Outcomes:  Nutrition Focused Physical

## 2025-07-30 NOTE — H&P
Department of Physical Medicine & Rehabilitation  History & Physical      Patient Identification:  Deborah Agustin  : 1955  Admit date: 2025   Attending provider: Sander Valderrama DO        Primary care provider: Sara Bernal MD     Chief Complaint: Humerus head fracture, right, closed, initial encounter     History of Present Illness/Hospital Course:  This is a 69-year-old female with a past medical history including:  Past Medical History        Past Medical History:   Diagnosis Date    Arthritis      Bone spur      CTS (carpal tunnel syndrome)      Cyst       left foot and spur    Dental crowns present       molars    Glaucoma      High risk medication use 2022    Hyperlipidemia      Hypothyroidism      Ocular migraine      Osteopenia      Overweight(278.02)      PONV (postoperative nausea and vomiting)       phenergan works well     Psoriatic arthritis (HCC)      S/P foot surgery 2011     excision cyst and resection spur left foot    Scalp psoriasis           Who came to the hospital after a fall.  Patient was found to have right humerus fracture and is currently status post ORIF of right proximal humerus shaft.  Patient does require PT/OT and is on aspirin twice a day.  Patient was also suffering from hypotension and required IV fluids.  AM-PAC scores are 17/15 respectively.    Prior Level of Function:  Independent for mobility, ADLs, and IADLs    Current Level of Function:  Min assist     Pertinent Social History:  Support: family at home      Past Medical History:   Diagnosis Date    Arthritis     Bone spur     CTS (carpal tunnel syndrome)     Cyst     left foot and spur    Dental crowns present     molars    Glaucoma     High risk medication use 2022    Hyperlipidemia     Hypothyroidism     Ocular migraine     Osteopenia     Overweight(278.02)     PONV (postoperative nausea and vomiting)     phenergan works well     Psoriatic arthritis (HCC)     S/P foot surgery 2011

## 2025-07-30 NOTE — PROGRESS NOTES
Occupational Therapy  Facility/Department: [facility] ACUTE REHAB UNIT  Occupational Therapy Initial Assessment/Treatment    Name: Deborah Agustin  : 1955  MRN: [id number]  Date of Service: 2025    Discharge Recommendations:  Home with assist PRN  OT Equipment Recommendations  Equipment Needed: No  Other: pt owns shower chair, GBs, reacher       Patient Diagnosis(es): There were no encounter diagnoses.  Past Medical History:  has a past medical history of Arthritis, Bone spur, CTS (carpal tunnel syndrome), Cyst, Dental crowns present, Glaucoma, High risk medication use, Hyperlipidemia, Hypothyroidism, Ocular migraine, Osteopenia, Overweight(278.02), PONV (postoperative nausea and vomiting), Psoriatic arthritis (HCC), S/P foot surgery, and Scalp psoriasis.  Past Surgical History:  has a past surgical history that includes Colonoscopy; Carpal tunnel release (Left, 2018); Foot surgery (2011); Carpal tunnel release (Right, 2018); Refractive surgery (); Cataract removal (Left, ); joint replacement (2015); joint replacement (Left, 2018); Refractive surgery (Left, ); arthrodesis (Right, 2020); Hip Closed Reduction (Left, 2023); and Arm Surgery (Right, 2025).    Treatment Diagnosis: imp ADLs/transfers s/p RUE ORIF      Assessment  Performance deficits / Impairments: Decreased balance;Decreased safe awareness;Decreased functional mobility ;Decreased ADL status;Decreased high-level IADLs;Decreased strength;Decreased endurance  Assessment: Pt is a 69 y.o female who presents s/p ORIF of R proximal humerus/shaft  for displaced proximal humerus and shaft fx s/p fall. Pt from home, lives alone and independent with I/ADLs and functional mobility without AD at baseline. Pt now presenting below baseline and limited by pain, mild balance deficits and new RUE NWB and ROM limitations. During OT evaluation, pt completes bathing with mod A, LB dressing with max A, toileting

## 2025-07-30 NOTE — PATIENT CARE CONFERENCE
Inpatient Rehabilitation  Weekly Team Conference Note  The [address] [place] [address]  [phone]  Patient Name: Deborah Agustin        MRN: [id number]    : 1955  (69 y.o.)  Gender: female   Sander Valderrama DO  Debility  The team conference for this patient was held on 2025 at 11:00am by:  Sander Valderrama DO    Current/Goal QM SCORES  QM Current/Goal Score   Eating CARE Score: 5 / Discharge Goal: Independent   Oral Hygiene CARE Score: 4 / Discharge Goal: Independent   Shower/Bathing CARE Score: 3 / Discharge Goal: Independent   UB Dressing CARE Score: 2 / Discharge Goal: Independent   LB Dressing CARE Score: 2 / Discharge Goal: Independent   Putting on/off Footwear CARE Score: 3 / Discharge Goal: Independent   Toileting Hygiene CARE Score: 3 / Discharge Goal: Independent   Bladder Continence      Bowel Continence      Toilet Transfers CARE Score: 3 / Discharge Goal: Independent   Shower/Bathe Self  CARE Score: 3 / Discharge Goal: Independent   Rolling Left and Right CARE Score: 4 / Discharge Goal: Independent   Sit to Lying CARE Score: 4 / Discharge Goal: Independent   Lying to Sitting on Bedside CARE Score: 4 / Discharge Goal: Independent   Sit to Stand CARE Score: 4 / Discharge Goal: Independent   Chair/Bed to Chair Transfer CARE Score: 4 / Discharge Goal: Independent   Car Transfers CARE Score: 4 / Discharge Goal: Independent   Walk 10 Feet CARE Score: 3 / Discharge Goal: Independent   Walk 50 Feet with Two Turns CARE Score: 3 / Discharge Goal: Independent   Walk 150 Feet CARE Score: 3 / Discharge Goal: Independent   Walk 10 Feet on Uneven Surfaces CARE Score: 3 / Discharge Goal: Independent   1 Step (Curb) CARE Score: 4 / Discharge Goal: Independent   4 Steps CARE Score: 4 / Discharge Goal: Independent   12 Steps CARE Score: 4 / Discharge Goal: Independent   Picking up Object from Floor CARE Score: 4 / Discharge Goal: Independent   Wheel 50 Feet

## 2025-07-30 NOTE — PROGRESS NOTES
Pt ao4, vss. Denied pain, n/v, sob, bm this shift. Pt voided w/o difficulty. Calls out appropriately for assistance. Bed alarm remains active for pt's safety.

## 2025-07-30 NOTE — PROGRESS NOTES
Physical Therapy  Facility/Department: [facility] ACUTE REHAB UNIT  Rehabilitation Physical Therapy Initial Assessment    NAME: Deborah Agustin  : 1955 (69 y.o.)  MRN: [id number]  CODE STATUS: Full Code    Date of Service: 25      Past Medical History:   Diagnosis Date    Arthritis     Bone spur     CTS (carpal tunnel syndrome)     Cyst     left foot and spur    Dental crowns present     molars    Glaucoma     High risk medication use 2022    Hyperlipidemia     Hypothyroidism     Ocular migraine     Osteopenia     Overweight(278.02)     PONV (postoperative nausea and vomiting)     phenergan works well     Psoriatic arthritis (HCC)     S/P foot surgery 2011    excision cyst and resection spur left foot    Scalp psoriasis      Past Surgical History:   Procedure Laterality Date    ARM SURGERY Right 2025    OPEN REDUCTION INTERNAL FIXATION OF RIGHT PROXIMAL HUMERUS AND SHAFT performed by Simona Shaffer MD at [facility] OR    ARTHRODESIS Right 2020    AKIN OSTEOTOMY RIGHT HALLUX, PROXIMAL INTERPHALANGEAL JOINT ARTHRODESIS WITH SEQUENTIAL REDUCTION AND DORSAL CAPSULOTOMY performed by Donya Stephens DPM at [facility] OR    CARPAL TUNNEL RELEASE Left 2018    CARPAL TUNNEL RELEASE Right 2018    right carpal tunnel release    CATARACT REMOVAL Left     COLONOSCOPY          FOOT SURGERY  2011    w/local anesthesia    HIP CLOSED REDUCTION Left 2023    LEFT HIP CLOSED REDUCTION performed by Simona Shaffer MD at [facility] OR    JOINT REPLACEMENT  2015    Rt. hip     JOINT REPLACEMENT Left 2018    hip -Dr Gomes    REFRACTIVE SURGERY      REFRACTIVE SURGERY Left        Chart Reviewed: Yes  Patient assessed for rehabilitation services?: Yes  Additional Pertinent Hx: Pt is 69 y.o. admitted to [facility] on 25 s/p tripping and falling, landing on R arm.  XR shoulder R: Oblique fracture the proximal humerus and 20 degree angulation  2.  Mild AC joint arthropathy.  XR humerus R:  Patient  Education Provided: Role of Therapy;Plan of Care;Precautions;Safety;Mobility Training;Transfer Training;Fall Prevention Strategies  Education Method: Verbal  Barriers to Learning: None  Education Outcome: Verbalized understanding;Demonstrated understanding    ELOS:   7 days      Therapy Time   Individual Concurrent Group Co-treatment   Time In 1000         Time Out 1100         Minutes 60           Timed Code Treatment Minutes: 60 Minutes       Juan Francisco Boateng PT, 07/30/25 at 12:25 PM       Second Session Therapy Time:   Individual Concurrent Group Co-treatment   Time In 1415         Time Out 1445         Minutes 30           Timed Code Treatment Minutes:  60+30    Total Treatment Minutes:  90 minutes    Juan Francisco Boateng PT, 07/30/25 at 3:23 PM

## 2025-07-31 PROCEDURE — 6360000002 HC RX W HCPCS: Performed by: PHYSICAL MEDICINE & REHABILITATION

## 2025-07-31 PROCEDURE — 97110 THERAPEUTIC EXERCISES: CPT

## 2025-07-31 PROCEDURE — 6370000000 HC RX 637 (ALT 250 FOR IP): Performed by: PHYSICAL MEDICINE & REHABILITATION

## 2025-07-31 PROCEDURE — 1280000000 HC REHAB R&B

## 2025-07-31 PROCEDURE — 97530 THERAPEUTIC ACTIVITIES: CPT

## 2025-07-31 PROCEDURE — 2500000003 HC RX 250 WO HCPCS: Performed by: PHYSICAL MEDICINE & REHABILITATION

## 2025-07-31 PROCEDURE — 97535 SELF CARE MNGMENT TRAINING: CPT

## 2025-07-31 PROCEDURE — 97116 GAIT TRAINING THERAPY: CPT

## 2025-07-31 RX ADMIN — LATANOPROST 1 DROP: 50 SOLUTION OPHTHALMIC at 21:29

## 2025-07-31 RX ADMIN — LEVOTHYROXINE SODIUM 125 MCG: 0.1 TABLET ORAL at 06:12

## 2025-07-31 RX ADMIN — DOCUSATE SODIUM 100 MG: 100 CAPSULE, LIQUID FILLED ORAL at 09:43

## 2025-07-31 RX ADMIN — ASPIRIN 325 MG: 325 TABLET, COATED ORAL at 09:43

## 2025-07-31 RX ADMIN — OXYCODONE 10 MG: 5 TABLET ORAL at 21:27

## 2025-07-31 RX ADMIN — FAMOTIDINE 20 MG: 20 TABLET, FILM COATED ORAL at 09:43

## 2025-07-31 RX ADMIN — ONDANSETRON 4 MG: 2 INJECTION, SOLUTION INTRAMUSCULAR; INTRAVENOUS at 09:40

## 2025-07-31 RX ADMIN — FAMOTIDINE 20 MG: 20 TABLET, FILM COATED ORAL at 21:27

## 2025-07-31 RX ADMIN — SODIUM CHLORIDE, PRESERVATIVE FREE 10 ML: 5 INJECTION INTRAVENOUS at 09:45

## 2025-07-31 RX ADMIN — ANTACID TABLETS 500 MG: 500 TABLET, CHEWABLE ORAL at 21:27

## 2025-07-31 RX ADMIN — ONDANSETRON 4 MG: 2 INJECTION, SOLUTION INTRAMUSCULAR; INTRAVENOUS at 21:28

## 2025-07-31 RX ADMIN — DULOXETINE 20 MG: 20 CAPSULE, DELAYED RELEASE ORAL at 21:28

## 2025-07-31 RX ADMIN — ANTACID TABLETS 500 MG: 500 TABLET, CHEWABLE ORAL at 09:43

## 2025-07-31 RX ADMIN — ATORVASTATIN CALCIUM 20 MG: 20 TABLET, FILM COATED ORAL at 09:43

## 2025-07-31 RX ADMIN — OXYCODONE 5 MG: 5 TABLET ORAL at 09:42

## 2025-07-31 RX ADMIN — GABAPENTIN 300 MG: 300 CAPSULE ORAL at 21:28

## 2025-07-31 RX ADMIN — TRAZODONE HYDROCHLORIDE 50 MG: 50 TABLET ORAL at 21:27

## 2025-07-31 RX ADMIN — ASPIRIN 325 MG: 325 TABLET, COATED ORAL at 21:27

## 2025-07-31 RX ADMIN — ACETAMINOPHEN 650 MG: 325 TABLET ORAL at 14:22

## 2025-07-31 RX ADMIN — SODIUM CHLORIDE, PRESERVATIVE FREE 10 ML: 5 INJECTION INTRAVENOUS at 21:31

## 2025-07-31 RX ADMIN — TIMOLOL MALEATE 1 DROP: 5 SOLUTION OPHTHALMIC at 09:45

## 2025-07-31 ASSESSMENT — PAIN SCALES - GENERAL
PAINLEVEL_OUTOF10: 3
PAINLEVEL_OUTOF10: 7
PAINLEVEL_OUTOF10: 3
PAINLEVEL_OUTOF10: 5

## 2025-07-31 ASSESSMENT — PAIN DESCRIPTION - DESCRIPTORS: DESCRIPTORS: ACHING

## 2025-07-31 ASSESSMENT — PAIN DESCRIPTION - PAIN TYPE: TYPE: SURGICAL PAIN

## 2025-07-31 ASSESSMENT — PAIN DESCRIPTION - LOCATION
LOCATION: ARM
LOCATION: ARM

## 2025-07-31 ASSESSMENT — PAIN DESCRIPTION - ORIENTATION: ORIENTATION: RIGHT

## 2025-07-31 ASSESSMENT — PAIN - FUNCTIONAL ASSESSMENT: PAIN_FUNCTIONAL_ASSESSMENT: PREVENTS OR INTERFERES SOME ACTIVE ACTIVITIES AND ADLS

## 2025-07-31 ASSESSMENT — PAIN DESCRIPTION - FREQUENCY: FREQUENCY: INTERMITTENT

## 2025-07-31 NOTE — PROGRESS NOTES
Department of Physical Medicine & Rehabilitation  Progress Note    Patient Identification:  Deborah Agustin  [id number]  : 1955  Admit date: 2025    Chief Complaint: Debility    Subjective:   No acute events overnight. Patient seen this am.  She is doing very well in therapy.  She has no new pain at this time.  Patient was able to have bowel movement.  Overall she feels like she is making good improvement.  Team conference today to discuss discharge planning.    ROS: No f/c, n/v, cp     Objective:  Patient Vitals for the past 24 hrs:   BP Temp Temp src Pulse Resp Height   25 0938 111/60 97.7 °F (36.5 °C) -- 60 18 --   25 -- -- -- -- 17 --   25 -- 98.1 °F (36.7 °C) Oral -- -- --   25 (!) 155/69 98.1 °F (36.7 °C) Oral 63 18 --   25 1320 -- -- -- -- -- 1.6 m (5' 2.99\")     Const: Alert. No distress, pleasant.   HEENT: Normocephalic, atraumatic. Normal sclera/conjunctiva. MMM.   CV: Regular rate and rhythm.   Resp: No respiratory distress. Lungs CTAB.   Abd: Soft, nontender, nondistended, NABS+   Ext: No edema.   Neuro: Alert, oriented, appropriately interactive.   Psych: Cooperative, appropriate mood and affect    Laboratory data: Available via EMR.   Last 24 hour lab  Recent Results (from the past 24 hours)   Basic Metabolic Panel w/ Reflex to MG    Collection Time: 25 10:19 AM   Result Value Ref Range    Sodium 134 (L) 136 - 145 mmol/L    Potassium reflex Magnesium 4.1 3.5 - 5.1 mmol/L    Chloride 100 99 - 110 mmol/L    CO2 25 21 - 32 mmol/L    Anion Gap 9 3 - 16    Glucose 128 (H) 70 - 99 mg/dL    BUN 11 7 - 20 mg/dL    Creatinine 0.7 0.6 - 1.2 mg/dL    Est, Glom Filt Rate >90 >60    Calcium 8.9 8.3 - 10.6 mg/dL   CBC auto differential    Collection Time: 25 10:19 AM   Result Value Ref Range    WBC 4.4 4.0 - 11.0 K/uL    RBC 2.91 (L) 4.00 - 5.20 M/uL    Hemoglobin 9.4 (L) 12.0 - 16.0 g/dL    Hematocrit 27.4 (L) 36.0 - 48.0 %    MCV 94.0 80.0 -

## 2025-07-31 NOTE — PATIENT CARE CONFERENCE
Team Conference In Room Rounding Note     In room rounding was completed today with RN, PT/OT, LSW and ARU Supervisor present. LSW called patient's significant other, Wilfrid, to allow them to be present and active during the conversation. Deborah Agustin was educated on their discharge date, 8/5/2025, and the recommendation for Home with outpatient therapies after discharge. Physical Therapy informed the patient and family on their current assist level and plan of care. Occupational Therapy provided information to the patient and family on their current assist level for ADLs and the plan of care. The RN staff provided education on medication management, current co-morbidities that are being addressed by the physicians and the patient's plan of care for their stay.    The interdisciplinary team identified the following barriers to address prior to discharge:  Decreased high level balance  Decreased endurance  Fluctuating level of supervision for transfers, ADLs and ambulation.  Needing assistance with bathing/dressing 2/2 R UE limitations.      Education/recommendations to assist at discharge included:  Recommending increased supervision initially upon d/c to ensure a safe return home.  Will continue to assess for any equipment recommendations as discharge gets closer.

## 2025-07-31 NOTE — PLAN OF CARE
Problem: Safety - Adult  Goal: Free from fall injury  7/31/2025 1403 by Ruchi Marinelli RN  Outcome: Progressing  7/31/2025 0433 by Susan Iyer RN  Outcome: Progressing  Flowsheets (Taken 7/31/2025 0433)  Free From Fall Injury: Instruct family/caregiver on patient safety     Problem: ABCDS Injury Assessment  Goal: Absence of physical injury  7/31/2025 1403 by Ruchi Marinelli RN  Outcome: Progressing  7/31/2025 0433 by Susan Iyer RN  Outcome: Progressing  Flowsheets (Taken 7/31/2025 0433)  Absence of Physical Injury: Implement safety measures based on patient assessment     Problem: Pain  Goal: Verbalizes/displays adequate comfort level or baseline comfort level  7/31/2025 1403 by Ruchi Marinelli, RN  Outcome: Progressing  7/31/2025 0433 by Susan Iyer RN  Outcome: Progressing  Flowsheets (Taken 7/31/2025 0433)  Verbalizes/displays adequate comfort level or baseline comfort level:   Encourage patient to monitor pain and request assistance   Administer analgesics based on type and severity of pain and evaluate response

## 2025-07-31 NOTE — PLAN OF CARE
Problem: Safety - Adult  Goal: Free from fall injury  Outcome: Progressing  Flowsheets (Taken 7/31/2025 0433)  Free From Fall Injury: Instruct family/caregiver on patient safety     Problem: ABCDS Injury Assessment  Goal: Absence of physical injury  Outcome: Progressing  Flowsheets (Taken 7/31/2025 0433)  Absence of Physical Injury: Implement safety measures based on patient assessment     Problem: Pain  Goal: Verbalizes/displays adequate comfort level or baseline comfort level  Outcome: Progressing  Flowsheets (Taken 7/31/2025 0433)  Verbalizes/displays adequate comfort level or baseline comfort level:   Encourage patient to monitor pain and request assistance   Administer analgesics based on type and severity of pain and evaluate response

## 2025-07-31 NOTE — PROGRESS NOTES
Occupational Therapy  Facility/Department: [facility] ACUTE REHAB UNIT  Rehabilitation Occupational Therapy Daily Treatment Note    Date: 25  Patient Name: Deborah Agustin       Room: 3109/3109-01  MRN: [id number]  Account: [id number]   : 1955  (69 y.o.) Gender: female                    Past Medical History:  has a past medical history of Arthritis, Bone spur, CTS (carpal tunnel syndrome), Cyst, Dental crowns present, Glaucoma, High risk medication use, Hyperlipidemia, Hypothyroidism, Ocular migraine, Osteopenia, Overweight(278.02), PONV (postoperative nausea and vomiting), Psoriatic arthritis (HCC), S/P foot surgery, and Scalp psoriasis.  Past Surgical History:   has a past surgical history that includes Colonoscopy; Carpal tunnel release (Left, 2018); Foot surgery (2011); Carpal tunnel release (Right, 2018); Refractive surgery (); Cataract removal (Left, ); joint replacement (2015); joint replacement (Left, 2018); Refractive surgery (Left, ); arthrodesis (Right, 2020); Hip Closed Reduction (Left, 2023); and Arm Surgery (Right, 2025).    Restrictions  Other Position/Activity Restrictions: NWB RUE, up as tolerated    Subjective  Subjective: Pt seated in recliner upon arrival, pleasant and agreeable to therapy and reported 8/10 pain in RUE, ice pack applied, RN aware of pain level. Pt requested to take another shower today                Objective     Cognition  Overall Cognitive Status: WNL  Orientation  Overall Orientation Status: Within Normal Limits         ADL  Grooming/Oral Hygiene  Assistance Level: Supervision  Skilled Clinical Factors: Pt combed hair in stance at sink w/ spvn and applied deodorant to LUE w/ cues for one arm technique  Upper Extremity Bathing  Assistance Level: Supervision  Skilled Clinical Factors: Pt washed/dried all components of UB bathing seated on TTB w/ decreased thoroughness d/t restricted RUE ROM, declined assist from  owns shower chair, GBs, reacher  Safety Devices  Safety Devices in place: Yes  Type of devices: Call light within reach;Chair alarm in place;Left in chair    Patient Education  Education  Education Given To: Patient  Education Provided: Role of Therapy;Plan of Care;Transfer Training;Precautions;ADL Function;Mobility Training  Education Method: Verbal  Barriers to Learning: None  Education Outcome: Verbalized understanding    Plan  Occupational Therapy Plan  Times Per Week: 5x/week, 90 mins daily  Current Treatment Recommendations: Strengthening;Balance training;Functional mobility training;Endurance training;Safety education & training;Equipment evaluation, education, & procurement;Self-Care / ADL;Patient/Caregiver education & training;Home management training    Goals  Patient Goals   Patient goals : \"learn strategies to dress and get along one-armed\"  Short Term Goals  Time Frame for Short Term Goals: 7 days - all ongoing  Short Term Goal 1: Pt completes UB bathing/dressing with mod I  Short Term Goal 2: Pt completes LB bathing/dressing with use of AE PRN and mod I  Short Term Goal 3: Pt completes toileting including toilet transfer with mod I  Short Term Goal 4: Pt completes standing ADL task >3 mins with mod I  Short Term Goal 5: Pt complete simple IADL task with mod I                First  Session Therapy Time (addendum):   Individual Concurrent Group Co-treatment   Time In  1130         Time Out  1200         Minutes  30           Timed Code Treatment Minutes:  30          Second Session Therapy Time (navigator):   Individual Concurrent Group Co-treatment   Time In 1245         Time Out 1355         Minutes 70          Timed Code Treatment Minutes:  70     30 + 70 = 100 Minutes Total    Sarah SHAH OT

## 2025-07-31 NOTE — PROGRESS NOTES
Physical Therapy  Facility/Department: [facility] ACUTE REHAB UNIT  Rehabilitation Physical Therapy Treatment Note    NAME: Deborah Agustin  : 1955 (69 y.o.)  MRN: [id number]  CODE STATUS: Full Code    Date of Service: 25       Restrictions:  Position Activity Restriction  Other Position/Activity Restrictions: NWB RUE, up as tolerated     SUBJECTIVE  Subjective: Pt seated in recliner and agreeable to PT  Pain: Pt reports no pain at rest and no nausea at this time.       OBJECTIVE  Cognition  Overall Cognitive Status: WNL  Orientation  Overall Orientation Status: Within Normal Limits    Functional Mobility  Scooting  Assistance Level: Supervision  Skilled Clinical Factors: In short sitting prior to initiation of transfers  Balance  Sitting Balance: Independent  Standing Balance: Supervision  Transfers  Surface: To chair with arms  Additional Factors: Hand placement cues  Sit to Stand  Assistance Level: Stand by assist  Skilled Clinical Factors: cue for hand placement  Stand to Sit  Assistance Level: Stand by assist  Skilled Clinical Factors: cue for hand placement. Cue for proximity to sitting surface      Environmental Mobility  Ambulation  Surface: Level surface  Distance: 10' + 455' + 25'  Activity: Within Room;Within Unit  Assistance Level: Stand by assist  Gait Deviations: Decreased step length bilateral;Wide base of support;Unsteady gait (Alternative lateral postural sway)  Skilled Clinical Factors: Cues for increased step length    PT Exercises  Exercise Treatment: Pt performed ambulation 170' + 140' with small foam basketball held upside down on thin cone to challenge balance and dual-tasking. CGA provided. Pt able to perform task without dropping ball.  Dynamic Standing Balance Exercises: Pt performed throwing tennis ball against stack of swiss balls for unpredictable rebounding and catching to challenge dynamic balance. Pt with difficulty performing catch with single, non-dominant hand, but

## 2025-08-01 LAB
ANION GAP SERPL CALCULATED.3IONS-SCNC: 10 MMOL/L (ref 3–16)
BASOPHILS # BLD: 0 K/UL (ref 0–0.2)
BASOPHILS NFR BLD: 1 %
BUN SERPL-MCNC: 13 MG/DL (ref 7–20)
CALCIUM SERPL-MCNC: 9.1 MG/DL (ref 8.3–10.6)
CHLORIDE SERPL-SCNC: 99 MMOL/L (ref 99–110)
CO2 SERPL-SCNC: 27 MMOL/L (ref 21–32)
CREAT SERPL-MCNC: 0.7 MG/DL (ref 0.6–1.2)
DEPRECATED RDW RBC AUTO: 14 % (ref 12.4–15.4)
EOSINOPHIL # BLD: 0.2 K/UL (ref 0–0.6)
EOSINOPHIL NFR BLD: 3.6 %
GFR SERPLBLD CREATININE-BSD FMLA CKD-EPI: >90 ML/MIN/{1.73_M2}
GLUCOSE SERPL-MCNC: 127 MG/DL (ref 70–99)
HCT VFR BLD AUTO: 28.8 % (ref 36–48)
HGB BLD-MCNC: 9.7 G/DL (ref 12–16)
LYMPHOCYTES # BLD: 1.6 K/UL (ref 1–5.1)
LYMPHOCYTES NFR BLD: 34.7 %
MCH RBC QN AUTO: 31.7 PG (ref 26–34)
MCHC RBC AUTO-ENTMCNC: 33.8 G/DL (ref 31–36)
MCV RBC AUTO: 94 FL (ref 80–100)
MONOCYTES # BLD: 0.3 K/UL (ref 0–1.3)
MONOCYTES NFR BLD: 7.2 %
NEUTROPHILS # BLD: 2.5 K/UL (ref 1.7–7.7)
NEUTROPHILS NFR BLD: 53.5 %
PLATELET # BLD AUTO: 232 K/UL (ref 135–450)
PMV BLD AUTO: 7.6 FL (ref 5–10.5)
POTASSIUM SERPL-SCNC: 4.2 MMOL/L (ref 3.5–5.1)
RBC # BLD AUTO: 3.06 M/UL (ref 4–5.2)
SODIUM SERPL-SCNC: 136 MMOL/L (ref 136–145)
WBC # BLD AUTO: 4.7 K/UL (ref 4–11)

## 2025-08-01 PROCEDURE — 80048 BASIC METABOLIC PNL TOTAL CA: CPT

## 2025-08-01 PROCEDURE — 1280000000 HC REHAB R&B

## 2025-08-01 PROCEDURE — 2500000003 HC RX 250 WO HCPCS: Performed by: PHYSICAL MEDICINE & REHABILITATION

## 2025-08-01 PROCEDURE — 97116 GAIT TRAINING THERAPY: CPT

## 2025-08-01 PROCEDURE — 97530 THERAPEUTIC ACTIVITIES: CPT

## 2025-08-01 PROCEDURE — 36415 COLL VENOUS BLD VENIPUNCTURE: CPT

## 2025-08-01 PROCEDURE — 97535 SELF CARE MNGMENT TRAINING: CPT

## 2025-08-01 PROCEDURE — 6370000000 HC RX 637 (ALT 250 FOR IP): Performed by: PHYSICAL MEDICINE & REHABILITATION

## 2025-08-01 PROCEDURE — 97110 THERAPEUTIC EXERCISES: CPT

## 2025-08-01 PROCEDURE — 85025 COMPLETE CBC W/AUTO DIFF WBC: CPT

## 2025-08-01 RX ADMIN — TRAZODONE HYDROCHLORIDE 50 MG: 50 TABLET ORAL at 20:30

## 2025-08-01 RX ADMIN — GABAPENTIN 300 MG: 300 CAPSULE ORAL at 21:44

## 2025-08-01 RX ADMIN — TIMOLOL MALEATE 1 DROP: 5 SOLUTION OPHTHALMIC at 08:15

## 2025-08-01 RX ADMIN — DOCUSATE SODIUM 100 MG: 100 CAPSULE, LIQUID FILLED ORAL at 08:11

## 2025-08-01 RX ADMIN — LACTULOSE 20 G: 10 SOLUTION ORAL at 08:14

## 2025-08-01 RX ADMIN — DULOXETINE 20 MG: 20 CAPSULE, DELAYED RELEASE ORAL at 20:30

## 2025-08-01 RX ADMIN — ANTACID TABLETS 500 MG: 500 TABLET, CHEWABLE ORAL at 08:11

## 2025-08-01 RX ADMIN — FAMOTIDINE 20 MG: 20 TABLET, FILM COATED ORAL at 20:30

## 2025-08-01 RX ADMIN — SODIUM CHLORIDE, PRESERVATIVE FREE 10 ML: 5 INJECTION INTRAVENOUS at 08:15

## 2025-08-01 RX ADMIN — ASPIRIN 325 MG: 325 TABLET, COATED ORAL at 08:11

## 2025-08-01 RX ADMIN — BISACODYL 5 MG: 5 TABLET, COATED ORAL at 08:11

## 2025-08-01 RX ADMIN — LACTULOSE 20 G: 10 SOLUTION ORAL at 13:05

## 2025-08-01 RX ADMIN — OXYCODONE 5 MG: 5 TABLET ORAL at 08:10

## 2025-08-01 RX ADMIN — ASPIRIN 325 MG: 325 TABLET, COATED ORAL at 20:30

## 2025-08-01 RX ADMIN — ANTACID TABLETS 500 MG: 500 TABLET, CHEWABLE ORAL at 20:30

## 2025-08-01 RX ADMIN — LATANOPROST 1 DROP: 50 SOLUTION OPHTHALMIC at 20:32

## 2025-08-01 RX ADMIN — OXYCODONE 10 MG: 5 TABLET ORAL at 20:30

## 2025-08-01 RX ADMIN — OXYCODONE 5 MG: 5 TABLET ORAL at 13:13

## 2025-08-01 RX ADMIN — ONDANSETRON 4 MG: 4 TABLET, ORALLY DISINTEGRATING ORAL at 08:10

## 2025-08-01 RX ADMIN — ACETAMINOPHEN 650 MG: 325 TABLET ORAL at 16:19

## 2025-08-01 RX ADMIN — LEVOTHYROXINE SODIUM 125 MCG: 0.1 TABLET ORAL at 06:14

## 2025-08-01 RX ADMIN — ONDANSETRON 4 MG: 4 TABLET, ORALLY DISINTEGRATING ORAL at 20:30

## 2025-08-01 RX ADMIN — ATORVASTATIN CALCIUM 20 MG: 20 TABLET, FILM COATED ORAL at 08:11

## 2025-08-01 RX ADMIN — FAMOTIDINE 20 MG: 20 TABLET, FILM COATED ORAL at 08:12

## 2025-08-01 RX ADMIN — SODIUM CHLORIDE, PRESERVATIVE FREE 10 ML: 5 INJECTION INTRAVENOUS at 20:32

## 2025-08-01 ASSESSMENT — PAIN DESCRIPTION - FREQUENCY
FREQUENCY: INTERMITTENT

## 2025-08-01 ASSESSMENT — PAIN DESCRIPTION - LOCATION
LOCATION: ARM;SHOULDER
LOCATION: ARM

## 2025-08-01 ASSESSMENT — PAIN SCALES - GENERAL
PAINLEVEL_OUTOF10: 0
PAINLEVEL_OUTOF10: 7
PAINLEVEL_OUTOF10: 6
PAINLEVEL_OUTOF10: 5
PAINLEVEL_OUTOF10: 6
PAINLEVEL_OUTOF10: 5

## 2025-08-01 ASSESSMENT — PAIN DESCRIPTION - DESCRIPTORS
DESCRIPTORS: ACHING
DESCRIPTORS: ACHING
DESCRIPTORS: SHOOTING;SHARP;ACHING
DESCRIPTORS: ACHING

## 2025-08-01 ASSESSMENT — PAIN DESCRIPTION - ONSET
ONSET: ON-GOING

## 2025-08-01 ASSESSMENT — PAIN DESCRIPTION - PAIN TYPE
TYPE: SURGICAL PAIN

## 2025-08-01 ASSESSMENT — PAIN - FUNCTIONAL ASSESSMENT
PAIN_FUNCTIONAL_ASSESSMENT: ACTIVITIES ARE NOT PREVENTED
PAIN_FUNCTIONAL_ASSESSMENT: PREVENTS OR INTERFERES SOME ACTIVE ACTIVITIES AND ADLS
PAIN_FUNCTIONAL_ASSESSMENT: ACTIVITIES ARE NOT PREVENTED
PAIN_FUNCTIONAL_ASSESSMENT: PREVENTS OR INTERFERES SOME ACTIVE ACTIVITIES AND ADLS

## 2025-08-01 ASSESSMENT — PAIN DESCRIPTION - ORIENTATION
ORIENTATION: RIGHT

## 2025-08-01 NOTE — CARE COORDINATION
Case Management Assessment  Initial Evaluation    Date/Time of Evaluation: 8/1/2025 11:48 AM  Assessment Completed by: HERON Wilson    If patient is discharged prior to next notation, then this note serves as note for discharge by case management.    Patient Name: Deborah Agustin                   YOB: 1955  Diagnosis: Other displaced fracture of upper end of right humerus, initial encounter for closed fracture [S42.291A]  Debility [R53.81]                   Date / Time: 7/29/2025  5:13 PM    Patient Admission Status: REHAB IP   Readmission Risk (Low < 19, Mod (19-27), High > 27): Readmission Risk Score: 16.5    Current PCP: Sara Bernal MD  PCP verified by CM? Yes    Chart Reviewed: Yes      History Provided by: Patient  Patient Orientation: Alert and Oriented    Patient Cognition: Alert    Hospitalization in the last 30 days (Readmission):  Yes    If yes, Readmission Assessment in CM Navigator will be completed.    Advance Directives:      Code Status: Full Code   Patient's Primary Decision Maker is: Named in Scanned ACP Document    Primary Decision Maker: VamsiSarmad - Child - [phone]    Secondary Decision Maker: Wilfrid Elder - Domestic Partner - [phone]    Secondary Decision Maker: Leo Matute - Brother/Sister - [phone]    Discharge Planning:    Patient lives with: Alone Type of Home: House  Primary Care Giver: Self  Patient Support Systems include: Spouse/Significant Other   Current Financial resources: None  Current community resources: None  Current services prior to admission: None            Current DME: Walker, Cane, Wheelchair            Type of Home Care services:  None    ADLS  Prior functional level: Independent in ADLs/IADLs  Current functional level: Assistance with the following:, Mobility    PT AM-PAC:   /24  OT AM-PAC:   /24    Family can provide assistance at DC: Yes  Would you like Case Management to discuss the discharge plan with any other

## 2025-08-01 NOTE — PROGRESS NOTES
Occupational Therapy  Facility/Department: [facility] ACUTE REHAB UNIT  Rehabilitation Occupational Therapy Daily Treatment Note    Date: 25  Patient Name: Deborah Agustin       Room: 3109/3109-01  MRN: [id number]  Account: [id number]   : 1955  (69 y.o.) Gender: female       Past Medical History:  has a past medical history of Arthritis, Bone spur, CTS (carpal tunnel syndrome), Cyst, Dental crowns present, Glaucoma, High risk medication use, Hyperlipidemia, Hypothyroidism, Ocular migraine, Osteopenia, Overweight(278.02), PONV (postoperative nausea and vomiting), Psoriatic arthritis (HCC), S/P foot surgery, and Scalp psoriasis.  Past Surgical History:   has a past surgical history that includes Colonoscopy; Carpal tunnel release (Left, 2018); Foot surgery (2011); Carpal tunnel release (Right, 2018); Refractive surgery (); Cataract removal (Left, ); joint replacement (2015); joint replacement (Left, 2018); Refractive surgery (Left, ); arthrodesis (Right, 2020); Hip Closed Reduction (Left, 2023); and Arm Surgery (Right, 2025).    Restrictions  Other Position/Activity Restrictions: NWB RUE, up as tolerated    Subjective  Subjective: Pt seated in recliner upon arrival, pleasant and agreeable to therapy and reported 8/10 pain in RUE, ice pack applied, RN aware of pain level.      Objective  Cognition  Overall Cognitive Status: WNL  Orientation  Overall Orientation Status: Within Normal Limits         ADL  Feeding  Assistance Level: Set-up  Skilled Clinical Factors: Set-Up assistance for opening condiments/containers and dressing food with condiments for lunch meal.  Grooming/Oral Hygiene  Assistance Level: Stand by assist  Skilled Clinical Factors: In stance at sink without use of AD, Pt washed hands post toileting task.  Upper Extremity Dressing  Assistance Level: Minimal assistance  Skilled Clinical Factors: Pt doffed sling without touch assistance.

## 2025-08-01 NOTE — PROGRESS NOTES
Occupational Therapy      Deborah Agustin     SECOND THERAPY SESSION  Pt seated in recliner, rates RUE pain 6/10 and RN notified/administered pain meds. Pt is very pleasant, wanting to shower and agreeable to therapy session. Pt Supervision for all functional transfers (chair/toilet/shower) and functional mobility no device to/from bathroom + to/from therapy gym. Pt toileted Supervision and doffed LB clothing seated on toilet. Pt showered UB Mod I and LB Supervision. Pt dressed UB Min A needing assist to bring sling belt around back and pt donned personal shirt setup. Pt threaded BLEs into brief/pants - needed assist to pull up posterior right. Pt able to doff footwear and Dependent to don  socks - Footwear (Mod A). Pt in therapy gym in stance at raised table Independent with leisure activity of 500 rummy (card game). Pt played 2 hands with a seated rest breaks between hands - total activity ~15 min. Pt back in room, stand to sit Supervision. Call light in reach, chair alarm on and ice pack applied. Pt also given edema glove for right hand due to swelling and pt verbalized comfort with fit.      Therapy Time:   Individual Second Therapy Session Concurrent Group Co-treatment   Time In 1245         Time Out 1345         Minutes 60         Timed Code Treatment Minutes:  60 min  Total Treatment Minutes: 60 min    MERCEDES Santos/NIMA [id number]

## 2025-08-01 NOTE — PROGRESS NOTES
Physical Therapy  Facility/Department: [facility] ACUTE REHAB UNIT  Rehabilitation Physical Therapy Treatment Note    NAME: Deborah Agustin  : 1955 (69 y.o.)  MRN: [id number]  CODE STATUS: Full Code    Date of Service: 25       Restrictions:  Position Activity Restriction  Other Position/Activity Restrictions: NWB RUE, up as tolerated     SUBJECTIVE  Subjective: Pt seated in recliner and agreeable to PT  Pain: Pt reports 5/10 pain and states she has taken medication for it. Reports no nausea at this time.       OBJECTIVE  Cognition  Overall Cognitive Status: WNL  Orientation  Overall Orientation Status: Within Normal Limits    Functional Mobility  Scooting  Assistance Level: Supervision  Skilled Clinical Factors: In short sitting prior to initiation of transfers  Balance  Sitting Balance: Independent  Standing Balance: Supervision  Transfers  Surface: To chair with arms;From chair with arms;Wheelchair;Standard toilet  Additional Factors: Hand placement cues  Sit to Stand  Assistance Level: Supervision  Stand to Sit  Assistance Level: Supervision  Skilled Clinical Factors: Cue for proximity to sitting surface      Environmental Mobility  Ambulation  Surface: Level surface;Ramp  Distance: 525' (including 70' ascending and 70' descending ramp) + 405' + 115' + 15'  Activity: Within Room;Within Unit  Assistance Level: Supervision  Gait Deviations: Decreased step length bilateral;Wide base of support;Unsteady gait  Skilled Clinical Factors: Cues for increased step length  Stairs  Stair Height: 6''  Number of Stairs: 3x15  Additional Factors: Reciprocal going up;Reciprocal going down  Assistance Level: Supervision  Skilled Clinical Factors: Cues for whole foot contact with each step      PT Exercises  Exercise Treatment: Pt performed 3x125' ambulation while tapping balloon with LUE to keep it afloat. Pt able to perform reaching outside of NELSON and maintained dynamic and reactive balance appropriately. No LOB occurred,

## 2025-08-01 NOTE — PROGRESS NOTES
Department of Physical Medicine & Rehabilitation  Progress Note    Patient Identification:  Deborah Agustin  [id number]  : 1955  Admit date: 2025    Chief Complaint: Debility    Subjective:   Seen in room this morning.  No new complaints overnight.  Patient continues to be tired but is feeling like she is making progress in therapy.  Slept well last night.  Follow-up with Ortho pending  ROS: No f/c, n/v, cp     Objective:  Patient Vitals for the past 24 hrs:   BP Temp Temp src Pulse Resp SpO2   25 2115 (!) 141/79 98.2 °F (36.8 °C) Oral 72 18 99 %   25 1012 -- -- -- -- 15 --   25 0938 111/60 97.7 °F (36.5 °C) Oral 60 18 99 %     Const: Alert. No distress, pleasant.   HEENT: Normocephalic, atraumatic. Normal sclera/conjunctiva. MMM.   CV: Regular rate and rhythm.   Resp: No respiratory distress. Lungs CTAB.   Abd: Soft, nontender, nondistended, NABS+   Ext: No edema.   Neuro: Alert, oriented, appropriately interactive.   Psych: Cooperative, appropriate mood and affect    Laboratory data: Available via EMR.   Last 24 hour lab  No results found for this or any previous visit (from the past 24 hours).      Therapy progress:  PT  Position Activity Restriction  Other Position/Activity Restrictions: NWB RUE, up as tolerated  Objective     Sit to Stand: Stand by assistance  Stand to Sit: Stand by assistance  Bed to Chair: Stand by assistance  Device: No Device  Assistance: Minimal assistance  Distance: 160' + 10' + 20'  OT  PT Equipment Recommendations  Equipment Needed: No  Toilet - Technique: Ambulating  Equipment Used: Standard toilet  Assessment        SLP          Body mass index is 42.5 kg/m².    Assessment and Plan:  Right humerus fracture  - Status post fall at home  - Status post ORIF of right proximal humeral shaft  - PT/OT  - Pain control     Mild constipation  - Start lactulose 3 times daily     Hypotension  - Improved with fluids  - Monitor     Morbid obesity  - BMI greater than

## 2025-08-01 NOTE — PLAN OF CARE
Problem: Discharge Planning  Goal: Discharge to home or other facility with appropriate resources  8/1/2025 0049 by Sophie Shin, RN  Outcome: Progressing    Problem: Safety - Adult  Goal: Free from fall injury  8/1/2025 0049 by Sophie Shin, RN  Outcome: Progressing    Problem: ABCDS Injury Assessment  Goal: Absence of physical injury  Outcome: Progressing

## 2025-08-01 NOTE — PROGRESS NOTES
Patient alert and oriented x4, shift assessment done as charted, patient's BP running on low side this morning, fluids encouraged, patient asked to have IV out, told patient to continue to drink fluids and that she could have it out if her BP remained stable. Will continue to monitor.

## 2025-08-01 NOTE — PLAN OF CARE
Problem: Discharge Planning  Goal: Discharge to home or other facility with appropriate resources  Outcome: Progressing  Flowsheets (Taken 7/31/2025 2115 by Sophie Shin, RN)  Discharge to home or other facility with appropriate resources: Identify barriers to discharge with patient and caregiver     Problem: Safety - Adult  Goal: Free from fall injury  Outcome: Progressing  Flowsheets (Taken 7/31/2025 2330 by Sophie Shin, RN)  Free From Fall Injury: Instruct family/caregiver on patient safety     Problem: Pain  Goal: Verbalizes/displays adequate comfort level or baseline comfort level  Outcome: Progressing  Flowsheets (Taken 8/1/2025 1838)  Verbalizes/displays adequate comfort level or baseline comfort level: Encourage patient to monitor pain and request assistance     Problem: Nutrition Deficit:  Goal: Optimize nutritional status  Outcome: Progressing  Flowsheets (Taken 8/1/2025 1838)  Nutrient intake appropriate for improving, restoring, or maintaining nutritional needs:   Assess nutritional status and recommend course of action   Monitor oral intake, labs, and treatment plans

## 2025-08-02 PROCEDURE — 97530 THERAPEUTIC ACTIVITIES: CPT

## 2025-08-02 PROCEDURE — 97535 SELF CARE MNGMENT TRAINING: CPT

## 2025-08-02 PROCEDURE — 97116 GAIT TRAINING THERAPY: CPT

## 2025-08-02 PROCEDURE — 6370000000 HC RX 637 (ALT 250 FOR IP): Performed by: PHYSICAL MEDICINE & REHABILITATION

## 2025-08-02 PROCEDURE — 1280000000 HC REHAB R&B

## 2025-08-02 PROCEDURE — 2500000003 HC RX 250 WO HCPCS: Performed by: PHYSICAL MEDICINE & REHABILITATION

## 2025-08-02 RX ADMIN — FAMOTIDINE 20 MG: 20 TABLET, FILM COATED ORAL at 21:45

## 2025-08-02 RX ADMIN — LEVOTHYROXINE SODIUM 125 MCG: 0.1 TABLET ORAL at 06:35

## 2025-08-02 RX ADMIN — TRAZODONE HYDROCHLORIDE 50 MG: 50 TABLET ORAL at 21:45

## 2025-08-02 RX ADMIN — LATANOPROST 1 DROP: 50 SOLUTION OPHTHALMIC at 21:48

## 2025-08-02 RX ADMIN — ASPIRIN 325 MG: 325 TABLET, COATED ORAL at 09:39

## 2025-08-02 RX ADMIN — DULOXETINE 20 MG: 20 CAPSULE, DELAYED RELEASE ORAL at 21:45

## 2025-08-02 RX ADMIN — BISACODYL 5 MG: 5 TABLET, COATED ORAL at 09:39

## 2025-08-02 RX ADMIN — ACETAMINOPHEN 650 MG: 325 TABLET ORAL at 09:51

## 2025-08-02 RX ADMIN — OXYCODONE 5 MG: 5 TABLET ORAL at 21:45

## 2025-08-02 RX ADMIN — GABAPENTIN 300 MG: 300 CAPSULE ORAL at 21:45

## 2025-08-02 RX ADMIN — ANTACID TABLETS 500 MG: 500 TABLET, CHEWABLE ORAL at 09:39

## 2025-08-02 RX ADMIN — TIMOLOL MALEATE 1 DROP: 5 SOLUTION OPHTHALMIC at 09:40

## 2025-08-02 RX ADMIN — ACETAMINOPHEN 650 MG: 325 TABLET ORAL at 14:55

## 2025-08-02 RX ADMIN — DOCUSATE SODIUM 100 MG: 100 CAPSULE, LIQUID FILLED ORAL at 09:39

## 2025-08-02 RX ADMIN — LACTULOSE 20 G: 10 SOLUTION ORAL at 09:40

## 2025-08-02 RX ADMIN — ANTACID TABLETS 500 MG: 500 TABLET, CHEWABLE ORAL at 21:45

## 2025-08-02 RX ADMIN — ASPIRIN 325 MG: 325 TABLET, COATED ORAL at 21:45

## 2025-08-02 RX ADMIN — ATORVASTATIN CALCIUM 20 MG: 20 TABLET, FILM COATED ORAL at 09:39

## 2025-08-02 RX ADMIN — SODIUM CHLORIDE, PRESERVATIVE FREE 10 ML: 5 INJECTION INTRAVENOUS at 09:40

## 2025-08-02 RX ADMIN — FAMOTIDINE 20 MG: 20 TABLET, FILM COATED ORAL at 09:40

## 2025-08-02 ASSESSMENT — PAIN DESCRIPTION - DESCRIPTORS
DESCRIPTORS: ACHING

## 2025-08-02 ASSESSMENT — PAIN DESCRIPTION - ORIENTATION
ORIENTATION: RIGHT

## 2025-08-02 ASSESSMENT — PAIN DESCRIPTION - LOCATION
LOCATION: ARM
LOCATION: SHOULDER;ELBOW
LOCATION: ARM

## 2025-08-02 ASSESSMENT — PAIN DESCRIPTION - ONSET
ONSET: ON-GOING
ONSET: ON-GOING

## 2025-08-02 ASSESSMENT — PAIN SCALES - GENERAL
PAINLEVEL_OUTOF10: 4
PAINLEVEL_OUTOF10: 5
PAINLEVEL_OUTOF10: 5
PAINLEVEL_OUTOF10: 6
PAINLEVEL_OUTOF10: 5
PAINLEVEL_OUTOF10: 5

## 2025-08-02 ASSESSMENT — PAIN - FUNCTIONAL ASSESSMENT
PAIN_FUNCTIONAL_ASSESSMENT: ACTIVITIES ARE NOT PREVENTED

## 2025-08-02 ASSESSMENT — PAIN DESCRIPTION - PAIN TYPE
TYPE: SURGICAL PAIN;ACUTE PAIN
TYPE: SURGICAL PAIN

## 2025-08-02 ASSESSMENT — PAIN DESCRIPTION - FREQUENCY
FREQUENCY: INTERMITTENT
FREQUENCY: INTERMITTENT

## 2025-08-02 NOTE — PROGRESS NOTES
Occupational Therapy  Facility/Department: [facility] ACUTE REHAB UNIT  Rehabilitation Occupational Therapy Daily Treatment Note    Date: 25  Patient Name: Deborah Agustin       Room: 3109/3109-01  MRN: [id number]  Account: [id number]   : 1955  (69 y.o.) Gender: female                    Past Medical History:  has a past medical history of Arthritis, Bone spur, CTS (carpal tunnel syndrome), Cyst, Dental crowns present, Glaucoma, High risk medication use, Hyperlipidemia, Hypothyroidism, Ocular migraine, Osteopenia, Overweight(278.02), PONV (postoperative nausea and vomiting), Psoriatic arthritis (HCC), S/P foot surgery, and Scalp psoriasis.  Past Surgical History:   has a past surgical history that includes Colonoscopy; Carpal tunnel release (Left, 2018); Foot surgery (2011); Carpal tunnel release (Right, 2018); Refractive surgery (); Cataract removal (Left, ); joint replacement (2015); joint replacement (Left, 2018); Refractive surgery (Left, ); arthrodesis (Right, 2020); Hip Closed Reduction (Left, 2023); and Arm Surgery (Right, 2025).    Restrictions  Other Position/Activity Restrictions: NWB RUE, up as tolerated    Subjective  Subjective: Pt seated in recliner upon arrival, pleasant and agreeable to therapy and denied pain at beginning of session, reported intermittent discomfort in RUE throughout session, RN aware.                Objective     Cognition  Overall Cognitive Status: WNL  Orientation  Overall Orientation Status: Within Normal Limits         ADL  Grooming/Oral Hygiene  Assistance Level: Modified independent  Skilled Clinical Factors: Oral care in stance at sink. Pt combed/blow dried hair seated w/ ++ time  Upper Extremity Bathing  Assistance Level: Modified independent;Increased time to complete  Skilled Clinical Factors: Pt washed/dried all components of UB bathing seated on TTB w/ decreased thoroughness d/t limited RUE ROM, declined

## 2025-08-02 NOTE — PLAN OF CARE
Problem: Discharge Planning  Goal: Discharge to home or other facility with appropriate resources  Outcome: Progressing  Flowsheets (Taken 7/31/2025 2115 by Sophie Shin, RN)  Discharge to home or other facility with appropriate resources: Identify barriers to discharge with patient and caregiver  Note: No significant barriers at this time, will continue to monitor     Problem: Safety - Adult  Goal: Free from fall injury  Outcome: Progressing  Flowsheets (Taken 8/2/2025 1037)  Free From Fall Injury:   Instruct family/caregiver on patient safety   Based on caregiver fall risk screen, instruct family/caregiver to ask for assistance with transferring infant if caregiver noted to have fall risk factors  Note: Patient and family cooperative with precautions     Problem: ABCDS Injury Assessment  Goal: Absence of physical injury  Outcome: Progressing  Flowsheets (Taken 8/2/2025 1037)  Absence of Physical Injury: Implement safety measures based on patient assessment     Problem: Pain  Goal: Verbalizes/displays adequate comfort level or baseline comfort level  Outcome: Progressing     Problem: Nutrition Deficit:  Goal: Optimize nutritional status  Outcome: Progressing  Flowsheets (Taken 8/1/2025 1838)  Nutrient intake appropriate for improving, restoring, or maintaining nutritional needs:   Assess nutritional status and recommend course of action   Monitor oral intake, labs, and treatment plans

## 2025-08-02 NOTE — PROGRESS NOTES
Pt ao4, vss. Surg pain on R arm was controlled w oxycodone. Pt denied n.v, chest pain, HA, sob, bm this shift. Pt calls out appropriately for assistance. All needs met at this time. Pt is up to the chair, chair alarm on.

## 2025-08-02 NOTE — PROGRESS NOTES
Physical Therapy  Facility/Department: [facility] ACUTE REHAB UNIT  Rehabilitation Physical Therapy Treatment    NAME: Deborah Agustin  : 1955 (69 y.o.)  MRN: [id number]  CODE STATUS: Full Code    Date of Service: 25      Past Medical History:   Diagnosis Date    Arthritis     Bone spur     CTS (carpal tunnel syndrome)     Cyst     left foot and spur    Dental crowns present     molars    Glaucoma     High risk medication use 2022    Hyperlipidemia     Hypothyroidism     Ocular migraine     Osteopenia     Overweight(278.02)     PONV (postoperative nausea and vomiting)     phenergan works well     Psoriatic arthritis (HCC)     S/P foot surgery 2011    excision cyst and resection spur left foot    Scalp psoriasis      Past Surgical History:   Procedure Laterality Date    ARM SURGERY Right 2025    OPEN REDUCTION INTERNAL FIXATION OF RIGHT PROXIMAL HUMERUS AND SHAFT performed by Simona Shaffer MD at [facility] OR    ARTHRODESIS Right 2020    AKIN OSTEOTOMY RIGHT HALLUX, PROXIMAL INTERPHALANGEAL JOINT ARTHRODESIS WITH SEQUENTIAL REDUCTION AND DORSAL CAPSULOTOMY performed by Donya Stephens DPM at [facility] OR    CARPAL TUNNEL RELEASE Left 2018    CARPAL TUNNEL RELEASE Right 2018    right carpal tunnel release    CATARACT REMOVAL Left     COLONOSCOPY          FOOT SURGERY  2011    w/local anesthesia    HIP CLOSED REDUCTION Left 2023    LEFT HIP CLOSED REDUCTION performed by Simona Shaffer MD at [facility] OR    JOINT REPLACEMENT  2015    Rt. hip     JOINT REPLACEMENT Left 2018    hip -Dr Eliseo    REFRACTIVE SURGERY      REFRACTIVE SURGERY Left        Chart Reviewed: Yes  Patient assessed for rehabilitation services?: Yes  Additional Pertinent Hx: Pt is 69 y.o. admitted to [facility] on 25 s/p tripping and falling, landing on R arm.  XR shoulder R: Oblique fracture the proximal humerus and 20 degree angulation  2.  Mild AC joint arthropathy.  XR humerus R: Oblique

## 2025-08-03 VITALS
SYSTOLIC BLOOD PRESSURE: 154 MMHG | DIASTOLIC BLOOD PRESSURE: 76 MMHG | BODY MASS INDEX: 42.5 KG/M2 | HEIGHT: 63 IN | HEART RATE: 61 BPM | TEMPERATURE: 97.8 F | WEIGHT: 239.86 LBS | RESPIRATION RATE: 16 BRPM | OXYGEN SATURATION: 95 %

## 2025-08-03 PROCEDURE — 6370000000 HC RX 637 (ALT 250 FOR IP): Performed by: PHYSICAL MEDICINE & REHABILITATION

## 2025-08-03 PROCEDURE — 1280000000 HC REHAB R&B

## 2025-08-03 RX ORDER — VALSARTAN 40 MG/1
40 TABLET ORAL DAILY
Status: DISPENSED | OUTPATIENT
Start: 2025-08-03

## 2025-08-03 RX ADMIN — OXYCODONE 5 MG: 5 TABLET ORAL at 18:57

## 2025-08-03 RX ADMIN — ANTACID TABLETS 500 MG: 500 TABLET, CHEWABLE ORAL at 08:55

## 2025-08-03 RX ADMIN — ONDANSETRON 4 MG: 4 TABLET, ORALLY DISINTEGRATING ORAL at 19:00

## 2025-08-03 RX ADMIN — TRAZODONE HYDROCHLORIDE 50 MG: 50 TABLET ORAL at 20:09

## 2025-08-03 RX ADMIN — TIMOLOL MALEATE 1 DROP: 5 SOLUTION OPHTHALMIC at 08:55

## 2025-08-03 RX ADMIN — LEVOTHYROXINE SODIUM 125 MCG: 0.1 TABLET ORAL at 06:58

## 2025-08-03 RX ADMIN — FAMOTIDINE 20 MG: 20 TABLET, FILM COATED ORAL at 08:54

## 2025-08-03 RX ADMIN — ASPIRIN 325 MG: 325 TABLET, COATED ORAL at 20:08

## 2025-08-03 RX ADMIN — DOCUSATE SODIUM 100 MG: 100 CAPSULE, LIQUID FILLED ORAL at 08:55

## 2025-08-03 RX ADMIN — GABAPENTIN 300 MG: 300 CAPSULE ORAL at 20:08

## 2025-08-03 RX ADMIN — DULOXETINE 20 MG: 20 CAPSULE, DELAYED RELEASE ORAL at 20:08

## 2025-08-03 RX ADMIN — ACETAMINOPHEN 650 MG: 325 TABLET ORAL at 08:54

## 2025-08-03 RX ADMIN — LATANOPROST 1 DROP: 50 SOLUTION OPHTHALMIC at 20:09

## 2025-08-03 RX ADMIN — VALSARTAN 40 MG: 40 TABLET, FILM COATED ORAL at 12:34

## 2025-08-03 RX ADMIN — ASPIRIN 325 MG: 325 TABLET, COATED ORAL at 08:55

## 2025-08-03 RX ADMIN — FAMOTIDINE 20 MG: 20 TABLET, FILM COATED ORAL at 20:09

## 2025-08-03 RX ADMIN — OXYCODONE 5 MG: 5 TABLET ORAL at 12:35

## 2025-08-03 RX ADMIN — ATORVASTATIN CALCIUM 20 MG: 20 TABLET, FILM COATED ORAL at 08:54

## 2025-08-03 RX ADMIN — ANTACID TABLETS 500 MG: 500 TABLET, CHEWABLE ORAL at 20:09

## 2025-08-03 ASSESSMENT — PAIN SCALES - GENERAL
PAINLEVEL_OUTOF10: 5
PAINLEVEL_OUTOF10: 4
PAINLEVEL_OUTOF10: 3
PAINLEVEL_OUTOF10: 6
PAINLEVEL_OUTOF10: 5

## 2025-08-03 ASSESSMENT — PAIN DESCRIPTION - ORIENTATION
ORIENTATION: RIGHT

## 2025-08-03 ASSESSMENT — PAIN DESCRIPTION - LOCATION
LOCATION: ARM
LOCATION: SHOULDER
LOCATION: SHOULDER

## 2025-08-03 NOTE — PLAN OF CARE
Problem: Discharge Planning  Goal: Discharge to home or other facility with appropriate resources  Outcome: Progressing  Flowsheets (Taken 8/3/2025 0126)  Discharge to home or other facility with appropriate resources:   Identify barriers to discharge with patient and caregiver   Identify discharge learning needs (meds, wound care, etc)     Problem: Safety - Adult  Goal: Free from fall injury  Outcome: Progressing  Flowsheets (Taken 8/3/2025 0126)  Free From Fall Injury: Instruct family/caregiver on patient safety     Problem: ABCDS Injury Assessment  Goal: Absence of physical injury  Outcome: Progressing  Flowsheets (Taken 8/3/2025 0126)  Absence of Physical Injury: Implement safety measures based on patient assessment     Problem: Pain  Goal: Verbalizes/displays adequate comfort level or baseline comfort level  Outcome: Progressing  Flowsheets (Taken 8/3/2025 0126)  Verbalizes/displays adequate comfort level or baseline comfort level:   Encourage patient to monitor pain and request assistance   Assess pain using appropriate pain scale   Administer analgesics based on type and severity of pain and evaluate response   Implement non-pharmacological measures as appropriate and evaluate response     Problem: Nutrition Deficit:  Goal: Optimize nutritional status  Outcome: Adequate for Discharge  Flowsheets (Taken 8/3/2025 0126)  Nutrient intake appropriate for improving, restoring, or maintaining nutritional needs:   Assess nutritional status and recommend course of action   Monitor oral intake, labs, and treatment plans

## 2025-08-03 NOTE — PROGRESS NOTES
Department of Physical Medicine & Rehabilitation  Progress Note    Patient Identification:  Deborah Agustin  [id number]  : 1955  Admit date: 2025    Chief Complaint: Debility    Subjective:   Resting in her chair this morning.  Patient has no new complaints overnight and is able to tolerate therapy.  She does feel like her blood pressure has been running slightly high and previous to coming into the hospital the patient was put on Diovan.  This was held on the acute floor and not restarted when coming to the rehab unit due to hypotension.  Patient's recent BPs include 144/77.  Discussed with patient about just restarting valsartan.  Will continue to monitor over the next 24 hours.  ROS: No f/c, n/v, cp     Objective:  Patient Vitals for the past 24 hrs:   BP Temp Temp src Pulse Resp SpO2   25 0832 (!) 144/77 98 °F (36.7 °C) Oral 63 16 94 %   25 2215 -- -- -- -- 17 --   25 2130 (!) 161/74 97.8 °F (36.6 °C) Oral 61 17 97 %     Const: Alert. No distress, pleasant.   HEENT: Normocephalic, atraumatic. Normal sclera/conjunctiva. MMM.   CV: Regular rate and rhythm.   Resp: No respiratory distress. Lungs CTAB.   Abd: Soft, nontender, nondistended, NABS+   Ext: No edema.   Neuro: Alert, oriented, appropriately interactive.   Psych: Cooperative, appropriate mood and affect    Laboratory data: Available via EMR.   Last 24 hour lab  No results found for this or any previous visit (from the past 24 hours).      Therapy progress:  PT  Position Activity Restriction  Other Position/Activity Restrictions: NWB RUE, up as tolerated  Objective     Sit to Stand: Modified independent (from recliner and chair to no AD)  Stand to Sit: Modified independent  Bed to Chair: Stand by assistance  Device: No Device  Other Apparatus:  (shoulder immobilizer R)  Assistance: Supervision, Modified Independent (pt fluctuating between Supervision-Dennise)  Distance: 350' (with 70' ascend ramp) + 350' (with 70' descend ramp) +

## 2025-08-03 NOTE — PROGRESS NOTES
Pt in bed, at this time. Alert & oriented x 4. BP elevated after returning from bathroom. Other VSS. Assessment completed. Pt complaining of pain to right arm, dressing in place. Nighttime medications given along with Oxycodone for pain. Pt tolerated medication well. Reminded pt to call with any needs. Call light within reach. Safety measures in place.

## 2025-08-04 ENCOUNTER — TELEPHONE (OUTPATIENT)
Dept: ORTHOPEDIC SURGERY | Age: 70
End: 2025-08-04

## 2025-08-04 ENCOUNTER — APPOINTMENT (OUTPATIENT)
Dept: GENERAL RADIOLOGY | Age: 70
DRG: 560 | End: 2025-08-04
Attending: PHYSICAL MEDICINE & REHABILITATION
Payer: MEDICARE

## 2025-08-04 LAB
ANION GAP SERPL CALCULATED.3IONS-SCNC: 8 MMOL/L (ref 3–16)
BASOPHILS # BLD: 0 K/UL (ref 0–0.2)
BASOPHILS NFR BLD: 0.7 %
BUN SERPL-MCNC: 10 MG/DL (ref 7–20)
CALCIUM SERPL-MCNC: 9 MG/DL (ref 8.3–10.6)
CHLORIDE SERPL-SCNC: 100 MMOL/L (ref 99–110)
CO2 SERPL-SCNC: 29 MMOL/L (ref 21–32)
CREAT SERPL-MCNC: 0.7 MG/DL (ref 0.6–1.2)
DEPRECATED RDW RBC AUTO: 14.4 % (ref 12.4–15.4)
EOSINOPHIL # BLD: 0.2 K/UL (ref 0–0.6)
EOSINOPHIL NFR BLD: 3.6 %
GFR SERPLBLD CREATININE-BSD FMLA CKD-EPI: >90 ML/MIN/{1.73_M2}
GLUCOSE SERPL-MCNC: 96 MG/DL (ref 70–99)
HCT VFR BLD AUTO: 29.3 % (ref 36–48)
HGB BLD-MCNC: 9.8 G/DL (ref 12–16)
LYMPHOCYTES # BLD: 1.5 K/UL (ref 1–5.1)
LYMPHOCYTES NFR BLD: 29.8 %
MCH RBC QN AUTO: 31.9 PG (ref 26–34)
MCHC RBC AUTO-ENTMCNC: 33.3 G/DL (ref 31–36)
MCV RBC AUTO: 95.8 FL (ref 80–100)
MONOCYTES # BLD: 0.5 K/UL (ref 0–1.3)
MONOCYTES NFR BLD: 9.2 %
NEUTROPHILS # BLD: 2.8 K/UL (ref 1.7–7.7)
NEUTROPHILS NFR BLD: 56.7 %
PLATELET # BLD AUTO: 281 K/UL (ref 135–450)
PMV BLD AUTO: 7.6 FL (ref 5–10.5)
POTASSIUM SERPL-SCNC: 4.3 MMOL/L (ref 3.5–5.1)
RBC # BLD AUTO: 3.06 M/UL (ref 4–5.2)
SODIUM SERPL-SCNC: 137 MMOL/L (ref 136–145)
WBC # BLD AUTO: 5 K/UL (ref 4–11)

## 2025-08-04 PROCEDURE — 97530 THERAPEUTIC ACTIVITIES: CPT

## 2025-08-04 PROCEDURE — 97110 THERAPEUTIC EXERCISES: CPT

## 2025-08-04 PROCEDURE — 73060 X-RAY EXAM OF HUMERUS: CPT

## 2025-08-04 PROCEDURE — 97116 GAIT TRAINING THERAPY: CPT

## 2025-08-04 PROCEDURE — 80048 BASIC METABOLIC PNL TOTAL CA: CPT

## 2025-08-04 PROCEDURE — 6370000000 HC RX 637 (ALT 250 FOR IP): Performed by: PHYSICAL MEDICINE & REHABILITATION

## 2025-08-04 PROCEDURE — 1280000000 HC REHAB R&B

## 2025-08-04 PROCEDURE — 99024 POSTOP FOLLOW-UP VISIT: CPT | Performed by: PHYSICIAN ASSISTANT

## 2025-08-04 PROCEDURE — 85025 COMPLETE CBC W/AUTO DIFF WBC: CPT

## 2025-08-04 PROCEDURE — 97535 SELF CARE MNGMENT TRAINING: CPT

## 2025-08-04 PROCEDURE — 36415 COLL VENOUS BLD VENIPUNCTURE: CPT

## 2025-08-04 RX ORDER — PSEUDOEPHEDRINE HCL 30 MG
100 TABLET ORAL DAILY
Qty: 30 CAPSULE | Refills: 0 | Status: SHIPPED | OUTPATIENT
Start: 2025-08-05

## 2025-08-04 RX ORDER — VALSARTAN 40 MG/1
40 TABLET ORAL DAILY
Qty: 30 TABLET | Refills: 3 | Status: SHIPPED | OUTPATIENT
Start: 2025-08-05

## 2025-08-04 RX ORDER — LATANOPROST 50 UG/ML
1 SOLUTION/ DROPS OPHTHALMIC NIGHTLY
Qty: 1 EACH | Refills: 1 | Status: SHIPPED | OUTPATIENT
Start: 2025-08-04

## 2025-08-04 RX ORDER — DULOXETIN HYDROCHLORIDE 20 MG/1
20 CAPSULE, DELAYED RELEASE ORAL NIGHTLY
Qty: 30 CAPSULE | Refills: 3 | Status: SHIPPED | OUTPATIENT
Start: 2025-08-04

## 2025-08-04 RX ORDER — POLYETHYLENE GLYCOL 3350 17 G/17G
17 POWDER, FOR SOLUTION ORAL DAILY PRN
Qty: 527 G | Refills: 1 | Status: SHIPPED | OUTPATIENT
Start: 2025-08-04 | End: 2025-10-05

## 2025-08-04 RX ORDER — OXYCODONE HYDROCHLORIDE 5 MG/1
5 TABLET ORAL EVERY 4 HOURS PRN
Qty: 20 TABLET | Refills: 0 | Status: SHIPPED | OUTPATIENT
Start: 2025-08-04 | End: 2025-08-07

## 2025-08-04 RX ORDER — SENNOSIDES 8.6 MG
325 CAPSULE ORAL 2 TIMES DAILY
Qty: 30 TABLET | Refills: 3 | Status: SHIPPED | OUTPATIENT
Start: 2025-08-04

## 2025-08-04 RX ORDER — LEVOTHYROXINE SODIUM 125 UG/1
125 TABLET ORAL DAILY
Qty: 30 TABLET | Refills: 3 | Status: SHIPPED | OUTPATIENT
Start: 2025-08-05

## 2025-08-04 RX ORDER — TRAZODONE HYDROCHLORIDE 50 MG/1
50 TABLET ORAL NIGHTLY
Qty: 90 TABLET | Refills: 1 | Status: SHIPPED | OUTPATIENT
Start: 2025-08-04

## 2025-08-04 RX ORDER — ONDANSETRON 4 MG/1
4 TABLET, ORALLY DISINTEGRATING ORAL EVERY 8 HOURS PRN
Qty: 30 TABLET | Refills: 0 | Status: SHIPPED | OUTPATIENT
Start: 2025-08-04

## 2025-08-04 RX ORDER — FAMOTIDINE 20 MG/1
20 TABLET, FILM COATED ORAL 2 TIMES DAILY
Qty: 60 TABLET | Refills: 0 | Status: SHIPPED | OUTPATIENT
Start: 2025-08-04

## 2025-08-04 RX ADMIN — ASPIRIN 325 MG: 325 TABLET, COATED ORAL at 10:19

## 2025-08-04 RX ADMIN — BISACODYL 5 MG: 5 TABLET, COATED ORAL at 10:19

## 2025-08-04 RX ADMIN — TRAZODONE HYDROCHLORIDE 50 MG: 50 TABLET ORAL at 19:51

## 2025-08-04 RX ADMIN — ONDANSETRON 4 MG: 4 TABLET, ORALLY DISINTEGRATING ORAL at 19:51

## 2025-08-04 RX ADMIN — ANTACID TABLETS 500 MG: 500 TABLET, CHEWABLE ORAL at 10:20

## 2025-08-04 RX ADMIN — DULOXETINE 20 MG: 20 CAPSULE, DELAYED RELEASE ORAL at 19:50

## 2025-08-04 RX ADMIN — LEVOTHYROXINE SODIUM 125 MCG: 0.1 TABLET ORAL at 06:43

## 2025-08-04 RX ADMIN — OXYCODONE 5 MG: 5 TABLET ORAL at 04:56

## 2025-08-04 RX ADMIN — ATORVASTATIN CALCIUM 20 MG: 20 TABLET, FILM COATED ORAL at 10:19

## 2025-08-04 RX ADMIN — VALSARTAN 40 MG: 40 TABLET, FILM COATED ORAL at 10:20

## 2025-08-04 RX ADMIN — ANTACID TABLETS 500 MG: 500 TABLET, CHEWABLE ORAL at 19:50

## 2025-08-04 RX ADMIN — ONDANSETRON 4 MG: 4 TABLET, ORALLY DISINTEGRATING ORAL at 10:28

## 2025-08-04 RX ADMIN — ONDANSETRON 4 MG: 4 TABLET, ORALLY DISINTEGRATING ORAL at 04:56

## 2025-08-04 RX ADMIN — TIMOLOL MALEATE 1 DROP: 5 SOLUTION OPHTHALMIC at 10:22

## 2025-08-04 RX ADMIN — LATANOPROST 1 DROP: 50 SOLUTION OPHTHALMIC at 19:53

## 2025-08-04 RX ADMIN — FAMOTIDINE 20 MG: 20 TABLET, FILM COATED ORAL at 19:51

## 2025-08-04 RX ADMIN — GABAPENTIN 300 MG: 300 CAPSULE ORAL at 19:51

## 2025-08-04 RX ADMIN — ASPIRIN 325 MG: 325 TABLET, COATED ORAL at 19:50

## 2025-08-04 RX ADMIN — FAMOTIDINE 20 MG: 20 TABLET, FILM COATED ORAL at 10:19

## 2025-08-04 RX ADMIN — DOCUSATE SODIUM 100 MG: 100 CAPSULE, LIQUID FILLED ORAL at 10:19

## 2025-08-04 RX ADMIN — OXYCODONE 5 MG: 5 TABLET ORAL at 10:28

## 2025-08-04 RX ADMIN — OXYCODONE 5 MG: 5 TABLET ORAL at 19:51

## 2025-08-04 ASSESSMENT — PAIN DESCRIPTION - PAIN TYPE
TYPE: SURGICAL PAIN
TYPE: SURGICAL PAIN

## 2025-08-04 ASSESSMENT — PAIN DESCRIPTION - LOCATION
LOCATION: ARM

## 2025-08-04 ASSESSMENT — PAIN DESCRIPTION - DESCRIPTORS
DESCRIPTORS: ACHING

## 2025-08-04 ASSESSMENT — PAIN SCALES - GENERAL
PAINLEVEL_OUTOF10: 6
PAINLEVEL_OUTOF10: 4
PAINLEVEL_OUTOF10: 6
PAINLEVEL_OUTOF10: 3

## 2025-08-04 ASSESSMENT — PAIN DESCRIPTION - FREQUENCY
FREQUENCY: CONTINUOUS
FREQUENCY: CONTINUOUS

## 2025-08-04 ASSESSMENT — PAIN - FUNCTIONAL ASSESSMENT
PAIN_FUNCTIONAL_ASSESSMENT: ACTIVITIES ARE NOT PREVENTED

## 2025-08-04 ASSESSMENT — PAIN DESCRIPTION - ONSET: ONSET: ON-GOING

## 2025-08-04 ASSESSMENT — PAIN DESCRIPTION - ORIENTATION
ORIENTATION: RIGHT
ORIENTATION: RIGHT

## 2025-08-04 NOTE — PLAN OF CARE
Problem: Discharge Planning  Goal: Discharge to home or other facility with appropriate resources  Outcome: Adequate for Discharge  Patient discharging home 8/5.      Problem: Safety - Adult  Goal: Free from fall injury  Outcome: Adequate for Discharge  Patient remains free from falls/injuries. Safety measures in place.     Problem: ABCDS Injury Assessment  Goal: Absence of physical injury  Outcome: Adequate for Discharge     Problem: Pain  Goal: Verbalizes/displays adequate comfort level or baseline comfort level  Outcome: Adequate for Discharge  Pain to right arm managed with oxycodone and non-pharmacological interventions, ice, rest, distraction and sling.     Flowsheets (Taken 8/4/2025 1015)  Verbalizes/displays adequate comfort level or baseline comfort level:   Encourage patient to monitor pain and request assistance   Assess pain using appropriate pain scale   Administer analgesics based on type and severity of pain and evaluate response   Implement non-pharmacological measures as appropriate and evaluate response   Consider cultural and social influences on pain and pain management   Notify Licensed Independent Practitioner if interventions unsuccessful or patient reports new pain.

## 2025-08-04 NOTE — DISCHARGE INSTR - COC
Continuity of Care Form    Patient Name: Deborah Agustin   :  1955  MRN:  [id number]    Admit date:  2025  Discharge date:  2025    Code Status Order: Full Code   Advance Directives:     Admitting Physician:  Sander Valderrama DO  PCP: Sara Bernal MD    Discharging Nurse: [facility] Unit/Room#: 3109/3109-01  Discharging Unit Phone Number: [phone]    Emergency Contact:   Extended Emergency Contact Information  Primary Emergency Contact: Wilfrid Elder           [place], [place]  Home Phone: [phone]  Mobile Phone: [phone]  Relation: Domestic Partner  Secondary Emergency Contact: Shorty Agustin           [facility]  Home Phone: [phone]  Mobile Phone: [phone]  Relation: Child    Past Surgical History:  Past Surgical History:   Procedure Laterality Date    ARM SURGERY Right 2025    OPEN REDUCTION INTERNAL FIXATION OF RIGHT PROXIMAL HUMERUS AND SHAFT performed by Simona Shaffer MD at [facility] OR    ARTHRODESIS Right 2020    AKIN OSTEOTOMY RIGHT HALLUX, PROXIMAL INTERPHALANGEAL JOINT ARTHRODESIS WITH SEQUENTIAL REDUCTION AND DORSAL CAPSULOTOMY performed by Donya Stephens DPM at [facility] OR    CARPAL TUNNEL RELEASE Left 2018    CARPAL TUNNEL RELEASE Right 2018    right carpal tunnel release    CATARACT REMOVAL Left     COLONOSCOPY          FOOT SURGERY  2011    w/local anesthesia    HIP CLOSED REDUCTION Left 2023    LEFT HIP CLOSED REDUCTION performed by Simona Shaffer MD at [facility] OR    JOINT REPLACEMENT  2015    Rt. hip     JOINT REPLACEMENT Left 2018    hip -Dr Gomes    REFRACTIVE SURGERY      REFRACTIVE SURGERY Left        Immunization History:   Immunization History   Administered Date(s) Administered    COVID-19, MODERNA BLUE border, Primary or Immunocompromised, (age 12y+), IM, 100 mcg/0.5mL 2021, 2021, 2021    COVID-19, MODERNA Bivalent, (age 12y+), IM, 50

## 2025-08-04 NOTE — PROGRESS NOTES
Physical Therapy  Facility/Department: [facility] ACUTE REHAB UNIT  Rehabilitation Physical Therapy Treatment Note and Discharge Summary    NAME: Deborah Agustin  : 1955 (69 y.o.)  MRN: [id number]  CODE STATUS: Full Code    Date of Service: 25       Restrictions:  Position Activity Restriction  Other Position/Activity Restrictions: NWB RUE, up as tolerated     SUBJECTIVE  Subjective: Pt seated in recliner and agreeable to PT  Pain: Pt reports 3-4/10 pain and states she has taken medication for it. Reports no nausea at this time.       OBJECTIVE  Cognition  Overall Cognitive Status: WNL  Orientation  Overall Orientation Status: Within Normal Limits    Functional Mobility  Bridging  Assistance Level: Independent  Roll Left  Assistance Level: Independent  Roll Right  Skilled Clinical Factors: Not performed 2/2 NWB RUE  Sit to Supine  Assistance Level: Independent  Skilled Clinical Factors: HOB flat, no handrails  Supine to Sit  Assistance Level: Independent  Skilled Clinical Factors: HOB flat, no handrails  Scooting  Assistance Level: Independent  Balance  Sitting Balance: Independent  Standing Balance: Independent  Standing Balance  Comments: Pt performed object pickup from floor with independence without AD  Transfers  Surface: To chair with arms;From chair with arms;Wheelchair  Sit to Stand  Assistance Level: Independent  Stand to Sit  Assistance Level: Independent  Bed To/From Chair  Technique: Stand step  Assistance Level: Independent  Car Transfer  Assistance Level: Independent      Environmental Mobility  Ambulation  Distance: 130' + 210' + 550' + 350' + 470'  Activity: Within Room;Within Unit  Activity Comments: Bout of 350' ambulation performed in courtyard on uneven brick pavers with chair, table, and tree obstacles present  Assistance Level: Independent  Gait Deviations: Decreased step length bilateral;Wide base of support  Stairs  Stair Height: 6''  Device: One handrail  Number of Stairs: 12  Additional

## 2025-08-04 NOTE — DISCHARGE INSTRUCTIONS
F/U with Dr Shaffer in 1-2 weeks.  Call [facility] for appointment time and date for follow up at [phone].  NWB on the right arm.  Okay for ROM at the elbow, wrist, hand.  Remain in sling except for hygiene.

## 2025-08-04 NOTE — PROGRESS NOTES
Department of Orthopedic Surgery  Physician Assistant   Progress Note    Subjective:       Systemic or Specific Complaints:Pain Control    Objective:     Patient Vitals for the past 24 hrs:   BP Temp Temp src Pulse Resp SpO2 Weight   08/04/25 1015 135/78 97.6 °F (36.4 °C) Oral 58 16 95 % --   08/04/25 0459 -- -- -- -- -- -- 106 kg (233 lb 11 oz)   08/04/25 0456 -- -- -- -- 18 -- --   08/03/25 2000 (!) 154/76 97.8 °F (36.6 °C) Oral 61 16 95 % --       General: alert, appears stated age, and cooperative   Wound: Wound clean and dry no evidence of infection., No Erythema, No Drainage, Wound Intact, and Positive for Edema   Motion: Painful range of Motion in affected extremity   DVT Exam: No evidence of DVT seen on physical exam.     Additional exam: nvi  Distal pulses intact.  Sensation intact.      Data Review  CBC:   Lab Results   Component Value Date/Time    WBC 5.0 08/04/2025 06:53 AM    RBC 3.06 08/04/2025 06:53 AM    HGB 9.8 08/04/2025 06:53 AM    HCT 29.3 08/04/2025 06:53 AM     08/04/2025 06:53 AM       Renal:   Lab Results   Component Value Date/Time     08/04/2025 06:53 AM    K 4.3 08/04/2025 06:53 AM     08/04/2025 06:53 AM    CO2 29 08/04/2025 06:53 AM    BUN 10 08/04/2025 06:53 AM    CREATININE 0.7 08/04/2025 06:53 AM    GLUCOSE 96 08/04/2025 06:53 AM    CALCIUM 9.0 08/04/2025 06:53 AM            Assessment:      S/P right humerus ORIF .     Plan:      1:  PT/OT as able. NWB for another 5 weeks or so.  She is just over 1 weeks from surgery.  Doing well.  Will need to f/u with Dr Shaffer in another 1-2 weeks for post op check.  Order plain film of humerus today to check healing.  2:  Continue Deep venous thrombosis prophylaxis  3:  Continue Pain Control - minimize opioids as able to maintain adequate pain control. Ice for swellng  4:  Discharge Home anticipated    Placido Trujillo, PA

## 2025-08-04 NOTE — PROGRESS NOTES
Pt alert & oriented x 4. BP elevated, other VSS. Previous nurse did relay to me, pt was started on Valsartan for elevated BP. Assessment completed. Pt continues to complain of incisional pain to RUE.  RUE in sling. Assisted pt the bathroom with CGA. Nighttime medications given including Oxycodone for pain. Pt tolerated medications well. Reminded pt to call for assistance with any needs. Call light within reach. Safety measures in place.

## 2025-08-04 NOTE — PLAN OF CARE
Problem: Safety - Adult  Goal: Free from fall injury  8/4/2025 1940 by Yareli Healy, RN  Outcome: Progressing     Problem: ABCDS Injury Assessment  Goal: Absence of physical injury  8/4/2025 1940 by Yareli Healy, RN  Outcome: Progressing     Problem: Pain  Goal: Verbalizes/displays adequate comfort level or baseline comfort level  8/4/2025 1940 by Yareli Healy, RN  Outcome: Progressing

## 2025-08-04 NOTE — PROGRESS NOTES
procurement;Self-Care / ADL;Patient/Caregiver education & training;Home management training    Goals  Patient Goals   Patient goals : \"learn strategies to dress and get along one-armed\"  Short Term Goals  Time Frame for Short Term Goals: 7 days  Short Term Goal 1: Pt completes UB bathing/dressing with mod I - met 8/4  Short Term Goal 2: Pt completes LB bathing/dressing with use of AE PRN and mod I - met 8/4  Short Term Goal 3: Pt completes toileting including toilet transfer with mod I - met 8/4  Short Term Goal 4: Pt completes standing ADL task >3 mins with mod I - met 8/2  Short Term Goal 5: Pt complete simple IADL task with mod I - met 8/2                   Therapy Time   Individual Concurrent Group Co-treatment   Time In 1245         Time Out 1415         Minutes 90         Timed Code Treatment Minutes: 90 Minutes       Sarah SHAH, OT

## 2025-08-04 NOTE — PROGRESS NOTES
[facility] Discharge Assessment    Transportation  \"Has lack of transportation kept you from medical appointments, meetings, work, or from getting things needed for daily living?\"Check all that apply:  [] A.  Yes, it has kept me from medical appointments or from getting my medications  [] B.  Yes, it has kept me from non-medical meetings, appointments, work, or from getting things that I need  [x] C.  No  [] X.  Patient unable to respond  [] Y.  Patient declines to respond    Provision of Current Reconciled Medication List to Subsequent Provider at Discharge  [] No, current reconciled medication list not provided to the subsequent provider.  [x] Yes, current reconciled medication list provided to the subsequent provider. (**Select route of transmission below**)   [] Via Electronic Health Record   [x] Via Health Information Exchange Organization  [] Verbal (e.g. in person, telephone, video conferencing)  [] Paper-based (e.g. fax, copies, printouts)   [] Other Methods (e.g. texting, email, CDs)    Provision of Current Reconciled Medication List to Patient at Discharge  [] No, current reconciled medication list not provided to the patient, family and/or caregiver.   [x] Yes, current reconciled medication list provided to the patient, family and/or caregiver.  (**Select route of transmission below**)   [x] Via Electronic Health Record (e.g., electronic access to patient portal)   [] Via Health Information Exchange Organization  [x] Verbal (e.g. in person, telephone, video conferencing)  [x] Paper-based (e.g. fax, copies, printouts)   [] Other Methods (e.g. texting, email, CDs)    Health Literacy  \"How often do you need to have someone help you when you read instructions, pamphlets, or other written material from your doctor or pharmacy?\"  [x]  0.  Never  []  1.  Rarely  []  2.  Sometimes  []  3.  Often  []  4.  Always  []  7.  Patient declines to respond  []  8.  Patient unable to respond    BIMS - **Must be completed in the

## 2025-08-04 NOTE — PROGRESS NOTES
Department of Physical Medicine & Rehabilitation  Progress Note    Patient Identification:  Deborah Agustin  [id number]  : 1955  Admit date: 2025    Chief Complaint: Debility    Subjective:   Seen in room this morning.  No new complaints overnight.  Patient is planning on discharging home tomorrow.  She is happy with her care so far on the rehab unit.  Patient did not follow-up with orthopedics as requested.  New request prepped for today.  Patient also would like to talk to case management about home health.  Blood pressures have improved.  ROS: No f/c, n/v, cp     Objective:  Patient Vitals for the past 24 hrs:   BP Temp Temp src Pulse Resp SpO2 Weight   25 1015 135/78 97.6 °F (36.4 °C) Oral 58 16 95 % --   25 0459 -- -- -- -- -- -- 106 kg (233 lb 11 oz)   256 -- -- -- -- 18 -- --   25 2000 (!) 154/76 97.8 °F (36.6 °C) Oral 61 16 95 % --   25 1233 132/70 -- -- 72 -- -- --     Const: Alert. No distress, pleasant.   HEENT: Normocephalic, atraumatic. Normal sclera/conjunctiva. MMM.   CV: Regular rate and rhythm.   Resp: No respiratory distress. Lungs CTAB.   Abd: Soft, nontender, nondistended, NABS+   Ext: No edema.   Neuro: Alert, oriented, appropriately interactive.   Psych: Cooperative, appropriate mood and affect    Laboratory data: Available via EMR.   Last 24 hour lab  Recent Results (from the past 24 hours)   Basic Metabolic Panel w/ Reflex to MG    Collection Time: 25  6:53 AM   Result Value Ref Range    Sodium 137 136 - 145 mmol/L    Potassium reflex Magnesium 4.3 3.5 - 5.1 mmol/L    Chloride 100 99 - 110 mmol/L    CO2 29 21 - 32 mmol/L    Anion Gap 8 3 - 16    Glucose 96 70 - 99 mg/dL    BUN 10 7 - 20 mg/dL    Creatinine 0.7 0.6 - 1.2 mg/dL    Est, Glom Filt Rate >90 >60    Calcium 9.0 8.3 - 10.6 mg/dL   CBC auto differential    Collection Time: 25  6:53 AM   Result Value Ref Range    WBC 5.0 4.0 - 11.0 K/uL    RBC 3.06 (L) 4.00 - 5.20 M/uL

## 2025-08-04 NOTE — PLAN OF CARE
Problem: Discharge Planning  Goal: Discharge to home or other facility with appropriate resources  Outcome: Progressing  Flowsheets (Taken 8/4/2025 0247)  Discharge to home or other facility with appropriate resources:   Identify barriers to discharge with patient and caregiver   Identify discharge learning needs (meds, wound care, etc)     Problem: Safety - Adult  Goal: Free from fall injury  Outcome: Progressing  Flowsheets (Taken 8/4/2025 0247)  Free From Fall Injury: Instruct family/caregiver on patient safety     Problem: ABCDS Injury Assessment  Goal: Absence of physical injury  Outcome: Progressing  Flowsheets (Taken 8/4/2025 0247)  Absence of Physical Injury: Implement safety measures based on patient assessment     Problem: Nutrition Deficit:  Goal: Optimize nutritional status  Outcome: Adequate for Discharge  Flowsheets (Taken 8/4/2025 0247)  Nutrient intake appropriate for improving, restoring, or maintaining nutritional needs:   Assess nutritional status and recommend course of action   Monitor oral intake, labs, and treatment plans

## 2025-08-05 VITALS
OXYGEN SATURATION: 96 % | RESPIRATION RATE: 16 BRPM | DIASTOLIC BLOOD PRESSURE: 75 MMHG | HEIGHT: 63 IN | BODY MASS INDEX: 41.41 KG/M2 | SYSTOLIC BLOOD PRESSURE: 123 MMHG | HEART RATE: 63 BPM | WEIGHT: 233.69 LBS | TEMPERATURE: 97.9 F

## 2025-08-05 PROCEDURE — 6370000000 HC RX 637 (ALT 250 FOR IP): Performed by: PHYSICAL MEDICINE & REHABILITATION

## 2025-08-05 RX ADMIN — DOCUSATE SODIUM 100 MG: 100 CAPSULE, LIQUID FILLED ORAL at 10:19

## 2025-08-05 RX ADMIN — ATORVASTATIN CALCIUM 20 MG: 20 TABLET, FILM COATED ORAL at 10:17

## 2025-08-05 RX ADMIN — LEVOTHYROXINE SODIUM 125 MCG: 0.1 TABLET ORAL at 06:40

## 2025-08-05 RX ADMIN — BISACODYL 5 MG: 5 TABLET, COATED ORAL at 10:19

## 2025-08-05 RX ADMIN — ASPIRIN 325 MG: 325 TABLET, COATED ORAL at 10:18

## 2025-08-05 RX ADMIN — TIMOLOL MALEATE 1 DROP: 5 SOLUTION OPHTHALMIC at 08:30

## 2025-08-05 RX ADMIN — VALSARTAN 40 MG: 40 TABLET, FILM COATED ORAL at 10:17

## 2025-08-05 RX ADMIN — FAMOTIDINE 20 MG: 20 TABLET, FILM COATED ORAL at 10:19

## 2025-08-05 RX ADMIN — ANTACID TABLETS 500 MG: 500 TABLET, CHEWABLE ORAL at 10:17

## 2025-08-05 RX ADMIN — ONDANSETRON 4 MG: 4 TABLET, ORALLY DISINTEGRATING ORAL at 10:19

## 2025-08-05 RX ADMIN — OXYCODONE 5 MG: 5 TABLET ORAL at 10:19

## 2025-08-05 ASSESSMENT — PAIN - FUNCTIONAL ASSESSMENT: PAIN_FUNCTIONAL_ASSESSMENT: ACTIVITIES ARE NOT PREVENTED

## 2025-08-05 ASSESSMENT — PAIN DESCRIPTION - FREQUENCY: FREQUENCY: INTERMITTENT

## 2025-08-05 ASSESSMENT — PAIN DESCRIPTION - LOCATION: LOCATION: ARM

## 2025-08-05 ASSESSMENT — PAIN DESCRIPTION - ONSET: ONSET: ON-GOING

## 2025-08-05 ASSESSMENT — PAIN SCALES - GENERAL: PAINLEVEL_OUTOF10: 3

## 2025-08-05 ASSESSMENT — PAIN DESCRIPTION - ORIENTATION: ORIENTATION: RIGHT

## 2025-08-05 ASSESSMENT — PAIN DESCRIPTION - DESCRIPTORS: DESCRIPTORS: ACHING

## 2025-08-05 ASSESSMENT — PAIN DESCRIPTION - PAIN TYPE: TYPE: SURGICAL PAIN

## 2025-08-05 NOTE — CARE COORDINATION
Case Management Assessment            Discharge Note                    Date / Time of Note: 8/5/2025 9:59 AM                  Discharge Note Completed by: HERON Wilson    Patient Name: Deborah Agustin   YOB: 1955  Diagnosis: Other displaced fracture of upper end of right humerus, initial encounter for closed fracture [S42.291A]  Debility [R53.81]   Date / Time: 7/29/2025  5:13 PM    Current PCP: Sara Bernal MD  Clinic patient: Yes    Hospitalization in the last 30 days: Yes       Advance Directives:  Code Status: Full Code  [place] DNR form completed and on chart: Not Indicated    Financial:  Payor: MEDICARE / Plan: MEDICARE PART A AND B / Product Type: *No Product type* /      Pharmacy:    [id number] - [place], [place] - [address] - P [phone] - F [phone] [address]  [facility] [address]  Phone: [phone] Fax: [phone]      Assistance purchasing medications?: Potential Assistance Purchasing Medications: No  Assistance provided by Case Management: None at this time    Does patient want to participate in local refill/ meds to beds program?:      Meds To Beds General Rules:  1. Can ONLY be done Monday- Friday between 8:30am-5pm  2. Prescription(s) must be in pharmacy by 3pm to be filled same day  3.Copy of patient's insurance/ prescription drug card and patient face sheet must be sent along with the prescription(s)  4. Cost of Rx cannot be added to hospital bill. If financial assistance is needed, please contact unit  or ;  or  CANNOT provide pharmacy voucher for patients co-pays  5. Patients can then  the prescription on their way out of the hospital at discharge, or pharmacy can deliver to the bedside if staff is available. (payment due at time of pick-up or delivery - cash, check, or card accepted)     Able to afford home medications/ co-pay costs: Yes    ADLS:  Current

## 2025-08-05 NOTE — PROGRESS NOTES
IV meds have completed to the best of this RN's knowledge (note added due to \"IV meds completed\" reminder on discharge screen).

## 2025-08-05 NOTE — PROGRESS NOTES
[facility] Orthopedic Surgery  Progress Note        DOS 7/26/2025, s/p ORIF left proximal humerus and shaft.    Pt comfortable, no c/o.  Drsg right shoulder D/C/I,  Minimal pain with right shoulder ROM, NVI    CBC:   Lab Results   Component Value Date/Time    WBC 5.0 08/04/2025 06:53 AM    RBC 3.06 08/04/2025 06:53 AM    HGB 9.8 08/04/2025 06:53 AM    HCT 29.3 08/04/2025 06:53 AM    MCV 95.8 08/04/2025 06:53 AM    MCH 31.9 08/04/2025 06:53 AM    MCHC 33.3 08/04/2025 06:53 AM    RDW 14.4 08/04/2025 06:53 AM     08/04/2025 06:53 AM    MPV 7.6 08/04/2025 06:53 AM     PT/INR:    Lab Results   Component Value Date/Time    PROTIME 12.9 07/11/2023 10:40 PM    INR 0.98 07/11/2023 10:40 PM     IMAGING: Xray, 2 views right humerus dated 8/4/2025 showed anatomic alignment of right proximal humerus and shaft fracture.  No other abnormality.    PTT:  No results found for: \"APTT\"[APTT    A/P: s/p ORIF left proximal humerus and shaft.  - Stable  - PT/OT, NWB right shoulder for 6 weeks, ok for ROM.  - Ok to D/C home from Rehab  from ortho standpoint.  - F/U Dr Shaffer in 6 weeks.      Simona Shaffer MD 8/5/2025 7:48 AM

## 2025-08-05 NOTE — DISCHARGE SUMMARY
Physical Medicine & Rehabilitation  Discharge Summary     Patient Identification:  Deborah Agustin  : 1955  Admit date: 2025  Discharge date:   Attending provider: Sander Valderrama DO        Primary care provider: Sara Bernal MD     Discharge Diagnoses:   Patient Active Problem List   Diagnosis    Psoriasis    Glaucoma    Osteoporosis    Hyperlipidemia    Hypothyroidism    Osteoarthritis of right hip    Bilateral carpal tunnel syndrome    Arthritis of carpometacarpal (CMC) joint of right thumb    Arthritis of carpometacarpal (CMC) joint of left thumb    Psoriatic arthritis (HCC)    High risk medication use    Closed dislocation of left hip (HCC)    Hip dislocation, left, initial encounter (HCC)    Obesity, class 3 (E66.813)    Dysarthria    Ocular migraine    Episodes of speech arrest    Primary hypertension    Humerus head fracture, right, closed, initial encounter    Closed fracture of right proximal humerus    Accidental fall    Closed displaced spiral fracture of shaft of right humerus    Debility       Discharge Functional Status:      Physical therapy:  Supine to Sit: Independent  Sit to Supine: Independent      Sit to Stand: Independent  Chair/Bed to Chair Transfer: Independent  Car Transfer: Independent     Ambulation 10 ft: Independent  Ambulation 50 ft: Independent  Ambulation 150 ft: Independent  Stairs - 1 Step: Independent  Stairs - 4 Step: Independent  Stairs - 12 Step: Independent    Occupational therapy:  Eating: Independent  Oral Hygiene: Independent  Bathing: Independent  Upper Body Dressing: Independent  Lower Body Dressing: Independent     Toilet Transfer: Independent  Toilet Hygiene: Independent    Speech therapy:         Inpatient Rehabilitation Course:   Deborah Agustin is a 69 y.o. female admitted to inpatient rehabilitation on 2025 s/p Debility.  The patient participated in an aggressive multidisciplinary inpatient rehabilitation program involving 3 hours of therapy per

## 2025-08-05 NOTE — FLOWSHEET NOTE
08/04/25 1938   Vital Signs   Temp 97.7 °F (36.5 °C)   Temp Source Oral   Pulse 64   Heart Rate Source Monitor   Respirations 17   BP (!) 157/76   MAP (Calculated) 103     Patient sitting in chair upon entering room. Ambulated to restroom and to bed stand by assist with gait belt. All safety measures in place. Vitals as shown, patient alert and oriented. Patient rates her pain 4/10 in RUE, medicated per MAR per request. Bed alarm on and call light within reach. Patient denies any needs at this time.

## 2025-08-05 NOTE — PLAN OF CARE
Problem: Discharge Planning  Goal: Discharge to home or other facility with appropriate resources  Outcome: Adequate for Discharge     Problem: Safety - Adult  Goal: Free from fall injury  Outcome: Adequate for Discharge     Problem: ABCDS Injury Assessment  Goal: Absence of physical injury  Outcome: Adequate for Discharge     Problem: Pain  Goal: Verbalizes/displays adequate comfort level or baseline comfort level  Outcome: Adequate for Discharge     Problem: Nutrition Deficit:  Goal: Optimize nutritional status  Outcome: Adequate for Discharge

## 2025-08-06 ENCOUNTER — CARE COORDINATION (OUTPATIENT)
Dept: CASE MANAGEMENT | Age: 70
End: 2025-08-06

## 2025-08-06 DIAGNOSIS — Z87.81 S/P ORIF (OPEN REDUCTION INTERNAL FIXATION) FRACTURE: Primary | ICD-10-CM

## 2025-08-06 DIAGNOSIS — Z98.890 S/P ORIF (OPEN REDUCTION INTERNAL FIXATION) FRACTURE: Primary | ICD-10-CM

## 2025-08-06 PROCEDURE — 1111F DSCHRG MED/CURRENT MED MERGE: CPT | Performed by: STUDENT IN AN ORGANIZED HEALTH CARE EDUCATION/TRAINING PROGRAM

## 2025-08-08 NOTE — PROGRESS NOTES
Physician Progress Note      PATIENT:               MGE CORTES  CSN #:                  [id number]  :                       1955  ADMIT DATE:       2025 10:36 AM  DISCH DATE:        2025 5:12 PM  RESPONDING  PROVIDER #:        Torsten Beckman MD          QUERY TEXT:    Based on your medical judgment, please clarify these findings and document if   any of the following are being evaluated and/or treated:    The clinical indicators include:  This is a 69 y.o female.   - Na - 133 >  - Na - 131   - IM - Nausea vomiting. IV fluids.  This was treated with 1L NSS bolus, IV zofran  Options provided:  -- This patient has hyponatremia  -- The hyponatremia is not clinically significant.  -- Other - I will add my own diagnosis  -- Disagree - Not applicable / Not valid  -- Disagree - Clinically unable to determine / Unknown  -- Refer to Clinical Documentation Reviewer    PROVIDER RESPONSE TEXT:    The hyponatremia is not clinically significant    Query created by: Gerhard Lake on 2025 10:38 AM      Electronically signed by:  Torsten Beckman MD 2025 8:28 AM

## 2025-08-13 ENCOUNTER — OFFICE VISIT (OUTPATIENT)
Dept: FAMILY MEDICINE CLINIC | Age: 70
End: 2025-08-13

## 2025-08-13 VITALS
DIASTOLIC BLOOD PRESSURE: 62 MMHG | WEIGHT: 233 LBS | BODY MASS INDEX: 41.29 KG/M2 | OXYGEN SATURATION: 96 % | SYSTOLIC BLOOD PRESSURE: 128 MMHG | TEMPERATURE: 97.5 F | HEART RATE: 61 BPM | HEIGHT: 63 IN

## 2025-08-13 DIAGNOSIS — I10 PRIMARY HYPERTENSION: ICD-10-CM

## 2025-08-13 DIAGNOSIS — H40.1132 PRIMARY OPEN ANGLE GLAUCOMA (POAG) OF BOTH EYES, MODERATE STAGE: ICD-10-CM

## 2025-08-13 DIAGNOSIS — S42.341D CLOSED DISPLACED SPIRAL FRACTURE OF SHAFT OF RIGHT HUMERUS WITH ROUTINE HEALING, SUBSEQUENT ENCOUNTER: ICD-10-CM

## 2025-08-13 DIAGNOSIS — G43.109 OCULAR MIGRAINE: ICD-10-CM

## 2025-08-13 DIAGNOSIS — Z09 HOSPITAL DISCHARGE FOLLOW-UP: ICD-10-CM

## 2025-08-13 DIAGNOSIS — S42.291A HUMERUS HEAD FRACTURE, RIGHT, CLOSED, INITIAL ENCOUNTER: ICD-10-CM

## 2025-08-13 DIAGNOSIS — R47.89 EPISODES OF SPEECH ARREST: ICD-10-CM

## 2025-08-13 DIAGNOSIS — E78.5 HYPERLIPIDEMIA, UNSPECIFIED HYPERLIPIDEMIA TYPE: ICD-10-CM

## 2025-08-13 DIAGNOSIS — L40.50 PSORIATIC ARTHRITIS (HCC): ICD-10-CM

## 2025-08-13 DIAGNOSIS — E03.9 ACQUIRED HYPOTHYROIDISM: ICD-10-CM

## 2025-08-13 DIAGNOSIS — L40.9 PSORIASIS: ICD-10-CM

## 2025-08-13 DIAGNOSIS — E66.813 OBESITY, CLASS 3 (HCC): Primary | ICD-10-CM

## 2025-08-13 DIAGNOSIS — M81.0 OSTEOPOROSIS WITHOUT CURRENT PATHOLOGICAL FRACTURE, UNSPECIFIED OSTEOPOROSIS TYPE: ICD-10-CM

## 2025-08-13 PROBLEM — R53.81 DEBILITY: Status: RESOLVED | Noted: 2025-07-29 | Resolved: 2025-08-13

## 2025-08-13 PROBLEM — S73.005A CLOSED DISLOCATION OF LEFT HIP (HCC): Status: RESOLVED | Noted: 2023-07-11 | Resolved: 2025-08-13

## 2025-08-13 PROBLEM — S73.005A HIP DISLOCATION, LEFT, INITIAL ENCOUNTER (HCC): Status: RESOLVED | Noted: 2023-07-11 | Resolved: 2025-08-13

## 2025-08-13 PROBLEM — Z79.899 HIGH RISK MEDICATION USE: Status: RESOLVED | Noted: 2022-11-08 | Resolved: 2025-08-13

## 2025-08-13 PROBLEM — G56.03 BILATERAL CARPAL TUNNEL SYNDROME: Status: RESOLVED | Noted: 2018-02-14 | Resolved: 2025-08-13

## 2025-08-13 PROBLEM — S42.201A CLOSED FRACTURE OF RIGHT PROXIMAL HUMERUS: Status: RESOLVED | Noted: 2025-07-26 | Resolved: 2025-08-13

## 2025-08-13 PROBLEM — M18.12 ARTHRITIS OF CARPOMETACARPAL (CMC) JOINT OF LEFT THUMB: Status: RESOLVED | Noted: 2018-10-01 | Resolved: 2025-08-13

## 2025-08-13 PROBLEM — M18.11 ARTHRITIS OF CARPOMETACARPAL (CMC) JOINT OF RIGHT THUMB: Status: RESOLVED | Noted: 2018-10-01 | Resolved: 2025-08-13
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
## 2025-08-13 RX ORDER — VALSARTAN AND HYDROCHLOROTHIAZIDE 80; 12.5 MG/1; MG/1
1 TABLET, FILM COATED ORAL DAILY
COMMUNITY

## 2025-08-14 ENCOUNTER — CARE COORDINATION (OUTPATIENT)
Dept: CASE MANAGEMENT | Age: 70
End: 2025-08-14

## 2025-08-20 ENCOUNTER — CARE COORDINATION (OUTPATIENT)
Dept: CASE MANAGEMENT | Age: 70
End: 2025-08-20

## 2025-08-28 ENCOUNTER — CARE COORDINATION (OUTPATIENT)
Dept: CASE MANAGEMENT | Age: 70
End: 2025-08-28

## 2025-08-28 DIAGNOSIS — D64.9 ANEMIA, UNSPECIFIED TYPE: ICD-10-CM

## 2025-08-28 LAB
BASOPHILS # BLD: 0 K/UL (ref 0–0.2)
BASOPHILS NFR BLD: 0.8 %
DEPRECATED RDW RBC AUTO: 15 % (ref 12.4–15.4)
EOSINOPHIL # BLD: 0.3 K/UL (ref 0–0.6)
EOSINOPHIL NFR BLD: 6 %
HCT VFR BLD AUTO: 34.2 % (ref 36–48)
HGB BLD-MCNC: 11.6 G/DL (ref 12–16)
LYMPHOCYTES # BLD: 1.6 K/UL (ref 1–5.1)
LYMPHOCYTES NFR BLD: 38 %
MCH RBC QN AUTO: 32.8 PG (ref 26–34)
MCHC RBC AUTO-ENTMCNC: 34 G/DL (ref 31–36)
MCV RBC AUTO: 96.3 FL (ref 80–100)
MONOCYTES # BLD: 0.4 K/UL (ref 0–1.3)
MONOCYTES NFR BLD: 8.7 %
NEUTROPHILS # BLD: 2 K/UL (ref 1.7–7.7)
NEUTROPHILS NFR BLD: 46.5 %
PLATELET # BLD AUTO: 272 K/UL (ref 135–450)
PMV BLD AUTO: 8.3 FL (ref 5–10.5)
RBC # BLD AUTO: 3.55 M/UL (ref 4–5.2)
WBC # BLD AUTO: 4.3 K/UL (ref 4–11)

## 2025-09-05 ENCOUNTER — CARE COORDINATION (OUTPATIENT)
Dept: CARE COORDINATION | Age: 70
End: 2025-09-05

## (undated) DEVICE — K WIRE FIX L6IN DIA0.062IN 1600662] MICROAIRE SURGICAL INSTRUMENTS INC]

## (undated) DEVICE — BANDAGE COBAN 4 IN COMPR W4INXL5YD FOAM COHESIVE QUIK STK SELF ADH SFT

## (undated) DEVICE — TOWEL,OR,DSP,ST,BLUE,DLX,8/PK,10PK/CS: Brand: MEDLINE

## (undated) DEVICE — COVER,MAYO STAND,XL,STERILE: Brand: MEDLINE

## (undated) DEVICE — STAPLER SKIN H3.9MM WIRE DIA0.58MM CRWN 6.9MM 35 STPL FIX

## (undated) DEVICE — SYRINGE IRRIG 60ML SFT PLIABLE BLB EZ TO GRP 1 HND USE W/

## (undated) DEVICE — ZIMMER® STERILE DISPOSABLE TOURNIQUET CUFF WITH PLC, DUAL PORT, SINGLE BLADDER, 30 IN. (76 CM)

## (undated) DEVICE — APPLICATOR MEDICATED 26 CC SOLUTION HI LT ORNG CHLORAPREP

## (undated) DEVICE — PAD,ABDOMINAL,5"X9",ST,LF,25/BX: Brand: MEDLINE

## (undated) DEVICE — SYRINGE 20ML LL S/C 50

## (undated) DEVICE — JEWISH HOSPITAL TURNOVER KIT: Brand: MEDLINE INDUSTRIES, INC.

## (undated) DEVICE — EYE PROTECTOR FOAM MEDICHOICE

## (undated) DEVICE — STRIP SKIN CLSR W0.25XL4IN WHT SPUNBOUND FBR NYL HI ADH

## (undated) DEVICE — SUTURE VICRYL SZ 2-0 L18IN ABSRB UD CT-1 L36MM 1/2 CIR J839D

## (undated) DEVICE — GARMENT,MEDLINE,DVT,INT,CALF,MED, GEN2: Brand: MEDLINE

## (undated) DEVICE — E-Z CLEAN, NON-STICK, PTFE COATED, ELECTROSURGICAL BLADE ELECTRODE, 2.5 INCH (6.35 CM): Brand: EZ CLEAN

## (undated) DEVICE — GLOVE SURG SZ 8 CRM LTX FREE POLYISOPRENE POLYMER BEAD ANTI

## (undated) DEVICE — DRAPE C ARM W54XL84IN MINI FOR OEC 6800

## (undated) DEVICE — STRIP,CLOSURE,WOUND,MEDI-STRIP,1/2X4: Brand: MEDLINE

## (undated) DEVICE — GLOVE SURG SZ 8 L12IN FNGR THK79MIL GRN LTX FREE

## (undated) DEVICE — TUBING, SUCTION, 1/4" X 12', STRAIGHT: Brand: MEDLINE

## (undated) DEVICE — UNTHREADED GUIDE WIRE: Brand: FIXOS

## (undated) DEVICE — STANDARD HYPODERMIC NEEDLE,POLYPROPYLENE HUB: Brand: MONOJECT

## (undated) DEVICE — SHEET,T,THYROID,STERILE: Brand: MEDLINE

## (undated) DEVICE — SUTURE VICRYL + SZ 0 L18IN ABSRB UD L36MM CT-1 1/2 CIR VCP840D

## (undated) DEVICE — BLADE SURG SAW OSC ST S STL 28.5MM LEN 28MM CUT DEPTH

## (undated) DEVICE — SUTURE VICRYL + SZ 4-0 L27IN ABSRB UD PS-2 3/8 CIR REV CUT VCP426H

## (undated) DEVICE — DRILL BIT  TI LOCKING, MEDIUM,: Brand: AXSOS

## (undated) DEVICE — SPONGE,LAP,18"X18",DLX,XR,ST,5/PK,40/PK: Brand: MEDLINE

## (undated) DEVICE — INTENDED FOR TISSUE SEPARATION, AND OTHER PROCEDURES THAT REQUIRE A SHARP SURGICAL BLADE TO PUNCTURE OR CUT.: Brand: BARD-PARKER ® CARBON RIB-BACK BLADES

## (undated) DEVICE — SUTURE COAT VCRL SZ 4-0 L18IN ABSRB UD L19MM PS-2 1/2 CIR J496G

## (undated) DEVICE — MASTISOL ADHESIVE LIQ 2/3ML

## (undated) DEVICE — SOLUTION IV 1000ML 0.9% SOD CHL

## (undated) DEVICE — OPTIFOAM GENTLE SA, POSTOP, 4X8: Brand: MEDLINE

## (undated) DEVICE — SUTURE VCRL SZ 3-0 L27IN ABSRB UD L26MM CT-2 1/2 CIR J232H

## (undated) DEVICE — PLATE ES AD W 9FT CRD 2

## (undated) DEVICE — GOWN,SIRUS,POLYRNF,BRTHSLV,XL,30/CS: Brand: MEDLINE

## (undated) DEVICE — GAUZE,SPONGE,4"X4",16PLY,XRAY,STRL,LF: Brand: MEDLINE

## (undated) DEVICE — COVER LT HNDL BLU PLAS

## (undated) DEVICE — PODIATRY PK

## (undated) DEVICE — PADDING CAST W4INXL4YD HIGHLY ABSRB THAN COT EZ APPL

## (undated) DEVICE — SUTURE VICRYL + SZ 3-0 L18IN CT 1 CR ABSRB VCP838D

## (undated) DEVICE — K WIRE FIX L6IN DIA0.045IN 1600645] MICROAIRE SURGICAL INSTRUMENTS INC]

## (undated) DEVICE — VELCLOSE LATEX FREE ELASTIC BANDAGE D/L 6INX10YD

## (undated) DEVICE — STERILE PVP: Brand: MEDLINE INDUSTRIES, INC.

## (undated) DEVICE — GLOVE ORANGE PI 7 1/2   MSG9075

## (undated) DEVICE — DRAPE EQUIP FOOTSWITCH 24X20 IN PUL CORDAND CRD LCK NS

## (undated) DEVICE — TOWEL,STOP FLAG GOLD N-W: Brand: MEDLINE

## (undated) DEVICE — Device

## (undated) DEVICE — TOTAL SHOULDER: Brand: MEDLINE INDUSTRIES, INC.

## (undated) DEVICE — 4-PORT MANIFOLD: Brand: NEPTUNE 2

## (undated) DEVICE — MICRO SAGITTAL BLADE (9.4 X 0.4 X 26.2MM)

## (undated) DEVICE — SUTURE MCRYL SZ 4-0 L27IN ABSRB UD L19MM PS-2 1/2 CIR PRIM Y426H

## (undated) DEVICE — GLOVE ORTHO 8   MSG9480

## (undated) DEVICE — PADDING,UNDERCAST,COTTON, 4"X4YD STERILE: Brand: MEDLINE

## (undated) DEVICE — SYRINGE MED 10ML LUERLOCK TIP W/O SFTY DISP